# Patient Record
Sex: MALE | Race: BLACK OR AFRICAN AMERICAN | Employment: UNEMPLOYED | URBAN - METROPOLITAN AREA
[De-identification: names, ages, dates, MRNs, and addresses within clinical notes are randomized per-mention and may not be internally consistent; named-entity substitution may affect disease eponyms.]

---

## 2017-02-11 ENCOUNTER — HOSPITAL ENCOUNTER (EMERGENCY)
Age: 67
Discharge: HOME | End: 2017-02-11
Attending: EMERGENCY MEDICINE
Payer: MEDICARE

## 2017-02-11 ENCOUNTER — APPOINTMENT (OUTPATIENT)
Dept: GENERAL RADIOLOGY | Age: 67
End: 2017-02-11
Attending: EMERGENCY MEDICINE
Payer: MEDICARE

## 2017-02-11 VITALS
HEART RATE: 100 BPM | HEIGHT: 67 IN | RESPIRATION RATE: 20 BRPM | SYSTOLIC BLOOD PRESSURE: 181 MMHG | WEIGHT: 284.83 LBS | BODY MASS INDEX: 44.71 KG/M2 | DIASTOLIC BLOOD PRESSURE: 85 MMHG | OXYGEN SATURATION: 99 % | TEMPERATURE: 100 F

## 2017-02-11 DIAGNOSIS — M70.31 BURSITIS OF RIGHT ELBOW: Primary | ICD-10-CM

## 2017-02-11 LAB
ALBUMIN SERPL BCP-MCNC: 2.8 G/DL (ref 3.5–5)
ALBUMIN/GLOB SERPL: 0.5 {RATIO} (ref 1.1–2.2)
ALP SERPL-CCNC: 111 U/L (ref 45–117)
ALT SERPL-CCNC: 20 U/L (ref 12–78)
ANION GAP BLD CALC-SCNC: 6 MMOL/L (ref 5–15)
AST SERPL W P-5'-P-CCNC: 25 U/L (ref 15–37)
BASOPHILS # BLD AUTO: 0 K/UL (ref 0–0.1)
BASOPHILS # BLD: 0 % (ref 0–1)
BILIRUB SERPL-MCNC: 0.6 MG/DL (ref 0.2–1)
BUN SERPL-MCNC: 18 MG/DL (ref 6–20)
BUN/CREAT SERPL: 9 (ref 12–20)
CALCIUM SERPL-MCNC: 8.9 MG/DL (ref 8.5–10.1)
CHLORIDE SERPL-SCNC: 98 MMOL/L (ref 97–108)
CK MB CFR SERPL CALC: 0.3 % (ref 0–2.5)
CK MB SERPL-MCNC: 1 NG/ML (ref 5–25)
CK SERPL-CCNC: 310 U/L (ref 39–308)
CO2 SERPL-SCNC: 29 MMOL/L (ref 21–32)
CREAT SERPL-MCNC: 1.98 MG/DL (ref 0.7–1.3)
CRP SERPL-MCNC: 18.28 MG/DL
DIFFERENTIAL METHOD BLD: ABNORMAL
EOSINOPHIL # BLD: 0 K/UL (ref 0–0.4)
EOSINOPHIL NFR BLD: 0 % (ref 0–7)
ERYTHROCYTE [DISTWIDTH] IN BLOOD BY AUTOMATED COUNT: 14 % (ref 11.5–14.5)
ERYTHROCYTE [SEDIMENTATION RATE] IN BLOOD: 91 MM/HR (ref 0–20)
GLOBULIN SER CALC-MCNC: 5.6 G/DL (ref 2–4)
GLUCOSE SERPL-MCNC: 246 MG/DL (ref 65–100)
HCT VFR BLD AUTO: 33 % (ref 36.6–50.3)
HGB BLD-MCNC: 10.6 G/DL (ref 12.1–17)
LYMPHOCYTES # BLD AUTO: 11 % (ref 12–49)
LYMPHOCYTES # BLD: 1.5 K/UL
MCH RBC QN AUTO: 25.7 PG (ref 26–34)
MCHC RBC AUTO-ENTMCNC: 32.1 G/DL (ref 30–36.5)
MCV RBC AUTO: 80.1 FL (ref 80–99)
MONOCYTES # BLD: 0.9 K/UL (ref 0–1)
MONOCYTES NFR BLD AUTO: 7 % (ref 5–13)
NEUTS SEG # BLD: 10.4 K/UL (ref 1.8–8)
NEUTS SEG NFR BLD AUTO: 82 % (ref 32–75)
PLATELET # BLD AUTO: 274 K/UL (ref 150–400)
POTASSIUM SERPL-SCNC: 5.1 MMOL/L (ref 3.5–5.1)
PROT SERPL-MCNC: 8.4 G/DL (ref 6.4–8.2)
RBC # BLD AUTO: 4.12 M/UL (ref 4.1–5.7)
SODIUM SERPL-SCNC: 133 MMOL/L (ref 136–145)
TROPONIN I SERPL-MCNC: <0.04 NG/ML
WBC # BLD AUTO: 12.8 K/UL (ref 4.1–11.1)

## 2017-02-11 PROCEDURE — 73080 X-RAY EXAM OF ELBOW: CPT

## 2017-02-11 PROCEDURE — 73110 X-RAY EXAM OF WRIST: CPT

## 2017-02-11 PROCEDURE — 85652 RBC SED RATE AUTOMATED: CPT | Performed by: EMERGENCY MEDICINE

## 2017-02-11 PROCEDURE — 71020 XR CHEST PA LAT: CPT

## 2017-02-11 PROCEDURE — A9270 NON-COVERED ITEM OR SERVICE: HCPCS | Performed by: EMERGENCY MEDICINE

## 2017-02-11 PROCEDURE — 74011250637 HC RX REV CODE- 250/637: Performed by: EMERGENCY MEDICINE

## 2017-02-11 PROCEDURE — 36415 COLL VENOUS BLD VENIPUNCTURE: CPT | Performed by: EMERGENCY MEDICINE

## 2017-02-11 PROCEDURE — 99283 EMERGENCY DEPT VISIT LOW MDM: CPT

## 2017-02-11 PROCEDURE — 85025 COMPLETE CBC W/AUTO DIFF WBC: CPT | Performed by: EMERGENCY MEDICINE

## 2017-02-11 PROCEDURE — 86140 C-REACTIVE PROTEIN: CPT | Performed by: EMERGENCY MEDICINE

## 2017-02-11 PROCEDURE — 80053 COMPREHEN METABOLIC PANEL: CPT | Performed by: EMERGENCY MEDICINE

## 2017-02-11 PROCEDURE — 84484 ASSAY OF TROPONIN QUANT: CPT | Performed by: EMERGENCY MEDICINE

## 2017-02-11 PROCEDURE — 74011636637 HC RX REV CODE- 636/637: Performed by: EMERGENCY MEDICINE

## 2017-02-11 PROCEDURE — 82550 ASSAY OF CK (CPK): CPT | Performed by: EMERGENCY MEDICINE

## 2017-02-11 RX ORDER — AMLODIPINE BESYLATE 10 MG/1
TABLET ORAL DAILY
COMMUNITY
End: 2018-02-20

## 2017-02-11 RX ORDER — OXYCODONE AND ACETAMINOPHEN 5; 325 MG/1; MG/1
1 TABLET ORAL
Status: COMPLETED | OUTPATIENT
Start: 2017-02-11 | End: 2017-02-11

## 2017-02-11 RX ORDER — PREDNISONE 20 MG/1
60 TABLET ORAL DAILY
Qty: 15 TAB | Refills: 0 | Status: SHIPPED | OUTPATIENT
Start: 2017-02-11 | End: 2017-02-16

## 2017-02-11 RX ORDER — PREDNISONE 20 MG/1
60 TABLET ORAL
Status: COMPLETED | OUTPATIENT
Start: 2017-02-11 | End: 2017-02-11

## 2017-02-11 RX ADMIN — OXYCODONE HYDROCHLORIDE AND ACETAMINOPHEN 1 TABLET: 5; 325 TABLET ORAL at 14:14

## 2017-02-11 RX ADMIN — PREDNISONE 60 MG: 20 TABLET ORAL at 14:14

## 2017-02-11 NOTE — DISCHARGE INSTRUCTIONS
Bursitis of the Elbow: Care Instructions  Your Care Instructions  Bursitis is pain and swelling of the bursae, which are sacs of fluid that help your joints move smoothly. Olecranon bursitis is a type of bursitis that affects the back of the elbow. This is sometimes called Timothy elbow because the bump that develops looks like the cartoon character Timothy's elbow. Injury, overuse, or prolonged pressure on your elbow can cause this form of bursitis. Sometimes it happens when people have arthritis. It also can occur for unknown reasons. Treatment may include draining fluid from the bursa with a needle. If your doctor thought there was infection, he or she may have prescribed antibiotics. You also may get shots of medicine into the bursa to help the swelling go down. Your elbow should get better in a few days or weeks. Follow-up care is a key part of your treatment and safety. Be sure to make and go to all appointments, and call your doctor if you are having problems. It's also a good idea to know your test results and keep a list of the medicines you take. How can you care for yourself at home? · Take pain medicines exactly as directed. ¨ If the doctor gave you a prescription medicine for pain, take it as prescribed. ¨ If you are not taking a prescription pain medicine, ask your doctor if you can take an over-the-counter medicine. ¨ Do not take two or more pain medicines at the same time unless the doctor told you to. Many pain medicines have acetaminophen, which is Tylenol. Too much acetaminophen (Tylenol) can be harmful. · If your doctor prescribed antibiotics, take them as directed. Do not stop taking them just because you feel better. You need to take the full course of antibiotics. · If your doctor gave you a sling, an elastic bandage, or a compression sleeve, wear it exactly as instructed. · Put ice or a cold pack on your elbow for 10 to 20 minutes at a time.  Try to do this every 1 to 2 hours for the next 3 days (when you are awake) or until the swelling goes down. Put a thin cloth between the ice and your skin. · After 3 days, you can try heat, or alternate heat and ice. · Rest your elbow. Try to stop or reduce any activity that causes pain. · Wear elbow pads during physical activity to prevent injury. · Do not lean your elbows on tables or armrests. When should you call for help? Call your doctor now or seek immediate medical care if:  · Your pain is worse. · You have new or increased swelling in your elbow. · You have a fever. · Your elbow becomes red, or the redness gets worse. · You were given a shot and you have any bleeding or signs of infection (pain, swelling, redness, or pus) around the site of the shot. · You cannot use a joint, or the pain in a joint gets worse. Watch closely for changes in your health, and be sure to contact your doctor if:  · Your pain has not improved after 2 weeks. Where can you learn more? Go to http://vandana-sherri.info/. Enter  in the search box to learn more about \"Bursitis of the Elbow: Care Instructions. \"  Current as of: May 23, 2016  Content Version: 11.1  © 0479-9474 [employer]. Care instructions adapted under license by [employer] (which disclaims liability or warranty for this information). If you have questions about a medical condition or this instruction, always ask your healthcare professional. [place] [address] any warranty or liability for your use of this information. We hope that we have addressed all of your medical concerns. The examination and treatment you received in the Emergency Department were for an emergent problem and were not intended as complete care. It is important that you follow up with your healthcare provider(s) for ongoing care.  If your symptoms worsen or do not improve as expected, and you are unable to reach your usual health care provider(s), you should return to the Emergency Department. Today's healthcare is undergoing tremendous change, and patient satisfaction surveys are one of the many tools to assess the quality of medical care. You may receive a survey from the [employer] regarding your experience in the Emergency Department. I hope that your experience has been completely positive, particularly the medical care that I provided. As such, please participate in the survey; anything less than excellent does not meet my expectations or intentions. [facility] and [facility] participate in nationally recognized quality of care measures. If your blood pressure is greater than 120/80, as reported below, we urge that you seek medical care to address the potential of high blood pressure, commonly known as hypertension. Hypertension can be hereditary or can be caused by certain medical conditions, pain, stress, or \"white coat syndrome. \"       Please make an appointment with your health care provider(s) for follow up of your Emergency Department visit. VITALS:   Patient Vitals for the past 8 hrs:   Temp Pulse Resp BP SpO2   02/11/17 1501 - (!) 104 20 172/89 99 %   02/11/17 1335 100 °F (37.8 °C) (!) 106 18 (!) 155/104 100 %          Thank you for allowing us to provide you with medical care today. We realize that you have many choices for your emergency care needs. Please choose us in the future for any continued health care needs. Delphine Johansen [address], [address]: [phone]            Recent Results (from the past 24 hour(s))   METABOLIC PANEL, COMPREHENSIVE    Collection Time: 02/11/17  2:02 PM   Result Value Ref Range    Sodium 133 (L) 136 - 145 mmol/L    Potassium 5.1 3.5 - 5.1 mmol/L    Chloride 98 97 - 108 mmol/L    CO2 29 21 - 32 mmol/L    Anion gap 6 5 - 15 mmol/L    Glucose 246 (H) 65 - 100 mg/dL    BUN 18 6 - 20 MG/DL    Creatinine 1.98 (H) 0.70 - 1.30 MG/DL    BUN/Creatinine ratio 9 (L) 12 - 20      GFR est AA 41 (L) >60 ml/min/1.73m2    GFR est non-AA 34 (L) >60 ml/min/1.73m2    Calcium 8.9 8.5 - 10.1 MG/DL    Bilirubin, total 0.6 0.2 - 1.0 MG/DL    ALT (SGPT) 20 12 - 78 U/L    AST (SGOT) 25 15 - 37 U/L    Alk. phosphatase 111 45 - 117 U/L    Protein, total 8.4 (H) 6.4 - 8.2 g/dL    Albumin 2.8 (L) 3.5 - 5.0 g/dL    Globulin 5.6 (H) 2.0 - 4.0 g/dL    A-G Ratio 0.5 (L) 1.1 - 2.2     CK W/ CKMB & INDEX    Collection Time: 02/11/17  2:02 PM   Result Value Ref Range     (H) 39 - 308 U/L    CK - MB 1.0 <3.6 NG/ML    CK-MB Index 0.3 0 - 2.5     CBC WITH AUTOMATED DIFF    Collection Time: 02/11/17  2:02 PM   Result Value Ref Range    WBC 12.8 (H) 4.1 - 11.1 K/uL    RBC 4.12 4.10 - 5.70 M/uL    HGB 10.6 (L) 12.1 - 17.0 g/dL    HCT 33.0 (L) 36.6 - 50.3 %    MCV 80.1 80.0 - 99.0 FL    MCH 25.7 (L) 26.0 - 34.0 PG    MCHC 32.1 30.0 - 36.5 g/dL    RDW 14.0 11.5 - 14.5 %    PLATELET 012 258 - 674 K/uL    NEUTROPHILS 82 (H) 32 - 75 %    LYMPHOCYTES 11 (L) 12 - 49 %    MONOCYTES 7 5 - 13 %    EOSINOPHILS 0 0 - 7 %    BASOPHILS 0 0 - 1 %    ABS. NEUTROPHILS 10.4 (H) 1.8 - 8.0 K/UL    ABS. LYMPHOCYTES 1.5 K/UL    ABS. MONOCYTES 0.9 0.0 - 1.0 K/UL    ABS. EOSINOPHILS 0.0 0.0 - 0.4 K/UL    ABS. BASOPHILS 0.0 0.0 - 0.1 K/UL    DF AUTOMATED     TROPONIN I    Collection Time: 02/11/17  2:02 PM   Result Value Ref Range    Troponin-I, Qt. <0.04 <0.08 ng/mL   SED RATE (ESR)    Collection Time: 02/11/17  2:02 PM   Result Value Ref Range    Sed rate, automated 91 (H) 0 - 20 mm/hr   C REACTIVE PROTEIN, QT    Collection Time: 02/11/17  2:02 PM   Result Value Ref Range    C-Reactive protein 18.28 (H) <0.60 mg/dL       Xr Chest Pa Lat    Result Date: 2/11/2017  INDICATION: . R arm pain COMPARISON: Previous chest xray, 7/17/2014. Faye Po FINDINGS: PA and lateral view of the chest. . Lines/tubes/surgical: None. Heart/mediastinum: Unremarkable. Lungs/pleura:  No focal consolidation or mass. No visualized pleural effusion or pneumothorax. Additional Comments: None. Kaitlin Rae IMPRESSION: 1. No radiographic evidence of acute cardiopulmonary disease. Xr Elbow Rt Min 3 V    Result Date: 2/11/2017  XR WRIST RT AP/LAT/OBL MIN 3V, XR ELBOW RT MIN 3 V ,2/11/2017 2:14 PM INDICATION: Additional history: Chronic hand pain which extends up the forearm to the elbow COMPARISON: None . FINDINGS: ELBOW: 3 views The bones, joints, and soft tissues of the elbow are normal.  No elbow joint effusion is identified. . WRIST: 3 views Lucencies in the carpal bones and ulnar styloid. Degenerative changes in the wrists which are age appropriate. No visible fracture or malalignment. .    IMPRESSION:  1. Erosions versus subchondral cyst formation in the carpal bones and possible erosion in the ulnar styloid process. Xr Wrist Rt Ap/lat/obl Min 3v    Result Date: 2/11/2017  XR WRIST RT AP/LAT/OBL MIN 3V, XR ELBOW RT MIN 3 V ,2/11/2017 2:14 PM INDICATION: Additional history: Chronic hand pain which extends up the forearm to the elbow COMPARISON: None . FINDINGS: ELBOW: 3 views The bones, joints, and soft tissues of the elbow are normal.  No elbow joint effusion is identified. . WRIST: 3 views Lucencies in the carpal bones and ulnar styloid. Degenerative changes in the wrists which are age appropriate. No visible fracture or malalignment. .    IMPRESSION:  1. Erosions versus subchondral cyst formation in the carpal bones and possible erosion in the ulnar styloid process.

## 2017-02-11 NOTE — ED NOTES
Patient discharged by Dr. Johnny Dozier. IV removed. Vital signs re-assessed. Patient has no further questions or concerns regarding discharge at this time. Patient given one prescription. Patient dressed self and ambulated to ED lobby.

## 2017-02-11 NOTE — ED PROVIDER NOTES
HPI Comments: R handed male c/o atraumatic R elbow pain' x 2-3d; pt denies HA, vison changes, diff swallowing, CP, SOB, Abd pain, F/Ch, N/V, D/Cons or other current systemic complaints    Patient is a 78 y.o. male presenting with wrist pain and elbow pain. The history is provided by the patient and a relative. Wrist Pain    This is a new problem. The current episode started 2 days ago. The problem occurs constantly. The problem has been gradually worsening. The pain is present in the right elbow and right wrist. The quality of the pain is described as aching, pounding and constant. The pain is mild. Associated symptoms include limited range of motion and stiffness. Pertinent negatives include no numbness, no tingling, no itching, no back pain and no neck pain. The symptoms are aggravated by palpation, movement and activity. He has tried nothing for the symptoms. The treatment provided no relief. There has been no history of extremity trauma. denies   Elbow Pain    Associated symptoms include limited range of motion and stiffness. Pertinent negatives include no numbness, no tingling, no itching, no back pain and no neck pain. denies        Past Medical History:   Diagnosis Date    Anemia     Chronic kidney disease      UTI    Diabetes (HCC)     Gastrointestinal disorder      anemia    Hypertension     Kidney disease, chronic, stage II (GFR 60-89 ml/min)     Neurological disorder      Metabolic Brain Disorder       Past Surgical History:   Procedure Laterality Date    Hx other surgical       never         Family History:   Problem Relation Age of Onset    Diabetes Mother     Cancer Sister        Social History     Social History    Marital status:      Spouse name: N/A    Number of children: N/A    Years of education: N/A     Occupational History    Not on file.      Social History Main Topics    Smoking status: Former Smoker     Packs/day: 0.50     Years: 30.00     Types: Cigarettes     Quit date: 1/1/1994    Smokeless tobacco: Never Used    Alcohol use No    Drug use: No    Sexual activity: Yes     Partners: Female     Other Topics Concern    Not on file     Social History Narrative         ALLERGIES: Review of patient's allergies indicates no known allergies. Review of Systems   Musculoskeletal: Positive for arthralgias, myalgias and stiffness. Negative for back pain, gait problem, joint swelling and neck pain. Skin: Negative for itching. Neurological: Negative for tingling and numbness. All other systems reviewed and are negative. Vitals:    02/11/17 1335 02/11/17 1501 02/11/17 1546   BP: (!) 155/104 172/89 181/85   Pulse: (!) 106 (!) 104 100   Resp: 18 20 20   Temp: 100 °F (37.8 °C)     SpO2: 100% 99% 99%   Weight: 129.2 kg (284 lb 13.4 oz)     Height: 5' 7\" (1.702 m)              Physical Exam   Constitutional: He is oriented to person, place, and time. He appears well-developed and well-nourished. No distress. R handed, NAD, AxOx4, speaking in complete sentences     HENT:   Head: Normocephalic and atraumatic. Nose: Nose normal.   Mouth/Throat: Oropharynx is clear and moist. No oropharyngeal exudate. Eyes: Conjunctivae and EOM are normal. Pupils are equal, round, and reactive to light. Right eye exhibits no discharge. Left eye exhibits no discharge. Neck: Normal range of motion. Neck supple. Cardiovascular: Normal rate, regular rhythm, normal heart sounds and intact distal pulses. Exam reveals no gallop and no friction rub. No murmur heard. Pulmonary/Chest: Effort normal and breath sounds normal. No respiratory distress. He has no wheezes. He has no rales. He exhibits no tenderness. Abdominal: Soft. Bowel sounds are normal. He exhibits no distension and no mass. There is no tenderness. There is no rebound and no guarding. nttp   Musculoskeletal: He exhibits tenderness. He exhibits no edema or deformity.    R elbow - min ttp at lat > medial aspect; skin intact/ no redness/ deformity; decreased rom there/ wrist 2ary to pain/ no s box ttp;  R = L; pt has distal motor/ CV/ Sensation grossly intact all 5 R fingers; Lymphadenopathy:     He has no cervical adenopathy. Neurological: He is alert and oriented to person, place, and time. He has normal reflexes. No cranial nerve deficit. Coordination normal.   Skin: Skin is warm and dry. No rash noted. No erythema. Psychiatric: He has a normal mood and affect. Nursing note and vitals reviewed. MDM  ED Course       Procedures    Chief Complaint   Patient presents with    Wrist Pain    Elbow Pain       2:10 PM  The patients presenting problems have been discussed, and they are in agreement with the care plan formulated and outlined with them. I have encouraged them to ask questions as they arise throughout their visit. MEDICATIONS GIVEN:  Medications   oxyCODONE-acetaminophen (PERCOCET) 5-325 mg per tablet 1 Tab (not administered)   predniSONE (DELTASONE) tablet 60 mg (not administered)       LABS REVIEWED:  Labs Reviewed   METABOLIC PANEL, COMPREHENSIVE   CK W/ CKMB & INDEX   CBC WITH AUTOMATED DIFF   TROPONIN I   SED RATE (ESR)   C REACTIVE PROTEIN, QT   SAMPLES BEING HELD       RADIOLOGY RESULTS:  The following have been ordered and reviewed:  _____________________________________________________________________  _____________________________________________________________________        PROCEDURES:        CONSULTATIONS:       PROGRESS NOTES:      DIAGNOSIS:    1. Bursitis of right elbow        PLAN:  1- elbow bursitis; 'feels better now'; agrees w/ plans;   2 elevated Cr - at baseline;       ED COURSE: The patients hospital course has been uncomplicated. 2:30 PM  'feeling better'; awaiting results;      3:40 PM  Emmett Dallas's  results have been reviewed with him. He has been counseled regarding his diagnosis.   He verbally conveys understanding and agreement of the signs, symptoms, diagnosis, treatment and prognosis and additionally agrees to Call/ Arrange follow up as recommended with Dr. Alex Drake MD in 24 - 48 hours. He also agrees with the care-plan and conveys that all of his questions have been answered. I have also put together some discharge instructions for him that include: 1) educational information regarding their diagnosis, 2) how to care for their diagnosis at home, as well a 3) list of reasons why they would want to return to the ED prior to their follow-up appointment, should their condition change or for concerns.

## 2017-07-09 ENCOUNTER — APPOINTMENT (OUTPATIENT)
Dept: GENERAL RADIOLOGY | Age: 67
End: 2017-07-09
Attending: EMERGENCY MEDICINE
Payer: MEDICARE

## 2017-07-09 ENCOUNTER — HOSPITAL ENCOUNTER (EMERGENCY)
Age: 67
Discharge: HOME | End: 2017-07-09
Attending: EMERGENCY MEDICINE
Payer: MEDICARE

## 2017-07-09 VITALS
HEART RATE: 116 BPM | BODY MASS INDEX: 40.73 KG/M2 | TEMPERATURE: 100 F | DIASTOLIC BLOOD PRESSURE: 81 MMHG | SYSTOLIC BLOOD PRESSURE: 152 MMHG | HEIGHT: 68 IN | OXYGEN SATURATION: 97 % | WEIGHT: 268.74 LBS | RESPIRATION RATE: 21 BRPM

## 2017-07-09 DIAGNOSIS — M25.532 LEFT WRIST PAIN: Primary | ICD-10-CM

## 2017-07-09 LAB
ALBUMIN SERPL BCP-MCNC: 2.9 G/DL (ref 3.5–5)
ALBUMIN/GLOB SERPL: 0.5 {RATIO} (ref 1.1–2.2)
ALP SERPL-CCNC: 98 U/L (ref 45–117)
ALT SERPL-CCNC: 18 U/L (ref 12–78)
ANION GAP BLD CALC-SCNC: 12 MMOL/L (ref 5–15)
APTT PPP: 31.6 SEC (ref 22.1–32.5)
AST SERPL W P-5'-P-CCNC: 16 U/L (ref 15–37)
BASOPHILS # BLD AUTO: 0 K/UL (ref 0–0.1)
BASOPHILS # BLD: 0 % (ref 0–1)
BILIRUB SERPL-MCNC: 0.5 MG/DL (ref 0.2–1)
BUN SERPL-MCNC: 18 MG/DL (ref 6–20)
BUN/CREAT SERPL: 8 (ref 12–20)
CALCIUM SERPL-MCNC: 8.7 MG/DL (ref 8.5–10.1)
CHLORIDE SERPL-SCNC: 97 MMOL/L (ref 97–108)
CO2 SERPL-SCNC: 24 MMOL/L (ref 21–32)
CREAT SERPL-MCNC: 2.22 MG/DL (ref 0.7–1.3)
CRP SERPL-MCNC: 23.13 MG/DL
DIFFERENTIAL METHOD BLD: ABNORMAL
EOSINOPHIL # BLD: 0 K/UL (ref 0–0.4)
EOSINOPHIL NFR BLD: 0 % (ref 0–7)
ERYTHROCYTE [DISTWIDTH] IN BLOOD BY AUTOMATED COUNT: 15.9 % (ref 11.5–14.5)
ERYTHROCYTE [SEDIMENTATION RATE] IN BLOOD: 27 MM/HR (ref 0–20)
GLOBULIN SER CALC-MCNC: 5.3 G/DL (ref 2–4)
GLUCOSE SERPL-MCNC: 246 MG/DL (ref 65–100)
HCT VFR BLD AUTO: 32.9 % (ref 36.6–50.3)
HGB BLD-MCNC: 11.1 G/DL (ref 12.1–17)
INR PPP: 1.1 (ref 0.9–1.1)
LYMPHOCYTES # BLD AUTO: 13 % (ref 12–49)
LYMPHOCYTES # BLD: 1.5 K/UL
MCH RBC QN AUTO: 26 PG (ref 26–34)
MCHC RBC AUTO-ENTMCNC: 33.7 G/DL (ref 30–36.5)
MCV RBC AUTO: 77 FL (ref 80–99)
MONOCYTES # BLD: 0.9 K/UL (ref 0–1)
MONOCYTES NFR BLD AUTO: 8 % (ref 5–13)
NEUTS SEG # BLD: 9.1 K/UL (ref 1.8–8)
NEUTS SEG NFR BLD AUTO: 79 % (ref 32–75)
PLATELET # BLD AUTO: 399 K/UL (ref 150–400)
POTASSIUM SERPL-SCNC: 3.8 MMOL/L (ref 3.5–5.1)
PROT SERPL-MCNC: 8.2 G/DL (ref 6.4–8.2)
PROTHROMBIN TIME: 10.8 SEC (ref 9–11.1)
RBC # BLD AUTO: 4.27 M/UL (ref 4.1–5.7)
RBC MORPH BLD: ABNORMAL
SODIUM SERPL-SCNC: 133 MMOL/L (ref 136–145)
THERAPEUTIC RANGE,PTTT: NORMAL SECS (ref 58–77)
URATE SERPL-MCNC: 9.3 MG/DL (ref 3.5–7.2)
WBC # BLD AUTO: 11.5 K/UL (ref 4.1–11.1)

## 2017-07-09 PROCEDURE — 85025 COMPLETE CBC W/AUTO DIFF WBC: CPT | Performed by: EMERGENCY MEDICINE

## 2017-07-09 PROCEDURE — 86140 C-REACTIVE PROTEIN: CPT | Performed by: EMERGENCY MEDICINE

## 2017-07-09 PROCEDURE — 74011250636 HC RX REV CODE- 250/636: Performed by: EMERGENCY MEDICINE

## 2017-07-09 PROCEDURE — 85730 THROMBOPLASTIN TIME PARTIAL: CPT | Performed by: EMERGENCY MEDICINE

## 2017-07-09 PROCEDURE — 84550 ASSAY OF BLOOD/URIC ACID: CPT | Performed by: EMERGENCY MEDICINE

## 2017-07-09 PROCEDURE — 96374 THER/PROPH/DIAG INJ IV PUSH: CPT

## 2017-07-09 PROCEDURE — L3809 WHFO W/O JOINTS PRE OTS: HCPCS

## 2017-07-09 PROCEDURE — 73110 X-RAY EXAM OF WRIST: CPT

## 2017-07-09 PROCEDURE — 80053 COMPREHEN METABOLIC PANEL: CPT | Performed by: EMERGENCY MEDICINE

## 2017-07-09 PROCEDURE — 85652 RBC SED RATE AUTOMATED: CPT | Performed by: EMERGENCY MEDICINE

## 2017-07-09 PROCEDURE — 96375 TX/PRO/DX INJ NEW DRUG ADDON: CPT

## 2017-07-09 PROCEDURE — 36415 COLL VENOUS BLD VENIPUNCTURE: CPT | Performed by: EMERGENCY MEDICINE

## 2017-07-09 PROCEDURE — 85610 PROTHROMBIN TIME: CPT | Performed by: EMERGENCY MEDICINE

## 2017-07-09 PROCEDURE — 99284 EMERGENCY DEPT VISIT MOD MDM: CPT

## 2017-07-09 RX ORDER — HYDROCODONE BITARTRATE AND ACETAMINOPHEN 5; 325 MG/1; MG/1
1-2 TABLET ORAL
Qty: 15 TAB | Refills: 0 | Status: SHIPPED | OUTPATIENT
Start: 2017-07-09 | End: 2017-07-16

## 2017-07-09 RX ORDER — HYDROMORPHONE HYDROCHLORIDE 1 MG/ML
1 INJECTION, SOLUTION INTRAMUSCULAR; INTRAVENOUS; SUBCUTANEOUS
Status: COMPLETED | OUTPATIENT
Start: 2017-07-09 | End: 2017-07-09

## 2017-07-09 RX ORDER — COLCHICINE 0.6 MG/1
TABLET ORAL
Qty: 3 TAB | Refills: 0 | Status: SHIPPED | OUTPATIENT
Start: 2017-07-09 | End: 2018-03-13

## 2017-07-09 RX ORDER — ONDANSETRON 2 MG/ML
8 INJECTION INTRAMUSCULAR; INTRAVENOUS
Status: COMPLETED | OUTPATIENT
Start: 2017-07-09 | End: 2017-07-09

## 2017-07-09 RX ADMIN — HYDROMORPHONE HYDROCHLORIDE 1 MG: 1 INJECTION, SOLUTION INTRAMUSCULAR; INTRAVENOUS; SUBCUTANEOUS at 20:20

## 2017-07-09 RX ADMIN — ONDANSETRON 8 MG: 2 INJECTION INTRAMUSCULAR; INTRAVENOUS at 20:20

## 2017-07-09 NOTE — Clinical Note
- Colchicine as prescribed. - Norco as needed for pain. - Rest, ice, elevate your wrist. 
- Please call Dr. Odilia Edward office in the AM. Let them know you were seen in the ED for left wrist pain and swelling and need to be seen on Monday. - Return to ED  for fever, increased pain, increased swelling, joint redness, any other concerns.

## 2017-07-10 NOTE — ED PROVIDER NOTES
HPI Comments: The patient presents to the ED with L wrist pain and swelling. He is a poor historian. He notes pain and swelling for a few days. He cannot move his wrist. He denies any fever. He denies any wrist trauma. He does have hx gout. No meds have been taken for pain. The pain is a throbbing, constant pain which is severe. No meds have been taken. The wrist feels stills and he cannot move it. He has not other concerns. His family drove him to the ED. ORTHO:      Patient is a 78 y.o. male presenting with hand swelling. The history is provided by the patient. Hand Swelling           Past Medical History:   Diagnosis Date    Anemia     Chronic kidney disease     UTI    Diabetes ([facility] [address]     Gastrointestinal disorder     anemia    Hypertension     Kidney disease, chronic, stage II (GFR 60-89 ml/min)     Neurological disorder     Metabolic Brain Disorder       Past Surgical History:   Procedure Laterality Date    HX OTHER SURGICAL      never         Family History:   Problem Relation Age of Onset    Diabetes Mother     Cancer Sister        Social History     Social History    Marital status:      Spouse name: N/A    Number of children: N/A    Years of education: N/A     Occupational History    Not on file. Social History Main Topics    Smoking status: Former Smoker     Packs/day: 0.50     Years: 30.00     Types: Cigarettes     Quit date: 1/1/1994    Smokeless tobacco: Never Used    Alcohol use No    Drug use: No    Sexual activity: Yes     Partners: Female     Other Topics Concern    Not on file     Social History Narrative         ALLERGIES: Review of patient's allergies indicates no known allergies. Review of Systems   Constitutional: Negative for appetite change and fever. HENT: Negative for congestion, nosebleeds and sore throat. Eyes: Negative for discharge. Respiratory: Negative for cough and shortness of breath. Cardiovascular: Negative for chest pain. Gastrointestinal: Negative for abdominal pain, diarrhea, nausea and vomiting. Genitourinary: Negative for dysuria. Musculoskeletal: Positive for joint swelling. Skin: Negative for rash. Neurological: Negative for weakness and headaches. Hematological: Negative for adenopathy. Psychiatric/Behavioral: Negative. All other systems reviewed and are negative. Vitals:    07/09/17 1944   BP: (!) 217/100   Pulse: (!) 116   Resp: 21   Temp: 100.4 °F (38 °C)   SpO2: 97%   Weight: 121.9 kg (268 lb 11.9 oz)            Physical Exam   Constitutional: He is oriented to person, place, and time. He appears well-developed and well-nourished. HENT:   Head: Normocephalic and atraumatic. Mouth/Throat: Oropharynx is clear and moist.   Eyes: Conjunctivae are normal.   Neck: Normal range of motion. Neck supple. Cardiovascular: Normal rate, regular rhythm and normal heart sounds. Pulmonary/Chest: Effort normal and breath sounds normal.   Abdominal: Soft. Bowel sounds are normal. There is no tenderness. Musculoskeletal: He exhibits edema and tenderness. L wrist: Warm to touch. Difficult to distinguish erythema. Radial and ulnar pulses present. Joint is very stiff and difficulty to move without pain. Pain even with passive motion. Hand is swollen but passive finger movement without significant pain. Neurological: He is alert and oriented to person, place, and time. Skin: Skin is warm and dry. Psychiatric: He has a normal mood and affect. His behavior is normal.   Nursing note and vitals reviewed. [facility]  ED Course       Procedures    8:52 PM  Blood hemolyzed. Being re-drawn. DDx - trauma, gout, infection, OA    CONSULT:  Rina , NP - advises treating for gout, splint, and F/U with Dr. Francine Narvaez tomorrow. A/P:  1. L wrist pain/swelling - ? Gout. Needs to f/u with ortho in AM as may need eval for septic joint. Colchicine. Winston prn pain. Patient's results have been reviewed with them. Patient and/or family have verbally conveyed their understanding and agreement of the patient's signs, symptoms, diagnosis, treatment and prognosis and additionally agree to follow up as recommended or return to the Emergency Room should their condition change prior to follow-up. Discharge instructions have also been provided to the patient with some educational information regarding their diagnosis as well a list of reasons why they would want to return to the ER prior to their follow-up appointment should their condition change.

## 2017-07-10 NOTE — DISCHARGE INSTRUCTIONS
We hope that we have addressed all of your medical concerns. The examination and treatment you received in the Emergency Department were for an emergent problem and were not intended as complete care. It is important that you follow up with your healthcare provider(s) for ongoing care. If your symptoms worsen or do not improve as expected, and you are unable to reach your usual health care provider(s), you should return to the Emergency Department. Today's healthcare is undergoing tremendous change, and patient satisfaction surveys are one of the many tools to assess the quality of medical care. You may receive a survey from the [employer] regarding your experience in the Emergency Department. I hope that your experience has been completely positive, particularly the medical care that I provided. As such, please participate in the survey; anything less than excellent does not meet my expectations or intentions. Formerly [facility] and [employer] participate in nationally recognized quality of care measures. If your blood pressure is greater than 120/80, as reported below, we urge that you seek medical care to address the potential of high blood pressure, commonly known as hypertension. Hypertension can be hereditary or can be caused by certain medical conditions, pain, stress, or \"white coat syndrome. \"       Please make an appointment with your health care provider(s) for follow up of your Emergency Department visit.        VITALS:   Patient Vitals for the past 8 hrs:   Temp Pulse Resp BP SpO2   07/09/17 2200 - - - 152/81 97 %   07/09/17 2130 - - - 172/74 96 %   07/09/17 2110 100 °F (37.8 °C) - - - -   07/09/17 2053 - - - - 96 %   07/09/17 2050 - - - 184/68 -   07/09/17 2030 - - - 165/86 94 %   07/09/17 2019 - - - (!) 176/92 98 %   07/09/17 1944 100.4 °F (38 °C) (!) 116 21 (!) 217/100 97 %          Thank you for allowing us to provide you with medical care today. We realize that you have many choices for your emergency care needs. Please choose us in the future for any continued health care needs. Michelene Goldberg Latesha Arevalo, [address] [place], [place] [address] 20.   Office: [phone]            Recent Results (from the past 24 hour(s))   CBC WITH AUTOMATED DIFF    Collection Time: 07/09/17  8:19 PM   Result Value Ref Range    WBC 11.5 (H) 4.1 - 11.1 K/uL    RBC 4.27 4. 10 - 5.70 M/uL    HGB 11.1 (L) 12.1 - 17.0 g/dL    HCT 32.9 (L) 36.6 - 50.3 %    MCV 77.0 (L) 80.0 - 99.0 FL    MCH 26.0 26.0 - 34.0 PG    MCHC 33.7 30.0 - 36.5 g/dL    RDW 15.9 (H) 11.5 - 14.5 %    PLATELET 426 626 - 385 K/uL    NEUTROPHILS 79 (H) 32 - 75 %    LYMPHOCYTES 13 12 - 49 %    MONOCYTES 8 5 - 13 %    EOSINOPHILS 0 0 - 7 %    BASOPHILS 0 0 - 1 %    ABS. NEUTROPHILS 9.1 (H) 1.8 - 8.0 K/UL    ABS. LYMPHOCYTES 1.5 K/UL    ABS. MONOCYTES 0.9 0.0 - 1.0 K/UL    ABS. EOSINOPHILS 0.0 0.0 - 0.4 K/UL    ABS. BASOPHILS 0.0 0.0 - 0.1 K/UL    DF SMEAR SCANNED      RBC COMMENTS MICROCYTOSIS  1+        RBC COMMENTS POLYCHROMASIA  1+        RBC COMMENTS SCHISTOCYTES  PRESENT       SED RATE (ESR)    Collection Time: 07/09/17  8:19 PM   Result Value Ref Range    Sed rate, automated 27 (H) 0 - 20 mm/hr   METABOLIC PANEL, COMPREHENSIVE    Collection Time: 07/09/17  9:01 PM   Result Value Ref Range    Sodium 133 (L) 136 - 145 mmol/L    Potassium 3.8 3.5 - 5.1 mmol/L    Chloride 97 97 - 108 mmol/L    CO2 24 21 - 32 mmol/L    Anion gap 12 5 - 15 mmol/L    Glucose 246 (H) 65 - 100 mg/dL    BUN 18 6 - 20 MG/DL    Creatinine 2.22 (H) 0.70 - 1.30 MG/DL    BUN/Creatinine ratio 8 (L) 12 - 20      GFR est AA 36 (L) >60 ml/min/1.73m2    GFR est non-AA 30 (L) >60 ml/min/1.73m2    Calcium 8.7 8.5 - 10.1 MG/DL    Bilirubin, total 0.5 0.2 - 1.0 MG/DL    ALT (SGPT) 18 12 - 78 U/L    AST (SGOT) 16 15 - 37 U/L    Alk.  phosphatase 98 45 - 117 U/L    Protein, total 8.2 6.4 - 8.2 g/dL    Albumin 2.9 (L) 3.5 - 5.0 g/dL    Globulin 5.3 (H) 2.0 - 4.0 g/dL    A-G Ratio 0.5 (L) 1.1 - 2.2     C REACTIVE PROTEIN, QT    Collection Time: 07/09/17  9:01 PM   Result Value Ref Range    C-Reactive protein 23.13 (H) <0.60 mg/dL   PROTHROMBIN TIME + INR    Collection Time: 07/09/17  9:01 PM   Result Value Ref Range    INR 1.1 0.9 - 1.1      Prothrombin time 10.8 9.0 - 11.1 sec   PTT    Collection Time: 07/09/17  9:01 PM   Result Value Ref Range    aPTT 31.6 22.1 - 32.5 sec    aPTT, therapeutic range     58.0 - 77.0 SECS       Xr Wrist Lt Ap/lat/obl Min 3v    Result Date: 7/9/2017  EXAM: XR WRIST LT AP/LAT/OBL MIN 3V INDICATION:  pain, swelling, wrist is warm. ? gout vs infection. FINDINGS: Three  views of the left wrist demonstrate no fracture or dislocation. There is mild osteoarthritis. There are some small cysts in the carpal bones. There is soft tissue swelling without calcification. There is no bony erosion. IMPRESSION: No bony erosion. Osteoarthritis. Soft tissue swelling. No fracture. Joint Pain: Care Instructions  Your Care Instructions  Many people have small aches and pains from overuse or injury to muscles and joints. Joint injuries often happen during sports or recreation, work tasks, or projects around the home. An overuse injury can happen when you put too much stress on a joint or when you do an activity that stresses the joint over and over, such as using the computer or rowing a boat. You can take action at home to help your muscles and joints get better. You should feel better in 1 to 2 weeks, but it can take 3 months or more to heal completely. Follow-up care is a key part of your treatment and safety. Be sure to make and go to all appointments, and call your doctor if you are having problems. It's also a good idea to know your test results and keep a list of the medicines you take. How can you care for yourself at home?   · Do not put weight on the injured joint for at least a day or two.  · For the first day or two after an injury, do not take hot showers or baths, and do not use hot packs. The heat could make swelling worse. · Put ice or a cold pack on the sore joint for 10 to 20 minutes at a time. Try to do this every 1 to 2 hours for the next 3 days (when you are awake) or until the swelling goes down. Put a thin cloth between the ice and your skin. · Wrap the injury in an elastic bandage. Do not wrap it too tightly because this can cause more swelling. · Prop up the sore joint on a pillow when you ice it or anytime you sit or lie down during the next 3 days. Try to keep it above the level of your heart. This will help reduce swelling. · Take an over-the-counter pain medicine, such as acetaminophen (Tylenol), ibuprofen (Advil, Motrin), or naproxen (Aleve). Read and follow all instructions on the label. · After 1 or 2 days of rest, begin moving the joint gently. While the joint is still healing, you can begin to exercise using activities that do not strain or hurt the painful joint. When should you call for help? Call your doctor now or seek immediate medical care if:  · You have signs of infection, such as:  ¨ Increased pain, swelling, warmth, and redness. ¨ Red streaks leading from the joint. ¨ A fever. Watch closely for changes in your health, and be sure to contact your doctor if:  · Your movement or symptoms are not getting better after 1 to 2 weeks of home treatment. Where can you learn more? Go to http://vandana-sherri.info/. Enter P205 in the search box to learn more about \"Joint Pain: Care Instructions. \"  Current as of: March 21, 2017  Content Version: 11.3  © 6628-6022 [employer]. Care instructions adapted under license by [employer] (which disclaims liability or warranty for this information).  If you have questions about a medical condition or this instruction, always ask your healthcare professional. [facility] dis     Gout: Care Instructions  Your Care Instructions  Gout is a form of arthritis caused by a buildup of uric acid crystals in a joint. It causes sudden attacks of pain, swelling, redness, and stiffness, usually in one joint, especially the big toe. Gout usually comes on without a cause. But it can be brought on by drinking alcohol (especially beer) or eating seafood and red meat. Taking certain medicines, such as diuretics or aspirin, also can bring on an attack of gout. Taking your medicines as prescribed and following up with your doctor regularly can help you avoid gout attacks in the future. Follow-up care is a key part of your treatment and safety. Be sure to make and go to all appointments, and call your doctor if you are having problems. Its also a good idea to know your test results and keep a list of the medicines you take. How can you care for yourself at home? · If the joint is swollen, put ice or a cold pack on the area for 10 to 20 minutes at a time. Put a thin cloth between the ice and your skin. · Prop up the sore limb on a pillow when you ice it or anytime you sit or lie down during the next 3 days. Try to keep it above the level of your heart. This will help reduce swelling. · Rest sore joints. Avoid activities that put weight or strain on the joints for a few days. Take short rest breaks from your regular activities during the day. · Take your medicines exactly as prescribed. Call your doctor if you think you are having a problem with your medicine. · Take pain medicines exactly as directed. ¨ If the doctor gave you a prescription medicine for pain, take it as prescribed. ¨ If you are not taking a prescription pain medicine, ask your doctor if you can take an over-the-counter medicine. · Eat less seafood and red meat. · Check with your doctor before drinking alcohol. · Losing weight, if you are overweight, may help reduce attacks of gout. But do not go on a [employer]. \" Losing a lot of weight in a short amount of time can cause a gout attack. When should you call for help? Call your doctor now or seek immediate medical care if:  · You have a fever. · The joint is so painful you cannot use it. · You have sudden, unexplained swelling, redness, warmth, or severe pain in one or more joints. Watch closely for changes in your health, and be sure to contact your doctor if:  · You have joint pain. · Your symptoms get worse or are not improving after 2 or 3 days. Where can you learn more? Go to http://vandana-sherri.info/. Enter V277 in the search box to learn more about \"Gout: Care Instructions. \"  Current as of: 2016  Content Version: 11.3  © 1143-0657 [employer]. Care instructions adapted under license by [employer] (which disclaims liability or warranty for this information). If you have questions about a medical condition or this instruction, always ask your healthcare professional. [place] [address] any warranty or liability for your use of this information. Employee Benefit Plans Activation    Thank you for requesting access to Employee Benefit Plans. Please follow the instructions below to securely access and download your online medical record. Employee Benefit Plans allows you to send messages to your doctor, view your test results, renew your prescriptions, schedule appointments, and more. How Do I Sign Up? 1. In your internet browser, go to [email or web address]  2. Click on the First Time User? Click Here link in the Sign In box. You will be redirect to the New Member Sign Up page. 3. Enter your Employee Benefit Plans Access Code exactly as it appears below. You will not need to use this code after youve completed the sign-up process. If you do not sign up before the expiration date, you must request a new code. Employee Benefit Plans Access Code: [email or web address]  Expires: 10/7/2017 10:35 PM (This is the date your Employee Benefit Plans access code will )    4.  Enter the last four digits of your Social Security Number (xxxx) and Date of Birth (mm/dd/yyyy) as indicated and click Submit. You will be taken to the next sign-up page. 5. Create a M[employer]work ID. This will be your [employer]work login ID and cannot be changed, so think of one that is secure and easy to remember. 6. Create a [employer]work password. You can change your password at any time. 7. Enter your Password Reset Question and Answer. This can be used at a later time if you forget your password. 8. Enter your e-mail address. You will receive e-mail notification when new information is available in [address] 9. Click Sign Up. You can now view and download portions of your medical record. 10. Click the Download Summary menu link to download a portable copy of your medical information. Additional Information    If you have questions, please visit the Frequently Asked Questions section of the [employer] website at [email or web address] Remember, M[employer]work is NOT to be used for urgent needs. For medical emergencies, dial 911.

## 2017-07-10 NOTE — ED NOTES
Discharge note: The patient was discharged home in stable condition, accompanied by family member. The patient is alert and oriented, is in no respiratory distress and has vital signs noted. The patient's diagnosis, condition and treatment were explained to patient by Dr Susanna Brenann and reinforced by nurse. The patient expressed understanding of discharge instructions, prescriptions, and plan of care. A discharge plan has been developed. A  was not involved in the process. Patient offered a wheelchair to ED lobby for discharge but declined at this time. Patient ambulatory to ED lobby to go home with family member.

## 2017-07-10 NOTE — ED NOTES
AIDET communication provided and informed of purposeful rounding to include collaboration of entire care team; patient acknowledged understanding.

## 2017-07-10 NOTE — ED NOTES
AIDET communication provided and informed of purposeful rounding to include collaboration of entire care team; patient acknowledged understanding. Family at bedside. Pillow given for comfort.

## 2017-10-07 ENCOUNTER — HOSPITAL ENCOUNTER (EMERGENCY)
Age: 67
Discharge: HOME | End: 2017-10-07
Attending: EMERGENCY MEDICINE
Payer: MEDICARE

## 2017-10-07 ENCOUNTER — APPOINTMENT (OUTPATIENT)
Dept: GENERAL RADIOLOGY | Age: 67
End: 2017-10-07
Attending: EMERGENCY MEDICINE
Payer: MEDICARE

## 2017-10-07 ENCOUNTER — APPOINTMENT (OUTPATIENT)
Dept: ULTRASOUND IMAGING | Age: 67
End: 2017-10-07
Attending: EMERGENCY MEDICINE
Payer: MEDICARE

## 2017-10-07 ENCOUNTER — APPOINTMENT (OUTPATIENT)
Dept: MRI IMAGING | Age: 67
End: 2017-10-07
Attending: EMERGENCY MEDICINE
Payer: MEDICARE

## 2017-10-07 VITALS
DIASTOLIC BLOOD PRESSURE: 82 MMHG | OXYGEN SATURATION: 98 % | RESPIRATION RATE: 20 BRPM | WEIGHT: 262.13 LBS | BODY MASS INDEX: 39.86 KG/M2 | HEART RATE: 98 BPM | TEMPERATURE: 98.7 F | SYSTOLIC BLOOD PRESSURE: 159 MMHG

## 2017-10-07 DIAGNOSIS — M79.671 RIGHT FOOT PAIN: Primary | ICD-10-CM

## 2017-10-07 LAB
ALBUMIN SERPL-MCNC: 2.5 G/DL (ref 3.5–5)
ALBUMIN/GLOB SERPL: 0.4 {RATIO} (ref 1.1–2.2)
ALP SERPL-CCNC: 98 U/L (ref 45–117)
ALT SERPL-CCNC: 20 U/L (ref 12–78)
ANION GAP SERPL CALC-SCNC: 11 MMOL/L (ref 5–15)
AST SERPL-CCNC: 32 U/L (ref 15–37)
BASOPHILS # BLD: 0 K/UL (ref 0–0.1)
BASOPHILS NFR BLD: 0 % (ref 0–1)
BILIRUB SERPL-MCNC: 0.6 MG/DL (ref 0.2–1)
BUN SERPL-MCNC: 24 MG/DL (ref 6–20)
BUN/CREAT SERPL: 10 (ref 12–20)
CALCIUM SERPL-MCNC: 8.7 MG/DL (ref 8.5–10.1)
CHLORIDE SERPL-SCNC: 97 MMOL/L (ref 97–108)
CO2 SERPL-SCNC: 23 MMOL/L (ref 21–32)
CREAT SERPL-MCNC: 2.49 MG/DL (ref 0.7–1.3)
CRP SERPL-MCNC: 20.44 MG/DL
D DIMER PPP FEU-MCNC: 1.1 MG/L FEU (ref 0–0.65)
DIFFERENTIAL METHOD BLD: ABNORMAL
EOSINOPHIL # BLD: 0 K/UL (ref 0–0.4)
EOSINOPHIL NFR BLD: 0 % (ref 0–7)
ERYTHROCYTE [DISTWIDTH] IN BLOOD BY AUTOMATED COUNT: 15.8 % (ref 11.5–14.5)
ERYTHROCYTE [SEDIMENTATION RATE] IN BLOOD: 117 MM/HR (ref 0–20)
GLOBULIN SER CALC-MCNC: 6 G/DL (ref 2–4)
GLUCOSE SERPL-MCNC: 197 MG/DL (ref 65–100)
HCT VFR BLD AUTO: 31.2 % (ref 36.6–50.3)
HGB BLD-MCNC: 10.3 G/DL (ref 12.1–17)
LYMPHOCYTES # BLD: 2 K/UL
LYMPHOCYTES NFR BLD: 17 % (ref 12–49)
MCH RBC QN AUTO: 25.6 PG (ref 26–34)
MCHC RBC AUTO-ENTMCNC: 33 G/DL (ref 30–36.5)
MCV RBC AUTO: 77.6 FL (ref 80–99)
MONOCYTES # BLD: 1 K/UL (ref 0–1)
MONOCYTES NFR BLD: 8 % (ref 5–13)
NEUTS SEG # BLD: 8.9 K/UL (ref 1.8–8)
NEUTS SEG NFR BLD: 75 % (ref 32–75)
PLATELET # BLD AUTO: 289 K/UL (ref 150–400)
POTASSIUM SERPL-SCNC: 4.4 MMOL/L (ref 3.5–5.1)
PROT SERPL-MCNC: 8.5 G/DL (ref 6.4–8.2)
RBC # BLD AUTO: 4.02 M/UL (ref 4.1–5.7)
SODIUM SERPL-SCNC: 131 MMOL/L (ref 136–145)
WBC # BLD AUTO: 11.9 K/UL (ref 4.1–11.1)

## 2017-10-07 PROCEDURE — 80053 COMPREHEN METABOLIC PANEL: CPT | Performed by: EMERGENCY MEDICINE

## 2017-10-07 PROCEDURE — 96365 THER/PROPH/DIAG IV INF INIT: CPT

## 2017-10-07 PROCEDURE — L4360 PNEUMAT WALKING BOOT PRE CST: HCPCS

## 2017-10-07 PROCEDURE — 93971 EXTREMITY STUDY: CPT

## 2017-10-07 PROCEDURE — 74011250637 HC RX REV CODE- 250/637: Performed by: EMERGENCY MEDICINE

## 2017-10-07 PROCEDURE — 85379 FIBRIN DEGRADATION QUANT: CPT | Performed by: EMERGENCY MEDICINE

## 2017-10-07 PROCEDURE — 99283 EMERGENCY DEPT VISIT LOW MDM: CPT

## 2017-10-07 PROCEDURE — 86140 C-REACTIVE PROTEIN: CPT | Performed by: EMERGENCY MEDICINE

## 2017-10-07 PROCEDURE — 74011250636 HC RX REV CODE- 250/636: Performed by: EMERGENCY MEDICINE

## 2017-10-07 PROCEDURE — 73630 X-RAY EXAM OF FOOT: CPT

## 2017-10-07 PROCEDURE — 73718 MRI LOWER EXTREMITY W/O DYE: CPT

## 2017-10-07 PROCEDURE — 85652 RBC SED RATE AUTOMATED: CPT | Performed by: EMERGENCY MEDICINE

## 2017-10-07 PROCEDURE — 85025 COMPLETE CBC W/AUTO DIFF WBC: CPT | Performed by: EMERGENCY MEDICINE

## 2017-10-07 PROCEDURE — 74011000258 HC RX REV CODE- 258: Performed by: EMERGENCY MEDICINE

## 2017-10-07 PROCEDURE — 36415 COLL VENOUS BLD VENIPUNCTURE: CPT | Performed by: EMERGENCY MEDICINE

## 2017-10-07 RX ORDER — SULFAMETHOXAZOLE AND TRIMETHOPRIM 800; 160 MG/1; MG/1
1 TABLET ORAL
Status: COMPLETED | OUTPATIENT
Start: 2017-10-07 | End: 2017-10-07

## 2017-10-07 RX ORDER — SULFAMETHOXAZOLE AND TRIMETHOPRIM 800; 160 MG/1; MG/1
1 TABLET ORAL 2 TIMES DAILY
Qty: 20 TAB | Refills: 0 | Status: SHIPPED | OUTPATIENT
Start: 2017-10-07 | End: 2017-10-17

## 2017-10-07 RX ORDER — AMOXICILLIN AND CLAVULANATE POTASSIUM 500; 125 MG/1; MG/1
1 TABLET, FILM COATED ORAL 2 TIMES DAILY
Qty: 20 TAB | Refills: 0 | Status: SHIPPED | OUTPATIENT
Start: 2017-10-07 | End: 2017-10-17

## 2017-10-07 RX ADMIN — SULFAMETHOXAZOLE AND TRIMETHOPRIM 1 TABLET: 800; 160 TABLET ORAL at 04:38

## 2017-10-07 RX ADMIN — SODIUM CHLORIDE 3 G: 900 INJECTION, SOLUTION INTRAVENOUS at 04:37

## 2017-10-07 NOTE — ED NOTES
Pain in right foot \"has calmed down a lot. \" Patient and family informed of delay waiting for MRI tech to arrive. Watching TV and talking with each other at this time.  MRI checklist completed and pertanent information relayed to radiology

## 2017-10-07 NOTE — ED PROVIDER NOTES
HPI Comments: Cullen Nickerson is a 76 yo M with history of gout, CKD, HTN metabolic brain disorder and diabetes who presents to the ED complaining of right foot pain and swelling. Family is with patient and states that he has had pain and swelling in right midfoot for the past 4 days. He has not had injury. He has not had fever. Past Medical History:   Diagnosis Date    Anemia     Chronic kidney disease     UTI    Diabetes (HCC)     Gastrointestinal disorder     anemia    Hypertension     Kidney disease, chronic, stage II (GFR 60-89 ml/min)     Neurological disorder     Metabolic Brain Disorder       Past Surgical History:   Procedure Laterality Date    HX OTHER SURGICAL      never         Family History:   Problem Relation Age of Onset    Diabetes Mother     Cancer Sister        Social History     Social History    Marital status:      Spouse name: N/A    Number of children: N/A    Years of education: N/A     Occupational History    Not on file. Social History Main Topics    Smoking status: Former Smoker     Packs/day: 0.50     Years: 30.00     Types: Cigarettes     Quit date: 1/1/1994    Smokeless tobacco: Never Used    Alcohol use No    Drug use: No    Sexual activity: Yes     Partners: Female     Other Topics Concern    Not on file     Social History Narrative         ALLERGIES: Review of patient's allergies indicates no known allergies. Review of Systems   Constitutional: Negative for fever. HENT: Negative for sore throat. Eyes: Negative for visual disturbance. Respiratory: Negative for cough. Cardiovascular: Negative for chest pain. Gastrointestinal: Negative for abdominal pain. Genitourinary: Negative for dysuria. Musculoskeletal: Negative for back pain. Right foot pain   Skin: Negative for rash. Neurological: Negative for headaches.        Vitals:    10/07/17 0031 10/07/17 0200   BP: 198/88 168/80   Pulse:  (!) 110   Resp: 22 20   Temp: 98.7 °F (37.1 °C)    SpO2: 99% 100%   Weight: 118.9 kg (262 lb 2 oz)             Physical Exam   Constitutional: He appears well-developed and well-nourished. No distress. HENT:   Head: Normocephalic and atraumatic. Mouth/Throat: Oropharynx is clear and moist.   Eyes: Conjunctivae and EOM are normal.   Neck: Normal range of motion and phonation normal.   Cardiovascular: Normal rate and intact distal pulses. Pulmonary/Chest: Effort normal. No respiratory distress. Abdominal: He exhibits no distension. Musculoskeletal: Normal range of motion. Right foot: There is tenderness and swelling (mid foot with noticable warmth but no erythema of skin, no joint swelling). Neurological: He is alert. He is not disoriented. He exhibits normal muscle tone. Skin: Skin is warm and dry. Nursing note and vitals reviewed. MDM   Mid foot pain with vague warmth - patient has history of gout but pain and erythema does not seem to be centered on joint. Consider osteomyelitis vs mild cellultitis. Check labs ESR, CRP. XR right foot. May need MRI. ED Course     2:02 AM  Discussed with Dr. Kavita Bishop, radiologist.  Vague midfoot pain erythema, concern for osteomyelitis, also considering DVT as ddimer is elevated but patient does not have calf swelling, only foot. Agrees with plan for MR now to r/o osteomyelitis, will hold off on DVT US.     4:05 AM  MRI prelim read significant for diffuse soft tissue edema, no evidence of abscess. Focal marrow edema in the navicular bone could indicated osteomyelitis. Discussed with Dr. Sammy Aguilar, orthopedics who felt that considering his history of diabetes, could also have early charcot joint as well. Recommended putting in ortho boot, prescribing antibiotics and follow-up as out patient with Dr. Naa Thomas this week.      4:22 AM  As plan is for outpatient management and follow-up with ortho will need US to r/o DVT prior to discharge. IV Unasyn ordered now as well as bactrim. Plan on discharge home with bactrim and augmentin if US normal.    Procedures

## 2017-10-07 NOTE — ED NOTES
The patient was discharged home by ER MD and Nurse in stable condition, accompanied by parent/guardian . The patient is alert and oriented, is in no respiratory distress and has vital signs within normal limits . The patient's diagnosis, condition and treatment were explained to patient or parent/guardian. The patient/responsible party expressed understanding. prescriptions given to pt. No work/school note given to pt. A discharge plan has been developed. A  was not involved in the process. Aftercare instructions were given to the patient. Discharged via wheel chair. Continues \"feeling better. \"

## 2017-10-18 ENCOUNTER — OFFICE VISIT (OUTPATIENT)
Dept: FAMILY MEDICINE CLINIC | Age: 67
End: 2017-10-18

## 2017-10-18 VITALS
OXYGEN SATURATION: 100 % | SYSTOLIC BLOOD PRESSURE: 144 MMHG | HEIGHT: 68 IN | BODY MASS INDEX: 38.13 KG/M2 | RESPIRATION RATE: 18 BRPM | WEIGHT: 251.6 LBS | HEART RATE: 102 BPM | TEMPERATURE: 97 F | DIASTOLIC BLOOD PRESSURE: 68 MMHG

## 2017-10-18 DIAGNOSIS — N18.2 CKD (CHRONIC KIDNEY DISEASE) STAGE 2, GFR 60-89 ML/MIN: Chronic | ICD-10-CM

## 2017-10-18 DIAGNOSIS — E78.00 PURE HYPERCHOLESTEROLEMIA: ICD-10-CM

## 2017-10-18 DIAGNOSIS — E11.9 CONTROLLED TYPE 2 DIABETES MELLITUS WITHOUT COMPLICATION, WITHOUT LONG-TERM CURRENT USE OF INSULIN (HCC): Primary | ICD-10-CM

## 2017-10-18 DIAGNOSIS — D64.9 ANEMIA, UNSPECIFIED TYPE: ICD-10-CM

## 2017-10-18 NOTE — PROGRESS NOTES
CC:  Chief Complaint   Patient presents with   [facility] Wellness Visit          HISTORY OF PRESENT ILLNESS  Tina Villalobos is a 75 y.o. male. HPI Comments: 66M with h/o Type II DM, HTN, HLD and CKD. The patient has not been seen in our office in over a year. He recently was seen in the ER for onset of R-foot pain. The patient is a very, very poor historian. I have asked his family members to come back to the visit with him. He has not been taking his medications and really doesn't know what he is taking. He was recently seen in the ER for right foot pain. Underwent MRI, XR and Duplex US of the R-foot this showed:    MRI prelim read significant for diffuse soft tissue edema, no evidence of abscess. Focal marrow edema in the navicular bone could indicated osteomyelitis. Discussed with Dr. Lisa Mitchell, orthopedics who felt that considering his history of diabetes, could also have early charcot joint as well. Recommended putting in ortho boot, prescribing antibiotics and follow-up as out patient with Dr. Melanie Neil this week. Unfortunately, he has not been able to see Dr. Gilford Jesus yet. Has an appointment scheduled for 10/31/2017. I have verified this with the family. I have instructed the family that whenever he comes to our visits, he must bring his medications and that he needs to be seen every 4-months. Non-compliance from now on will result in dropping him from the practice. ROS:  Review of Systems   Musculoskeletal: Positive for joint pain (Right foot). All other systems reviewed and are negative.     Past Medical History:  Patient Active Problem List    Diagnosis Date Noted    Anemia 07/17/2014    Acute renal failure ([address] 06/23/2014    Diabetes mellitus type 2, controlled, without complications ([address] 52/59/4351    Protein calorie malnutrition ([facility] 06/23/2014    HTN (hypertension) 06/23/2014    CKD (chronic kidney disease) stage 2, GFR 60-89 ml/min 06/23/2014 Medications:  Current Outpatient Prescriptions   Medication Sig Dispense Refill    colchicine (COLCRYS) 0.6 mg tablet Take 2 tabs by mouth now. Take 1 tab in 1 hour. 3 Tab 0    amLODIPine (NORVASC) 10 mg tablet Take  by mouth daily.  metoprolol tartrate (LOPRESSOR) 25 mg tablet Take 25 mg by mouth two (2) times a day.  insulin syringe-needle U-100 Dispense ultrafine 1 mL syringes with 28 gauge needle Dx: E11.65 100 Syringe 11    ferrous sulfate (IRON) 325 mg (65 mg iron) EC tablet Take 1 tablet by mouth three (3) times daily (with meals). 100 tablet 0    glucose blood VI test strips (ASCENSIA CONTOUR) strip Check glucose TID 1 Package 11    Blood-Glucose Meter monitoring kit Check sugars 4 times daily. 1 Kit 1    pneumococcal 13 france conj dip (PREVNAR-13) 0.5 mL syrg injection 0.5 mL by IntraMUSCular route PRIOR TO DISCHARGE for 1 dose. 0.5 mL 0    insulin glargine (LANTUS) 100 unit/mL injection 10 Units at bedtime. (Patient not taking: Reported on 10/18/2017) 1 Vial 3    levothyroxine (SYNTHROID) 150 mcg tablet Take 1 Tab by mouth Daily (before breakfast). 30 Tab 5    LACTOSE-FREE FOOD (ENSURE COMPLETE PO) Take  by mouth daily. COULD NOT RECONCILE MEDICATIONS DUE TO PATIENT NOT KNOWING WHAT HE IS TAKING AND DID NOT BRING IN MEDICATIONS. OBJECTIVE:  /68 (BP 1 Location: Left arm, BP Patient Position: Sitting)  Pulse (!) 102  Temp 97 °F (36.1 °C) (Oral)   Resp 18  Ht 5' 8\" (1.727 m)  Wt 251 lb 9.6 oz (114.1 kg)  SpO2 100%  BMI 38.26 kg/m2  Physical Exam   Constitutional:   Unkempt     HENT:   Edentulous     Eyes: Conjunctivae and EOM are normal. Pupils are equal, round, and reactive to light. Neck: Normal range of motion. Neck supple. Cardiovascular: Normal rate and regular rhythm. Pulmonary/Chest: Effort normal and breath sounds normal.   Neurological: He is alert. Skin: Skin is warm. Psychiatric: He has a normal mood and affect.  Cognition and memory are impaired. He expresses inappropriate judgment. Nursing note and vitals reviewed. ASSESSMENT and PLAN    ICD-10-CM ICD-9-CM    1. Controlled type 2 diabetes mellitus without complication, without long-term current use of insulin (HCC) E11.9 250.00 HEMOGLOBIN A1C WITH EAG      METABOLIC PANEL, COMPREHENSIVE   2. Anemia, unspecified type D64.9 285.9    3. CKD (chronic kidney disease) stage 2, GFR 60-89 ml/min N18.2 585.2 HEMOGLOBIN A1C WITH EAG   4. Pure hypercholesterolemia E78.00 272.0 LIPID PANEL      CRP, HIGH SENSITIVITY      CBC WITH AUTOMATED DIFF     67M who was very difficult to understand and further more difficult to get a valid history. His KUMAR and BOY are with him and were not aware of all of his medical concerns. He has not been in our office in over a year. Will send for the above labs. Will await outside medical records. Discussed with him the vital importance of being a partner in his health care. He has not been. He may have some learning disability, but mostly, I am concerned that he is not engaged. I have discussed the diagnosis with the patient and the intended treatment plan as seen in the above orders. The patient has received an after-visit summary and questions were answered concerning future plans. Asked to return should symptoms worsen or not improve with treatment. Any pending labs and studies will be relayed to patient when they become available. Pt verbalizes understanding of plan of care and denies further questions or concerns at this time. Follow-up Disposition:  Return in about 1 month (around 11/18/2017).

## 2017-10-18 NOTE — PROGRESS NOTES
Chief Complaint   Patient presents with   [facility] Visit          1. Have you been to the ER, urgent care clinic since your last visit? Hospitalized since your last visit? NO    2. Have you seen or consulted any other health care providers outside of the [address] [place] [address] since your last visit? Include any pap smears or colon screening.  NO

## 2017-10-18 NOTE — MR AVS SNAPSHOT
Visit Information Date & Time Provider Department Dept. Phone Encounter #  
 10/18/2017  1:00 PM Ion Butcher Miguel [phone] [id number] Follow-up Instructions Return in about 1 month (around 11/18/2017). Upcoming Health Maintenance Date Due DTaP/Tdap/Td series (1 - Tdap) 9/23/1970 ZOSTER VACCINE AGE 60> 7/23/2009 GLAUCOMA SCREENING Q2Y 9/23/2014 FOBT Q 1 YEAR AGE 50-75 7/18/2015 FOOT EXAM Q1 7/30/2015 EYE EXAM RETINAL OR DILATED Q1 7/30/2015 Pneumococcal 65+ High/Highest Risk (2 of 2 - PCV13) 10/6/2015 HEMOGLOBIN A1C Q6M 2/28/2017 INFLUENZA AGE 9 TO ADULT 8/1/2017 MICROALBUMIN Q1 8/29/2017 LIPID PANEL Q1 8/29/2017 MEDICARE YEARLY EXAM 10/12/2017 Allergies as of 10/18/2017  Review Complete On: 10/18/2017 By: Brad Geiger MD  
 No Known Allergies Current Immunizations  Reviewed on 10/18/2017 Name Date Pneumococcal Polysaccharide (PPSV-23) 10/6/2014 10:02 AM  
  
 Reviewed by Brad Geiger MD on 10/18/2017 at  1:36 PM  
You Were Diagnosed With   
  
 Codes Comments Controlled type 2 diabetes mellitus without complication, without long-term current use of insulin ([facility]    -  Primary ICD-10-CM: E11.9 ICD-9-CM: 250.00 Anemia, unspecified type     ICD-10-CM: D64.9 ICD-9-CM: 285.9 CKD (chronic kidney disease) stage 2, GFR 60-89 ml/min     ICD-10-CM: N18.2 ICD-9-CM: 090. 2 Pure hypercholesterolemia     ICD-10-CM: E78.00 ICD-9-CM: 272.0 Vitals BP Pulse Temp Resp Height(growth percentile) Weight(growth percentile) 144/68 (BP 1 Location: Left arm, BP Patient Position: Sitting) (!) 102 97 °F (36.1 °C) (Oral) 18 5' 8\" (1.727 m) 251 lb 9.6 oz (114.1 kg) SpO2 BMI Smoking Status 100% 38.26 kg/m2 Former Smoker Vitals History BMI and BSA Data Body Mass Index Body Surface Area  
 38.26 kg/m 2 2.34 m 2 Preferred Pharmacy Pharmacy Name Phone [facility] [address] [place], [place] [address]Jose Antonio [phone] Your Updated Medication List  
  
   
This list is accurate as of: 10/18/17  1:57 PM.  Always use your most recent med list. amLODIPine 10 mg tablet Commonly known as:  Vonc[place] Take  by mouth daily. Blood-Glucose Meter monitoring kit Check sugars 4 times daily. colchicine 0.6 mg tablet Commonly known as:  COLCRYS Take 2 tabs by mouth now. Take 1 tab in 1 hour. ENSURE COMPLETE PO Take  by mouth daily. ferrous sulfate 325 mg (65 mg iron) EC tablet Commonly known as:  IRON Take 1 tablet by mouth three (3) times daily (with meals). glucose blood VI test strips strip Commonly known as:  Ascensia CONTOUR Check glucose TID  
  
 insulin glargine 100 unit/mL injection Commonly known as:  LANTUS  
10 Units at bedtime. insulin syringe-needle U-100 Dispense ultrafine 1 mL syringes with 28 gauge needle Dx: E11.65  
  
 levothyroxine 150 mcg tablet Commonly known as:  SYNTHROID Take 1 Tab by mouth Daily (before breakfast). metoprolol tartrate 25 mg tablet Commonly known as:  LOPRESSOR Take 25 mg by mouth two (2) times a day. pneumococcal 13 france conj dip 0.5 mL Syrg injection Commonly known as:  PREVNAR-13  
0.5 mL by IntraMUSCular route PRIOR TO DISCHARGE for 1 dose. We Performed the Following CBC WITH AUTOMATED DIFF [65554 CPT(R)] CRP, HIGH SENSITIVITY [84788 CPT(R)] HEMOGLOBIN A1C WITH EAG [51211 CPT(R)] LIPID PANEL [76018 CPT(R)] METABOLIC PANEL, COMPREHENSIVE [25173 CPT(R)] Follow-up Instructions Return in about 1 month (around 11/18/2017). Introducing [address]! Noemi Guy introduces [employer] patient portal. Now you can access parts of your medical record, email your doctor's office, and request medication refills online.    
 
1. In your internet browser, go to [email or web address] 2. Click on the First Time User? Click Here link in the Sign In box. You will see the New Member Sign Up page. 3. Enter your [employer] Access Code exactly as it appears below. You will not need to use this code after youve completed the sign-up process. If you do not sign up before the expiration date, you must request a new code. · [employer] Access Code: [email or web address] Expires: 1/16/2018  1:57 PM 
 
4. Enter the last four digits of your Social Security Number (xxxx) and Date of Birth (mm/dd/yyyy) as indicated and click Submit. You will be taken to the next sign-up page. 5. Create a ScheduleSoftt ID. This will be your [employer] login ID and cannot be changed, so think of one that is secure and easy to remember. 6. Create a [employer] password. You can change your password at any time. 7. Enter your Password Reset Question and Answer. This can be used at a later time if you forget your password. 8. Enter your e-mail address. You will receive e-mail notification when new information is available in [address] 9. Click Sign Up. You can now view and download portions of your medical record. 10. Click the Download Summary menu link to download a portable copy of your medical information. If you have questions, please visit the Frequently Asked Questions section of the [employer] website. Remember, [employer] is NOT to be used for urgent needs. For medical emergencies, dial 911. Now available from your iPhone and Android! Please provide this summary of care documentation to your next provider. Your primary care clinician is listed as [address] If you have any questions after today's visit, please call [phone].

## 2017-10-19 ENCOUNTER — LAB ONLY (OUTPATIENT)
Dept: FAMILY MEDICINE CLINIC | Age: 67
End: 2017-10-19

## 2017-10-19 ENCOUNTER — HOSPITAL ENCOUNTER (OUTPATIENT)
Dept: LAB | Age: 67
Discharge: HOME | End: 2017-10-19
Payer: MEDICARE

## 2017-10-19 PROCEDURE — 80061 LIPID PANEL: CPT

## 2017-10-19 PROCEDURE — 36415 COLL VENOUS BLD VENIPUNCTURE: CPT

## 2017-10-19 PROCEDURE — 86141 C-REACTIVE PROTEIN HS: CPT

## 2017-10-19 PROCEDURE — 83036 HEMOGLOBIN GLYCOSYLATED A1C: CPT

## 2017-10-19 PROCEDURE — 80053 COMPREHEN METABOLIC PANEL: CPT

## 2017-10-19 PROCEDURE — 85025 COMPLETE CBC W/AUTO DIFF WBC: CPT

## 2017-10-20 LAB
ALBUMIN SERPL-MCNC: 3.4 G/DL (ref 3.6–4.8)
ALBUMIN/GLOB SERPL: 0.8 {RATIO} (ref 1.2–2.2)
ALP SERPL-CCNC: 105 IU/L (ref 39–117)
ALT SERPL-CCNC: 19 IU/L (ref 0–44)
AST SERPL-CCNC: 17 IU/L (ref 0–40)
BASOPHILS # BLD AUTO: 0 X10E3/UL (ref 0–0.2)
BASOPHILS NFR BLD AUTO: 0 %
BILIRUB SERPL-MCNC: 0.3 MG/DL (ref 0–1.2)
BUN SERPL-MCNC: 44 MG/DL (ref 8–27)
BUN/CREAT SERPL: 18 (ref 10–24)
CALCIUM SERPL-MCNC: 9.3 MG/DL (ref 8.6–10.2)
CHLORIDE SERPL-SCNC: 97 MMOL/L (ref 96–106)
CHOLEST SERPL-MCNC: 212 MG/DL (ref 100–199)
CO2 SERPL-SCNC: 19 MMOL/L (ref 18–29)
CREAT SERPL-MCNC: 2.44 MG/DL (ref 0.76–1.27)
CRP SERPL HS-MCNC: 68.71 MG/L (ref 0–3)
EOSINOPHIL # BLD AUTO: 0.1 X10E3/UL (ref 0–0.4)
EOSINOPHIL NFR BLD AUTO: 1 %
ERYTHROCYTE [DISTWIDTH] IN BLOOD BY AUTOMATED COUNT: 17.5 % (ref 12.3–15.4)
EST. AVERAGE GLUCOSE BLD GHB EST-MCNC: 246 MG/DL
GFR SERPLBLD CREATININE-BSD FMLA CKD-EPI: 26 ML/MIN/1.73
GFR SERPLBLD CREATININE-BSD FMLA CKD-EPI: 30 ML/MIN/1.73
GLOBULIN SER CALC-MCNC: 4.3 G/DL (ref 1.5–4.5)
GLUCOSE SERPL-MCNC: 133 MG/DL (ref 65–99)
HBA1C MFR BLD: 10.2 % (ref 4.8–5.6)
HCT VFR BLD AUTO: 30.9 % (ref 37.5–51)
HDLC SERPL-MCNC: 35 MG/DL
HGB BLD-MCNC: 9.7 G/DL (ref 12.6–17.7)
IMM GRANULOCYTES # BLD: 0 X10E3/UL (ref 0–0.1)
IMM GRANULOCYTES NFR BLD: 1 %
INTERPRETATION, 910389: NORMAL
INTERPRETATION: NORMAL
LDLC SERPL CALC-MCNC: 151 MG/DL (ref 0–99)
LYMPHOCYTES # BLD AUTO: 1.8 X10E3/UL (ref 0.7–3.1)
LYMPHOCYTES NFR BLD AUTO: 24 %
Lab: NORMAL
MCH RBC QN AUTO: 24.6 PG (ref 26.6–33)
MCHC RBC AUTO-ENTMCNC: 31.4 G/DL (ref 31.5–35.7)
MCV RBC AUTO: 78 FL (ref 79–97)
MONOCYTES # BLD AUTO: 0.4 X10E3/UL (ref 0.1–0.9)
MONOCYTES NFR BLD AUTO: 6 %
NEUTROPHILS # BLD AUTO: 5.4 X10E3/UL (ref 1.4–7)
NEUTROPHILS NFR BLD AUTO: 68 %
PDF IMAGE, 910387: NORMAL
PLATELET # BLD AUTO: 580 X10E3/UL (ref 150–379)
POTASSIUM SERPL-SCNC: 4.6 MMOL/L (ref 3.5–5.2)
PROT SERPL-MCNC: 7.7 G/DL (ref 6–8.5)
RBC # BLD AUTO: 3.95 X10E6/UL (ref 4.14–5.8)
SODIUM SERPL-SCNC: 135 MMOL/L (ref 134–144)
TRIGL SERPL-MCNC: 129 MG/DL (ref 0–149)
VLDLC SERPL CALC-MCNC: 26 MG/DL (ref 5–40)
WBC # BLD AUTO: 7.8 X10E3/UL (ref 3.4–10.8)

## 2018-01-08 RX ORDER — LEVOTHYROXINE SODIUM 150 UG/1
TABLET ORAL
Qty: 30 TAB | Refills: 5 | Status: SHIPPED | OUTPATIENT
Start: 2018-01-08 | End: 2018-03-19 | Stop reason: SDUPTHER

## 2018-01-08 RX ORDER — AMLODIPINE BESYLATE 10 MG/1
TABLET ORAL
Qty: 30 TAB | Refills: 5 | Status: SHIPPED | OUTPATIENT
Start: 2018-01-08 | End: 2018-03-17

## 2018-02-20 ENCOUNTER — OFFICE VISIT (OUTPATIENT)
Dept: FAMILY MEDICINE CLINIC | Age: 68
End: 2018-02-20

## 2018-02-20 VITALS
SYSTOLIC BLOOD PRESSURE: 160 MMHG | DIASTOLIC BLOOD PRESSURE: 92 MMHG | WEIGHT: 240.6 LBS | TEMPERATURE: 98.1 F | BODY MASS INDEX: 36.46 KG/M2 | OXYGEN SATURATION: 98 % | HEART RATE: 89 BPM | HEIGHT: 68 IN | RESPIRATION RATE: 20 BRPM

## 2018-02-20 DIAGNOSIS — D64.9 ANEMIA, UNSPECIFIED TYPE: ICD-10-CM

## 2018-02-20 DIAGNOSIS — Z00.00 MEDICARE ANNUAL WELLNESS VISIT, SUBSEQUENT: Primary | ICD-10-CM

## 2018-02-20 DIAGNOSIS — I10 ESSENTIAL HYPERTENSION: ICD-10-CM

## 2018-02-20 DIAGNOSIS — N18.2 CKD (CHRONIC KIDNEY DISEASE) STAGE 2, GFR 60-89 ML/MIN: Chronic | ICD-10-CM

## 2018-02-20 DIAGNOSIS — E11.21 TYPE 2 DIABETES WITH NEPHROPATHY (HCC): ICD-10-CM

## 2018-02-20 DIAGNOSIS — E44.1 MILD PROTEIN-CALORIE MALNUTRITION (HCC): ICD-10-CM

## 2018-02-20 DIAGNOSIS — R35.1 NOCTURIA: ICD-10-CM

## 2018-02-20 DIAGNOSIS — E78.00 PURE HYPERCHOLESTEROLEMIA: ICD-10-CM

## 2018-02-20 DIAGNOSIS — Z91.199 NONCOMPLIANCE WITH DIABETES TREATMENT: ICD-10-CM

## 2018-02-20 PROBLEM — M25.471 EDEMA OF RIGHT ANKLE: Status: ACTIVE | Noted: 2017-10-31

## 2018-02-20 RX ORDER — AMLODIPINE BESYLATE 10 MG/1
TABLET ORAL
COMMUNITY
End: 2018-03-12

## 2018-02-20 NOTE — PATIENT INSTRUCTIONS
Medicare Wellness Visit, Male    The best way to live healthy is to have a healthy lifestyle by eating a well-balanced diet, exercising regularly, limiting alcohol and stopping smoking. Regular physical exams and screening tests are another way to keep healthy. Preventive exams provided by your health care provider can find health problems before they become diseases or illnesses. Preventive services including immunizations, screening tests, monitoring and exams can help you take care of your own health. All people over age 72 should have a pneumovax  and and a prevnar shot to prevent pneumonia. These are once in a lifetime unless you and your provider decide differently. All people over 65 should have a yearly flu shot and a tetanus vaccine every 10 years. Screening for diabetes mellitus with a blood sugar test should be done every year. Glaucoma is a disease of the eye due to increased ocular pressure that can lead to blindness and it should be done every year by an eye professional.    Cardiovascular screening tests that check for elevated lipids (fatty part of blood) which can lead to heart disease and strokes should be done every 5 years. Colorectal screening that evaluates for blood or polyps in your colon should be done yearly as a stool test or every five years as a flexible sigmoidoscope or every 10 years as a colonoscopy up to age 76. Men up to age 76 may need a screening blood test for prostate cancer at certain intervals, depending on their personal and family history. This decision is between the patient and his provider. If you have been a smoker or had family history of abdominal aortic aneurysms, you and your provider may decide to schedule an ultrasound test of your aorta. Hepatitis C screening is also recommended for anyone born between [birth year] through [address]. A shingles vaccine is also recommended once in a lifetime after age 61.     Your Medicare Wellness Exam is recommended annually. Here is a list of your current Health Maintenance items with a due date:  Health Maintenance Due   Topic Date Due    DTaP/Tdap/Td  (1 - Tdap) 09/23/1970    Shingles Vaccine  07/23/2009    Glaucoma Screening   09/23/2014    Stool testing for trace blood  07/18/2015    Eye Exam  07/30/2015    Pneumococcal Vaccine (2 of 2 - PCV13) 10/06/2015         Medicare Wellness Visit, Male    The best way to live healthy is to have a healthy lifestyle by eating a well-balanced diet, exercising regularly, limiting alcohol and stopping smoking. Regular physical exams and screening tests are another way to keep healthy. Preventive exams provided by your health care provider can find health problems before they become diseases or illnesses. Preventive services including immunizations, screening tests, monitoring and exams can help you take care of your own health. All people over age 72 should have a pneumovax  and and a prevnar shot to prevent pneumonia. These are once in a lifetime unless you and your provider decide differently. All people over 65 should have a yearly flu shot and a tetanus vaccine every 10 years. Screening for diabetes mellitus with a blood sugar test should be done every year. Glaucoma is a disease of the eye due to increased ocular pressure that can lead to blindness and it should be done every year by an eye professional.    Cardiovascular screening tests that check for elevated lipids (fatty part of blood) which can lead to heart disease and strokes should be done every 5 years. Colorectal screening that evaluates for blood or polyps in your colon should be done yearly as a stool test or every five years as a flexible sigmoidoscope or every 10 years as a colonoscopy up to age 76. Men up to age 76 may need a screening blood test for prostate cancer at certain intervals, depending on their personal and family history.  This decision is between the patient and his provider. If you have been a smoker or had family history of abdominal aortic aneurysms, you and your provider may decide to schedule an ultrasound test of your aorta. Hepatitis C screening is also recommended for anyone born between [birth year] [address]. A shingles vaccine is also recommended once in a lifetime after age 61. Your Medicare Wellness Exam is recommended annually. Here is a list of your current Health Maintenance items with a due date:  Health Maintenance Due   Topic Date Due    DTaP/Tdap/Td  (1 - Tdap) 09/23/1970    Shingles Vaccine  07/23/2009    Glaucoma Screening   09/23/2014    Stool testing for trace blood  07/18/2015    Eye Exam  07/30/2015    Pneumococcal Vaccine (2 of 2 - PCV13) 10/06/2015          Diabetes Foot Health: Care Instructions  Your Care Instructions    When you have diabetes, your feet need extra care and attention. Diabetes can damage the nerve endings and blood vessels in your feet, making you less likely to notice when your feet are injured. Diabetes also limits your body's ability to fight infection and get blood to areas that need it. If you get a minor foot injury, it could become an ulcer or a serious infection. With good foot care, you can prevent most of these problems. Caring for your feet can be quick and easy. Most of the care can be done when you are bathing or getting ready for bed. Follow-up care is a key part of your treatment and safety. Be sure to make and go to all appointments, and call your doctor if you are having problems. It's also a good idea to know your test results and keep a list of the medicines you take. How can you care for yourself at home? · Keep your blood sugar close to normal by watching what and how much you eat, monitoring blood sugar, taking medicines if prescribed, and getting regular exercise. · Do not smoke. Smoking affects blood flow and can make foot problems worse.  If you need help quitting, talk to your doctor about stop-smoking programs and medicines. These can increase your chances of quitting for good. · Eat a diet that is low in fats. High fat intake can cause fat to build up in your blood vessels and decrease blood flow. · Inspect your feet daily for blisters, cuts, cracks, or sores. If you cannot see well, use a mirror or have someone help you. · Take care of your feet:  [facility] your feet every day. Use warm (not hot) water. Check the water temperature with your wrists or other part of your body, not your feet. ¨ Dry your feet well. Pat them dry. Do not rub the skin on your feet too hard. Dry well between your toes. If the skin on your feet stays moist, bacteria or a fungus can grow, which can lead to infection. ¨ Keep your skin soft. Use moisturizing skin cream to keep the skin on your feet soft and prevent calluses and cracks. But do not put the cream between your toes, and stop using any cream that causes a rash. ¨ Clean underneath your toenails carefully. Do not use a sharp object to clean underneath your toenails. Use the blunt end of a nail file or other rounded tool. ¨ Trim and file your toenails straight across to prevent ingrown toenails. Use a nail clipper, not scissors. Use an emery board to smooth the edges. · Change socks daily. Socks without seams are best, because seams often rub the feet. You can find socks for people with diabetes from specialty catalogs. · Look inside your shoes every day for things like gravel or torn linings, which could cause blisters or sores. · Buy shoes that fit well:  ¨ Look for shoes that have plenty of space around the toes. This helps prevent bunions and blisters. ¨ Try on shoes while wearing the kind of socks you will usually wear with the shoes. ¨ Avoid plastic shoes. They may rub your feet and cause blisters. Good shoes should be made of materials that are flexible and breathable, such as leather or cloth.   ¨ Break in new shoes slowly by wearing them for no more than an hour a day for several days. Take extra time to check your feet for red areas, blisters, or other problems after you wear new shoes. · Do not go barefoot. Do not wear sandals, and do not wear shoes with very thin soles. Thin soles are easy to puncture. They also do not protect your feet from hot pavement or cold weather. · Have your doctor check your feet during each visit. If you have a foot problem, see your doctor. Do not try to treat an early foot problem at home. Home remedies or treatments that you can buy without a prescription (such as corn removers) can be harmful. · Always get early treatment for foot problems. A minor irritation can lead to a major problem if not properly cared for early. When should you call for help? Call your doctor now or seek immediate medical care if:  ? · You have a foot sore, an ulcer or break in the skin that is not healing after 4 days, bleeding corns or calluses, or an ingrown toenail. ? · You have blue or black areas, which can mean bruising or blood flow problems. ? · You have peeling skin or tiny blisters between your toes or cracking or oozing of the skin. ? · You have a fever for more than 24 hours and a foot sore. ? · You have new numbness or tingling in your feet that does not go away after you move your feet or change positions. ? · You have unexplained or unusual swelling of the foot or ankle. ? Watch closely for changes in your health, and be sure to contact your doctor if:  ? · You cannot do proper foot care. Where can you learn more? Go to http://vandana-sherri.info/. Enter A739 in the search box to learn more about \"Diabetes Foot Health: Care Instructions. \"  Current as of: March 13, 2017  Content Version: 11.4  © 4945-2151 [employer]. Care instructions adapted under license by [employer] (which disclaims liability or warranty for this information).  If you have questions about a medical condition or this instruction, always ask your healthcare professional. [place] [address] any warranty or liability for your use of this information.

## 2018-02-20 NOTE — ASSESSMENT & PLAN NOTE
Stable, based on history, physical exam and review of pertinent labs, studies and medications; meds reconciled; lifestyle modifications recommended.   Lab Results   Component Value Date/Time    WBC 7.8 10/19/2017 09:16 AM    HGB 9.7 10/19/2017 09:16 AM    HCT 30.9 10/19/2017 09:16 AM    PLATELET 870 32/13/2050 09:16 AM    Creatinine 2.44 10/19/2017 09:16 AM    BUN 44 10/19/2017 09:16 AM    Potassium 4.6 10/19/2017 09:16 AM    INR 1.1 07/09/2017 09:01 PM    Prothrombin time 10.8 07/09/2017 09:01 PM

## 2018-02-20 NOTE — MR AVS SNAPSHOT
[address] [place] [address] 
 
 
 [address] [facility] 
[phone] Patient: Bobby Fonseca MRN: [id number]:[id number] Visit Information Date & Time Provider Department Dept. Phone Encounter #  
 2/20/2018  9:30 AM Ion Wilkinson Miguel [phone] [id number] Follow-up Instructions Return in about 3 months (around 5/20/2018), or if symptoms worsen or fail to improve. Follow-up and Disposition History Upcoming Health Maintenance Date Due DTaP/Tdap/Td series (1 - Tdap) 9/23/1970 ZOSTER VACCINE AGE 60> 7/23/2009 GLAUCOMA SCREENING Q2Y 9/23/2014 FOBT Q 1 YEAR AGE 50-75 7/18/2015 EYE EXAM RETINAL OR DILATED Q1 7/30/2015 Pneumococcal 65+ High/Highest Risk (2 of 2 - PCV13) 10/6/2015 HEMOGLOBIN A1C Q6M 4/19/2018 LIPID PANEL Q1 10/19/2018 FOOT EXAM Q1 2/20/2019 MICROALBUMIN Q1 2/20/2019 MEDICARE YEARLY EXAM 2/21/2019 Allergies as of 2/20/2018  Review Complete On: 2/20/2018 By: Jose Bhandari MD  
 No Known Allergies Current Immunizations  Reviewed on 2/20/2018 Name Date Pneumococcal Polysaccharide (PPSV-23) 10/6/2014 10:02 AM  
  
 Reviewed by Jose Bhandari MD on 2/20/2018 at 10:41 AM  
You Were Diagnosed With   
  
 Codes Comments Medicare annual wellness visit, subsequent    -  Primary ICD-10-CM: Z00.00 ICD-9-CM: V70.0 Type 2 diabetes with nephropathy (HCC)     ICD-10-CM: E11.21 
ICD-9-CM: 250.40, 583.81   
 CKD (chronic kidney disease) stage 2, GFR 60-89 ml/min     ICD-10-CM: N18.2 ICD-9-CM: 585.2 Essential hypertension     ICD-10-CM: I10 
ICD-9-CM: 401.9 Anemia, unspecified type     ICD-10-CM: D64.9 ICD-9-CM: 285.9 Noncompliance with diabetes treatment     ICD-10-CM: Z91.19 ICD-9-CM: V15.81 Nocturia     ICD-10-CM: R35.1 ICD-9-CM: 788.43 Pure hypercholesterolemia     ICD-10-CM: E78.00 ICD-9-CM: 272.0 Mild protein-calorie malnutrition ([facility] 75.)     ICD-10-CM: E44.1 ICD-9-CM: 263.1 Vitals BP Pulse Temp Resp Height(growth percentile) Weight(growth percentile) (!) 160/92 89 98.1 °F (36.7 °C) (Oral) 20 5' 8\" (1.727 m) 240 lb 9.6 oz (109.1 kg) SpO2 BMI Smoking Status 98% 36.58 kg/m2 Former Smoker Vitals History BMI and BSA Data Body Mass Index Body Surface Area  
 36.58 kg/m 2 2.29 m 2 Preferred Pharmacy Pharmacy Name Phone [address] [phone] Your Updated Medication List  
  
   
This list is accurate as of: 2/20/18 11:06 AM.  Always use your most recent med list.  
  
  
  
  
 * amLODIPine 10 mg tablet Commonly known as:  Aaron Thakkar Take  by mouth. * amLODIPine 10 mg tablet Commonly known as:  Aaron Thakkar TAKE ONE TABLET BY MOUTH ONCE DAILY Blood-Glucose Meter monitoring kit Check sugars 4 times daily. colchicine 0.6 mg tablet Commonly known as:  COLCRYS Take 2 tabs by mouth now. Take 1 tab in 1 hour. diph,pertuss(acel),tetanus vac(PF) 2 Lf-(2.5-5-3-5 mcg)-5Lf/0.5 mL Syrg vaccine Commonly known as:  ADACEL  
0.5 mL by IntraMUSCular route once for 1 dose. ENSURE COMPLETE PO Take  by mouth daily. ferrous sulfate 325 mg (65 mg iron) EC tablet Commonly known as:  IRON Take 1 tablet by mouth three (3) times daily (with meals). glucose blood VI test strips strip Commonly known as:  Ascensia CONTOUR Check glucose TID  
  
 insulin glargine 100 unit/mL injection Commonly known as:  LANTUS  
10 Units at bedtime. insulin syringe-needle U-100 Dispense ultrafine 1 mL syringes with 28 gauge needle Dx: E11.65  
  
 levothyroxine 150 mcg tablet Commonly known as:  SYNTHROID  
TAKE ONE TABLET BY MOUTH ONCE DAILY BEFORE BREAKFAST  
  
 metoprolol tartrate 25 mg tablet Commonly known as:  LOPRESSOR Take 25 mg by mouth two (2) times a day. pneumococcal 13 france conj dip 0.5 mL Syrg injection Commonly known as:  PREVNAR-13  
0.5 mL by IntraMUSCular route once for 1 dose. * Notice: This list has 2 medication(s) that are the same as other medications prescribed for you. Read the directions carefully, and ask your doctor or other care provider to review them with you. Prescriptions Printed Refills diph,pertuss,acel,,tetanus vac,PF, (ADACEL) 2 Lf-(2.5-5-3-5 mcg)-5Lf/0.5 mL syrg vaccine 0 Si.5 mL by IntraMUSCular route once for 1 dose. Class: Print Route: IntraMUSCular  
 pneumococcal 13 france conj dip (PREVNAR-13) 0.5 mL syrg injection 0 Si.5 mL by IntraMUSCular route once for 1 dose. Class: Print Route: IntraMUSCular We Performed the Following CBC WITH AUTOMATED DIFF [60161 CPT(R)] CRP, HIGH SENSITIVITY [34970 CPT(R)] HEMOGLOBIN A1C WITH EAG [32925 CPT(R)] LIPID PANEL [31638 CPT(R)] METABOLIC PANEL, COMPREHENSIVE [36107 CPT(R)] PSA, DIAGNOSTIC (PROSTATE SPECIFIC AG) [id number] CPT(R)] Follow-up Instructions Return in about 3 months (around 2018), or if symptoms worsen or fail to improve. Patient Instructions Medicare Wellness Visit, Male The best way to live healthy is to have a healthy lifestyle by eating a well-balanced diet, exercising regularly, limiting alcohol and stopping smoking. Regular physical exams and screening tests are another way to keep healthy. Preventive exams provided by your health care provider can find health problems before they become diseases or illnesses. Preventive services including immunizations, screening tests, monitoring and exams can help you take care of your own health. All people over age 72 should have a pneumovax  and and a prevnar shot to prevent pneumonia. These are once in a lifetime unless you and your provider decide differently. All people over 65 should have a yearly flu shot and a tetanus vaccine every 10 years. Screening for diabetes mellitus with a blood sugar test should be done every year. Glaucoma is a disease of the eye due to increased ocular pressure that can lead to blindness and it should be done every year by an eye professional. 
 
Cardiovascular screening tests that check for elevated lipids (fatty part of blood) which can lead to heart disease and strokes should be done every 5 years. Colorectal screening that evaluates for blood or polyps in your colon should be done yearly as a stool test or every five years as a flexible sigmoidoscope or every 10 years as a colonoscopy up to age 76. Men up to age 76 may need a screening blood test for prostate cancer at certain intervals, depending on their personal and family history. This decision is between the patient and his provider. If you have been a smoker or had family history of abdominal aortic aneurysms, you and your provider may decide to schedule an ultrasound test of your aorta. Hepatitis C screening is also recommended for anyone born between [birth year] through [address]. A shingles vaccine is also recommended once in a lifetime after age 61. Your Medicare Wellness Exam is recommended annually. Here is a list of your current Health Maintenance items with a due date: 
Health Maintenance Due Topic Date Due  
 DTaP/Tdap/Td  (1 - Tdap) 09/23/1970  Shingles Vaccine  07/23/2009  Glaucoma Screening   09/23/2014  Stool testing for trace blood  07/18/2015 Bentley Gimenez Eye Exam  07/30/2015  Pneumococcal Vaccine (2 of 2 - PCV13) 10/06/2015 Medicare Wellness Visit, Male The best way to live healthy is to have a healthy lifestyle by eating a well-balanced diet, exercising regularly, limiting alcohol and stopping smoking. Regular physical exams and screening tests are another way to keep healthy. Preventive exams provided by your health care provider can find health problems before they become diseases or illnesses. Preventive services including immunizations, screening tests, monitoring and exams can help you take care of your own health. All people over age 72 should have a pneumovax  and and a prevnar shot to prevent pneumonia. These are once in a lifetime unless you and your provider decide differently. All people over 65 should have a yearly flu shot and a tetanus vaccine every 10 years. Screening for diabetes mellitus with a blood sugar test should be done every year. Glaucoma is a disease of the eye due to increased ocular pressure that can lead to blindness and it should be done every year by an eye professional. 
 
Cardiovascular screening tests that check for elevated lipids (fatty part of blood) which can lead to heart disease and strokes should be done every 5 years. Colorectal screening that evaluates for blood or polyps in your colon should be done yearly as a stool test or every five years as a flexible sigmoidoscope or every 10 years as a colonoscopy up to age 76. Men up to age 76 may need a screening blood test for prostate cancer at certain intervals, depending on their personal and family history. This decision is between the patient and his provider. If you have been a smoker or had family history of abdominal aortic aneurysms, you and your provider may decide to schedule an ultrasound test of your aorta. Hepatitis C screening is also recommended for anyone born between [birth year] through [address]. A shingles vaccine is also recommended once in a lifetime after age 61. Your Medicare Wellness Exam is recommended annually. Here is a list of your current Health Maintenance items with a due date: 
Health Maintenance Due Topic Date Due  
 DTaP/Tdap/Td  (1 - Tdap) 09/23/1970  Shingles Vaccine  07/23/2009  Glaucoma Screening   09/23/2014  Stool testing for trace blood  07/18/2015 Afshan Moran Eye Exam  07/30/2015  Pneumococcal Vaccine (2 of 2 - PCV13) 10/06/2015 Diabetes Foot Health: Care Instructions Your Care Instructions When you have diabetes, your feet need extra care and attention. Diabetes can damage the nerve endings and blood vessels in your feet, making you less likely to notice when your feet are injured. Diabetes also limits your body's ability to fight infection and get blood to areas that need it. If you get a minor foot injury, it could become an ulcer or a serious infection. With good foot care, you can prevent most of these problems. Caring for your feet can be quick and easy. Most of the care can be done when you are bathing or getting ready for bed. Follow-up care is a key part of your treatment and safety. Be sure to make and go to all appointments, and call your doctor if you are having problems. It's also a good idea to know your test results and keep a list of the medicines you take. How can you care for yourself at home? · Keep your blood sugar close to normal by watching what and how much you eat, monitoring blood sugar, taking medicines if prescribed, and getting regular exercise. · Do not smoke. Smoking affects blood flow and can make foot problems worse. If you need help quitting, talk to your doctor about stop-smoking programs and medicines. These can increase your chances of quitting for good. · Eat a diet that is low in fats. High fat intake can cause fat to build up in your blood vessels and decrease blood flow. · Inspect your feet daily for blisters, cuts, cracks, or sores. If you cannot see well, use a mirror or have someone help you. · Take care of your feet: 
[facility] [place] [facility] your feet every day. Use warm (not hot) water. Check the water temperature with your wrists or other part of your body, not your feet. ¨ Dry your feet well. Pat them dry.  Do not rub the skin on your feet too hard. Dry well between your toes. If the skin on your feet stays moist, bacteria or a fungus can grow, which can lead to infection. ¨ Keep your skin soft. Use moisturizing skin cream to keep the skin on your feet soft and prevent calluses and cracks. But do not put the cream between your toes, and stop using any cream that causes a rash. ¨ Clean underneath your toenails carefully. Do not use a sharp object to clean underneath your toenails. Use the blunt end of a nail file or other rounded tool. ¨ Trim and file your toenails straight across to prevent ingrown toenails. Use a nail clipper, not scissors. Use an emery board to smooth the edges. · Change socks daily. Socks without seams are best, because seams often rub the feet. You can find socks for people with diabetes from specialty catalogs. · Look inside your shoes every day for things like gravel or torn linings, which could cause blisters or sores. · Buy shoes that fit well: 
¨ Look for shoes that have plenty of space around the toes. This helps prevent bunions and blisters. ¨ Try on shoes while wearing the kind of socks you will usually wear with the shoes. ¨ Avoid plastic shoes. They may rub your feet and cause blisters. Good shoes should be made of materials that are flexible and breathable, such as leather or cloth. ¨ Break in new shoes slowly by wearing them for no more than an hour a day for several days. Take extra time to check your feet for red areas, blisters, or other problems after you wear new shoes. · Do not go barefoot. Do not wear sandals, and do not wear shoes with very thin soles. Thin soles are easy to puncture. They also do not protect your feet from hot pavement or cold weather. · Have your doctor check your feet during each visit. If you have a foot problem, see your doctor. Do not try to treat an early foot problem at home.  Home remedies or treatments that you can buy without a prescription (such as corn removers) can be harmful. · Always get early treatment for foot problems. A minor irritation can lead to a major problem if not properly cared for early. When should you call for help? Call your doctor now or seek immediate medical care if: 
? · You have a foot sore, an ulcer or break in the skin that is not healing after 4 days, bleeding corns or calluses, or an ingrown toenail. ? · You have blue or black areas, which can mean bruising or blood flow problems. ? · You have peeling skin or tiny blisters between your toes or cracking or oozing of the skin. ? · You have a fever for more than 24 hours and a foot sore. ? · You have new numbness or tingling in your feet that does not go away after you move your feet or change positions. ? · You have unexplained or unusual swelling of the foot or ankle. ? Watch closely for changes in your health, and be sure to contact your doctor if: 
? · You cannot do proper foot care. Where can you learn more? Go to http://vandana-sherri.info/. Enter A739 in the search box to learn more about \"Diabetes Foot Health: Care Instructions. \" Current as of: March 13, 2017 Content Version: 11.4 © 1826-7282 [employer]. Care instructions adapted under license by [employer] (which disclaims liability or warranty for this information). If you have questions about a medical condition or this instruction, always ask your healthcare professional. [place] [address] any warranty or liability for your use of this information. Patient Instructions History Introducing [facility]! [facility] introduces [employer] patient portal. Now you can access parts of your medical record, email your doctor's office, and request medication refills online. 1. In your internet browser, go to [email or web address]. [email or web address] 2. Click on the First Time User? Click Here link in the Sign In box.  You will see the New Member Sign Up page. 3. Enter your [employer] Access Code exactly as it appears below. You will not need to use this code after youve completed the sign-up process. If you do not sign up before the expiration date, you must request a new code. · [employer] Access Code: [email or web address] Expires: 5/21/2018 10:54 AM 
 
4. Enter the last four digits of your Social Security Number (xxxx) and Date of Birth (mm/dd/yyyy) as indicated and click Submit. You will be taken to the next sign-up page. 5. Create a [employer] ID. This will be your [employer] login ID and cannot be changed, so think of one that is secure and easy to remember. 6. Create a [employer] password. You can change your password at any time. 7. Enter your Password Reset Question and Answer. This can be used at a later time if you forget your password. 8. Enter your e-mail address. You will receive e-mail notification when new information is available in [address]. 9. Click Sign Up. You can now view and download portions of your medical record. 10. Click the Download Summary menu link to download a portable copy of your medical information. If you have questions, please visit the Frequently Asked Questions section of the [employer] website. Remember, [employer] is NOT to be used for urgent needs. For medical emergencies, dial 911. Now available from your iPhone and Android! Please provide this summary of care documentation to your next provider. Your primary care clinician is listed as [address]. If you have any questions after today's visit, please call [phone].

## 2018-02-20 NOTE — ACP (ADVANCE CARE PLANNING)
Patient not able to discuss and understand the concept of ACP form. His family was not with him today. In the past, had given him one with his family present. Does not recall filling it out. Tried to help guide him through the conversation. Form given again.

## 2018-02-20 NOTE — PROGRESS NOTES
Identified pt with two pt identifiers(name and ). Chief Complaint   Patient presents with    Complete Physical     Annual physical        Health Maintenance Due   Topic    DTaP/Tdap/Td series (1 - Tdap)    ZOSTER VACCINE AGE 60>     GLAUCOMA SCREENING Q2Y     FOBT Q 1 YEAR AGE 50-75     FOOT EXAM Q1     EYE EXAM RETINAL OR DILATED Q1     Pneumococcal 65+ High/Highest Risk (2 of 2 - PCV13)    Influenza Age 5 to Adult     MICROALBUMIN Q1     MEDICARE YEARLY EXAM        Wt Readings from Last 3 Encounters:   10/18/17 251 lb 9.6 oz (114.1 kg)   10/07/17 262 lb 2 oz (118.9 kg)   17 268 lb 11.9 oz (121.9 kg)     Temp Readings from Last 3 Encounters:   10/18/17 97 °F (36.1 °C) (Oral)   10/07/17 98.7 °F (37.1 °C)   17 100 °F (37.8 °C)     BP Readings from Last 3 Encounters:   10/18/17 144/68   10/07/17 159/82   17 152/81     Pulse Readings from Last 3 Encounters:   10/18/17 (!) 102   10/07/17 98   17 (!) 116         Learning Assessment:  :     Learning Assessment 2016   PRIMARY LEARNER Patient Patient   HIGHEST LEVEL OF EDUCATION - PRIMARY LEARNER  - DID NOT GRADUATE HIGH SCHOOL   BARRIERS PRIMARY LEARNER - NONE   CO-LEARNER CAREGIVER - No   PRIMARY LANGUAGE ENGLISH ENGLISH   LEARNER PREFERENCE PRIMARY VIDEOS LISTENING   ANSWERED BY patient Patient   RELATIONSHIP SELF SELF       Depression Screening:  :     PHQ over the last two weeks 2018   Little interest or pleasure in doing things Not at all   Feeling down, depressed or hopeless Not at all   Total Score PHQ 2 0       Fall Risk Assessment:  :     Fall Risk Assessment, last 12 mths 2018   Able to walk? Yes   Fall in past 12 months? No       Abuse Screening:  :     Abuse Screening Questionnaire 10/11/2016   Do you ever feel afraid of your partner? N   Are you in a relationship with someone who physically or mentally threatens you? N   Is it safe for you to go home?  Y       Coordination of Care Questionnaire:  :     1) Have you been to an emergency room, urgent care clinic since your last visit? no   Hospitalized since your last visit? no             2) Have you seen or consulted any other health care providers outside of [place] [address] since your last visit? no  (Include any pap smears or colon screenings in this section.)    3) Do you have an Advance Directive on file? no  Are you interested in receiving information about Advance Directives? no    Reviewed record in preparation for visit and have obtained necessary documentation. Medication reconciliation up to date and corrected with patient at this time. Patient presents for annual Medicare physical and reports that he is not checking his blood sugars nor is he taking his insulin. Blood pressure this visit elevated.

## 2018-02-20 NOTE — PROGRESS NOTES
CC:  Chief Complaint   Patient presents with   [facility] Annual Wellness Visit     Medicare wellness visit and also completion of Dellis Din form     This is a Subsequent Medicare Annual Wellness Exam (AWV) (Performed 12 months after IPPE or effective date of Medicare Part B enrollment)    I have reviewed the patient's medical history in detail and updated the computerized patient record. History   67M with h/o W6CQ and uncertain control. He has CKD, HTN and Metabolic brain disorder NOS. He continues to be poorly compliant with medical regimen. He has not been seen in the office since October 2017. He reports that he has not been taking any of his medications. He stopped the insulin. He does not routinely check his blood sugars at home despite having a machine. So, he does not know what his levels are. He is unaware of his medications. He states: \"I am taking some sort of round pill. \"  He did not bring his medications into the visit with him. The last visit, we had a similar discussion. At that time, his family was with him. He became upset and left the exam room when we discussed his non-compliance. Unfortunately, this did not motivate him to action. He returns still non-compliant and his family did not come with him today. He did not bring his medications. Past Medical History:  Past Medical History:   Diagnosis Date    Anemia     Chronic kidney disease     UTI    Diabetes ([address]     Gastrointestinal disorder     anemia    Hypertension     Kidney disease, chronic, stage II (GFR 60-89 ml/min)     Neurological disorder     Metabolic Brain Disorder       Past Surgical History:  Past Surgical History:   Procedure Laterality Date    HX OTHER SURGICAL      never       Medications:  Current Outpatient Prescriptions   Medication Sig Dispense Refill    diph,pertuss,acel,,tetanus vac,PF, (ADACEL) 2 Lf-(2.5-5-3-5 mcg)-5Lf/0.5 mL syrg vaccine 0.5 mL by IntraMUSCular route once for 1 dose.  0.5 mL 0    pneumococcal 13 fr[place] conj dip (PREVNAR-13) 0.5 mL syrg injection 0.5 mL by IntraMUSCular route once for 1 dose. 0.5 mL 0    amLODIPine (NORVASC) 10 mg tablet TAKE ONE TABLET BY MOUTH ONCE DAILY 30 Tab 5    levothyroxine (SYNTHROID) 150 mcg tablet TAKE ONE TABLET BY MOUTH ONCE DAILY BEFORE BREAKFAST 30 Tab 5    colchicine (COLCRYS) 0.6 mg tablet Take 2 tabs by mouth now. Take 1 tab in 1 hour. 3 Tab 0    metoprolol tartrate (LOPRESSOR) 25 mg tablet Take 25 mg by mouth two (2) times a day.  ferrous sulfate (IRON) 325 mg (65 mg iron) EC tablet Take 1 tablet by mouth three (3) times daily (with meals). 100 tablet 0    amLODIPine (NORVASC) 10 mg tablet Take  by mouth.  amLODIPine (NORVASC) 10 mg tablet Take  by mouth daily.  insulin glargine (LANTUS) 100 unit/mL injection 10 Units at bedtime. (Patient not taking: Reported on 10/18/2017) 1 Vial 3    insulin syringe-needle U-100 Dispense ultrafine 1 mL syringes with 28 gauge needle Dx: E11.65 100 Syringe 11    glucose blood VI test strips (ASCENSIA CONTOUR) strip Check glucose TID 1 Package 11    Blood-Glucose Meter monitoring kit Check sugars 4 times daily. 1 Kit 1    LACTOSE-FREE FOOD (ENSURE COMPLETE PO) Take  by mouth daily.        Allergies:  No Known Allergies    Family History:  Family History   Problem Relation Age of Onset    Diabetes Mother     Cancer Sister        Social History:  Social History   Substance Use Topics    Smoking status: Former Smoker     Packs/day: 0.50     Years: 30.00     Types: Cigarettes     Quit date: 1/1/1994    Smokeless tobacco: Never Used    Alcohol use No       Problem List:  Patient Active Problem List   Diagnosis Code    Acute renal failure ([facility] [address] N17.9    Diabetes mellitus type 2, controlled, without complications (HCC) F58.0    Protein calorie malnutrition ([address] E46    HTN (hypertension) I10    CKD (chronic kidney disease) stage 2, GFR 60-89 ml/min N18.2    Anemia D64.9    Type 2 diabetes with nephropathy ([facility] [address] E11.21    Edema of right ankle M25.471    Noncompliance with diabetes treatment Z91.19       Depression Risk Factor Screening:     PHQ over the last two weeks 2/20/2018   Little interest or pleasure in doing things Not at all   Feeling down, depressed or hopeless Not at all   Total Score PHQ 2 0     Alcohol Risk Factor Screening: You do not drink alcohol or very rarely. Functional Ability and Level of Safety:   Hearing Loss  Hearing is good. Activities of Daily Living  The home contains: no safety equipment. Patient does total self care    Fall Risk  Fall Risk Assessment, last 12 mths 2/20/2018   Able to walk? Yes   Fall in past 12 months? No       Abuse Screen  Patient is not abused    Cognitive Screening   Evaluation of Cognitive Function:  Has your family/caregiver stated any concerns about your memory: yes  Abnormal    Patient Care Team   Patient Care Team:  Marco Felton MD as PCP - General (Pediatrics)  Caryl Helm MD (Hematology and Oncology)  Katina Orellana MD (Nephrology)    Assessment/Plan   Education and counseling provided:  Are appropriate based on today's review and evaluation  End-of-Life planning (with patient's consent)  Prostate cancer screening tests (PSA, covered annually)  Colorectal cancer screening tests  Screening for glaucoma  Diabetes screening test    Diagnoses and all orders for this visit:    1. Noncompliance with diabetes treatment    Other orders  -     diph,pertuss,acel,,tetanus vac,PF, (ADACEL) 2 Lf-(2.5-5-3-5 mcg)-5Lf/0.5 mL syrg vaccine; 0.5 mL by IntraMUSCular route once for 1 dose. -     pneumococcal 13 france conj dip (PREVNAR-13) 0.5 mL syrg injection; 0.5 mL by IntraMUSCular route once for 1 dose.     Health Maintenance Due   Topic Date Due    DTaP/Tdap/Td series (1 - Tdap) Discussed    ZOSTER VACCINE AGE 60>  Discussed/ordered    GLAUCOMA SCREENING Q2Y  Discussed/ordered    FOBT Q 1 YEAR AGE 50-75  ordered    EYE EXAM RETINAL OR DILATED Q1 Discussed/ordered    Pneumococcal 65+ High/Highest Risk (2 of 2 - PCV13) Ordered PCV-13       Assessment and Plan:    ICD-10-CM ICD-9-CM    1. Medicare annual wellness visit, subsequent Z00.00 V70.0 Anticipatory guidance discussed. Immunizations reviewed  HM updated. 2. Type 2 diabetes with nephropathy (HCC) E11.21 250.40 HEMOGLOBIN A1C WITH EAG     583.81    3. CKD (chronic kidney disease) stage 2, GFR 60-89 ml/min Z91.2 780.2 METABOLIC PANEL, COMPREHENSIVE   4. Essential hypertension I10 401.9    5. Anemia, unspecified type D64.9 285.9 CBC WITH AUTOMATED DIFF   6. Noncompliance with diabetes treatment Z91.19 V15.81    7. Nocturia R35.1 788.43 PSA, DIAGNOSTIC (PROSTATE SPECIFIC AG)   8. Pure hypercholesterolemia E78.00 272.0 LIPID PANEL      CRP, HIGH SENSITIVITY      METABOLIC PANEL, COMPREHENSIVE   9. Mild protein-calorie malnutrition (HCC) E44.1 263.1        Chronic Conditions Addressed Today     1. Anemia     Relevant Orders     CBC WITH AUTOMATED DIFF      2. CKD (chronic kidney disease) stage 2, GFR 60-89 ml/min     Relevant Orders     METABOLIC PANEL, COMPREHENSIVE      3. HTN (hypertension)     Relevant Medications     amLODIPine (NORVASC) 10 mg tablet      4. Noncompliance with diabetes treatment  Continues to be non-compliant with all medical care. 5. Protein calorie malnutrition ([facility] [address]      Stable, based on history, physical exam and review of pertinent labs, studies and medications; meds reconciled; lifestyle modifications recommended.   Lab Results   Component Value Date/Time    WBC 7.8 10/19/2017 09:16 AM    HGB 9.7 10/19/2017 09:16 AM    HCT 30.9 10/19/2017 09:16 AM    PLATELET 108 29/24/3745 09:16 AM    Creatinine 2.44 10/19/2017 09:16 AM    BUN 44 10/19/2017 09:16 AM    Potassium 4.6 10/19/2017 09:16 AM    INR 1.1 07/09/2017 09:01 PM    Prothrombin time 10.8 07/09/2017 09:01 PM            6. Type 2 diabetes with nephropathy ([facility] [address] - Primary     Relevant Orders     HEMOGLOBIN A1C WITH EAG        Other Problems Addressed Today     Nocturia        New problem. Will send for PSA. Possible that Nocturia is due to poor blood glucose control. Relevant Orders        PSA, DIAGNOSTIC (PROSTATE SPECIFIC AG)      Pure hypercholesterolemia            Relevant Orders        LIPID PANEL        CRP, HIGH SENSITIVITY        METABOLIC PANEL, COMPREHENSIVE        I have discussed the diagnosis with the patient and the intended treatment plan as seen in the above orders. The patient has received an after-visit summary and questions were answered concerning future plans. Asked to return should symptoms worsen or not improve with treatment. Any pending labs and studies will be relayed to patient when they become available. Pt verbalizes understanding of plan of care and denies further questions or concerns at this time. Follow-up Disposition:  Return in about 3 months (around 5/20/2018), or if symptoms worsen or fail to improve.

## 2018-02-21 LAB
ALBUMIN SERPL-MCNC: 3.6 G/DL (ref 3.6–4.8)
ALBUMIN/GLOB SERPL: 0.9 {RATIO} (ref 1.2–2.2)
ALP SERPL-CCNC: 94 IU/L (ref 39–117)
ALT SERPL-CCNC: 8 IU/L (ref 0–44)
AST SERPL-CCNC: 11 IU/L (ref 0–40)
BASOPHILS # BLD AUTO: 0 X10E3/UL (ref 0–0.2)
BASOPHILS NFR BLD AUTO: 1 %
BILIRUB SERPL-MCNC: 0.2 MG/DL (ref 0–1.2)
BUN SERPL-MCNC: 13 MG/DL (ref 8–27)
BUN/CREAT SERPL: 7 (ref 10–24)
CALCIUM SERPL-MCNC: 9.5 MG/DL (ref 8.6–10.2)
CHLORIDE SERPL-SCNC: 101 MMOL/L (ref 96–106)
CHOLEST SERPL-MCNC: 217 MG/DL (ref 100–199)
CO2 SERPL-SCNC: 23 MMOL/L (ref 18–29)
CREAT SERPL-MCNC: 1.78 MG/DL (ref 0.76–1.27)
CRP SERPL HS-MCNC: 15.57 MG/L (ref 0–3)
EOSINOPHIL # BLD AUTO: 0.1 X10E3/UL (ref 0–0.4)
EOSINOPHIL NFR BLD AUTO: 1 %
ERYTHROCYTE [DISTWIDTH] IN BLOOD BY AUTOMATED COUNT: 17.2 % (ref 12.3–15.4)
EST. AVERAGE GLUCOSE BLD GHB EST-MCNC: 177 MG/DL
GFR SERPLBLD CREATININE-BSD FMLA CKD-EPI: 38 ML/MIN/{1.73_M2}
GFR SERPLBLD CREATININE-BSD FMLA CKD-EPI: 44 ML/MIN/{1.73_M2}
GLOBULIN SER CALC-MCNC: 3.8 G/L (ref 1.5–4.5)
GLUCOSE SERPL-MCNC: 105 MG/DL (ref 65–99)
HBA1C MFR BLD: 7.8 % (ref 4.8–5.6)
HCT VFR BLD AUTO: 33.3 % (ref 37.5–51)
HDLC SERPL-MCNC: 38 MG/DL
HGB BLD-MCNC: 10.6 G/DL (ref 13–17.7)
IMM GRANULOCYTES # BLD: 0 X10E3/UL (ref 0–0.1)
IMM GRANULOCYTES NFR BLD: 0 %
INTERPRETATION, 910389: NORMAL
INTERPRETATION: NORMAL
LDLC SERPL CALC-MCNC: 157 MG/DL (ref 0–99)
LYMPHOCYTES # BLD AUTO: 2.5 X10E3/UL (ref 0.7–3.1)
LYMPHOCYTES NFR BLD AUTO: 42 %
Lab: NORMAL
MCH RBC QN AUTO: 24.9 PG (ref 26.6–33)
MCHC RBC AUTO-ENTMCNC: 31.8 G/DL (ref 31.5–35.7)
MCV RBC AUTO: 78 FL (ref 79–97)
MONOCYTES # BLD AUTO: 0.3 X10E3/UL (ref 0.1–0.9)
MONOCYTES NFR BLD AUTO: 5 %
NEUTROPHILS # BLD AUTO: 3.1 X10E3/UL (ref 1.4–7)
NEUTROPHILS NFR BLD AUTO: 51 %
PDF IMAGE, 910387: NORMAL
PLATELET # BLD AUTO: 351 X10E3/UL (ref 150–379)
POTASSIUM SERPL-SCNC: 5 MMOL/L (ref 3.5–5.2)
PROT SERPL-MCNC: 7.4 G/DL (ref 6–8.5)
PSA SERPL-MCNC: 0.4 NG/ML (ref 0–4)
RBC # BLD AUTO: 4.25 X10E6/UL (ref 4.14–5.8)
SODIUM SERPL-SCNC: 140 MMOL/L (ref 134–144)
TRIGL SERPL-MCNC: 110 MG/DL (ref 0–149)
VLDLC SERPL CALC-MCNC: 22 MG/DL (ref 5–40)
WBC # BLD AUTO: 5.9 X10E3/UL (ref 3.4–10.8)

## 2018-03-12 ENCOUNTER — HOSPITAL ENCOUNTER (INPATIENT)
Age: 68
LOS: 5 days | Discharge: HOME | DRG: 246 | End: 2018-03-17
Attending: EMERGENCY MEDICINE | Admitting: FAMILY MEDICINE
Payer: MEDICARE

## 2018-03-12 ENCOUNTER — APPOINTMENT (OUTPATIENT)
Dept: GENERAL RADIOLOGY | Age: 68
DRG: 246 | End: 2018-03-12
Attending: EMERGENCY MEDICINE
Payer: MEDICARE

## 2018-03-12 DIAGNOSIS — I16.1 HYPERTENSIVE EMERGENCY: Primary | ICD-10-CM

## 2018-03-12 DIAGNOSIS — I50.9 ACUTE CONGESTIVE HEART FAILURE, UNSPECIFIED CONGESTIVE HEART FAILURE TYPE: ICD-10-CM

## 2018-03-12 PROBLEM — J96.90 RESPIRATORY FAILURE (HCC): Status: ACTIVE | Noted: 2018-03-12

## 2018-03-12 LAB
ALBUMIN SERPL-MCNC: 2.9 G/DL (ref 3.5–5)
ALBUMIN/GLOB SERPL: 0.6 {RATIO} (ref 1.1–2.2)
ALP SERPL-CCNC: 103 U/L (ref 45–117)
ALT SERPL-CCNC: 97 U/L (ref 12–78)
ANION GAP SERPL CALC-SCNC: 15 MMOL/L (ref 5–15)
ARTERIAL PATENCY WRIST A: YES
AST SERPL-CCNC: 119 U/L (ref 15–37)
BASE DEFICIT BLD-SCNC: 3 MMOL/L
BASOPHILS # BLD: 0 K/UL (ref 0–0.1)
BASOPHILS NFR BLD: 0 % (ref 0–1)
BDY SITE: ABNORMAL
BILIRUB SERPL-MCNC: 0.2 MG/DL (ref 0.2–1)
BNP SERPL-MCNC: 2369 PG/ML (ref 0–125)
BUN SERPL-MCNC: 16 MG/DL (ref 6–20)
BUN/CREAT SERPL: 8 (ref 12–20)
CALCIUM SERPL-MCNC: 8.4 MG/DL (ref 8.5–10.1)
CHLORIDE SERPL-SCNC: 102 MMOL/L (ref 97–108)
CO2 SERPL-SCNC: 21 MMOL/L (ref 21–32)
CREAT SERPL-MCNC: 1.99 MG/DL (ref 0.7–1.3)
DIFFERENTIAL METHOD BLD: ABNORMAL
EOSINOPHIL # BLD: 0.1 K/UL (ref 0–0.4)
EOSINOPHIL NFR BLD: 1 % (ref 0–7)
ERYTHROCYTE [DISTWIDTH] IN BLOOD BY AUTOMATED COUNT: 15.6 % (ref 11.5–14.5)
FLUAV AG NPH QL IA: NEGATIVE
FLUBV AG NOSE QL IA: NEGATIVE
GAS FLOW.O2 O2 DELIVERY SYS: ABNORMAL L/MIN
GLOBULIN SER CALC-MCNC: 4.9 G/DL (ref 2–4)
GLUCOSE BLD STRIP.AUTO-MCNC: 237 MG/DL (ref 65–100)
GLUCOSE SERPL-MCNC: 323 MG/DL (ref 65–100)
HCO3 BLD-SCNC: 23.1 MMOL/L (ref 22–26)
HCT VFR BLD AUTO: 33 % (ref 36.6–50.3)
HGB BLD-MCNC: 10.4 G/DL (ref 12.1–17)
LACTATE SERPL-SCNC: 2.5 MMOL/L (ref 0.4–2)
LYMPHOCYTES # BLD: 2.4 K/UL
LYMPHOCYTES NFR BLD: 36 % (ref 12–49)
MCH RBC QN AUTO: 24.9 PG (ref 26–34)
MCHC RBC AUTO-ENTMCNC: 31.5 G/DL (ref 30–36.5)
MCV RBC AUTO: 78.9 FL (ref 80–99)
MONOCYTES # BLD: 0.4 K/UL (ref 0–1)
MONOCYTES NFR BLD: 6 % (ref 5–13)
NEUTS SEG # BLD: 3.7 K/UL (ref 1.8–8)
NEUTS SEG NFR BLD: 57 % (ref 32–75)
O2/TOTAL GAS SETTING VFR VENT: 28 %
PCO2 BLD: 42.5 MMHG (ref 35–45)
PEEP RESPIRATORY: 6 CMH2O
PH BLD: 7.34 [PH] (ref 7.35–7.45)
PIP ISTAT,IPIP: 12
PLATELET # BLD AUTO: 279 K/UL (ref 150–400)
PMV BLD AUTO: 10.4 FL (ref 8.9–12.9)
PO2 BLD: 113 MMHG (ref 80–100)
POTASSIUM SERPL-SCNC: 3.5 MMOL/L (ref 3.5–5.1)
PRESSURE SUPPORT SETTING VENT: 5 CMH2O
PROT SERPL-MCNC: 7.8 G/DL (ref 6.4–8.2)
RBC # BLD AUTO: 4.18 M/UL (ref 4.1–5.7)
SAO2 % BLD: 98 % (ref 92–97)
SERVICE CMNT-IMP: ABNORMAL
SODIUM SERPL-SCNC: 138 MMOL/L (ref 136–145)
SPECIMEN TYPE: ABNORMAL
TOTAL RESP. RATE, ITRR: 22
TROPONIN I SERPL-MCNC: 0.05 NG/ML
TSH SERPL DL<=0.05 MIU/L-ACNC: 0.08 UIU/ML (ref 0.36–3.74)
WBC # BLD AUTO: 6.6 K/UL (ref 4.1–11.1)
XXWBCSUS: 0

## 2018-03-12 PROCEDURE — 74011000250 HC RX REV CODE- 250: Performed by: EMERGENCY MEDICINE

## 2018-03-12 PROCEDURE — 36415 COLL VENOUS BLD VENIPUNCTURE: CPT | Performed by: EMERGENCY MEDICINE

## 2018-03-12 PROCEDURE — 65660000000 HC RM CCU STEPDOWN

## 2018-03-12 PROCEDURE — 74011250637 HC RX REV CODE- 250/637: Performed by: EMERGENCY MEDICINE

## 2018-03-12 PROCEDURE — 94640 AIRWAY INHALATION TREATMENT: CPT

## 2018-03-12 PROCEDURE — 74011250636 HC RX REV CODE- 250/636: Performed by: STUDENT IN AN ORGANIZED HEALTH CARE EDUCATION/TRAINING PROGRAM

## 2018-03-12 PROCEDURE — 65270000029 HC RM PRIVATE

## 2018-03-12 PROCEDURE — 83880 ASSAY OF NATRIURETIC PEPTIDE: CPT | Performed by: EMERGENCY MEDICINE

## 2018-03-12 PROCEDURE — 94660 CPAP INITIATION&MGMT: CPT

## 2018-03-12 PROCEDURE — 82962 GLUCOSE BLOOD TEST: CPT

## 2018-03-12 PROCEDURE — 87804 INFLUENZA ASSAY W/OPTIC: CPT | Performed by: EMERGENCY MEDICINE

## 2018-03-12 PROCEDURE — 77030013032 HC MSK BPAP/CPAP FISP -B

## 2018-03-12 PROCEDURE — 85025 COMPLETE CBC W/AUTO DIFF WBC: CPT | Performed by: EMERGENCY MEDICINE

## 2018-03-12 PROCEDURE — 84443 ASSAY THYROID STIM HORMONE: CPT | Performed by: EMERGENCY MEDICINE

## 2018-03-12 PROCEDURE — 93005 ELECTROCARDIOGRAM TRACING: CPT

## 2018-03-12 PROCEDURE — 74011250636 HC RX REV CODE- 250/636: Performed by: FAMILY MEDICINE

## 2018-03-12 PROCEDURE — 84484 ASSAY OF TROPONIN QUANT: CPT | Performed by: EMERGENCY MEDICINE

## 2018-03-12 PROCEDURE — 80053 COMPREHEN METABOLIC PANEL: CPT | Performed by: EMERGENCY MEDICINE

## 2018-03-12 PROCEDURE — 87040 BLOOD CULTURE FOR BACTERIA: CPT | Performed by: EMERGENCY MEDICINE

## 2018-03-12 PROCEDURE — 99285 EMERGENCY DEPT VISIT HI MDM: CPT

## 2018-03-12 PROCEDURE — 82803 BLOOD GASES ANY COMBINATION: CPT

## 2018-03-12 PROCEDURE — 83605 ASSAY OF LACTIC ACID: CPT | Performed by: EMERGENCY MEDICINE

## 2018-03-12 PROCEDURE — 71045 X-RAY EXAM CHEST 1 VIEW: CPT

## 2018-03-12 PROCEDURE — 36600 WITHDRAWAL OF ARTERIAL BLOOD: CPT

## 2018-03-12 PROCEDURE — 77030013140 HC MSK NEB VYRM -A

## 2018-03-12 RX ORDER — FUROSEMIDE 10 MG/ML
20 INJECTION INTRAMUSCULAR; INTRAVENOUS ONCE
Status: COMPLETED | OUTPATIENT
Start: 2018-03-12 | End: 2018-03-12

## 2018-03-12 RX ORDER — SODIUM CHLORIDE 0.9 % (FLUSH) 0.9 %
5-10 SYRINGE (ML) INJECTION EVERY 8 HOURS
Status: DISCONTINUED | OUTPATIENT
Start: 2018-03-12 | End: 2018-03-17 | Stop reason: SDUPTHER

## 2018-03-12 RX ORDER — GUAIFENESIN 100 MG/5ML
324 LIQUID (ML) ORAL
Status: COMPLETED | OUTPATIENT
Start: 2018-03-12 | End: 2018-03-12

## 2018-03-12 RX ORDER — SODIUM CHLORIDE 0.9 % (FLUSH) 0.9 %
5-10 SYRINGE (ML) INJECTION AS NEEDED
Status: DISCONTINUED | OUTPATIENT
Start: 2018-03-12 | End: 2018-03-17 | Stop reason: SDUPTHER

## 2018-03-12 RX ORDER — HEPARIN SODIUM 5000 [USP'U]/ML
5000 INJECTION, SOLUTION INTRAVENOUS; SUBCUTANEOUS EVERY 8 HOURS
Status: DISCONTINUED | OUTPATIENT
Start: 2018-03-12 | End: 2018-03-13 | Stop reason: SDUPTHER

## 2018-03-12 RX ADMIN — ASPIRIN 81 MG 324 MG: 81 TABLET ORAL at 19:13

## 2018-03-12 RX ADMIN — ALBUTEROL SULFATE 1 DOSE: 2.5 SOLUTION RESPIRATORY (INHALATION) at 19:15

## 2018-03-12 RX ADMIN — Medication 10 ML: at 22:10

## 2018-03-12 RX ADMIN — HEPARIN SODIUM 5000 UNITS: 5000 INJECTION, SOLUTION INTRAVENOUS; SUBCUTANEOUS at 21:31

## 2018-03-12 RX ADMIN — FUROSEMIDE 20 MG: 10 INJECTION, SOLUTION INTRAMUSCULAR; INTRAVENOUS at 22:37

## 2018-03-12 RX ADMIN — NITROGLYCERIN 1 INCH: 20 OINTMENT TOPICAL at 19:17

## 2018-03-12 NOTE — IP AVS SNAPSHOT
[address] [address] 
[phone] Patient: Shreya Beard MRN: [id number] MVL:3[birth year]03[birth year]003 A check shahana indicates which time of day the medication should be taken. My Medications START taking these medications Instructions Each Dose to Equal  
 Morning Noon Evening Bedtime  
 aspirin 81 mg chewable tablet Your last dose was: Your next dose is: Take 1 Tab by mouth daily. 81 mg  
    
   
   
   
  
 atorvastatin 40 mg tablet Commonly known as:  LIPITOR Your last dose was: Your next dose is: Take 1 Tab by mouth daily. 40 mg  
    
   
   
   
  
 carvedilol 6.25 mg tablet Commonly known as:  Tristan Jessica Your last dose was: Your next dose is: Take 1 Tab by mouth two (2) times daily (with meals). 6.25 mg  
    
   
   
   
  
 clopidogrel 75 mg Tab Commonly known as:  PLAVIX Start taking on:  3/18/2018 Your last dose was: Your next dose is: Take 1 Tab by mouth daily. 75 mg  
    
   
   
   
  
 hydrALAZINE 25 mg tablet Commonly known as:  APRESOLINE Your last dose was: Your next dose is: Take 1.5 Tabs by mouth three (3) times daily. 37.5 mg  
    
   
   
   
  
 insulin glargine 100 unit/mL injection Commonly known as:  LANTUS Your last dose was: Your next dose is:    
   
   
 10 Units by SubCUTAneous route nightly. 10 Units  
    
   
   
   
  
 isosorbide mononitrate ER 30 mg tablet Commonly known as:  IMDUR Your last dose was: Your next dose is: Take 1 Tab by mouth daily. 30 mg  
    
   
   
   
  
 pantoprazole 40 mg tablet Commonly known as:  PROTONIX Your last dose was: Your next dose is: Take 1 Tab by mouth daily.   
 40 mg  
    
   
   
   
  
  
 CONTINUE taking these medications Instructions Each Dose to Equal  
 Morning Noon Evening Bedtime  
 ferrous sulfate 325 mg (65 mg iron) tablet Your last dose was: Your next dose is: Take 325 mg by mouth daily. 325 mg  
    
   
   
   
  
 levothyroxine 150 mcg tablet Commonly known as:  SYNTHROID Your last dose was: Your next dose is: TAKE ONE TABLET BY MOUTH ONCE DAILY BEFORE BREAKFAST  
     
   
   
   
  
  
STOP taking these medications   
 amLODIPine 10 mg tablet Commonly known as:  Amy Jackson Where to Get Your Medications Information on where to get these meds will be given to you by the nurse or doctor. ! Ask your nurse or doctor about these medications  
  aspirin 81 mg chewable tablet  
 atorvastatin 40 mg tablet  
 carvedilol 6.25 mg tablet  
 clopidogrel 75 mg Tab  
 hydrALAZINE 25 mg tablet  
 insulin glargine 100 unit/mL injection  
 isosorbide mononitrate ER 30 mg tablet  
 pantoprazole 40 mg tablet

## 2018-03-12 NOTE — ED PROVIDER NOTES
HPI Comments: Michael Urbina is a 71 yo M with history of hypothyroidism, HTN, Diabetes and chronic kidney disease and bronchitis who is brought tot the ED by EMS with complaints of shortness of breath x 3 days. He states that this dyspnea increased this afternoon when he was trying to change out the oil in his home heater. He received duoneb by EMS and then was placed on NRB O2. He denies fever and chills. States that he has pain in his chest when he coughs and his cough is productive of white foamy sputum. Past Medical History:   Diagnosis Date    Anemia     Bronchitis     Chronic kidney disease     UTI    Diabetes (HCC)     Gastrointestinal disorder     anemia    Hypertension     Kidney disease, chronic, stage II (GFR 60-89 ml/min)     Neurological disorder     Metabolic Brain Disorder       Past Surgical History:   Procedure Laterality Date    HX OTHER SURGICAL      never         Family History:   Problem Relation Age of Onset    Diabetes Mother     Cancer Sister        Social History     Social History    Marital status:      Spouse name: N/A    Number of children: N/A    Years of education: N/A     Occupational History    Not on file. Social History Main Topics    Smoking status: Former Smoker     Packs/day: 0.50     Years: 30.00     Types: Cigarettes     Quit date: 1/1/1994    Smokeless tobacco: Never Used    Alcohol use No    Drug use: No    Sexual activity: Yes     Partners: Female     Other Topics Concern    Not on file     Social History Narrative         ALLERGIES: Review of patient's allergies indicates no known allergies. Review of Systems   Constitutional: Negative for fever. HENT: Negative for sore throat. Eyes: Negative for visual disturbance. Respiratory: Positive for cough and shortness of breath. Cardiovascular: Positive for chest pain. Gastrointestinal: Negative for abdominal pain. Genitourinary: Negative for dysuria. Musculoskeletal: Negative for back pain. Skin: Negative for rash. Neurological: Negative for headaches. Vitals:    03/12/18 1930 03/12/18 1940 03/12/18 1945 03/12/18 2000   BP: 190/90  164/78 143/74   Pulse: (!) 112  (!) 110 (!) 113   Resp: 11 20 18   Temp:       SpO2: 93% 97% 98% 98%            Physical Exam   Constitutional: He appears well-developed and well-nourished. No distress. HENT:   Head: Normocephalic and atraumatic. Mouth/Throat: Oropharynx is clear and moist.   Eyes: Conjunctivae and EOM are normal.   Neck: Normal range of motion and phonation normal.   Cardiovascular: Normal rate and intact distal pulses. Pulmonary/Chest: Effort normal. No respiratory distress. He has wheezes (faint). He has rales in the right lower field and the left lower field. Abdominal: He exhibits distension. There is no tenderness. Musculoskeletal: Normal range of motion. He exhibits no tenderness. Neurological: He is alert. He is not disoriented. He exhibits normal muscle tone. Skin: Skin is warm and dry. Nursing note and vitals reviewed. ED EKG interpretation:  Rhythm: sinus tachycardia; and regular . Rate (approx.): 109; Axis: normal; P wave: normal; QRS interval: normal ; ST/T wave: non-specific changes; in  Lead: aVL , V2 , V5  and V6 ; Other findings: abnormal ekg. This EKG was interpreted by Lis Juarez MD,ED Provider. MDM    Patient with hypertension, chest pain, dyspnea. Concern for hypertensive emergency with pulmonary edema. Also consider pneumonia or bronchitis. Check labs including trop, bnp, lactic acid. PCXR. Will give aspirin, NTG paste. Duoneb. Consider starting Bipap if pulmonary edema on CXR or patient desatruates with weaning O2 after duoneb. ED Course     7:31 PM  Review ofCXr limited by body habitus but is concerning for intersitial edema. PAtient remains tachypneic after duoneb.   Will apply Bipap.     8:22 PM  Discussed with Family medicine resident, Dr. Chandrika Jerez. Patient with hypertension, pulmonary edema and dyspnea. Improved after aspirin, NTG and bipap.      8:58 PM  No step down or ICU beds available at this time. Patient will need to board in ED. Discussed with Dr. Lane Vazquez [facility] ED, accepts patient to board in Ed under Family medicine admission.    Procedures

## 2018-03-12 NOTE — IP AVS SNAPSHOT
[address] [facility] 
 
 
 [address] [facility] [address] 
[phone] Patient: Sandro Ovalles MRN: [id number] I:[id number] About your hospitalization You were admitted on:  March 12, 2018 You last received care in the:  [facility] You were discharged on:  March 17, 2018 Why you were hospitalized Your primary diagnosis was:  Respiratory Failure (Hcc) Your diagnoses also included:  Ckd (Chronic Kidney Disease) Stage 2, Gfr 60-89 Ml/Min, Htn (Hypertension), Anemia, Type 2 Diabetes With Nephropathy (Hcc), Noncompliance With Diabetes Treatment, Ground Glass Opacity Present On Imaging Of Lung, Status Post Insertion Of Drug Eluting Coronary Artery Stent Follow-up Information Follow up With Details Comments Contact Info Ximena Villeda MD On 3/19/2018 Follow up with your primary care doctor at on Monday, 3/19/2018 at 1:45 PM   [address] [facility] 
[phone] Naomie Burrell MD On 4/2/2018 10:20 am [address] [facility] [phone] [address] [place] [address] 
[phone] Your Scheduled Appointments Monday March 19, 2018  1:45 PM EDT TRANSITIONAL CARE MANAGEMENT with Ximena Villeda MD  
[address] 7802 [facility]) [address] [facility]  
[phone] Monday April 02, 2018 10:20 AM EDT  
ESTABLISHED PATIENT with Naomie Burrell MD  
[facility] ([address]) 354 [facility] [address] 70 [facility]  
[phone] Discharge Orders None A check shahana indicates which time of day the medication should be taken. My Medications START taking these medications Instructions Each Dose to Equal  
 Morning Noon Evening Bedtime  
 aspirin 81 mg chewable tablet Your last dose was: Your next dose is: Take 1 Tab by mouth daily. 81 mg  
    
   
   
   
  
 atorvastatin 40 mg tablet Commonly known as:  LIPITOR Your last dose was: Your next dose is: Take 1 Tab by mouth daily. 40 mg  
    
   
   
   
  
 carvedilol 6.25 mg tablet Commonly known as:  [place] Dine Your last dose was: Your next dose is: Take 1 Tab by mouth two (2) times daily (with meals). 6.25 mg  
    
   
   
   
  
 clopidogrel 75 mg Tab Commonly known as:  PLAVIX Start taking on:  3/18/2018 Your last dose was: Your next dose is: Take 1 Tab by mouth daily. 75 mg  
    
   
   
   
  
 hydrALAZINE 25 mg tablet Commonly known as:  APRESOLINE Your last dose was: Your next dose is: Take 1.5 Tabs by mouth three (3) times daily. 37.5 mg  
    
   
   
   
  
 insulin glargine 100 unit/mL injection Commonly known as:  LANTUS Your last dose was: Your next dose is:    
   
   
 10 Units by SubCUTAneous route nightly. 10 Units  
    
   
   
   
  
 isosorbide mononitrate ER 30 mg tablet Commonly known as:  IMDUR Your last dose was: Your next dose is: Take 1 Tab by mouth daily. 30 mg  
    
   
   
   
  
 pantoprazole 40 mg tablet Commonly known as:  PROTONIX Your last dose was: Your next dose is: Take 1 Tab by mouth daily. 40 mg CONTINUE taking these medications Instructions Each Dose to Equal  
 Morning Noon Evening Bedtime  
 ferrous sulfate 325 mg (65 mg iron) tablet Your last dose was: Your next dose is: Take 325 mg by mouth daily. 325 mg  
    
   
   
   
  
 levothyroxine 150 mcg tablet Commonly known as:  SYNTHROID Your last dose was: Your next dose is: TAKE ONE TABLET BY MOUTH ONCE DAILY BEFORE BREAKFAST  
     
   
   
   
  
  
STOP taking these medications amLODIPine 10 mg tablet Commonly known as:  [place] Where to Get Your Medications Information on where to get these meds will be given to you by the nurse or doctor. ! Ask your nurse or doctor about these medications  
  aspirin 81 mg chewable tablet  
 atorvastatin 40 mg tablet  
 carvedilol 6.25 mg tablet  
 clopidogrel 75 mg Tab  
 hydrALAZINE 25 mg tablet  
 insulin glargine 100 unit/mL injection  
 isosorbide mononitrate ER 30 mg tablet  
 pantoprazole 40 mg tablet Discharge Instructions HOME DISCHARGE INSTRUCTIONS Deandra Brown / [id number] : 1950 Admission date: 3/12/2018 Discharge date: 3/17/2018 Please bring this form with you to show your care provider at your follow-up appointment. Primary care provider:  Warren Perez MD 
 
Discharging provider:  Karina Mahmood MD  - Family Medicine Resident Anthony Lopez - Attending, Family Medicine You have been admitted to the hospital with the following diagnoses: 
 
ACUTE DIAGNOSES: 
Respiratory failure ([address] Nick Celeste . . . . . . . . . . . . . . . . . . . . . . . . . . . . . . . . . . . . . . . . . . . . . . . . . . . . . . . . . . . . . . . . . . . . . . . MEDICATION CHANGES: 
 
1) NEW MEDICATIONS: 
-HYDRALAZINE 25 MG, TAKE ONE TABLET, THREE TIMES DAILY 
-ISOSORBIDE MONONITRATE ER 30 MG, TAKE ONE TABLET DAILY 
-COREG 6.25 MG, TAKE ONE TABLET,  TWO TIMES DAILY 
-ASPIRIN 81 MG, ONE TABLET DAILY 
-PLAVIX 75 MG, ONE TABLET DAILY 
-LIPITOR 40 MG, ONE TABLET DAILY 
-PLEASE RESUME HOME INSULIN 10 MG SUBCUTANEOUS AT BEDTIME 2) DISCONTINUED MEDICATIONS 
-AMLODIPINE 10 MG 
 
 
FOLLOW-UP CARE RECOMMENDATIONS: 
 
Appointments Follow-up Information Follow up With Details Comments Contact Info Warren Perez MD On 3/19/2018 Follow up with your primary care doctor at on Monday, 3/19/2018 at 1:45 PM   [address] [place] [address] 
[phone] Shira Atkins MD On 4/2/2018 10:20 am [address] [place] [address] [phone] [address] [facility] 
[phone] Follow-up tests needed: CBC and monitor BLOOD PRESSURES Pending test results: At the time of your discharge the following test results are still pending: None. Please make sure you review these results with your outpatient follow-up provider(s). Specific symptoms to watch for: chest pain, shortness of breath, fever, chills, nausea, vomiting, diarrhea, change in mentation, falling, weakness, bleeding. DIET/what to eat:  Diabetic diet ACTIVITY:  Activity as tolerated Wound care: none Equipment needed:  none What to do if new or unexpected symptoms occur? If you experience any of the above symptoms (or should other concerns or questions arise after discharge) please call your primary care physician. Return to the emergency room if you cannot get hold of your doctor. · It is very important that you keep your follow-up appointment(s). · Please bring discharge papers, medication list (and/or medication bottles) to your follow-up appointments for review by your outpatient provider(s). · Please check the list of medications and be sure it includes every medication (even non-prescription medications) that your provider wants you to take. · It is important that you take the medication exactly as they are prescribed. · Keep your medication in the bottles provided by the pharmacist and keep a list of the medication names, dosages, and times to be taken in your wallet. · Do not take other medications without consulting your doctor. · If you have any questions about your medications or other instructions, please talk to your nurse or care provider before you leave the hospital. 
 
 
 
Cardiac Catheterization  Discharge Instructions ?  Do not drive, operate any machinery, or sign any legal documents for 24 hours after your procedure. You must have someone to drive you home. ? You may take a shower 24 hours after your cardiac catheterization. Be sure to get the dressing wet and then remove it; gently wash the area with warm soapy water. Pat dry and leave open to air. To help prevent infections, be sure to keep the cath site clean and dry. No lotions, creams, powders, ointments, etc. in the cath site for approximately 1 week. ? Do not take a tub bath, get in a hot tub or swimming pool for approximately 5 days or until the cath site is completely healed. ? No strenuous activity or heavy lifting over 10 lbs. for 7 days. ? Drink plenty of fluids for 24-48 hours after your cath to flush the contrast dye from your kidneys. No alcoholic beverages for 24 hours. You may resume your previous diet (low fat, low cholesterol) after your cath. ? After your cath, some bruising or discomfort is common during the healing process. Tylenol, 1-2 tablets every 6 hours as needed, is recommended if you experience any discomfort. If you experience any signs or symptoms of infection such as fever, chills, or poorly healing incision, persistent tenderness or swelling in the groin, redness and/or warmth to the touch, numbness, significant tingling or pain at the groin site or affected extremity, rash, drainage from the cath site, or if the leg feels tight or swollen, call your physician right away. ? If bleeding at the cath site occurs, take a clean gauze pad and apply direct pressure to the groin just above the puncture site. Call 911 immediately, and continue to apply direct pressure until an ambulance gets to your location. ? You may return to work  2  days after your cardiac cath if no groin bleeding. Kalia Segura NP Information obtained by:  
 
I understand that if any problems occur once I am at home I am to contact my physician. These instructions were explained to me and I had the opportunity to ask questions. I understand and acknowledge receipt of the instructions indicated above. Physician's or R.N.'s Signature                                                                  Date/Time Patient or Representative Signature                                                          Date/Time Percutaneous Coronary Intervention: What to Expect at [facility] Your Recovery Percutaneous coronary intervention (PCI) is the name for procedures that are used to open a narrowed or blocked coronary artery. The two most common PCI procedures are coronary angioplasty and coronary stent placement. Your groin or arm may have a bruise and feel sore for a day or two after a percutaneous coronary intervention (PCI). You can do light activities around the house, but nothing strenuous for several days. This care sheet gives you a general idea about how long it will take for you to recover. But each person recovers at a different pace. Follow the steps below to get better as quickly as possible. How can you care for yourself at home? Activity · Do not do strenuous exercise and do not lift, pull, or push anything heavy until your doctor says it is okay. This may be for a day or two. You can walk around the house and do light activity, such as cooking. · You may shower 24 hours after the procedure, if your doctor okays it. Pat the incision dry. Do not take a bath, or go swimming, for 7 days. · If the catheter was placed in your groin, try to limit walking up stairs for the first couple of days · If the catheter was placed in your arm near your wrist, do not bend your wrist deeply for the first couple of days. Be careful using your hand to get into and out of a chair or bed. · If your doctor recommends it, get more exercise. Walking is a good choice. Bit by bit, increase the amount you walk every day. Try for at least 30 minutes on most days of the week. Diet · Drink plenty of fluids to help your body flush out the dye. If you have kidney, heart, or liver disease and have to limit fluids, talk with your doctor before you increase the amount of fluids you drink. · Keep eating a heart-healthy diet that has lots of fruits, vegetables, and whole grains. If you have not been eating this way, talk to your doctor. You also may want to talk to a dietitian. This expert can help you to learn about healthy foods and plan meals. Medicines · Your doctor will tell you if and when you can restart your medicines. He or she will also give you instructions about taking any new medicines. · If you take blood thinners, such as warfarin (Coumadin), clopidogrel (Plavix), or aspirin, be sure to talk to your doctor. He or she will tell you if and when to start taking those medicines again. Make sure that you understand exactly what your doctor wants you to do. · Your doctor will prescribe blood-thinning medicines. You will likely take aspirin plus another antiplatelet, such as clopidogrel (Plavix) or Effient (prasugrel). It is very important that you take your blood-thinner exactly as directed, because these medicines help keep your stent open. · Call your doctor if you think you are having a problem with your medicine. Care of the catheter site · Keep a bandage over the spot where the catheter was inserted for 24 hrs. Once the dressing is removed, if the puncture site oozes a little, you may apply a bandaid. Do not leave a bandaid on for more than 12 hrs.   
· Put ice or a cold pack on the area for 10 to 20 minutes at a time to help with soreness or swelling. Put a thin cloth between the ice and your skin. Follow-up care is a key part of your treatment and safety. Be sure to make and go to all appointments, and call your doctor if you are having problems. It's also a good idea to know your test results and keep a list of the medicines you take. When should you call for help? Call 911 anytime you think you may need emergency care. For example, call if: 
· You passed out (lost consciousness). · You have severe trouble breathing. · You have sudden chest pain and shortness of breath, or you cough up blood. · You have symptoms of a heart attack, such as: ¨ Chest pain or pressure. ¨ Sweating. ¨ Shortness of breath. ¨ Nausea or vomiting. ¨ Pain that spreads from the chest to the neck, jaw, or one or both shoulders or arms. ¨ Dizziness or lightheadedness. ¨ A fast or uneven pulse. After calling 911, chew 1 adult-strength aspirin. Wait for an ambulance. Do not try to drive yourself. · You have been diagnosed with angina, and you have angina symptoms that do not go away with rest or are not getting better within 5 minutes after you take one dose of nitroglycerin. Call your doctor now or seek immediate medical care if: 
· You are bleeding from the area where the catheter was put in your artery. · You have a fast-growing, painful lump at the catheter site. · You have signs of infection, such as: 
¨ Increased pain, swelling, warmth, or redness. ¨ Red streaks leading from the catheter site. ¨ Pus draining from the catheter site. ¨ A fever. · Your leg or arm looks blue or feels cold, numb, or tingly. Watch closely for changes in your health, and be sure to contact your doctor if you have any problems. Where can you learn more? Go to http://vandana-sherri.info/ Enter H961 in the search box to learn more about \"Percutaneous Coronary Intervention: What to Expect at Home. \" 
 © 2059-8301 [employer]. Care instructions adapted under license by [employer] (which disclaims liability or warranty for this information). This care instruction is for use with your licensed healthcare professional. If you have questions about a medical condition or this instruction, always ask your healthcare professional. [address] any warranty or liability for your use of this information. Content Version: 80.4.356820; Current as of: January 27, 2016 (modified 10/31/16). Avoiding Triggers With Heart Failure: Care Instructions Your Care Instructions Triggers are anything that make your heart failure flare up. A flare-up is also called \"sudden heart failure\" or \"acute heart failure. \" When you have a flare-up, fluid builds up in your lungs, and you have problems breathing. You might need to go to the hospital. By watching for changes in your condition and avoiding triggers, you can prevent heart failure flare-ups. Follow-up care is a key part of your treatment and safety. Be sure to make and go to all appointments, and call your doctor if you are having problems. It's also a good idea to know your test results and keep a list of the medicines you take. How can you care for yourself at home? Watch for changes in your weight and condition · Weigh yourself without clothing at the same time each day. Record your weight. Call your doctor if you gain 3 pounds or more in 24 hrs or 5 pounds in one week. A sudden weight gain may mean that your heart failure is getting worse. · Keep a daily record of your symptoms. Write down any changes in how you feel, such as new shortness of breath, cough, or problems eating. Also record if your ankles are more swollen than usual and if you have to urinate in the night more often. Note anything that you ate or did that could have triggered these changes. Limit sodium Sodium causes your body to hold on to water, making it harder for your heart to pump. People get most of their sodium from processed foods. Fast food and restaurant meals also tend to be very high in sodium. · Your doctor may suggest that you limit sodium to 1,500 milligrams (mg) a day. That is less than 1 teaspoon of salt a day, including all the salt you eat in cooking or in packaged foods. · Read food labels on cans and food packages. They tell you how much sodium you get in one serving. Check the serving size. If you eat more than one serving, you are getting more sodium. · Be aware that sodium can come in forms other than salt, including monosodium glutamate (MSG), sodium citrate, and sodium bicarbonate (baking soda). MSG is often added to Asian food. You can sometimes ask for food without MSG or salt. · Slowly reducing salt will help you adjust to the taste. Take the salt shaker off the table. · Flavor your food with garlic, lemon juice, onion, vinegar, herbs, and spices instead of salt. Do not use soy sauce, steak sauce, onion salt, garlic salt, mustard, or ketchup on your food, unless it is labeled \"low-sodium\" or \"low-salt. \" 
· Make your own salad dressings, sauces, and ketchup without adding salt. · Use fresh or frozen ingredients, instead of canned ones, whenever you can. Choose low-sodium canned goods. · Eat less processed food and food from restaurants, including fast food. Exercise as directed Moderate, regular exercise is very good for your heart. It improves your blood flow and helps control your weight. But too much exercise can stress your heart and cause a heart failure flare-up. · Check with your doctor before you start an exercise program. 
· Walking is an easy way to get exercise. Start out slowly. Gradually increase the length and pace of your walk. Swimming, riding a bike, and using a treadmill are also good forms of exercise. · When you exercise, watch for signs that your heart is working too hard. You are pushing yourself too hard if you cannot talk while you are exercising. If you become short of breath or dizzy or have chest pain, stop, sit down, and rest. 
· Do not exercise when you do not feel well. Take medicines correctly · Take your medicines exactly as prescribed. Call your doctor if you think you are having a problem with your medicine. · Make a list of all the medicines you take. Include those prescribed to you by other doctors and any over-the-counter medicines, vitamins, or supplements you take. Take this list with you when you go to any doctor. · Take your medicines at the same time every day. It may help you to post a list of all the medicines you take every day and what time of day you take them. · Make taking your medicine as simple as you can. Plan times to take your medicines when you are doing other things, such as eating a meal or getting ready for bed. This will make it easier to remember to take your medicines. · Get organized. Use helpful tools, such as daily or weekly pill containers. When should you call for help? Call 911 if you have symptoms of sudden heart failure such as: 
· You have severe trouble breathing. · You cough up pink, foamy mucus. · You have a new irregular or rapid heartbeat. Call your doctor now or seek immediate medical care if: 
· You have new or increased shortness of breath. · You are dizzy or lightheaded, or you feel like you may faint. · You have sudden weight gain, such as 3 pounds in 24 hours, or 5 pounds in one week. · You have increased swelling in your legs, ankles, or feet. · You are suddenly so tired or weak that you cannot do your usual activities. Watch closely for changes in your health, and be sure to contact your doctor if you develop new symptoms. Where can you learn more? Go to http://vandana-sherri.info/ Enter P105 in the search box to learn more about \"Avoiding Triggers With Heart Failure: Care Instructions. \" 
© 2086-6644 [employer]. Care instructions adapted under license by [employer] (which disclaims liability or warranty for this information). This care instruction is for use with your licensed healthcare professional. If you have questions about a medical condition or this instruction, always ask your healthcare professional. [address] any warranty or liability for your use of this information. Content Version: 30.3.892627; Current as of: January 27, 2016 (modified 2/9/18). eFuneral Announcement We are excited to announce that we are making your provider's discharge notes available to you in eFuneral. You will see these notes when they are completed and signed by the physician that discharged you from your recent hospital stay. If you have any questions or concerns about any information you see in eFuneral, please call the Health Information Department where you were seen or reach out to your Primary Care Provider for more information about your plan of care. Introducing [facility]! [facility] introduces eFuneral patient portal. Now you can access parts of your medical record, email your doctor's office, and request medication refills online. 1. In your internet browser, go to [email or web address] 2. Click on the First Time User? Click Here link in the Sign In box. You will see the New Member Sign Up page. 3. Enter your eFuneral Access Code exactly as it appears below. You will not need to use this code after youve completed the sign-up process. If you do not sign up before the expiration date, you must request a new code. · eFuneral Access Code: [email or web address] Expires: 5/21/2018 11:54 AM 
 
4.  Enter the last four digits of your Social Security Number (xxxx) and Date of Birth (mm/dd/yyyy) as indicated and click Submit. You will be taken to the next sign-up page. 5. Create a OwnEnergy ID. This will be your Own[employer] login ID and cannot be changed, so think of one that is secure and easy to remember. 6. Create a [employer] password. You can change your password at any time. 7. Enter your Password Reset Question and Answer. This can be used at a later time if you forget your password. 8. Enter your e-mail address. You will receive e-mail notification when new information is available in [address] 9. Click Sign Up. You can now view and download portions of your medical record. 10. Click the Download Summary menu link to download a portable copy of your medical information. If you have questions, please visit the Frequently Asked Questions section of the [employer] website. Remember, OwnEnergy is NOT to be used for urgent needs. For medical emergencies, dial 911. Now available from your iPhone and Android! Unresulted Labs-Please follow up with your PCP about these lab tests Order Current Status CULTURE, BLOOD, PAIRED Preliminary result Providers Seen During Your Hospitalization Provider Specialty Primary office phone Fatuma Esqueda MD Emergency Medicine [phone] Sandy Easton MD Family Practice [phone] Gerard Bai MD Family Practice [phone] Miriam Lopes DO Family Practice [phone] Your Primary Care Physician (PCP) Primary Care Physician Office Phone Office Fax Munira Baxter [phone] You are allergic to the following No active allergies Recent Documentation Height Weight BMI Smoking Status 1.727 m 108.8 kg 36.47 kg/m2 Former Smoker Emergency Contacts Name Discharge Info Relation Home Work Mobile Lucy Shea DISCHARGE CAREGIVER [3] Other Relative [6] [phone] Isael Sandoval  Other Relative [6]   [phone] Patient Belongings The following personal items are in your possession at time of discharge: 
  Dental Appliances: With patient  Visual Aid: None      Home Medications: Sent home   Jewelry: With patient  Clothing: At bedside    Other Valuables: At bedside Discharge Instructions Attachments/References MEFS - CLOPIDOGREL (PLAVIX) - (BY MOUTH) (ENGLISH) MEFS - CARVEDILOL (COREG, COREG CR, HYPERTENEVIDE-12.5) - (BY MOUTH) (ENGLISH) MEFS - ISOSORBIDE MONONITRATE (IMDUR, IMDUR ER, ISMO) - (BY MOUTH) (ENGLISH) MEFS - HYDRALAZINE (APRESOLINE) - (BY MOUTH) (ENGLISH) ASPIRIN: HEART ATTACK AND STROKE PREVENTION (ENGLISH) Patient Handouts Clopidogrel (Plavix) - (By mouth) Why this medicine is used:  
Helps prevent stroke, heart attack, and other heart problems. Contact a nurse or doctor right away if you have: 
· Sudden or severe headache · Bloody vomit or vomit that looks like coffee grounds; bloody or black, tarry stools · Bleeding that does not stop or bruises that do not heal 
· Dark urine or pale stools, nausea, loss of appetite, stomach pain, · Yellow skin or eyes Common side effects: · Minor bleeding or bruising © 2017 [employer] Information is for End User's use only and may not be sold, redistributed or otherwise used for commercial purposes. Carvedilol (Coreg, Coreg CR, Hypertenevide-12.5) - (By mouth) Why this medicine is used:  
Treats high blood pressure and heart failure. Contact a nurse or doctor right away if you have: 
· Change in how much or how often you urinate · Leg pain when you walk, legs and feet that feel cold or numb · Lightheadedness, dizziness, fainting · Rapid weight gain, swelling in your hands, ankles, or feet Common side effects: · Mild dizziness · Tiredness · Trouble having sex · Diarrhea © 2017 [employer] Information is for End User's use only and may not be sold, redistributed or otherwise used for commercial purposes. Isosorbide Mononitrate (Imdur, Imdur ER, Ismo) - (By mouth) Why this medicine is used:  
Prevents angina. Contact a nurse or doctor right away if you have: · Severe or ongoing dizziness, lightheadedness, fainting · Slow heartbeat, new or increased chest pain Common side effects: · Mild dizziness, lightheadedness · Headache · Feeling of warmth, redness of the face, neck, arms, and upper chest 
© 2017 [facility] Information is for End User's use only and may not be sold, redistributed or otherwise used for commercial purposes. Hydralazine (Apresoline) - (By mouth) Why this medicine is used:  
Treats high blood pressure. Contact a nurse or doctor right away if you have: · Chest pain that may spread to your arms, jaw, back, or neck, trouble breathing · Dark urine or pale stools · Pain in your upper stomach, yellow skin or eyes · Lightheadedness, dizziness, fainting, unusual sweating · Numbness, tingling, or burning pain in your hands, arms, legs, or feet Common side effects: 
· Diarrhea, nausea, vomiting, loss of appetite · Headache © 2017  [employer] [facility] Information is for End User's use only and may not be sold, redistributed or otherwise used for commercial purposes. Taking Aspirin to Prevent Heart Attack and Stroke: Care Instructions Your Care Instructions Aspirin acts as a \"blood thinner. \" It prevents blood clots from forming. When taken during and after a heart attack, it can reduce your chance of dying. And it's used if you have a stent in your coronary artery. Also, aspirin helps certain people lower their risk of a heart attack or stroke. Be sure you know what dose of aspirin to take and how often to take it. Low-dose aspirin is typically 81 mg. But the dose for daily aspirin can range from 81 mg to 325 mg. Taking aspirin every day can cause bleeding. It may not be safe if you have stomach ulcers. And it may not be safe if you have high blood pressure that is not controlled. If you take aspirin pills every day, do not take ones that have other ingredients such as caffeine or sodium. Before you start to take aspirin, tell your doctor all the medicines, vitamins, herbal products, and supplements you take. Follow-up care is a key part of your treatment and safety. Be sure to make and go to all appointments, and call your doctor if you are having problems. It's also a good idea to know your test results and keep a list of the medicines you take. How can you care for yourself at home? · Take aspirin with a full glass of water unless your doctor tells you not to. Do not lie down right after you take it. · If you have a stent in your coronary artery, take your aspirin as your heart doctor says to. If another doctor says to stop taking the aspirin for any reason, talk to your heart doctor before you stop. · Do not chew or crush the coated or sustained-release forms of aspirin. · Ask your doctor if you can drink alcohol while you take aspirin. And ask how much you can drink. Too much alcohol with aspirin can cause stomach bleeding. · Do not take aspirin if you are pregnant, unless your doctor says it is okay. · Keep all aspirin out of children's reach. · Throw aspirin away if it starts to smell like vinegar. · Do not take aspirin if you have gout or if you take prescription blood thinners, unless your doctor has told you to. · Do not take prescription or over-the-counter medicines, vitamins, herbal products, or supplements without talking to your doctor first. Kam Fernandez the label before you take another over-the-counter medicine. Many contain aspirin. So they could cause you to take too much aspirin. · Talk with your doctor before you take a pain medicine.  Ask which type of medicine you can take and how to take it safely with aspirin. · Tell your doctor or dentist before a surgery or procedure that you take aspirin. He or she will tell you if you should stop taking aspirin before your surgery or procedure. Make sure that you understand exactly what your doctor wants you to do. Where can you learn more? Go to http://vandana-sherri.info/. Enter M069 in the search box to learn more about \"Taking Aspirin to Prevent Heart Attack and Stroke: Care Instructions. \" Current as of: September 21, 2016 Content Version: 11.4 © 5198-8567 [employer]. Care instructions adapted under license by [employer] (which disclaims liability or warranty for this information). If you have questions about a medical condition or this instruction, always ask your healthcare professional. [address] any warranty or liability for your use of this information. Please provide this summary of care documentation to your next provider. Signatures-by signing, you are acknowledging that this After Visit Summary has been reviewed with you and you have received a copy. Patient Signature:  ____________________________________________________________ Date:  ____________________________________________________________  
  
Susana Lisa Provider Signature:  ____________________________________________________________ Date:  ____________________________________________________________

## 2018-03-12 NOTE — IP AVS SNAPSHOT
[address] [facility] [id number] 
[phone] Patient: Sammy Dao MRN: [id number] About your hospitalization You were admitted on:  March 12, 2018 You last received care in the:  [facility] 3 [facility] You were discharged on:  March 17, 2018 Why you were hospitalized Your primary diagnosis was:  Respiratory Failure (Hcc) Your diagnoses also included:  Ckd (Chronic Kidney Disease) Stage 2, Gfr 60-89 Ml/Min, Htn (Hypertension), Anemia, Type 2 Diabetes With Nephropathy (Hcc), Noncompliance With Diabetes Treatment, Ground Glass Opacity Present On Imaging Of Lung, Status Post Insertion Of Drug Eluting Coronary Artery Stent Follow-up Information Follow up With Details Comments Contact Info Joselito Rodarte MD On 3/19/2018 Follow up with your primary care doctor at on Monday, 3/19/2018 at 1:45 PM   [address]  [address] [place] [address] 
[phone] Erika Douglas MD On 4/2/2018 10:20 am 380 [facility] [phone] 70 [facility] 
[phone] Your Scheduled Appointments Monday March 19, 2018  1:45 PM EDT TRANSITIONAL CARE MANAGEMENT with Joselito Rodarte MD  
[address] [place] [address] [address] [place] [address]) Carlos  [address] [facility]  
[phone] Monday April 02, 2018 10:20 AM EDT  
ESTABLISHED PATIENT with Erika Douglas MD  
[facility] ([address] [place] [address] Road) 320 [facility] [address] 600 70 [facility]  
[phone] Discharge Orders None A check shahana indicates which time of day the medication should be taken. My Medications START taking these medications Instructions Each Dose to Equal  
 Morning Noon Evening Bedtime  
 aspirin 81 mg chewable tablet Your last dose was: Your next dose is: Take 1 Tab by mouth daily. 81 mg  
    
   
   
   
  
 atorvastatin 40 mg tablet Commonly known as:  LIPITOR Your last dose was: Your next dose is: Take 1 Tab by mouth daily. 40 mg  
    
   
   
   
  
 carvedilol 6.25 mg tablet Commonly known as:  Kiki Silva Your last dose was: Your next dose is: Take 1 Tab by mouth two (2) times daily (with meals). 6.25 mg  
    
   
   
   
  
 clopidogrel 75 mg Tab Commonly known as:  PLAVIX Start taking on:  3/18/2018 Your last dose was: Your next dose is: Take 1 Tab by mouth daily. 75 mg  
    
   
   
   
  
 hydrALAZINE 25 mg tablet Commonly known as:  APRESOLINE Your last dose was: Your next dose is: Take 1.5 Tabs by mouth three (3) times daily. 37.5 mg  
    
   
   
   
  
 insulin glargine 100 unit/mL injection Commonly known as:  LANTUS Your last dose was: Your next dose is:    
   
   
 10 Units by SubCUTAneous route nightly. 10 Units  
    
   
   
   
  
 isosorbide mononitrate ER 30 mg tablet Commonly known as:  IMDUR Your last dose was: Your next dose is: Take 1 Tab by mouth daily. 30 mg  
    
   
   
   
  
 pantoprazole 40 mg tablet Commonly known as:  PROTONIX Your last dose was: Your next dose is: Take 1 Tab by mouth daily. 40 mg CONTINUE taking these medications Instructions Each Dose to Equal  
 Morning Noon Evening Bedtime  
 ferrous sulfate 325 mg (65 mg iron) tablet Your last dose was: Your next dose is: Take 325 mg by mouth daily. 325 mg  
    
   
   
   
  
 levothyroxine 150 mcg tablet Commonly known as:  SYNTHROID Your last dose was: Your next dose is: TAKE ONE TABLET BY MOUTH ONCE DAILY BEFORE BREAKFAST  
     
   
   
   
  
  
STOP taking these medications amLODIPine 10 mg tablet Commonly known as:  Lindy Mehta Where to Get Your Medications Information on where to get these meds will be given to you by the nurse or doctor. ! Ask your nurse or doctor about these medications  
  aspirin 81 mg chewable tablet  
 atorvastatin 40 mg tablet  
 carvedilol 6.25 mg tablet  
 clopidogrel 75 mg Tab  
 hydrALAZINE 25 mg tablet  
 insulin glargine 100 unit/mL injection  
 isosorbide mononitrate ER 30 mg tablet  
 pantoprazole 40 mg tablet Discharge Instructions HOME DISCHARGE INSTRUCTIONS Umberto Cochran / [id number] : 1950 Admission date: 3/12/2018 Discharge date: 3/17/2018 Please bring this form with you to show your care provider at your follow-up appointment. Primary care provider:  Augie Mejia MD 
 
Discharging provider:  Fatou Wan MD  - Family Medicine Resident Chasidy Kam - Attending, Family Medicine You have been admitted to the hospital with the following diagnoses: 
 
ACUTE DIAGNOSES: 
Respiratory failure ([address] Indra Engle . . . . . . . . . . . . . . . . . . . . . . . . . . . . . . . . . . . . . . . . . . . . . . . . . . . . . . . . . . . . . . . . . . . . . . . MEDICATION CHANGES: 
 
1) NEW MEDICATIONS: 
-HYDRALAZINE 25 MG, TAKE ONE TABLET, THREE TIMES DAILY 
-ISOSORBIDE MONONITRATE ER 30 MG, TAKE ONE TABLET DAILY 
-COREG 6.25 MG, TAKE ONE TABLET,  TWO TIMES DAILY 
-ASPIRIN 81 MG, ONE TABLET DAILY 
-PLAVIX 75 MG, ONE TABLET DAILY 
-LIPITOR 40 MG, ONE TABLET DAILY 
-PLEASE RESUME HOME INSULIN 10 MG SUBCUTANEOUS AT BEDTIME 2) DISCONTINUED MEDICATIONS 
-AMLODIPINE 10 MG 
 
 
FOLLOW-UP CARE RECOMMENDATIONS: 
 
Appointments Follow-up Information Follow up With Details Comments Contact Info Augie Mejia MD On 3/19/2018 Follow up with your primary care doctor at on Monday, 3/19/2018 at 1:45 PM   Carlos [address]ales [address] [place] [address] 
[phone] Pérez Stevens MD On 4/2/2018 10:20 am [address] [phone] 1007 [facility] 
[phone] Follow-up tests needed: CBC and monitor BLOOD PRESSURES Pending test results: At the time of your discharge the following test results are still pending: None. Please make sure you review these results with your outpatient follow-up provider(s). Specific symptoms to watch for: chest pain, shortness of breath, fever, chills, nausea, vomiting, diarrhea, change in mentation, falling, weakness, bleeding. DIET/what to eat:  Diabetic diet ACTIVITY:  Activity as tolerated Wound care: none Equipment needed:  none What to do if new or unexpected symptoms occur? If you experience any of the above symptoms (or should other concerns or questions arise after discharge) please call your primary care physician. Return to the emergency room if you cannot get hold of your doctor. · It is very important that you keep your follow-up appointment(s). · Please bring discharge papers, medication list (and/or medication bottles) to your follow-up appointments for review by your outpatient provider(s). · Please check the list of medications and be sure it includes every medication (even non-prescription medications) that your provider wants you to take. · It is important that you take the medication exactly as they are prescribed. · Keep your medication in the bottles provided by the pharmacist and keep a list of the medication names, dosages, and times to be taken in your wallet. · Do not take other medications without consulting your doctor. · If you have any questions about your medications or other instructions, please talk to your nurse or care provider before you leave the hospital. 
 
 
 
Cardiac Catheterization  Discharge Instructions ?  Do not drive, operate any machinery, or sign any legal documents for 24 hours after your procedure. You must have someone to drive you home. ? You may take a shower 24 hours after your cardiac catheterization. Be sure to get the dressing wet and then remove it; gently wash the area with warm soapy water. Pat dry and leave open to air. To help prevent infections, be sure to keep the cath site clean and dry. No lotions, creams, powders, ointments, etc. in the cath site for approximately 1 week. ? Do not take a tub bath, get in a hot tub or swimming pool for approximately 5 days or until the cath site is completely healed. ? No strenuous activity or heavy lifting over 10 lbs. for 7 days. ? Drink plenty of fluids for 24-48 hours after your cath to flush the contrast dye from your kidneys. No alcoholic beverages for 24 hours. You may resume your previous diet (low fat, low cholesterol) after your cath. ? After your cath, some bruising or discomfort is common during the healing process. Tylenol, 1-2 tablets every 6 hours as needed, is recommended if you experience any discomfort. If you experience any signs or symptoms of infection such as fever, chills, or poorly healing incision, persistent tenderness or swelling in the groin, redness and/or warmth to the touch, numbness, significant tingling or pain at the groin site or affected extremity, rash, drainage from the cath site, or if the leg feels tight or swollen, call your physician right away. ? If bleeding at the cath site occurs, take a clean gauze pad and apply direct pressure to the groin just above the puncture site. Call 911 immediately, and continue to apply direct pressure until an ambulance gets to your location. ? You may return to work  2  days after your cardiac cath if no groin bleeding. Frank De Luna NP Information obtained by:  
 
I understand that if any problems occur once I am at home I am to contact my physician. These instructions were explained to me and I had the opportunity to ask questions. I understand and acknowledge receipt of the instructions indicated above. Physician's or R.N.'s Signature                                                                  Date/Time Patient or Representative Signature                                                          Date/Time Percutaneous Coronary Intervention: What to Expect at [facility] Your Recovery Percutaneous coronary intervention (PCI) is the name for procedures that are used to open a narrowed or blocked coronary artery. The two most common PCI procedures are coronary angioplasty and coronary stent placement. Your groin or arm may have a bruise and feel sore for a day or two after a percutaneous coronary intervention (PCI). You can do light activities around the house, but nothing strenuous for several days. This care sheet gives you a general idea about how long it will take for you to recover. But each person recovers at a different pace. Follow the steps below to get better as quickly as possible. How can you care for yourself at home? Activity · Do not do strenuous exercise and do not lift, pull, or push anything heavy until your doctor says it is okay. This may be for a day or two. You can walk around the house and do light activity, such as cooking. · You may shower 24 hours after the procedure, if your doctor okays it. Pat the incision dry. Do not take a bath, or go swimming, for 7 days. · If the catheter was placed in your groin, try to limit walking up stairs for the first couple of days · If the catheter was placed in your arm near your wrist, do not bend your wrist deeply for the first couple of days. Be careful using your hand to get into and out of a chair or bed. · If your doctor recommends it, get more exercise. Walking is a good choice. Bit by bit, increase the amount you walk every day. Try for at least 30 minutes on most days of the week. Diet · Drink plenty of fluids to help your body flush out the dye. If you have kidney, heart, or liver disease and have to limit fluids, talk with your doctor before you increase the amount of fluids you drink. · Keep eating a heart-healthy diet that has lots of fruits, vegetables, and whole grains. If you have not been eating this way, talk to your doctor. You also may want to talk to a dietitian. This expert can help you to learn about healthy foods and plan meals. Medicines · Your doctor will tell you if and when you can restart your medicines. He or she will also give you instructions about taking any new medicines. · If you take blood thinners, such as warfarin (Coumadin), clopidogrel (Plavix), or aspirin, be sure to talk to your doctor. He or she will tell you if and when to start taking those medicines again. Make sure that you understand exactly what your doctor wants you to do. · Your doctor will prescribe blood-thinning medicines. You will likely take aspirin plus another antiplatelet, such as clopidogrel (Plavix) or Effient (prasugrel). It is very important that you take your blood-thinner exactly as directed, because these medicines help keep your stent open. · Call your doctor if you think you are having a problem with your medicine. Care of the catheter site · Keep a bandage over the spot where the catheter was inserted for 24 hrs. Once the dressing is removed, if the puncture site oozes a little, you may apply a bandaid. Do not leave a bandaid on for more than 12 hrs.   
· Put ice or a cold pack on the area for 10 to 20 minutes at a time to help with soreness or swelling. Put a thin cloth between the ice and your skin. Follow-up care is a key part of your treatment and safety. Be sure to make and go to all appointments, and call your doctor if you are having problems. It's also a good idea to know your test results and keep a list of the medicines you take. When should you call for help? Call 911 anytime you think you may need emergency care. For example, call if: 
· You passed out (lost consciousness). · You have severe trouble breathing. · You have sudden chest pain and shortness of breath, or you cough up blood. · You have symptoms of a heart attack, such as: ¨ Chest pain or pressure. ¨ Sweating. ¨ Shortness of breath. ¨ Nausea or vomiting. ¨ Pain that spreads from the chest to the neck, jaw, or one or both shoulders or arms. ¨ Dizziness or lightheadedness. ¨ A fast or uneven pulse. After calling 911, chew 1 adult-strength aspirin. Wait for an ambulance. Do not try to drive yourself. · You have been diagnosed with angina, and you have angina symptoms that do not go away with rest or are not getting better within 5 minutes after you take one dose of nitroglycerin. Call your doctor now or seek immediate medical care if: 
· You are bleeding from the area where the catheter was put in your artery. · You have a fast-growing, painful lump at the catheter site. · You have signs of infection, such as: 
¨ Increased pain, swelling, warmth, or redness. ¨ Red streaks leading from the catheter site. ¨ Pus draining from the catheter site. ¨ A fever. · Your leg or arm looks blue or feels cold, numb, or tingly. Watch closely for changes in your health, and be sure to contact your doctor if you have any problems. Where can you learn more? Go to http://vandana-sherri.info/ Enter R269 in the search box to learn more about \"Percutaneous Coronary Intervention: What to Expect at Home. \" 
 © 3792-2609 "[employer]. Care instructions adapted under license by [employer] (which disclaims liability or warranty for this information). This care instruction is for use with your licensed healthcare professional. If you have questions about a medical condition or this instruction, always ask your healthcare professional. [address] any warranty or liability for your use of this information. Content Version: 54.2.625283; Current as of: January 27, 2016 (modified 10/31/16). Avoiding Triggers With Heart Failure: Care Instructions Your Care Instructions Triggers are anything that make your heart failure flare up. A flare-up is also called \"sudden heart failure\" or \"acute heart failure. \" When you have a flare-up, fluid builds up in your lungs, and you have problems breathing. You might need to go to the hospital. By watching for changes in your condition and avoiding triggers, you can prevent heart failure flare-ups. Follow-up care is a key part of your treatment and safety. Be sure to make and go to all appointments, and call your doctor if you are having problems. It's also a good idea to know your test results and keep a list of the medicines you take. How can you care for yourself at home? Watch for changes in your weight and condition · Weigh yourself without clothing at the same time each day. Record your weight. Call your doctor if you gain 3 pounds or more in 24 hrs or 5 pounds in one week. A sudden weight gain may mean that your heart failure is getting worse. · Keep a daily record of your symptoms. Write down any changes in how you feel, such as new shortness of breath, cough, or problems eating. Also record if your ankles are more swollen than usual and if you have to urinate in the night more often. Note anything that you ate or did that could have triggered these changes. Limit sodium Sodium causes your body to hold on to water, making it harder for your heart to pump. People get most of their sodium from processed foods. Fast food and restaurant meals also tend to be very high in sodium. · Your doctor may suggest that you limit sodium to 1,500 milligrams (mg) a day. That is less than 1 teaspoon of salt a day, including all the salt you eat in cooking or in packaged foods. · Read food labels on cans and food packages. They tell you how much sodium you get in one serving. Check the serving size. If you eat more than one serving, you are getting more sodium. · Be aware that sodium can come in forms other than salt, including monosodium glutamate (MSG), sodium citrate, and sodium bicarbonate (baking soda). MSG is often added to Asian food. You can sometimes ask for food without MSG or salt. · Slowly reducing salt will help you adjust to the taste. Take the salt shaker off the table. · Flavor your food with garlic, lemon juice, onion, vinegar, herbs, and spices instead of salt. Do not use soy sauce, steak sauce, onion salt, garlic salt, mustard, or ketchup on your food, unless it is labeled \"low-sodium\" or \"low-salt. \" 
· Make your own salad dressings, sauces, and ketchup without adding salt. · Use fresh or frozen ingredients, instead of canned ones, whenever you can. Choose low-sodium canned goods. · Eat less processed food and food from restaurants, including fast food. Exercise as directed Moderate, regular exercise is very good for your heart. It improves your blood flow and helps control your weight. But too much exercise can stress your heart and cause a heart failure flare-up. · Check with your doctor before you start an exercise program. 
· Walking is an easy way to get exercise. Start out slowly. Gradually increase the length and pace of your walk. Swimming, riding a bike, and using a treadmill are also good forms of exercise. · When you exercise, watch for signs that your heart is working too hard. You are pushing yourself too hard if you cannot talk while you are exercising. If you become short of breath or dizzy or have chest pain, stop, sit down, and rest. 
· Do not exercise when you do not feel well. Take medicines correctly · Take your medicines exactly as prescribed. Call your doctor if you think you are having a problem with your medicine. · Make a list of all the medicines you take. Include those prescribed to you by other doctors and any over-the-counter medicines, vitamins, or supplements you take. Take this list with you when you go to any doctor. · Take your medicines at the same time every day. It may help you to post a list of all the medicines you take every day and what time of day you take them. · Make taking your medicine as simple as you can. Plan times to take your medicines when you are doing other things, such as eating a meal or getting ready for bed. This will make it easier to remember to take your medicines. · Get organized. Use helpful tools, such as daily or weekly pill containers. When should you call for help? Call 911 if you have symptoms of sudden heart failure such as: 
· You have severe trouble breathing. · You cough up pink, foamy mucus. · You have a new irregular or rapid heartbeat. Call your doctor now or seek immediate medical care if: 
· You have new or increased shortness of breath. · You are dizzy or lightheaded, or you feel like you may faint. · You have sudden weight gain, such as 3 pounds in 24 hours, or 5 pounds in one week. · You have increased swelling in your legs, ankles, or feet. · You are suddenly so tired or weak that you cannot do your usual activities. Watch closely for changes in your health, and be sure to contact your doctor if you develop new symptoms. Where can you learn more? Go to http://vandana-sherri.info/ Enter M727 in the search box to learn more about \"Avoiding Triggers With Heart Failure: Care Instructions. \" 
© 8905-8160 [employer], [employer]. Care instructions adapted under license by [employer] (which disclaims liability or warranty for this information). This care instruction is for use with your licensed healthcare professional. If you have questions about a medical condition or this instruction, always ask your healthcare professional. [address] any warranty or liability for your use of this information. Content Version: 92.7.058217; Current as of: January 27, 2016 (modified 2/9/18). "[employer] Announcement We are excited to announce that we are making your provider's discharge notes available to you in "[employer]. You will see these notes when they are completed and signed by the physician that discharged you from your recent hospital stay. If you have any questions or concerns about any information you see in "[employer] please call the Health Information Department where you were seen or reach out to your Primary Care Provider for more information about your plan of care. Introducing [facility]! Georgette Barlow introduces "[employer] patient portal. Now you can access parts of your medical record, email your doctor's office, and request medication refills online. 1. In your internet browser, go to [email or web address] 2. Click on the First Time User? Click Here link in the Sign In box. You will see the New Member Sign Up page. 3. Enter your "[employer] Access Code exactly as it appears below. You will not need to use this code after youve completed the sign-up process. If you do not sign up before the expiration date, you must request a new code. · "[employer] Access Code: [email or web address] Expires: 5/21/2018 11:54 AM 
 
4.  Enter the last four digits of your Social Security Number (xxxx) and Date of Birth (mm/dd/yyyy) as indicated and click Submit. You will be taken to the next sign-up page. 5. Create a PatientsLikeMe ID. This will be your PatientsLikeMe login ID and cannot be changed, so think of one that is secure and easy to remember. 6. Create a PatientsLikeMe password. You can change your password at any time. 7. Enter your Password Reset Question and Answer. This can be used at a later time if you forget your password. 8. Enter your e-mail address. You will receive e-mail notification when new information is available in [address] 9. Click Sign Up. You can now view and download portions of your medical record. 10. Click the Download Summary menu link to download a portable copy of your medical information. If you have questions, please visit the Frequently Asked Questions section of the PatientsLikeMe website. Remember, PatientsLikeMe is NOT to be used for urgent needs. For medical emergencies, dial 911. Now available from your iPhone and Android! Unresulted Labs-Please follow up with your PCP about these lab tests Order Current Status CULTURE, BLOOD, PAIRED Preliminary result Providers Seen During Your Hospitalization Provider Specialty Primary office phone Luis Estrada MD Emergency Medicine [phone] Micheal Zambrano MD Family Practice [phone] Krzysztof Saleh MD Family Practice [phone] Jolene Miranda DO Family Practice [phone] Your Primary Care Physician (PCP) Primary Care Physician Office Phone Office Fax Gaurav Roberts [phone] You are allergic to the following No active allergies Recent Documentation Height Weight BMI Smoking Status 1.727 m 108.8 kg 36.47 kg/m2 Former Smoker Emergency Contacts Name Discharge Info Relation Home Work Mobile Lucy Shea CAREGIVER [3] Other Relative [6] [phone] Isael Sandoval  Other Relative [6]   [phone] Patient Belongings The following personal items are in your possession at time of discharge: 
  Dental Appliances: With patient  Visual Aid: None      Home Medications: Sent home   Jewelry: With patient  Clothing: At bedside    Other Valuables: At bedside Discharge Instructions Attachments/References MEFS - CLOPIDOGREL (PLAVIX) - (BY MOUTH) (ENGLISH) MEFS - CARVEDILOL (COREG, COREG CR, HYPERTENEVIDE-12.5) - (BY MOUTH) (ENGLISH) MEFS - ISOSORBIDE MONONITRATE (IMDUR, IMDUR ER, ISMO) - (BY MOUTH) (ENGLISH) MEFS - HYDRALAZINE (APRESOLINE) - (BY MOUTH) (ENGLISH) ASPIRIN: HEART ATTACK AND STROKE PREVENTION (ENGLISH) Patient Handouts Clopidogrel (Plavix) - (By mouth) Why this medicine is used:  
Helps prevent stroke, heart attack, and other heart problems. Contact a nurse or doctor right away if you have: 
· Sudden or severe headache · Bloody vomit or vomit that looks like coffee grounds; bloody or black, tarry stools · Bleeding that does not stop or bruises that do not heal 
· Dark urine or pale stools, nausea, loss of appetite, stomach pain, · Yellow skin or eyes Common side effects: · Minor bleeding or bruising © 2017 [address] Information is for End User's use only and may not be sold, redistributed or otherwise used for commercial purposes. Carvedilol (Coreg, Coreg CR, Hypertenevide-12.5) - (By mouth) Why this medicine is used:  
Treats high blood pressure and heart failure. Contact a nurse or doctor right away if you have: 
· Change in how much or how often you urinate · Leg pain when you walk, legs and feet that feel cold or numb · Lightheadedness, dizziness, fainting · Rapid weight gain, swelling in your hands, ankles, or feet Common side effects: · Mild dizziness · Tiredness · Trouble having sex · Diarrhea © 2017 [address] Information is for End User's use only and may not be sold, redistributed or otherwise used for commercial purposes. Isosorbide Mononitrate (Imdur, Imdur ER, Ismo) - (By mouth) Why this medicine is used:  
Prevents angina. Contact a nurse or doctor right away if you have: · Severe or ongoing dizziness, lightheadedness, fainting · Slow heartbeat, new or increased chest pain Common side effects: · Mild dizziness, lightheadedness · Headache · Feeling of warmth, redness of the face, neck, arms, and upper chest 
© 2017 [facility] Information is for End User's use only and may not be sold, redistributed or otherwise used for commercial purposes. Hydralazine (Apresoline) - (By mouth) Why this medicine is used:  
Treats high blood pressure. Contact a nurse or doctor right away if you have: · Chest pain that may spread to your arms, jaw, back, or neck, trouble breathing · Dark urine or pale stools · Pain in your upper stomach, yellow skin or eyes · Lightheadedness, dizziness, fainting, unusual sweating · Numbness, tingling, or burning pain in your hands, arms, legs, or feet Common side effects: 
· Diarrhea, nausea, vomiting, loss of appetite · Headache © 2017  [employer] [facility] Information is for End User's use only and may not be sold, redistributed or otherwise used for commercial purposes. Taking Aspirin to Prevent Heart Attack and Stroke: Care Instructions Your Care Instructions Aspirin acts as a \"blood thinner. \" It prevents blood clots from forming. When taken during and after a heart attack, it can reduce your chance of dying. And it's used if you have a stent in your coronary artery. Also, aspirin helps certain people lower their risk of a heart attack or stroke. Be sure you know what dose of aspirin to take and how often to take it. Low-dose aspirin is typically 81 mg. But the dose for daily aspirin can range from 81 mg to 325 mg. Taking aspirin every day can cause bleeding. It may not be safe if you have stomach ulcers. And it may not be safe if you have high blood pressure that is not controlled. If you take aspirin pills every day, do not take ones that have other ingredients such as caffeine or sodium. Before you start to take aspirin, tell your doctor all the medicines, vitamins, herbal products, and supplements you take. Follow-up care is a key part of your treatment and safety. Be sure to make and go to all appointments, and call your doctor if you are having problems. It's also a good idea to know your test results and keep a list of the medicines you take. How can you care for yourself at home? · Take aspirin with a full glass of water unless your doctor tells you not to. Do not lie down right after you take it. · If you have a stent in your coronary artery, take your aspirin as your heart doctor says to. If another doctor says to stop taking the aspirin for any reason, talk to your heart doctor before you stop. · Do not chew or crush the coated or sustained-release forms of aspirin. · Ask your doctor if you can drink alcohol while you take aspirin. And ask how much you can drink. Too much alcohol with aspirin can cause stomach bleeding. · Do not take aspirin if you are pregnant, unless your doctor says it is okay. · Keep all aspirin out of children's reach. · Throw aspirin away if it starts to smell like vinegar. · Do not take aspirin if you have gout or if you take prescription blood thinners, unless your doctor has told you to. · Do not take prescription or over-the-counter medicines, vitamins, herbal products, or supplements without talking to your doctor first. Michelet Blackwoodg the label before you take another over-the-counter medicine. Many contain aspirin. So they could cause you to take too much aspirin. · Talk with your doctor before you take a pain medicine.  Ask which type of medicine you can take and how to take it safely with aspirin. · Tell your doctor or dentist before a surgery or procedure that you take aspirin. He or she will tell you if you should stop taking aspirin before your surgery or procedure. Make sure that you understand exactly what your doctor wants you to do. Where can you learn more? Go to http://vandana-sherri.info/. Enter T897 in the search box to learn more about \"Taking Aspirin to Prevent Heart Attack and Stroke: Care Instructions. \" Current as of: September 21, 2016 Content Version: 11.4 © 9381-8410 [employer]. Care instructions adapted under license by [employer] (which disclaims liability or warranty for this information). If you have questions about a medical condition or this instruction, always ask your healthcare professional. [address] any warranty or liability for your use of this information. Please provide this summary of care documentation to your next provider. Signatures-by signing, you are acknowledging that this After Visit Summary has been reviewed with you and you have received a copy. Patient Signature:  ____________________________________________________________ Date:  ____________________________________________________________  
  
Eladia Keane Provider Signature:  ____________________________________________________________ Date:  ____________________________________________________________

## 2018-03-12 NOTE — ED TRIAGE NOTES
Pt arrives via EMS they report that pt has been SOB for the past 3 days but today while changing the oil in his home heater he became more SOB. They have given him neb treatments and he is on a non re breather when he arrives.   Dr Tao Razo at the bedside to evaluate

## 2018-03-13 ENCOUNTER — APPOINTMENT (OUTPATIENT)
Dept: CT IMAGING | Age: 68
DRG: 246 | End: 2018-03-13
Attending: FAMILY MEDICINE
Payer: MEDICARE

## 2018-03-13 LAB
ALBUMIN SERPL-MCNC: 2 G/DL (ref 3.5–5)
ALBUMIN/GLOB SERPL: 0.6 {RATIO} (ref 1.1–2.2)
ALP SERPL-CCNC: 69 U/L (ref 45–117)
ALT SERPL-CCNC: 65 U/L (ref 12–78)
ANION GAP SERPL CALC-SCNC: 9 MMOL/L (ref 5–15)
ARTERIAL PATENCY WRIST A: YES
AST SERPL-CCNC: 53 U/L (ref 15–37)
ATRIAL RATE: 109 BPM
BASE DEFICIT BLDA-SCNC: 1.7 MMOL/L
BASOPHILS # BLD: 0 K/UL (ref 0–0.1)
BASOPHILS NFR BLD: 0 % (ref 0–1)
BDY SITE: ABNORMAL
BILIRUB SERPL-MCNC: <0.1 MG/DL (ref 0.2–1)
BREATHS.SPONTANEOUS ON VENT: 16
BUN SERPL-MCNC: 13 MG/DL (ref 6–20)
BUN/CREAT SERPL: 9 (ref 12–20)
CALCIUM SERPL-MCNC: 6.9 MG/DL (ref 8.5–10.1)
CALCULATED P AXIS, ECG09: 30 DEGREES
CALCULATED R AXIS, ECG10: 59 DEGREES
CALCULATED T AXIS, ECG11: 148 DEGREES
CHLORIDE SERPL-SCNC: 111 MMOL/L (ref 97–108)
CO2 SERPL-SCNC: 22 MMOL/L (ref 21–32)
CREAT SERPL-MCNC: 1.52 MG/DL (ref 0.7–1.3)
D DIMER PPP FEU-MCNC: 2.07 MG/L FEU (ref 0–0.65)
DIAGNOSIS, 93000: NORMAL
DIFFERENTIAL METHOD BLD: ABNORMAL
EOSINOPHIL # BLD: 0 K/UL (ref 0–0.4)
EOSINOPHIL NFR BLD: 0 % (ref 0–7)
EPAP/CPAP/PEEP, PAPEEP: 6
ERYTHROCYTE [DISTWIDTH] IN BLOOD BY AUTOMATED COUNT: 16.1 % (ref 11.5–14.5)
FIO2 ON VENT: 30 %
GLOBULIN SER CALC-MCNC: 3.6 G/DL (ref 2–4)
GLUCOSE BLD STRIP.AUTO-MCNC: 112 MG/DL (ref 65–100)
GLUCOSE BLD STRIP.AUTO-MCNC: 143 MG/DL (ref 65–100)
GLUCOSE BLD STRIP.AUTO-MCNC: 216 MG/DL (ref 65–100)
GLUCOSE SERPL-MCNC: 162 MG/DL (ref 65–100)
HCO3 BLDA-SCNC: 24 MMOL/L (ref 22–26)
HCT VFR BLD AUTO: 29.5 % (ref 36.6–50.3)
HGB BLD-MCNC: 9.1 G/DL (ref 12.1–17)
IMM GRANULOCYTES # BLD: 0 K/UL (ref 0–0.04)
IMM GRANULOCYTES NFR BLD AUTO: 0 % (ref 0–0.5)
IPAP/PIP, IPAPIP: 12
LACTATE SERPL-SCNC: 1.5 MMOL/L (ref 0.4–2)
LYMPHOCYTES # BLD: 1.6 K/UL (ref 0.8–3.5)
LYMPHOCYTES NFR BLD: 19 % (ref 12–49)
MCH RBC QN AUTO: 25.1 PG (ref 26–34)
MCHC RBC AUTO-ENTMCNC: 30.8 G/DL (ref 30–36.5)
MCV RBC AUTO: 81.3 FL (ref 80–99)
MONOCYTES # BLD: 0.5 K/UL (ref 0–1)
MONOCYTES NFR BLD: 6 % (ref 5–13)
NEUTS SEG # BLD: 6.2 K/UL (ref 1.8–8)
NEUTS SEG NFR BLD: 74 % (ref 32–75)
NRBC # BLD: 0 K/UL (ref 0–0.01)
NRBC BLD-RTO: 0 PER 100 WBC
P-R INTERVAL, ECG05: 182 MS
PCO2 BLDA: 42 MMHG (ref 35–45)
PH BLDA: 7.37 [PH] (ref 7.35–7.45)
PLATELET # BLD AUTO: 226 K/UL (ref 150–400)
PMV BLD AUTO: 9.9 FL (ref 8.9–12.9)
PO2 BLDA: 124 MMHG (ref 80–100)
POTASSIUM SERPL-SCNC: 3.1 MMOL/L (ref 3.5–5.1)
PROT SERPL-MCNC: 5.6 G/DL (ref 6.4–8.2)
Q-T INTERVAL, ECG07: 332 MS
QRS DURATION, ECG06: 98 MS
QTC CALCULATION (BEZET), ECG08: 447 MS
RBC # BLD AUTO: 3.63 M/UL (ref 4.1–5.7)
SAO2 % BLD: 98 % (ref 92–97)
SAO2% DEVICE SAO2% SENSOR NAME: ABNORMAL
SERVICE CMNT-IMP: ABNORMAL
SODIUM SERPL-SCNC: 142 MMOL/L (ref 136–145)
SPECIMEN SITE: ABNORMAL
T3FREE SERPL-MCNC: 2.6 PG/ML (ref 2.2–4)
T4 SERPL-MCNC: 9.6 UG/DL (ref 4.5–12.1)
TROPONIN I SERPL-MCNC: 0.06 NG/ML
TROPONIN I SERPL-MCNC: <0.04 NG/ML
TROPONIN I SERPL-MCNC: <0.04 NG/ML
VENTILATION MODE VENT: ABNORMAL
VENTRICULAR RATE, ECG03: 109 BPM
WBC # BLD AUTO: 8.4 K/UL (ref 4.1–11.1)

## 2018-03-13 PROCEDURE — 84436 ASSAY OF TOTAL THYROXINE: CPT | Performed by: FAMILY MEDICINE

## 2018-03-13 PROCEDURE — 83605 ASSAY OF LACTIC ACID: CPT | Performed by: STUDENT IN AN ORGANIZED HEALTH CARE EDUCATION/TRAINING PROGRAM

## 2018-03-13 PROCEDURE — 74011250636 HC RX REV CODE- 250/636: Performed by: FAMILY MEDICINE

## 2018-03-13 PROCEDURE — 74011250636 HC RX REV CODE- 250/636: Performed by: STUDENT IN AN ORGANIZED HEALTH CARE EDUCATION/TRAINING PROGRAM

## 2018-03-13 PROCEDURE — C9113 INJ PANTOPRAZOLE SODIUM, VIA: HCPCS | Performed by: STUDENT IN AN ORGANIZED HEALTH CARE EDUCATION/TRAINING PROGRAM

## 2018-03-13 PROCEDURE — 74011250637 HC RX REV CODE- 250/637: Performed by: STUDENT IN AN ORGANIZED HEALTH CARE EDUCATION/TRAINING PROGRAM

## 2018-03-13 PROCEDURE — 74011636320 HC RX REV CODE- 636/320: Performed by: RADIOLOGY

## 2018-03-13 PROCEDURE — 94660 CPAP INITIATION&MGMT: CPT

## 2018-03-13 PROCEDURE — 65660000000 HC RM CCU STEPDOWN

## 2018-03-13 PROCEDURE — 84484 ASSAY OF TROPONIN QUANT: CPT | Performed by: FAMILY MEDICINE

## 2018-03-13 PROCEDURE — 80053 COMPREHEN METABOLIC PANEL: CPT | Performed by: STUDENT IN AN ORGANIZED HEALTH CARE EDUCATION/TRAINING PROGRAM

## 2018-03-13 PROCEDURE — 36600 WITHDRAWAL OF ARTERIAL BLOOD: CPT | Performed by: STUDENT IN AN ORGANIZED HEALTH CARE EDUCATION/TRAINING PROGRAM

## 2018-03-13 PROCEDURE — 84481 FREE ASSAY (FT-3): CPT | Performed by: FAMILY MEDICINE

## 2018-03-13 PROCEDURE — 74011636637 HC RX REV CODE- 636/637: Performed by: FAMILY MEDICINE

## 2018-03-13 PROCEDURE — 77030013032 HC MSK BPAP/CPAP FISP -B

## 2018-03-13 PROCEDURE — 71275 CT ANGIOGRAPHY CHEST: CPT

## 2018-03-13 PROCEDURE — C8929 TTE W OR WO FOL WCON,DOPPLER: HCPCS

## 2018-03-13 PROCEDURE — 82962 GLUCOSE BLOOD TEST: CPT

## 2018-03-13 PROCEDURE — 85379 FIBRIN DEGRADATION QUANT: CPT | Performed by: FAMILY MEDICINE

## 2018-03-13 PROCEDURE — 36415 COLL VENOUS BLD VENIPUNCTURE: CPT | Performed by: STUDENT IN AN ORGANIZED HEALTH CARE EDUCATION/TRAINING PROGRAM

## 2018-03-13 PROCEDURE — 82803 BLOOD GASES ANY COMBINATION: CPT | Performed by: STUDENT IN AN ORGANIZED HEALTH CARE EDUCATION/TRAINING PROGRAM

## 2018-03-13 PROCEDURE — 85025 COMPLETE CBC W/AUTO DIFF WBC: CPT | Performed by: STUDENT IN AN ORGANIZED HEALTH CARE EDUCATION/TRAINING PROGRAM

## 2018-03-13 RX ORDER — FUROSEMIDE 10 MG/ML
40 INJECTION INTRAMUSCULAR; INTRAVENOUS ONCE
Status: COMPLETED | OUTPATIENT
Start: 2018-03-13 | End: 2018-03-13

## 2018-03-13 RX ORDER — LANOLIN ALCOHOL/MO/W.PET/CERES
325 CREAM (GRAM) TOPICAL DAILY
Status: ON HOLD | COMMUNITY
End: 2020-01-01

## 2018-03-13 RX ORDER — HYDRALAZINE HYDROCHLORIDE 20 MG/ML
20 INJECTION INTRAMUSCULAR; INTRAVENOUS
Status: DISCONTINUED | OUTPATIENT
Start: 2018-03-13 | End: 2018-03-17 | Stop reason: HOSPADM

## 2018-03-13 RX ORDER — MAGNESIUM SULFATE 100 %
4 CRYSTALS MISCELLANEOUS AS NEEDED
Status: DISCONTINUED | OUTPATIENT
Start: 2018-03-13 | End: 2018-03-17 | Stop reason: HOSPADM

## 2018-03-13 RX ORDER — HEPARIN SODIUM 5000 [USP'U]/ML
5000 INJECTION, SOLUTION INTRAVENOUS; SUBCUTANEOUS EVERY 8 HOURS
Status: DISCONTINUED | OUTPATIENT
Start: 2018-03-13 | End: 2018-03-15

## 2018-03-13 RX ORDER — INSULIN GLARGINE 100 [IU]/ML
8 INJECTION, SOLUTION SUBCUTANEOUS
Status: DISCONTINUED | OUTPATIENT
Start: 2018-03-13 | End: 2018-03-17 | Stop reason: HOSPADM

## 2018-03-13 RX ORDER — DEXTROSE 50 % IN WATER (D50W) INTRAVENOUS SYRINGE
12.5-25 AS NEEDED
Status: DISCONTINUED | OUTPATIENT
Start: 2018-03-13 | End: 2018-03-17 | Stop reason: SDUPTHER

## 2018-03-13 RX ORDER — INSULIN LISPRO 100 [IU]/ML
INJECTION, SOLUTION INTRAVENOUS; SUBCUTANEOUS
Status: DISCONTINUED | OUTPATIENT
Start: 2018-03-13 | End: 2018-03-17 | Stop reason: HOSPADM

## 2018-03-13 RX ORDER — MAGNESIUM SULFATE 100 %
4 CRYSTALS MISCELLANEOUS AS NEEDED
Status: DISCONTINUED | OUTPATIENT
Start: 2018-03-13 | End: 2018-03-13

## 2018-03-13 RX ORDER — SODIUM CHLORIDE 0.9 % (FLUSH) 0.9 %
5-10 SYRINGE (ML) INJECTION AS NEEDED
Status: DISCONTINUED | OUTPATIENT
Start: 2018-03-13 | End: 2018-03-17 | Stop reason: SDUPTHER

## 2018-03-13 RX ORDER — DEXTROSE 50 % IN WATER (D50W) INTRAVENOUS SYRINGE
12.5-25 AS NEEDED
Status: DISCONTINUED | OUTPATIENT
Start: 2018-03-13 | End: 2018-03-17 | Stop reason: HOSPADM

## 2018-03-13 RX ORDER — POTASSIUM CHLORIDE 1.5 G/1.77G
40 POWDER, FOR SOLUTION ORAL
Status: COMPLETED | OUTPATIENT
Start: 2018-03-13 | End: 2018-03-13

## 2018-03-13 RX ORDER — HYDRALAZINE HYDROCHLORIDE 10 MG/1
10 TABLET, FILM COATED ORAL ONCE
Status: DISCONTINUED | OUTPATIENT
Start: 2018-03-13 | End: 2018-03-13

## 2018-03-13 RX ORDER — SODIUM CHLORIDE 0.9 % (FLUSH) 0.9 %
5-10 SYRINGE (ML) INJECTION EVERY 8 HOURS
Status: DISCONTINUED | OUTPATIENT
Start: 2018-03-13 | End: 2018-03-17 | Stop reason: SDUPTHER

## 2018-03-13 RX ORDER — AMLODIPINE BESYLATE 5 MG/1
10 TABLET ORAL DAILY
Status: DISCONTINUED | OUTPATIENT
Start: 2018-03-13 | End: 2018-03-14

## 2018-03-13 RX ADMIN — Medication 10 ML: at 21:30

## 2018-03-13 RX ADMIN — FUROSEMIDE 40 MG: 10 INJECTION, SOLUTION INTRAMUSCULAR; INTRAVENOUS at 11:28

## 2018-03-13 RX ADMIN — HEPARIN SODIUM 5000 UNITS: 5000 INJECTION, SOLUTION INTRAVENOUS; SUBCUTANEOUS at 06:00

## 2018-03-13 RX ADMIN — HYDRALAZINE HYDROCHLORIDE 20 MG: 20 INJECTION INTRAMUSCULAR; INTRAVENOUS at 20:11

## 2018-03-13 RX ADMIN — PANTOPRAZOLE SODIUM 40 MG: 40 INJECTION, POWDER, FOR SOLUTION INTRAVENOUS at 20:11

## 2018-03-13 RX ADMIN — INSULIN GLARGINE 8 UNITS: 100 INJECTION, SOLUTION SUBCUTANEOUS at 21:29

## 2018-03-13 RX ADMIN — HEPARIN SODIUM 5000 UNITS: 5000 INJECTION, SOLUTION INTRAVENOUS; SUBCUTANEOUS at 14:50

## 2018-03-13 RX ADMIN — HEPARIN SODIUM 5000 UNITS: 5000 INJECTION, SOLUTION INTRAVENOUS; SUBCUTANEOUS at 21:29

## 2018-03-13 RX ADMIN — HYDRALAZINE HYDROCHLORIDE 20 MG: 20 INJECTION INTRAMUSCULAR; INTRAVENOUS at 03:42

## 2018-03-13 RX ADMIN — INSULIN GLARGINE 8 UNITS: 100 INJECTION, SOLUTION SUBCUTANEOUS at 01:40

## 2018-03-13 RX ADMIN — PANTOPRAZOLE SODIUM 40 MG: 40 INJECTION, POWDER, FOR SOLUTION INTRAVENOUS at 08:40

## 2018-03-13 RX ADMIN — PERFLUTREN 2 ML: 6.52 INJECTION, SUSPENSION INTRAVENOUS at 09:20

## 2018-03-13 RX ADMIN — IOPAMIDOL 90 ML: 755 INJECTION, SOLUTION INTRAVENOUS at 05:51

## 2018-03-13 RX ADMIN — INSULIN LISPRO 2 UNITS: 100 INJECTION, SOLUTION INTRAVENOUS; SUBCUTANEOUS at 21:27

## 2018-03-13 RX ADMIN — HYDRALAZINE HYDROCHLORIDE 10 MG: 10 TABLET, FILM COATED ORAL at 01:31

## 2018-03-13 RX ADMIN — Medication 10 ML: at 01:31

## 2018-03-13 RX ADMIN — POTASSIUM CHLORIDE 40 MEQ: 1.5 POWDER, FOR SOLUTION ORAL at 10:55

## 2018-03-13 NOTE — ED NOTES
Report given to Jeffry Prior prior to patient transport to [facility]. Opportunity for questions given question. Patient awake and alert. Patient sitting on stretcher, patient on bipap.

## 2018-03-13 NOTE — PROGRESS NOTES
Bedside shift change report given to Alex Quinonez RN (oncoming nurse) by Kwan Bishop RN (offgoing nurse). Report included the following information ED Summary. 0540: Pt taken to CT on NRB, nurse followed to change linen and escort pt back to room. 9656: PT back to rm 18, VSS. BIPAP replaced.

## 2018-03-13 NOTE — PROGRESS NOTES
[facility] RESIDENCY PROGRAM   Daily Progress Note    Date: 3/13/2018    Assessment/Plan:   Donna Chávez is a 75 y.o. male with PMHx of Type 2 DM, CKD stage 3b, HTN, Anemia Hypothyroidism and poor compliance with medications who is admitted for acute hypoxic respiratory failure in the setting of pulmonary edema .     Acute hypoxic respiratory failure   - Pt tolerated BiPAP overnight , SpO2 %  - ABG PH 7.37, PCO2 42, PO2 124, O2 98  - D-dimer elevated--> CTA showed no evidence for PE. New finding of focal ground glass opacification in the right upper lobe measuring 3.5 cm. Patient afebrile, no white count, less suspicion for infectious etiology.   Consider consulting Pulm.   -Echo on 2014 showed EF of 55-60%, will obtain echo in the AM  - LA 2.5-->1.5 s/p Bipap  - Strict I/Os, daily weights  - Start scheduled lasix based on volume status      HTN   - POA /99 and received Asprin, Nitrobid, Lasix 20 mg IV  - BP overnight fluctuating in 160s and 170s s/p 2 X dose of Hydralazine  - IV hydralazine for BP > 180/110   - Continue home Amlodipine 10 mg PO daily      Type 2 DM  - Home dose of lantus 10 units at qhs ( although patient hasn't been non -compliant)  - Will restart 80% of home lantus with 8 units qhs  - POC gluocse AC&QHS with SSI     Hypothyroidism   - POA TSH 0.08--> low  - Patient with history of hypothyroidism but non compliant with Synthroid  - Primary vs Secondary hypothyroidism  - Hold Syntrhoid for now and obtain T3 and FT4     CKD stage 3b  - POA Cr 1.99 ( baseline ~1.8)  - Improved to Cr 1.52 s/p Bipap   -Stable, will continue to monitor     Anemia  - POA Hgb 10.4, patient on iron pills at home  - Daily CBCs        FEN/GI -NPO  Activity - Ambulate with assitance   DVT prophylaxis - Heparin  GI prophylaxis -  Protonix  Disposition - Plan to d/c to home once stable      CODE STATUS:  Full Code    Pt to be discussed with Dr. Noy Saenz MD  Family Medicine Resident           Subjective  No acute events overnight. Patient sleeping with BiPaP in place. Pateint tolerated BiPAP over night. He reports shortness of breath is resolved. Inpatient Medications  Current Facility-Administered Medications   Medication Dose Route Frequency    sodium chloride (NS) flush 5-10 mL  5-10 mL IntraVENous Q8H    sodium chloride (NS) flush 5-10 mL  5-10 mL IntraVENous PRN    heparin (porcine) injection 5,000 Units  5,000 Units SubCUTAneous Q8H    glucose chewable tablet 16 g  4 Tab Oral PRN    dextrose (D50W) injection syrg 12.5-25 g  12.5-25 g IntraVENous PRN    glucagon (GLUCAGEN) injection 1 mg  1 mg IntraMUSCular PRN    insulin glargine (LANTUS) injection 8 Units  8 Units SubCUTAneous QHS    pantoprazole (PROTONIX) 40 mg in 0 mL injection  40 mg IntraVENous Q12H    hydrALAZINE (APRESOLINE) 20 mg/mL injection 20 mg  20 mg IntraVENous Q6H PRN    amLODIPine (NORVASC) tablet 10 mg  10 mg Oral DAILY    saline peripheral flush soln 5-10 mL  5-10 mL IntraVENous Q8H    sodium chloride (NS) flush 5-10 mL  5-10 mL IntraVENous PRN     Current Outpatient Prescriptions   Medication Sig    amLODIPine (NORVASC) 10 mg tablet TAKE ONE TABLET BY MOUTH ONCE DAILY    levothyroxine (SYNTHROID) 150 mcg tablet TAKE ONE TABLET BY MOUTH ONCE DAILY BEFORE BREAKFAST    colchicine (COLCRYS) 0.6 mg tablet Take 2 tabs by mouth now. Take 1 tab in 1 hour.  metoprolol tartrate (LOPRESSOR) 25 mg tablet Take 25 mg by mouth two (2) times a day.  insulin glargine (LANTUS) 100 unit/mL injection 10 Units at bedtime. (Patient not taking: Reported on 10/18/2017)    insulin syringe-needle U-100 Dispense ultrafine 1 mL syringes with 28 gauge needle Dx: E11.65    ferrous sulfate (IRON) 325 mg (65 mg iron) EC tablet Take 1 tablet by mouth three (3) times daily (with meals).     glucose blood VI test strips (ASCENSIA CONTOUR) strip Check glucose TID    Blood-Glucose Meter monitoring kit Check sugars 4 times daily.  LACTOSE-FREE FOOD (ENSURE COMPLETE PO) Take  by mouth daily. Allergies  No Known Allergies      Objective  Vitals:  Patient Vitals for the past 8 hrs:   Pulse Resp BP SpO2   03/13/18 0730 90 17 148/82 99 %   03/13/18 0700 88 17 149/81 100 %   03/13/18 0430 (!) 102 17 163/76 95 %   03/13/18 0330 88 17 (!) 182/123 98 %   03/13/18 0300 92 - 161/89 100 %   03/13/18 0247 92 12 (!) 156/95 99 %   03/13/18 0230 98 20 (!) 137/124 100 %   03/13/18 0229 - - - 100 %   03/13/18 0200 92 25 (!) 172/107 96 %   03/13/18 0130 (!) 103 21 (!) 152/99 100 %   03/13/18 0100 93 18 (!) 160/93 99 %         I/O:    Intake/Output Summary (Last 24 hours) at 03/13/18 0819  Last data filed at 03/13/18 0143   Gross per 24 hour   Intake              120 ml   Output              500 ml   Net             -380 ml     Last shift:       Last 3 shifts:    03/11 1901 - 03/13 0700  In: 120 [P.O.:120]  Out: 500 [Urine:500]    Physical Exam:  General: No acute distress. Alert. Cooperative. Head: Normocephalic. Atraumatic. Respiratory: Breath sounds improved form admission with some mild rales on left and right lower lung fields. Cardiovascular: RRR. Normal S1,S2. No m/r/g.    GI: + bowel sounds. Nontender. No rebound tenderness or guarding. Nondistended   Extremities: Trace edema. No palpable cord. No tenderness. Laboratory Data  Recent Results (from the past 12 hour(s))   POC G3 - PUL    Collection Time: 03/12/18  9:33 PM   Result Value Ref Range    FIO2 (POC) 28 %    pH (POC) 7.344 (L) 7.35 - 7.45      pCO2 (POC) 42.5 35.0 - 45.0 MMHG    pO2 (POC) 113 (H) 80 - 100 MMHG    HCO3 (POC) 23.1 22 - 26 MMOL/L    sO2 (POC) 98 (H) 92 - 97 %    Base deficit (POC) 3 mmol/L    Site RIGHT RADIAL      Device: BIPAP      PEEP/CPAP (POC) 6 cmH2O    PIP (POC) 12      Pressure support 5 cmH2O    Allens test (POC) YES      Specimen type (POC) ARTERIAL      Total resp.  rate 22     GLUCOSE, POC    Collection Time: 03/12/18 10:28 PM   Result Value Ref Range    Glucose (POC) 237 (H) 65 - 100 mg/dL    Performed by [facility]    BLOOD GAS, ARTERIAL    Collection Time: 03/13/18 12:22 AM   Result Value Ref Range    pH 7.37 7.35 - 7.45      PCO2 42 35.0 - 45.0 mmHg    PO2 124 (H) 80 - 100 mmHg    O2 SAT 98 (H) 92 - 97 %    BICARBONATE 24 22 - 26 mmol/L    BASE DEFICIT 1.7 mmol/L    O2 METHOD BIPAP      FIO2 30 %    MODE BIPAP      SPONTANEOUS RATE 16.0      IPAP/PIP 12.0      EPAP/CPAP/PEEP 6.0      Sample source ARTERIAL      SITE RIGHT RADIAL      YIMI'S TEST YES     LACTIC ACID    Collection Time: 03/13/18  1:29 AM   Result Value Ref Range    Lactic acid 1.5 0.4 - 2.0 MMOL/L   METABOLIC PANEL, COMPREHENSIVE    Collection Time: 03/13/18  1:29 AM   Result Value Ref Range    Sodium 142 136 - 145 mmol/L    Potassium 3.1 (L) 3.5 - 5.1 mmol/L    Chloride 111 (H) 97 - 108 mmol/L    CO2 22 21 - 32 mmol/L    Anion gap 9 5 - 15 mmol/L    Glucose 162 (H) 65 - 100 mg/dL    BUN 13 6 - 20 MG/DL    Creatinine 1.52 (H) 0.70 - 1.30 MG/DL    BUN/Creatinine ratio 9 (L) 12 - 20      GFR est AA 56 (L) >60 ml/min/1.73m2    GFR est non-AA 46 (L) >60 ml/min/1.73m2    Calcium 6.9 (L) 8.5 - 10.1 MG/DL    Bilirubin, total <0.1 (L) 0.2 - 1.0 MG/DL    ALT (SGPT) 65 12 - 78 U/L    AST (SGOT) 53 (H) 15 - 37 U/L    Alk.  phosphatase 69 45 - 117 U/L    Protein, total 5.6 (L) 6.4 - 8.2 g/dL    Albumin 2.0 (L) 3.5 - 5.0 g/dL    Globulin 3.6 2.0 - 4.0 g/dL    A-G Ratio 0.6 (L) 1.1 - 2.2     CBC WITH AUTOMATED DIFF    Collection Time: 03/13/18  1:29 AM   Result Value Ref Range    WBC 8.4 4.1 - 11.1 K/uL    RBC 3.63 (L) 4.10 - 5.70 M/uL    HGB 9.1 (L) 12.1 - 17.0 g/dL    HCT 29.5 (L) 36.6 - 50.3 %    MCV 81.3 80.0 - 99.0 FL    MCH 25.1 (L) 26.0 - 34.0 PG    MCHC 30.8 30.0 - 36.5 g/dL    RDW 16.1 (H) 11.5 - 14.5 %    PLATELET 236 639 - 264 K/uL    MPV 9.9 8.9 - 12.9 FL    NRBC 0.0 0  WBC    ABSOLUTE NRBC 0.00 0.00 - 0.01 K/uL    NEUTROPHILS 74 32 - 75 %    LYMPHOCYTES 19 12 - 49 %    MONOCYTES 6 5 - 13 %    EOSINOPHILS 0 0 - 7 %    BASOPHILS 0 0 - 1 %    IMMATURE GRANULOCYTES 0 0.0 - 0.5 %    ABS. NEUTROPHILS 6.2 1.8 - 8.0 K/UL    ABS. LYMPHOCYTES 1.6 0.8 - 3.5 K/UL    ABS. MONOCYTES 0.5 0.0 - 1.0 K/UL    ABS. EOSINOPHILS 0.0 0.0 - 0.4 K/UL    ABS. BASOPHILS 0.0 0.0 - 0.1 K/UL    ABS. IMM. GRANS. 0.0 0.00 - 0.04 K/UL    DF AUTOMATED     D DIMER    Collection Time: 03/13/18  3:49 AM   Result Value Ref Range    D-dimer 2.07 (H) 0.00 - 0.65 mg/L FEU   TROPONIN I    Collection Time: 03/13/18  3:49 AM   Result Value Ref Range    Troponin-I, Qt. 0.06 (H) <0.05 ng/mL   GLUCOSE, POC    Collection Time: 03/13/18  7:33 AM   Result Value Ref Range    Glucose (POC) 143 (H) 65 - 100 mg/dL    Performed by [facility]            Imaging  CTA 03/13/18    INDICATION:   Shortness of breath, hypertension      COMPARISON:  None     TECHNIQUE:  Following the uneventful intravenous administration of 100 cc Isovue  641, thin helical axial images were obtained through the chest. Postprocessing  was performed. 3D image postprocessing was performed.     CT dose reduction was achieved through the use of a standardized protocol  tailored for this examination and automatic exposure control for dose  modulation.     FINDINGS:     There are no enlarged mediastinal or hilar lymph nodes. The heart size is  normal.  There is no pericardial effusion.     No filling defect is seen within the pulmonary arterial system to suggest  pulmonary embolus. The aorta is normal in caliber.     There is bilateral lower lobe atelectasis. There is groundglass opacification  and portions of the right upper lobe. No pulmonary nodule or mass is seen. There are moderate layering bilateral pleural effusions.     Limited evaluation of the upper abdomen demonstrates layering gallstones in the  gallbladder. The osseous structures are unremarkable.     IMPRESSION  IMPRESSION:  1. No evidence of pulmonary embolus.   2.  Moderate layering bilateral pleural effusions. 3. Bilateral lower lobe atelectasis. 4. Focal groundglass opacification in the right upper lobe measuring 3.5 cm is  nonspecific. May represent focal airspace disease. Consider follow-up study in  3-6 months.   5. Cholelithiasis.           Hospital Problems:  Hospital Problems  Date Reviewed: 2/20/2018          Codes Class Noted POA    Respiratory failure [facility]) ICD-10-CM: J96.90  ICD-9-CM: 518.81  3/12/2018 Unknown

## 2018-03-13 NOTE — PROGRESS NOTES
[facility] FAMILY MEDICINE RESIDENCY PROGRAM   Daily Progress Note    Date: 3/14/2018  PCP: Amanda Chapin MD     Assessment/Plan:   Timothy Weiss is a 75 y.o. male with history of Type 2 DM, CKD stage 3b, HTN, Anemia Hypothyroidism who has spent 2 night(s) in the hospital, who is admitted for acute hypoxic respiratory failure due to HFrEF.    24 hour Event:  -Weaned off BIPAP, now on room air with sats>90%  -BP elevated o/n, pt received IV Hydralazine 20mg x2 doses but SBP remained in the 80's    Acute hypoxic respiratory failure likely due to HFrEF: Most recent Echo (3/13/18) showed cardiomyopathy with EF 35-40% and moderate diffuse hypokinesia (compared with that of 07/2014). CTA showed no pe. Pro- BNP of 2369 and symptoms of orthopnea most likely suggestive of pulmonary edema.  - Weaned off BiPAP, now on room air  - Will schedule lasix given new Echo findings  - Strict I/Os with daily weight. - Seen by Dr. Adonis Veliz (cardiology). Plans on doing a Lexisxan cardiolite tomorrow. Also recommends starting hydralazine/nitrates for now and Coreg. Will consider RAAS inhibition once stability of renal function established.        HTN   - POA /99, now  171/80. Received IV hydralazine 20 mg x2 doses yesterday due to elevated BP  - IV hydralazine for BP > 180/110   - Will cont Amlodipine 10 mg PO daily. - Will start Coreg per cardio recs.   - Cardiology following     Type 2 DM  - Home dose of lantus 10 units at qhs ( although patient hasn't been non -compliant)  - Will restart 80% of home lantus with 8 units qhs  - POC gluocse AC&QHS with SSI     Hypothyroidism   - POA TSH 0.08--> low  - Patient with history of hypothyroidism but non compliant with Synthroid  - Primary vs Secondary hypothyroidism  - Hold Syntrhoid for now and obtain T3 and FT4     CKD stage 3b  - Cr 2.08 (POA Cr 1.99, baseline ~1.8)  -Stable, will continue to monitor     Anemia  - POA Hgb 10.4 (at baseline)  -Follow up Iron profile  - Resume home Iron with bowel regimen  - Daily CBCs     FEN/GI -Cardiac diet  Activity - Ambulate with assitance   DVT prophylaxis - Heparin  GI prophylaxis -  Protonix  Disposition - Plan to d/c to home once stable      CODE STATUS:  Full Code    Pt was discussed with Dr. Blanca Bae (supervising provider)    I appreciate the opportunity to participate in the care of this patient,    Azael Cortez MD  [facility] Medicine Resident         Subjective  Elevated BP's overnight. Patient Denies fever, chills, chest pain, palpitations, shortness of breath, abdominal pain, nausea and vomiting, and LE edema. Tolerating diet.  Off BIPAP and now on room air    Inpatient Medications  Current Facility-Administered Medications   Medication Dose Route Frequency    sodium chloride (NS) flush 5-10 mL  5-10 mL IntraVENous Q8H    sodium chloride (NS) flush 5-10 mL  5-10 mL IntraVENous PRN    heparin (porcine) injection 5,000 Units  5,000 Units SubCUTAneous Q8H    dextrose (D50W) injection syrg 12.5-25 g  12.5-25 g IntraVENous PRN    insulin glargine (LANTUS) injection 8 Units  8 Units SubCUTAneous QHS    pantoprazole (PROTONIX) 40 mg in 0 mL injection  40 mg IntraVENous Q12H    hydrALAZINE (APRESOLINE) 20 mg/mL injection 20 mg  20 mg IntraVENous Q6H PRN    amLODIPine (NORVASC) tablet 10 mg  10 mg Oral DAILY    insulin lispro (HUMALOG) injection   SubCUTAneous AC&HS    glucose chewable tablet 16 g  4 Tab Oral PRN    dextrose (D50W) injection syrg 12.5-25 g  12.5-25 g IntraVENous PRN    glucagon (GLUCAGEN) injection 1 mg  1 mg IntraMUSCular PRN    saline peripheral flush soln 5-10 mL  5-10 mL IntraVENous Q8H    sodium chloride (NS) flush 5-10 mL  5-10 mL IntraVENous PRN         Allergies  No Known Allergies      Objective  Vitals:  Visit Vitals    /80    Pulse 93    Temp 98.2 °F (36.8 °C)    Resp 20    Ht 5' 8\" (1.727 m)    Wt 244 lb 9.6 oz (110.9 kg)    SpO2 97%    BMI 37.19 kg/m2      Temp (24hrs), Av.3 °F (36.8 °C), Min:98.1 °F (36.7 °C), Max:98.5 °F (36.9 °C)     O2 Flow Rate (L/min): 15 l/min   O2 Device: Room air    I/O:    Intake/Output Summary (Last 24 hours) at 03/14/18 0559  Last data filed at 03/14/18 0139   Gross per 24 hour   Intake              340 ml   Output             1600 ml   Net            -1260 ml     Last 3 shifts:    03/12 0701 - 03/13 1900  In: 120 [P.O.:120]  Out: 1100 [Urine:1100]    Physical Exam:  General: No acute distress. Alert. Cooperative. Head: Normocephalic. Atraumatic. Eyes:  Conjunctiva pink. Sclera white. PERRL. Nose:  Septum midline. Mucosa pink. No drainage. Throat: Mucosa pink. Moist mucous membranes. No tonsillar exudates or erythema. Palate movement equal bilaterally. Respiratory: Normal work of breathing, mildly decreased breath sounds (R>L)   Cardiovascular: RRR. Normal S1,S2. No m/r/g. Pulses 2+ throughout. GI: + bowel sounds. Nontender. No rebound tenderness or guarding. Nondistended   Extremities: No edema. No tenderness. Neuro:                          Oriented. No focal deficits  Skin:                             Warm and dry. No rashes or lesions      Labs and Data:    Telemetry: Normal sinus rhythm  Recent Labs      03/14/18   0123  03/13/18   0129 03/12/18 1912   WBC  5.7  8.4  6.6   HGB  10.4*  9.1*  10.4*   HCT  32.8*  29.5*  33.0*   PLT  262  226  279     Recent Labs      03/14/18   0123  03/13/18   0129 03/12/18 1912   NA  138  142  138   K  3.6  3.1*  3.5   CL  101  111*  102   CO2  26  22  21   GLU  126*  162*  323*   BUN  17  13  16   CREA  2.08*  1.52*  1.99*   CA  9.2  6.9*  8.4*   ALB  2.9*  2.0*  2.9*   SGOT  23  53*  119*   ALT  59  65  97*         Signed by:  Alaina Denis MD  Resident, Family Medicine  03/14/18       Attending Note:

## 2018-03-13 NOTE — PROGRESS NOTES
TRANSFER - IN REPORT:    Verbal report received from [facility]) on Sandro Ovalles  being received from ED(unit) for routine progression of care      Report consisted of patients Situation, Background, Assessment and   Recommendations(SBAR). Information from the following report(s) SBAR, Kardex, ED Summary, Procedure Summary, Intake/Output, MAR, Recent Results and Cardiac Rhythm nsr, sinus tach was reviewed with the receiving nurse. Opportunity for questions and clarification was provided. Assessment completed upon patients arrival to unit and care assumed. RN to send trop before arrival to floor. Gisel Campos on floor, addressed goals of care. Per MD ok to do trial off BIPAP. Consult to cardiology, cardiac reg diet. 5968 Dr Tru Wilkerson notified of negative trop. Per MD, if next trop is negative, q6 trop order can be dcd.    1601 verbal order to dc q6 accuchecks, keep achs accuchecks. Gisel Knight notified that pt is now off BIPAP. MD aware pt has no SSI.     1622 SSI order set opened     3600 [facility] in ED notified that room is ready    [place] 694 Dr Selam Obrien notified that pt has not arrived from ED, BG check and SSI overdue. MD has spoken to ED Rn.     1930 Pt arrived to the floor on room air, satting 98%. No BG check at dinner, no SSI given. /114, norvasc was held this am due to BIPAP. Dr Selam Obrien notified, will round on pt. Bedside and Verbal shift change report given to Lalo Decker (oncoming nurse) by Mikey Moe (offgoing nurse). Report included the following information SBAR, Kardex, ED Summary, Procedure Summary, Intake/Output, MAR, Recent Results and Cardiac Rhythm sinus tach. Night RN aware that next trop due 2030, if negative the order can be dcd.

## 2018-03-13 NOTE — PROGRESS NOTES
[facility]: MED RECONCILIATION    Comments/Recommendations:   Patient is awake and and alert. Pharmacist reviewed refill history available with Rx Query. The patient has not taken his levothyroxine in about 5 days. The patient has been unable to afford his refill as he is waiting on his next check. The patient reports he is not taking insulin at this time. Medications added:     · None    Medications removed:    · Colchicine  · Insulin glargine  · Metoprolol tartrate    Medications adjusted:    · Ferrous sulfate changed to daily      Allergies: Review of patient's allergies indicates no known allergies. Prior to Admission Medications:   Prior to Admission Medications   Prescriptions Last Dose Informant Patient Reported? Taking? amLODIPine (NORVASC) 10 mg tablet 3/12/2018 at Unknown time  No Yes   Sig: TAKE ONE TABLET BY MOUTH ONCE DAILY   ferrous sulfate 325 mg (65 mg iron) tablet 3/12/2018 at Unknown time  Yes Yes   Sig: Take 325 mg by mouth daily.    levothyroxine (SYNTHROID) 150 mcg tablet 3/6/2018 at Unknown time  No Yes   Sig: TAKE ONE TABLET BY MOUTH ONCE DAILY BEFORE BREAKFAST      Facility-Administered Medications: None      Thank you,    Mak Ortega, PharmD, BCPS

## 2018-03-13 NOTE — ROUTINE PROCESS
TRANSFER - OUT REPORT:    Verbal report given to Isaias Simeon RN (name) on Kervin Cervantes  being transferred to [facility] ED (unit) for routine progression of care       Report consisted of patients Situation, Background, Assessment and   Recommendations(SBAR). Information from the following report(s) SBAR, ED Summary, MAR, Recent Results and Cardiac Rhythm Sinus Tach  was reviewed with the receiving nurse. Lines:   Peripheral IV 03/12/18 Right Antecubital (Active)   Site Assessment Clean, dry, & intact 3/12/2018  7:23 PM   Phlebitis Assessment 0 3/12/2018  7:23 PM   Infiltration Assessment 0 3/12/2018  7:23 PM   Dressing Status Clean, dry, & intact 3/12/2018  7:23 PM   Dressing Type Tape;Transparent 3/12/2018  7:23 PM   Hub Color/Line Status Green;Flushed;Patent 3/12/2018  7:23 PM   Action Taken Blood drawn 3/12/2018  7:23 PM   Alcohol Cap Used No 3/12/2018  7:23 PM        Opportunity for questions and clarification was provided.       Patient transported with:   Monitor  Registered Nurse   Domo Israel

## 2018-03-13 NOTE — CDMP QUERY
The diagnosis of malignant HTN has been documented for this patient. In ICD-10, malignant HTN is an unspecified term. Based on the definitions listed below, could your documentation be further specified as:    =>Hypertensive emergency  =>Hypertensive urgency  =>Other Explanation of clinical findings  =>Unable to Determine (no explanation of clinical findings)    The medical record reflects the following clinical findings, treatment, and risk factors:  Risk Factors:  74 yo M admitted with acute hypoxic respiratory failure and HTN   Clinical Indicators:  /99    230/111  137/124     182/123   Treatment: IV Hydrazaline 20 mg x 1 and PO,  IV Lasix 20 mg     Please clarify and document your clinical opinion in the progress notes and discharge summary including the definitive and/or presumptive diagnosis, (suspected or probable), related to the above clinical findings.  Please include clinical findings supporting your diagnosis.    -----------------------------------------------  Hypertensive Emergency: SBP >180 or DBP > 120 with associated organ dysfunction (e.g. CVA, LOC, memory loss, MI, KELIN, aortic dissection, angina, pulmonary edema, etc.)    Hypertensive Urgency: SBP > 180 or DBP > 110 with or without symptoms (e.g. severe HA, SOB, nosebleed, severe anxiety, etc.)    Thank you for your time   [facility] RN/BSN, CCDS  Desk:   [phone]   Other:  [phone]

## 2018-03-13 NOTE — PROGRESS NOTES
Assumed care of pt this am. Pt is alert and oriented with continuous BIPAP on currently, oral medications held. Pt resting in bed comfortably at this time. Spoke with MD this morning about improvement of pt blood gas and if pt still needed BIPAP, no new orders. Pt given one time dose of lasix and is putting out well. TRANSFER - OUT REPORT:    Verbal report given to 3rd floor(name) on [facility]  being transferred to 3rd floor(unit) for routine progression of care       Report consisted of patients Situation, Background, Assessment and   Recommendations(SBAR). Information from the following report(s) ED Summary was reviewed with the receiving nurse. Opportunity for questions and clarification was provided.       Patient transported with:   Monitor  O2 @ BIPAP liters  Registered Nurse

## 2018-03-13 NOTE — H&P
[address] [facility]   Admission H&P    Date of admission: 3/12/2018    Patient name: Eunice Cunningham  MRN: [id number]  YOB: 1950  Age: 75 y.o. Primary care provider:  Marco Felton MD     Source of Information: patient, medical records    Chief complaint:  dyspnea    History of Present Illness  Eunice Cunningham is a 75 y.o. male with PMHx of Type 2 DM, CKD stage 3b, HTN, Anemia ,Hypothyroidism and poor compliance with medications  who presents to the ER complaining of progressive worsening of dyspnea. Patient reports he has been having dyspnea for the past 2-3 that got worse this afternoon while he was trying to\" change oil\" in his house. He reports he had an episode of dyspnea while laying flat last night. Patient is poor historian and very difficult to understand. Patient denies any history of smoking,  nasal congestion, cough, chest pain, fever, chills, nausea, vomiting or abdominal pain. Pt reports he has only been taking of amlodipine and iron pills. Of note, upon chart review, patient was  Seen last by his PCP Dr. Fernando Stephen on 02/20/18 which showed his poor compliance with medial regimen and noted that she was concerned for appropriate judgment he has of his own medical condition. In the ER,   vital signs were remarkable for /99, P of 111, RR 27 SpO2 of 98% on non re-breather mask    Labs were remarkable for pro BNP of 2369, TSH 0.08  And Hgb 10.4 CXR interpretation per this writer shows some edema with cardiomegaly. Pt received duo-neb and was placed on  non rebreather mask en route to the ER by EMS due to low sats in the [id number] followed by BiPAP    Home Medications   Prior to Admission medications    Medication Sig Start Date End Date Taking?  Authorizing Provider   amLODIPine (NORVASC) 10 mg tablet TAKE ONE TABLET BY MOUTH ONCE DAILY 1/8/18  Yes Marco Felton MD   levothyroxine (SYNTHROID) 150 mcg tablet TAKE ONE TABLET BY MOUTH ONCE DAILY BEFORE BREAKFAST 1/8/18   Hannah Puckett MD   colchicine (COLCRYS) 0.6 mg tablet Take 2 tabs by mouth now. Take 1 tab in 1 hour. 7/9/17   Jarred Olvera MD   metoprolol tartrate (LOPRESSOR) 25 mg tablet Take 25 mg by mouth two (2) times a day. Historical Provider   insulin glargine (LANTUS) 100 unit/mL injection 10 Units at bedtime. Patient not taking: Reported on 10/18/2017 10/11/16   Hannah Puckett MD   insulin syringe-needle U-100 Dispense ultrafine 1 mL syringes with 28 gauge needle Dx: E11.65 10/11/16   Hannah Puckett MD   ferrous sulfate (IRON) 325 mg (65 mg iron) EC tablet Take 1 tablet by mouth three (3) times daily (with meals). 10/6/14   Elie Mckinnon MD   glucose blood VI test strips (ASCENSIA CONTOUR) strip Check glucose TID 8/19/14   Eile Mckinnon MD   Blood-Glucose Meter monitoring kit Check sugars 4 times daily. 7/28/14   Elie Mckinnon MD   LACTOSE-FREE FOOD (ENSURE COMPLETE PO) Take  by mouth daily.     Nigel Aguirre MD       Allergies   No Known Allergies      Past Medical History:   Diagnosis Date    Anemia     Bronchitis     Chronic kidney disease     UTI    Diabetes ([address]     Gastrointestinal disorder     anemia    Hypertension     Kidney disease, chronic, stage II (GFR 60-89 ml/min)     Neurological disorder     Metabolic Brain Disorder       Past Surgical History:   Procedure Laterality Date    HX OTHER SURGICAL      never       Family History   Problem Relation Age of Onset    Diabetes Mother     Cancer Sister          Social History   Patient resides  x  Independently      With family care      Assisted living      SNF      Ambulates  x  Independently      With cane       Assisted walker           Alcohol history   x  None     Social     Chronic     Smoking history  x  None     Former smoker     Current smoker     History   Smoking Status    Former Smoker    Packs/day: 0.50    Years: 30.00    Types: Cigarettes    Quit date: 1/1/1994   Smokeless Tobacco    Never Used           Drug history  x  None     Former drug user     Current drug user       Code status  x  Full code     DNR/DNI     Partial    Code status discussed with the patient/caregivers. Review of Systems  See HPI      Physical Exam  Visit Vitals    BP (!) 168/104    Pulse 94    Temp 97 °F (36.1 °C)    Resp 19    SpO2 100%        General: No acute distress. Alert. Cooperative. Head: Normocephalic. Atraumatic. Neck: Supple. Normal ROM. No stiffness. Respiratory: Rales in the left and right lower bases with diminished breath sounds    Cardiovascular: RRR. Normal S1,S2. No m/r/g. Pulses 2+ throughout. GI: + bowel sounds. Nontender. No rebound tenderness or guarding. Non distended, obese   Extremities: Trace edema. No tenderness. Musculoskeletal: Full ROM in all extremities.            : Deferred   Rectal: Deferred       Laboratory Data  Recent Results (from the past 24 hour(s))   EKG, 12 LEAD, INITIAL    Collection Time: 03/12/18  7:06 PM   Result Value Ref Range    Ventricular Rate 109 BPM    Atrial Rate 109 BPM    P-R Interval 182 ms    QRS Duration 98 ms    Q-T Interval 332 ms    QTC Calculation (Bezet) 447 ms    Calculated P Axis 30 degrees    Calculated R Axis 59 degrees    Calculated T Axis 148 degrees    Diagnosis       Sinus tachycardia  T wave abnormality, consider lateral ischemia  Abnormal ECG  When compared with ECG of 17-JUL-2014 13:36,  Nonspecific T wave abnormality now evident in Inferior leads  T wave inversion no longer evident in Anterior leads     CBC WITH AUTOMATED DIFF    Collection Time: 03/12/18  7:12 PM   Result Value Ref Range    WBC 6.6 4.1 - 11.1 K/uL    RBC 4.18 4.10 - 5.70 M/uL    HGB 10.4 (L) 12.1 - 17.0 g/dL    HCT 33.0 (L) 36.6 - 50.3 %    MCV 78.9 (L) 80.0 - 99.0 FL    MCH 24.9 (L) 26.0 - 34.0 PG    MCHC 31.5 30.0 - 36.5 g/dL    RDW 15.6 (H) 11.5 - 14.5 %    PLATELET 127 462 - 763 K/uL    MPV 10.4 8.9 - 12.9 FL    NEUTROPHILS 57 32 - 75 %    LYMPHOCYTES 36 12 - 49 %    MONOCYTES 6 5 - 13 %    EOSINOPHILS 1 0 - 7 %    BASOPHILS 0 0 - 1 %    ABS. NEUTROPHILS 3.7 1.8 - 8.0 K/UL    ABS. LYMPHOCYTES 2.4 K/UL    ABS. MONOCYTES 0.4 0.0 - 1.0 K/UL    ABS. EOSINOPHILS 0.1 0.0 - 0.4 K/UL    ABS. BASOPHILS 0.0 0.0 - 0.1 K/UL    DF AUTOMATED      XXWBCSUS 0     METABOLIC PANEL, COMPREHENSIVE    Collection Time: 03/12/18  7:12 PM   Result Value Ref Range    Sodium 138 136 - 145 mmol/L    Potassium 3.5 3.5 - 5.1 mmol/L    Chloride 102 97 - 108 mmol/L    CO2 21 21 - 32 mmol/L    Anion gap 15 5 - 15 mmol/L    Glucose 323 (H) 65 - 100 mg/dL    BUN 16 6 - 20 MG/DL    Creatinine 1.99 (H) 0.70 - 1.30 MG/DL    BUN/Creatinine ratio 8 (L) 12 - 20      GFR est AA 41 (L) >60 ml/min/1.73m2    GFR est non-AA 34 (L) >60 ml/min/1.73m2    Calcium 8.4 (L) 8.5 - 10.1 MG/DL    Bilirubin, total 0.2 0.2 - 1.0 MG/DL    ALT (SGPT) 97 (H) 12 - 78 U/L    AST (SGOT) 119 (H) 15 - 37 U/L    Alk.  phosphatase 103 45 - 117 U/L    Protein, total 7.8 6.4 - 8.2 g/dL    Albumin 2.9 (L) 3.5 - 5.0 g/dL    Globulin 4.9 (H) 2.0 - 4.0 g/dL    A-G Ratio 0.6 (L) 1.1 - 2.2     TROPONIN I    Collection Time: 03/12/18  7:12 PM   Result Value Ref Range    Troponin-I, Qt. 0.05 <0.08 ng/mL   NT-PRO BNP    Collection Time: 03/12/18  7:12 PM   Result Value Ref Range    NT pro-BNP 2369 (H) 0 - 125 PG/ML   LACTIC ACID    Collection Time: 03/12/18  7:12 PM   Result Value Ref Range    Lactic acid 2.5 (HH) 0.4 - 2.0 MMOL/L   TSH 3RD GENERATION    Collection Time: 03/12/18  7:12 PM   Result Value Ref Range    TSH 0.08 (L) 0.36 - 3.74 uIU/mL   INFLUENZA A & B AG (RAPID TEST)    Collection Time: 03/12/18  7:30 PM   Result Value Ref Range    Influenza A Antigen NEGATIVE  NEG      Influenza B Antigen NEGATIVE  NEG     POC G3 - PUL    Collection Time: 03/12/18  9:33 PM   Result Value Ref Range    FIO2 (POC) 28 %    pH (POC) 7.344 (L) 7.35 - 7.45      pCO2 (POC) 42.5 35.0 - 45.0 MMHG    pO2 (POC) 113 (H) 80 - 100 MMHG    HCO3 (POC) 23.1 22 - 26 MMOL/L    sO2 (POC) 98 (H) 92 - 97 %    Base deficit (POC) 3 mmol/L    Site RIGHT RADIAL      Device: BIPAP      PEEP/CPAP (POC) 6 cmH2O    PIP (POC) 12      Pressure support 5 cmH2O    Allens test (POC) YES      Specimen type (POC) ARTERIAL      Total resp. rate 22     GLUCOSE, POC    Collection Time: 03/12/18 10:28 PM   Result Value Ref Range    Glucose (POC) 237 (H) 65 - 100 mg/dL    [facility]formed by [facility]        Imaging    CXR 3/12/18    INDICATION:   dyspnea     EXAM:  AP CHEST RADIOGRAPH     COMPARISON: February 11, 2017     FINDINGS:     AP portable view of the chest demonstrates a normal cardiomediastinal  silhouette. The lungs are underinflated. There is no edema, effusion,  consolidation, or pneumothorax. The osseous structures are unremarkable.     IMPRESSION  IMPRESSION:  No acute process. EKG:  Sinus tachycardia with T wave abnormality     Assessment and Plan   Anthony Martinez is a 75 y.o. male with PMHx of Type 2 DM, CKD stage 3b, HTN, Anemia Hypothyroidism and poor compliance with medications who is admitted for acute hypoxic respiratory failure in the setting of pulmonary edema . Acute hypoxic respiratory failure   - Patient placed on non re breather mask en route to the hospital by EMS due to tachypnea and SpO2 in high 80s. -With elevated pro- BNP of 2369 and symptoms of orthopnea most likely suggestive of pulmonary edema.   - Continuous BiPAP overnight and evaluate in the morning   -Echo on 2014 showed EF of 55-60%, will obtain echo in the AM  - Received 1 time dose of IV Lasix 20 mg   - Wells score 1.5 with low probability for PE, will obtain d-dimer  - LA 2.5-->1.5 s/p Bipap  - ABG to assess respiratory status   - Strict I/Os  - Admit to step down     HTN   - POA /99 and received Asprin, Nitrobid, Lasix 20 mg IV  - SBPs still elevated in 160s and 180s  - IV hydralazine for BP > 180/110   - Continue home Amlodipine 10 mg PO daily     Type 2 DM  - Home dose of lantus 10 units at qhs ( although patient hasn't been non -compliant)  - Will restart 80% of home lantus with 8 units qhs  - POC gluocse AC&QHS with SSI    Hypothyroidism   - POA TSH 0.08--> low  - Patient with history of hypothyroidism but non compliant with Synthroid  - Primary vs Secondary hypothyroidism  - Hold Syntrhoid for now and obtain T3 and FT4    CKD stage 3b  - POA Cr 1.99 ( baseline ~1.8)  - Improved to Cr 1.52 s/p Bipap   -Stable, will continue to monitor    Anemia  - POA Hgb 10.4, patient on iron pills at home  - Daily CBCs      FEN/GI -NPO  Activity - Ambulate with assitance   DVT prophylaxis - Heparin  GI prophylaxis -  Protonix  Disposition - Plan to d/c to home once stable     CODE STATUS:  Full Code       Patient to be discussed with Dr. Idalia Waggoner MD  [address] [place] [address] Problems  Date Reviewed: 2/20/2018          Codes Class Noted POA    Respiratory failure [facility]) ICD-10-CM: J96.90  ICD-9-CM: 518.81  3/12/2018 Unknown Pt on Metformin prior to admission, HbA1c 6.9. Does not monitor f/s regularly.

## 2018-03-13 NOTE — PROGRESS NOTES
[address] [facility]   Senior Resident Admission Note    CC: \"Dyspnea and SOB\"    HPI:  Donna Chávez is a 75 y.o. male with known CKD, DM, hypertension who presents to the ER complaining of dyspnea and SOB. Symptoms were going on for the last couple of days. He was working in the garage today and reports increased WOB and dyspnea. No fever, no chills. Patient is admitted for acute hypoxic respiratory failure. Chart reviewed. Visit Vitals    /76    Pulse (!) 102    Temp 97.7 °F (36.5 °C)    Resp 17    SpO2 95%       Physical Exam   Constitutional: He is well-developed, well-nourished, and in no distress. In mild distress 2/2 increased WOB   HENT:   Head: Normocephalic and atraumatic. Cardiovascular: Regular rhythm, normal heart sounds and intact distal pulses. Exam reveals no gallop. No murmur heard. Pulmonary/Chest: He has no wheezes. He has no rales. Bibasilar bilateral crackles   Abdominal: Soft. Bowel sounds are normal. He exhibits no distension. Musculoskeletal: He exhibits edema. A/P: 76 yo Charlene Mehta admitted for acute hypoxic respiratory failure. DDX include pulmonary edema, CHF, PE. No known hx of CHF however high risk given the underlying hypertension. Last ECHO 7/2017 with EF 55-60% and G1DD. Poor quality of CXR but significant concern for pulmonary edema.   -Admit to stepdown for continuous BIPAP  -Continue continuous BIPAP wean as tolerated  -D-dimer, if elevated will need CTA to rule out PE as the pt tachypnic and tachycardic  -Hydralazine for BP control, resume home Amlodipine  -Repeat ECHO  -Trend Troponin      Patient seen, examined, and discussed with Dr. Lianna Claros (PGY-1). For the remaining assessment and plan of other medical problems please refer to Dr. Sanjuana Altman H&P for more details.     Pt discussed with on-call attending physician    Ju Sanabria MD  Family Medicine Resident, PGY-2

## 2018-03-14 ENCOUNTER — APPOINTMENT (OUTPATIENT)
Dept: NUCLEAR MEDICINE | Age: 68
DRG: 246 | End: 2018-03-14
Attending: SPECIALIST
Payer: MEDICARE

## 2018-03-14 PROBLEM — R91.8 GROUND GLASS OPACITY PRESENT ON IMAGING OF LUNG: Chronic | Status: ACTIVE | Noted: 2018-03-14

## 2018-03-14 LAB
ALBUMIN SERPL-MCNC: 2.9 G/DL (ref 3.5–5)
ALBUMIN/GLOB SERPL: 0.6 {RATIO} (ref 1.1–2.2)
ALP SERPL-CCNC: 96 U/L (ref 45–117)
ALT SERPL-CCNC: 59 U/L (ref 12–78)
ANION GAP SERPL CALC-SCNC: 11 MMOL/L (ref 5–15)
AST SERPL-CCNC: 23 U/L (ref 15–37)
BASOPHILS # BLD: 0 K/UL (ref 0–0.1)
BASOPHILS NFR BLD: 0 % (ref 0–1)
BILIRUB SERPL-MCNC: 0.2 MG/DL (ref 0.2–1)
BUN SERPL-MCNC: 17 MG/DL (ref 6–20)
BUN/CREAT SERPL: 8 (ref 12–20)
CALCIUM SERPL-MCNC: 9.2 MG/DL (ref 8.5–10.1)
CHLORIDE SERPL-SCNC: 101 MMOL/L (ref 97–108)
CO2 SERPL-SCNC: 26 MMOL/L (ref 21–32)
CREAT SERPL-MCNC: 2.08 MG/DL (ref 0.7–1.3)
DIFFERENTIAL METHOD BLD: ABNORMAL
EOSINOPHIL # BLD: 0.1 K/UL (ref 0–0.4)
EOSINOPHIL NFR BLD: 1 % (ref 0–7)
ERYTHROCYTE [DISTWIDTH] IN BLOOD BY AUTOMATED COUNT: 15.9 % (ref 11.5–14.5)
GLOBULIN SER CALC-MCNC: 4.8 G/DL (ref 2–4)
GLUCOSE BLD STRIP.AUTO-MCNC: 120 MG/DL (ref 65–100)
GLUCOSE BLD STRIP.AUTO-MCNC: 140 MG/DL (ref 65–100)
GLUCOSE BLD STRIP.AUTO-MCNC: 140 MG/DL (ref 65–100)
GLUCOSE BLD STRIP.AUTO-MCNC: 220 MG/DL (ref 65–100)
GLUCOSE SERPL-MCNC: 126 MG/DL (ref 65–100)
HCT VFR BLD AUTO: 32.8 % (ref 36.6–50.3)
HGB BLD-MCNC: 10.4 G/DL (ref 12.1–17)
IMM GRANULOCYTES # BLD: 0 K/UL (ref 0–0.04)
IMM GRANULOCYTES NFR BLD AUTO: 0 % (ref 0–0.5)
LYMPHOCYTES # BLD: 1.9 K/UL (ref 0.8–3.5)
LYMPHOCYTES NFR BLD: 34 % (ref 12–49)
MCH RBC QN AUTO: 25 PG (ref 26–34)
MCHC RBC AUTO-ENTMCNC: 31.7 G/DL (ref 30–36.5)
MCV RBC AUTO: 78.8 FL (ref 80–99)
MONOCYTES # BLD: 0.5 K/UL (ref 0–1)
MONOCYTES NFR BLD: 8 % (ref 5–13)
NEUTS SEG # BLD: 3.2 K/UL (ref 1.8–8)
NEUTS SEG NFR BLD: 56 % (ref 32–75)
NRBC # BLD: 0 K/UL (ref 0–0.01)
NRBC BLD-RTO: 0 PER 100 WBC
PLATELET # BLD AUTO: 262 K/UL (ref 150–400)
PMV BLD AUTO: 9.8 FL (ref 8.9–12.9)
POTASSIUM SERPL-SCNC: 3.6 MMOL/L (ref 3.5–5.1)
PROT SERPL-MCNC: 7.7 G/DL (ref 6.4–8.2)
RBC # BLD AUTO: 4.16 M/UL (ref 4.1–5.7)
SERVICE CMNT-IMP: ABNORMAL
SODIUM SERPL-SCNC: 138 MMOL/L (ref 136–145)
TROPONIN I SERPL-MCNC: <0.04 NG/ML
WBC # BLD AUTO: 5.7 K/UL (ref 4.1–11.1)

## 2018-03-14 PROCEDURE — 74011250636 HC RX REV CODE- 250/636: Performed by: STUDENT IN AN ORGANIZED HEALTH CARE EDUCATION/TRAINING PROGRAM

## 2018-03-14 PROCEDURE — 74011250636 HC RX REV CODE- 250/636: Performed by: FAMILY MEDICINE

## 2018-03-14 PROCEDURE — 65660000000 HC RM CCU STEPDOWN

## 2018-03-14 PROCEDURE — 36415 COLL VENOUS BLD VENIPUNCTURE: CPT | Performed by: STUDENT IN AN ORGANIZED HEALTH CARE EDUCATION/TRAINING PROGRAM

## 2018-03-14 PROCEDURE — 74011250637 HC RX REV CODE- 250/637: Performed by: STUDENT IN AN ORGANIZED HEALTH CARE EDUCATION/TRAINING PROGRAM

## 2018-03-14 PROCEDURE — 82607 VITAMIN B-12: CPT | Performed by: FAMILY MEDICINE

## 2018-03-14 PROCEDURE — 84484 ASSAY OF TROPONIN QUANT: CPT | Performed by: FAMILY MEDICINE

## 2018-03-14 PROCEDURE — 80053 COMPREHEN METABOLIC PANEL: CPT | Performed by: FAMILY MEDICINE

## 2018-03-14 PROCEDURE — 74011636637 HC RX REV CODE- 636/637: Performed by: FAMILY MEDICINE

## 2018-03-14 PROCEDURE — 74011250637 HC RX REV CODE- 250/637: Performed by: SPECIALIST

## 2018-03-14 PROCEDURE — 82962 GLUCOSE BLOOD TEST: CPT

## 2018-03-14 PROCEDURE — 85025 COMPLETE CBC W/AUTO DIFF WBC: CPT | Performed by: STUDENT IN AN ORGANIZED HEALTH CARE EDUCATION/TRAINING PROGRAM

## 2018-03-14 PROCEDURE — C9113 INJ PANTOPRAZOLE SODIUM, VIA: HCPCS | Performed by: STUDENT IN AN ORGANIZED HEALTH CARE EDUCATION/TRAINING PROGRAM

## 2018-03-14 RX ORDER — CARVEDILOL 6.25 MG/1
6.25 TABLET ORAL 2 TIMES DAILY WITH MEALS
Status: DISCONTINUED | OUTPATIENT
Start: 2018-03-14 | End: 2018-03-17 | Stop reason: HOSPADM

## 2018-03-14 RX ORDER — LEVOTHYROXINE SODIUM 150 UG/1
150 TABLET ORAL
Status: DISCONTINUED | OUTPATIENT
Start: 2018-03-14 | End: 2018-03-17 | Stop reason: HOSPADM

## 2018-03-14 RX ORDER — HYDRALAZINE HYDROCHLORIDE 25 MG/1
37.5 TABLET, FILM COATED ORAL 3 TIMES DAILY
Status: DISCONTINUED | OUTPATIENT
Start: 2018-03-14 | End: 2018-03-17 | Stop reason: HOSPADM

## 2018-03-14 RX ORDER — LANOLIN ALCOHOL/MO/W.PET/CERES
325 CREAM (GRAM) TOPICAL DAILY
Status: DISCONTINUED | OUTPATIENT
Start: 2018-03-14 | End: 2018-03-17 | Stop reason: HOSPADM

## 2018-03-14 RX ORDER — ISOSORBIDE MONONITRATE 30 MG/1
30 TABLET, EXTENDED RELEASE ORAL DAILY
Status: DISCONTINUED | OUTPATIENT
Start: 2018-03-14 | End: 2018-03-17 | Stop reason: HOSPADM

## 2018-03-14 RX ADMIN — ISOSORBIDE MONONITRATE 30 MG: 30 TABLET, EXTENDED RELEASE ORAL at 09:00

## 2018-03-14 RX ADMIN — INSULIN LISPRO 2 UNITS: 100 INJECTION, SOLUTION INTRAVENOUS; SUBCUTANEOUS at 02:00

## 2018-03-14 RX ADMIN — HYDRALAZINE HYDROCHLORIDE 20 MG: 20 INJECTION INTRAMUSCULAR; INTRAVENOUS at 03:43

## 2018-03-14 RX ADMIN — HYDRALAZINE HYDROCHLORIDE 37.5 MG: 25 TABLET ORAL at 17:37

## 2018-03-14 RX ADMIN — HEPARIN SODIUM 5000 UNITS: 5000 INJECTION, SOLUTION INTRAVENOUS; SUBCUTANEOUS at 05:22

## 2018-03-14 RX ADMIN — CARVEDILOL 6.25 MG: 6.25 TABLET, FILM COATED ORAL at 09:00

## 2018-03-14 RX ADMIN — CARVEDILOL 6.25 MG: 6.25 TABLET, FILM COATED ORAL at 17:37

## 2018-03-14 RX ADMIN — Medication 10 ML: at 05:22

## 2018-03-14 RX ADMIN — LEVOTHYROXINE SODIUM 150 MCG: 150 TABLET ORAL at 09:00

## 2018-03-14 RX ADMIN — HYDRALAZINE HYDROCHLORIDE 37.5 MG: 25 TABLET ORAL at 21:16

## 2018-03-14 RX ADMIN — HYDRALAZINE HYDROCHLORIDE 37.5 MG: 25 TABLET ORAL at 09:04

## 2018-03-14 RX ADMIN — HEPARIN SODIUM 5000 UNITS: 5000 INJECTION, SOLUTION INTRAVENOUS; SUBCUTANEOUS at 14:53

## 2018-03-14 RX ADMIN — FERROUS SULFATE TAB 325 MG (65 MG ELEMENTAL FE) 325 MG: 325 (65 FE) TAB at 09:00

## 2018-03-14 RX ADMIN — INSULIN GLARGINE 8 UNITS: 100 INJECTION, SOLUTION SUBCUTANEOUS at 22:00

## 2018-03-14 RX ADMIN — PANTOPRAZOLE SODIUM 40 MG: 40 INJECTION, POWDER, FOR SOLUTION INTRAVENOUS at 09:00

## 2018-03-14 RX ADMIN — INSULIN LISPRO 3 UNITS: 100 INJECTION, SOLUTION INTRAVENOUS; SUBCUTANEOUS at 12:01

## 2018-03-14 RX ADMIN — PANTOPRAZOLE SODIUM 40 MG: 40 INJECTION, POWDER, FOR SOLUTION INTRAVENOUS at 21:16

## 2018-03-14 RX ADMIN — Medication 10 ML: at 14:53

## 2018-03-14 RX ADMIN — Medication 10 ML: at 21:25

## 2018-03-14 RX ADMIN — HEPARIN SODIUM 5000 UNITS: 5000 INJECTION, SOLUTION INTRAVENOUS; SUBCUTANEOUS at 21:16

## 2018-03-14 RX ADMIN — Medication 10 ML: at 21:16

## 2018-03-14 NOTE — PROGRESS NOTES
Shift Summary  3/14/2018  [id number]    0700 Pt awake at time of bedside shift report received from [facility].     2621 AM vitals, assessment, and meds complete without difficulty, AM rhythm strip shows SR w/occasional PVC. Reviewed today's plan of care and goals with patient; plan of care today includes monitor cardiac and respiratory status. 1511 Spoke w/stress lab on phone; pt has already had breakfast and half of his coffee. Plan for stress test tomorrow and will make pt NPO after midnight tonight. 0212 Rounded at bedside with Dr. Ric Madison team, informed them that stress test is planned for tomorrow. No other significant changes noted on my shift. Pt has completed the resting phase of the nuclear stress test and will be NPO after midnight for completion of test tomorrow. 1900 Bedside and Verbal shift change report given to Rolando Garcia (oncoming nurse) by Jaki Wellington (offgoing nurse). Report included the following information SBAR, Kardex, Intake/Output, MAR, Accordion, Recent Results and Cardiac Rhythm NSR.

## 2018-03-14 NOTE — NURSE NAVIGATOR
Chart reviewed by Heart Failure Nurse Navigator. Heart Failure database completed. Echo with EF 35-40% with moderate diffuse hypokinesis, systolic function moderately reduced, grade 1 diastolic dysfunction. ACEi/ARB: Per cardiology note, will consider RAAS inhibition once renal function established. BB: carvedilol 6.25 mg BID. Hydralazine 37.5 mg TID  Isosorbide mononitrate 30 mg daily. CRT not currently indicated. NYHA Functional Class not yet assigned. Heart Failure Teach Back in Patient Education. Heart Failure Avoiding Triggers on Discharge Instructions. Patient admitted with hypertensive urgency and HF related to poor medication compliance and cognitive barriers. Patient improved after diuresis and BP control. Stress test planned for tomorrow.

## 2018-03-14 NOTE — ROUTINE PROCESS
TRANSFER - IN REPORT:    Verbal report received from [place], RN(name) on Taurus Weeks  being received from ER(unit) for routine progression of care      Report consisted of patients Situation, Background, Assessment and   Recommendations(SBAR). Information from the following report(s) SBAR, Kardex, ED Summary, Intake/Output, MAR, Accordion, Recent Results, Cardiac Rhythm ST and Alarm Parameters  was reviewed with the receiving nurse. Opportunity for questions and clarification was provided. Assessment completed upon patients arrival to unit and care assumed.

## 2018-03-14 NOTE — PROGRESS NOTES
Dr. Clayton Cancer notified of pt having consistantly high BP like 171/80. Dr. Clayton Cancer to address issue with team and make appropriate changes soon.

## 2018-03-14 NOTE — PROGRESS NOTES
Dr. Rae Coleman notified of current cbg of 103 and current NPH insulin of 20 units now.  Ordered to hold current NPH insulin dose per MD.

## 2018-03-14 NOTE — PROGRESS NOTES
Workup for anemia performed by Dr. Nancy David in 2014. His note from 9/24/14 provides an an excellent summary of the workup. It appears the patient has multifactorial causes. We can try to get him back on his previous regimen, it appears presenting for follow up has been a recent with his recent PCP, so we will stress continued OP management of his anemia with the patient. Dr. Henrietta Alanis last office note:    Assessment:   1) Anemia  Multifactorial: CKD, anemia of chronic disease, B12 deficiency. BMBx was completed and was unremarkable. Last month he was quite anemia, and I recommended he return for transfusion and start aranesp, however, we were unable to reach him by phone. Today hemoglobin is a bit better, though still low. No need for transfusion, but I think he would benefit from aranesp, we will try to get him set up to start this monthly with his B12 injections.    2) Pernicious anemia  Intrinsic factor abs positive. Continue B12 supplementation, given in [facility].     3) Polyclonal gammopathy   Normal SPEP and UPEP, and normal BMBx. This does not appear to be the etiology of #1.     4) CKD  Following with nephrology     Plan:      · Continue B12 1000mcg IM monthly  · Start aranesp monthly  · Labs in 1 month: CBC ([facility])  · Return to see me in about 1 month at time of next injection        Will check iron studies, B12, folate this admission prior to tx since it has been a few years since the initial workup.

## 2018-03-14 NOTE — CDMP QUERY
2) => Acute diastolic  congestive heart failure POA in the setting of acute hypoxic respiratory failure, pleural effusions, CMP, and LV dysfunction treated with IV Lasix 40 mg and cardio consult   => Other explanation of clinical findings   => Clinically Undetermined (no explanation for clinical findings)    The medical record reflects the following clinical findings, treatment, and risk factors. Risk Factors:  74 yo M admitted with acute hypoxic respiratory failure and HTN   Clinical Indicators:  pro BNP of 2369, orthopnea, pulmonary edema   3/14 cardio consult states \" Acute respiratory failure, combination of airway disease + ADHF, ADHF with moderate LV dysfunction, no MI \"   Treatment:     Please clarify and document your clinical opinion in the progress notes and discharge summary including the definitive and/or presumptive diagnosis, (suspected or probable), related to the above clinical findings. Please include clinical findings supporting your diagnosis.     Thank you for your time   [facility] RN/BSN, [address]  Desk:   [phone]   Other:  [phone]

## 2018-03-14 NOTE — PROGRESS NOTES
Primary Nurse Susana Hayes RN and [facility], RN performed a dual skin assessment on this patient No impairment noted  Juan score is 21

## 2018-03-14 NOTE — ROUTINE PROCESS
Bedside and Verbal shift change report given to Alecia Huerta RN (oncoming nurse) by Rhianna Mohan RN (offgoing nurse). Report included the following information SBAR, Kardex, Intake/Output, MAR, Accordion, Recent Results, Cardiac Rhythm SR/ST and Alarm Parameters .

## 2018-03-14 NOTE — PROGRESS NOTES
Pt had coffee and ate breakfast this am. Will do resting portion of stress test today and stress portion tomorrow am. Informed patient's nurse patient needs to be NPO after midnight and pt is not to have any caffeine after dinner. Dr. Gay García made aware.

## 2018-03-14 NOTE — PROGRESS NOTES
Problem: Falls - Risk of  Goal: *Absence of Falls  Document Maria Fernanda Fall Risk and appropriate interventions in the flowsheet.    Outcome: Progressing Towards Goal  Fall Risk Interventions:            Medication Interventions: Assess postural VS orthostatic hypotension, Bed/chair exit alarm, Evaluate medications/consider consulting pharmacy, Patient to call before getting OOB, Teach patient to arise slowly

## 2018-03-14 NOTE — PROGRESS NOTES
3/14/2018  10:49 AM  Met with patient to discuss discharge planning. Verified patient charted demographics correct. Patient lives in [place] with his sister and brother in law, with 3 steps to enter. He follows  [facility] for medical management. He uses [employer] for Liya Michelle. He has a walker and cane, has used [facility] in the past. His family will be able to provide transportation on discharge. Will continue to follow. No Patient Care Coordination Note on file. Care Management Interventions  PCP Verified by CM:  Yes  Mode of Transport at Discharge: Self  Transition of Care Consult (CM Consult): Discharge Planning  Physical Therapy Consult: No  Occupational Therapy Consult: No  Speech Therapy Consult: No  Current Support Network: Relative's Home  Confirm Follow Up Transport: Family  Plan discussed with Pt/Family/Caregiver: Yes  Discharge Location  Discharge Placement: Home  Tj Denny N Eliceo Levine

## 2018-03-14 NOTE — CONSULTS
Rena Mccarthy MD [facility] [address]  Office [phone]  Mobile [phone]    Date of  Admission: 3/12/2018  7:01 PM  PCP- Min Marmolejo MD    Doreen Starr is a 75 y.o. male admitted for Respiratory failure ([facility] [address] Consult requested by Reilly Lopez MD    Assessment  · Acute respiratory failure, combination of airway disease + ADHF  · ADHF with moderate LV dysfunction, no MI -  Compensated  · Cardiomyopathy with regional/global LV dysfunction (EF 35-40%, inferolateral HK)  · HTN, suboptimal control  · T2DM  · CKD  · Cognitive dysfunction? · Chronic anemia with microcytic indices        Discussion/Recommendations:  Complex case with multiple comorbid conditions. Subacute presentation with dyspnea but no chest pain. No evidence of myocardial injury yet regional/global LV dysfunction by echo raises concern for CAD. High risk due to DM/CKD. Would evaluate further with lexiscan cardiolite. Consider cath for high risk findings. Optimize HF Rx - hydralazine/nitrates for now, consider RAAS inhibition once stability of renal function established. Start coreg. Needs anemia evaluation. Following. .. Subjective:  Fair historian. Chronic HTN, T2DM and CKD in the setting of chronic cognitive dysfunction but no previous cardiac hx, admitted with progressive dyspnea but no chest pain. In ED note to be in resp failure, normal CXR, accelerated HTN, improved with iv loops/nebulizer Rx. Chest CT without PE but + effusions. Troponins negative but proBNP 2K. Echo personally reviewed - regional/global LV dysfunction (severe inferolateral/lateral HK, EF 35-40%, LVH). Got iv lasix evening of 3/12 and and yesterday morning. Feels better today.  No orthopnea, PND or edema noted    Lives with his brother in [place], not working on disability  Nonsmoker,no alcohol      Past Medical History:   Diagnosis Date    Anemia     Bronchitis     Chronic kidney disease     UTI    Diabetes ([facility] [address]     Gastrointestinal disorder     anemia    Hypertension     Kidney disease, chronic, stage II (GFR 60-89 ml/min)     Neurological disorder     Metabolic Brain Disorder      Past Surgical History:   Procedure Laterality Date    HX OTHER SURGICAL      never     No current facility-administered medications on file prior to encounter. Current Outpatient Prescriptions on File Prior to Encounter   Medication Sig Dispense Refill    amLODIPine (NORVASC) 10 mg tablet TAKE ONE TABLET BY MOUTH ONCE DAILY 30 Tab 5    levothyroxine (SYNTHROID) 150 mcg tablet TAKE ONE TABLET BY MOUTH ONCE DAILY BEFORE BREAKFAST 30 Tab 5     No Known Allergies          Review of Symptoms:  Constitutional: negative for fevers, chills, anorexia and weight loss  Respiratory: negative for hemoptysis or pleurisy/chest pain  Gastrointestinal: negative for dysphagia, odynophagia and abdominal pain  Musculoskeletal:negative  Neurological: negative for dizziness, vertigo and seizures  Other systems reviewed and negative except as above. Physical Exam    Visit Vitals    /82 (BP 1 Location: Right arm, BP Patient Position: At rest)    Pulse 100    Temp 98.5 °F (36.9 °C)    Resp 23    Ht 5' 8\" (1.727 m)    Wt 244 lb 9.6 oz (110.9 kg)    SpO2 98%    BMI 37.19 kg/m2     Visit Vitals    /82 (BP 1 Location: Right arm, BP Patient Position: At rest)    Pulse 100    Temp 98.5 °F (36.9 °C)    Resp 23    Ht 5' 8\" (1.727 m)    Wt 244 lb 9.6 oz (110.9 kg)    SpO2 98%    BMI 37.19 kg/m2     General Appearance:  Well developed, well nourished,alert and oriented x 3, and individual in no acute distress. Ears/Nose/Mouth/Throat:   Hearing grossly normal.         Neck: Supple. Chest:   Lungs clear to auscultation bilaterally. Cardiovascular:  Regular rate and rhythm, S1, S2 normal, no murmur. Abdomen:   Soft, non-tender, bowel sounds are active. Extremities: No edema bilaterally. Skin: Warm and dry. Cardiographics    Telemetry: normal sinus rhythm  ECG: normal sinus rhythm, nonspecific lateral T wave inversions, new from 2014      Recent Results (from the past 12 hour(s))   GLUCOSE, POC    Collection Time: 03/13/18  8:49 PM   Result Value Ref Range    Glucose (POC) 216 (H) 65 - 100 mg/dL    Performed by Buddy Moore (PCT)    TROPONIN I    Collection Time: 03/13/18  8:50 PM   Result Value Ref Range    Troponin-I, Qt. <0.04 <0.05 ng/mL   CBC WITH AUTOMATED DIFF    Collection Time: 03/14/18  1:23 AM   Result Value Ref Range    WBC 5.7 4.1 - 11.1 K/uL    RBC 4.16 4.10 - 5.70 M/uL    HGB 10.4 (L) 12.1 - 17.0 g/dL    HCT 32.8 (L) 36.6 - 50.3 %    MCV 78.8 (L) 80.0 - 99.0 FL    MCH 25.0 (L) 26.0 - 34.0 PG    MCHC 31.7 30.0 - 36.5 g/dL    RDW 15.9 (H) 11.5 - 14.5 %    PLATELET 780 077 - 007 K/uL    MPV 9.8 8.9 - 12.9 FL    NRBC 0.0 0  WBC    ABSOLUTE NRBC 0.00 0.00 - 0.01 K/uL    NEUTROPHILS 56 32 - 75 %    LYMPHOCYTES 34 12 - 49 %    MONOCYTES 8 5 - 13 %    EOSINOPHILS 1 0 - 7 %    BASOPHILS 0 0 - 1 %    IMMATURE GRANULOCYTES 0 0.0 - 0.5 %    ABS. NEUTROPHILS 3.2 1.8 - 8.0 K/UL    ABS. LYMPHOCYTES 1.9 0.8 - 3.5 K/UL    ABS. MONOCYTES 0.5 0.0 - 1.0 K/UL    ABS. EOSINOPHILS 0.1 0.0 - 0.4 K/UL    ABS. BASOPHILS 0.0 0.0 - 0.1 K/UL    ABS. IMM.  GRANS. 0.0 0.00 - 0.04 K/UL    DF AUTOMATED     TROPONIN I    Collection Time: 03/14/18  1:23 AM   Result Value Ref Range    Troponin-I, Qt. <0.04 <8.61 ng/mL   METABOLIC PANEL, COMPREHENSIVE    Collection Time: 03/14/18  1:23 AM   Result Value Ref Range    Sodium 138 136 - 145 mmol/L    Potassium 3.6 3.5 - 5.1 mmol/L    Chloride 101 97 - 108 mmol/L    CO2 26 21 - 32 mmol/L    Anion gap 11 5 - 15 mmol/L    Glucose 126 (H) 65 - 100 mg/dL    BUN 17 6 - 20 MG/DL    Creatinine 2.08 (H) 0.70 - 1.30 MG/DL    BUN/Creatinine ratio 8 (L) 12 - 20      GFR est AA 39 (L) >60 ml/min/1.73m2    GFR est non-AA 32 (L) >60 ml/min/1.73m2    Calcium 9.2 8.5 - 10.1 MG/DL    Bilirubin, total 0.2 0.2 - 1.0 MG/DL    ALT (SGPT) 59 12 - 78 U/L    AST (SGOT) 23 15 - 37 U/L    Alk.  phosphatase 96 45 - 117 U/L    Protein, total 7.7 6.4 - 8.2 g/dL    Albumin 2.9 (L) 3.5 - 5.0 g/dL    Globulin 4.8 (H) 2.0 - 4.0 g/dL    A-G Ratio 0.6 (L) 1.1 - 2.2     GLUCOSE, POC    Collection Time: 03/14/18  7:35 AM   Result Value Ref Range    Glucose (POC) 120 (H) 65 - 100 mg/dL    Performed by Tameka Rae (PCT)

## 2018-03-15 ENCOUNTER — APPOINTMENT (OUTPATIENT)
Dept: NUCLEAR MEDICINE | Age: 68
DRG: 246 | End: 2018-03-15
Attending: SPECIALIST
Payer: MEDICARE

## 2018-03-15 LAB
ALBUMIN SERPL-MCNC: 2.7 G/DL (ref 3.5–5)
ALBUMIN/GLOB SERPL: 0.6 {RATIO} (ref 1.1–2.2)
ALP SERPL-CCNC: 86 U/L (ref 45–117)
ALT SERPL-CCNC: 38 U/L (ref 12–78)
ANION GAP SERPL CALC-SCNC: 12 MMOL/L (ref 5–15)
APPEARANCE UR: CLEAR
AST SERPL-CCNC: 17 U/L (ref 15–37)
ATTENDING PHYSICIAN, CST07: NORMAL
BACTERIA URNS QL MICRO: NEGATIVE /HPF
BASOPHILS # BLD: 0 K/UL (ref 0–0.1)
BASOPHILS NFR BLD: 1 % (ref 0–1)
BILIRUB SERPL-MCNC: 0.3 MG/DL (ref 0.2–1)
BILIRUB UR QL: NEGATIVE
BUN SERPL-MCNC: 20 MG/DL (ref 6–20)
BUN/CREAT SERPL: 10 (ref 12–20)
CALCIUM SERPL-MCNC: 8.9 MG/DL (ref 8.5–10.1)
CHLORIDE SERPL-SCNC: 101 MMOL/L (ref 97–108)
CO2 SERPL-SCNC: 23 MMOL/L (ref 21–32)
COLOR UR: ABNORMAL
CREAT SERPL-MCNC: 2.1 MG/DL (ref 0.7–1.3)
CREAT UR-MCNC: 97.49 MG/DL
DIAGNOSIS, 93000: NORMAL
DIFFERENTIAL METHOD BLD: ABNORMAL
DUKE TM SCORE RESULT, CST14: NORMAL
DUKE TREADMILL SCORE, CST13: NORMAL
ECG INTERP BEFORE EX, CST11: NORMAL
ECG INTERP DURING EX, CST12: NORMAL
EOSINOPHIL # BLD: 0.1 K/UL (ref 0–0.4)
EOSINOPHIL NFR BLD: 1 % (ref 0–7)
EPITH CASTS URNS QL MICRO: ABNORMAL /LPF
ERYTHROCYTE [DISTWIDTH] IN BLOOD BY AUTOMATED COUNT: 16.2 % (ref 11.5–14.5)
FERRITIN SERPL-MCNC: 129 NG/ML (ref 26–388)
FOLATE SERPL-MCNC: 13.3 NG/ML (ref 5–21)
FUNCTIONAL CAPACITY, CST17: NORMAL
GLOBULIN SER CALC-MCNC: 4.6 G/DL (ref 2–4)
GLUCOSE BLD STRIP.AUTO-MCNC: 116 MG/DL (ref 65–100)
GLUCOSE BLD STRIP.AUTO-MCNC: 131 MG/DL (ref 65–100)
GLUCOSE BLD STRIP.AUTO-MCNC: 149 MG/DL (ref 65–100)
GLUCOSE BLD STRIP.AUTO-MCNC: 98 MG/DL (ref 65–100)
GLUCOSE SERPL-MCNC: 114 MG/DL (ref 65–100)
GLUCOSE UR STRIP.AUTO-MCNC: NEGATIVE MG/DL
HCT VFR BLD AUTO: 30.6 % (ref 36.6–50.3)
HGB BLD-MCNC: 9.8 G/DL (ref 12.1–17)
HGB UR QL STRIP: ABNORMAL
HYALINE CASTS URNS QL MICRO: ABNORMAL /LPF (ref 0–5)
IMM GRANULOCYTES # BLD: 0 K/UL (ref 0–0.04)
IMM GRANULOCYTES NFR BLD AUTO: 0 % (ref 0–0.5)
IRON SATN MFR SERPL: 12 % (ref 20–50)
IRON SERPL-MCNC: 30 UG/DL (ref 35–150)
KETONES UR QL STRIP.AUTO: NEGATIVE MG/DL
KNOWN CARDIAC CONDITION, CST08: NORMAL
LEUKOCYTE ESTERASE UR QL STRIP.AUTO: NEGATIVE
LYMPHOCYTES # BLD: 1.9 K/UL (ref 0.8–3.5)
LYMPHOCYTES NFR BLD: 31 % (ref 12–49)
MAX. DIASTOLIC BP, CST04: 89 MMHG
MAX. HEART RATE, CST05: 103 BPM
MAX. SYSTOLIC BP, CST03: 185 MMHG
MCH RBC QN AUTO: 25.2 PG (ref 26–34)
MCHC RBC AUTO-ENTMCNC: 32 G/DL (ref 30–36.5)
MCV RBC AUTO: 78.7 FL (ref 80–99)
MONOCYTES # BLD: 0.5 K/UL (ref 0–1)
MONOCYTES NFR BLD: 7 % (ref 5–13)
NEUTS SEG # BLD: 3.8 K/UL (ref 1.8–8)
NEUTS SEG NFR BLD: 61 % (ref 32–75)
NITRITE UR QL STRIP.AUTO: NEGATIVE
NRBC # BLD: 0 K/UL (ref 0–0.01)
NRBC BLD-RTO: 0 PER 100 WBC
OVERALL BP RESPONSE TO EXERCISE, CST16: NORMAL
OVERALL HR RESPONSE TO EXERCISE, CST15: NORMAL
PEAK EX METS, CST10: 1 METS
PH UR STRIP: 6 [PH] (ref 5–8)
PLATELET # BLD AUTO: 272 K/UL (ref 150–400)
PMV BLD AUTO: 9.8 FL (ref 8.9–12.9)
POTASSIUM SERPL-SCNC: 3.7 MMOL/L (ref 3.5–5.1)
PROT SERPL-MCNC: 7.3 G/DL (ref 6.4–8.2)
PROT UR STRIP-MCNC: 100 MG/DL
PROT UR-MCNC: 112 MG/DL (ref 0–11.9)
PROTOCOL NAME, CST01: NORMAL
RBC # BLD AUTO: 3.89 M/UL (ref 4.1–5.7)
RBC #/AREA URNS HPF: ABNORMAL /HPF (ref 0–5)
SERVICE CMNT-IMP: ABNORMAL
SERVICE CMNT-IMP: NORMAL
SODIUM SERPL-SCNC: 136 MMOL/L (ref 136–145)
SP GR UR REFRACTOMETRY: 1.01 (ref 1–1.03)
TEST INDICATION, CST09: NORMAL
TIBC SERPL-MCNC: 260 UG/DL (ref 250–450)
UR CULT HOLD, URHOLD: NORMAL
UROBILINOGEN UR QL STRIP.AUTO: 0.2 EU/DL (ref 0.2–1)
VIT B12 SERPL-MCNC: 735 PG/ML (ref 211–911)
WBC # BLD AUTO: 6.2 K/UL (ref 4.1–11.1)
WBC URNS QL MICRO: ABNORMAL /HPF (ref 0–4)

## 2018-03-15 PROCEDURE — 51798 US URINE CAPACITY MEASURE: CPT

## 2018-03-15 PROCEDURE — A9500 TC99M SESTAMIBI: HCPCS

## 2018-03-15 PROCEDURE — 36415 COLL VENOUS BLD VENIPUNCTURE: CPT | Performed by: STUDENT IN AN ORGANIZED HEALTH CARE EDUCATION/TRAINING PROGRAM

## 2018-03-15 PROCEDURE — 85025 COMPLETE CBC W/AUTO DIFF WBC: CPT | Performed by: STUDENT IN AN ORGANIZED HEALTH CARE EDUCATION/TRAINING PROGRAM

## 2018-03-15 PROCEDURE — 82570 ASSAY OF URINE CREATININE: CPT | Performed by: INTERNAL MEDICINE

## 2018-03-15 PROCEDURE — 74011250636 HC RX REV CODE- 250/636: Performed by: INTERNAL MEDICINE

## 2018-03-15 PROCEDURE — 82746 ASSAY OF FOLIC ACID SERUM: CPT | Performed by: FAMILY MEDICINE

## 2018-03-15 PROCEDURE — 74011636637 HC RX REV CODE- 636/637: Performed by: FAMILY MEDICINE

## 2018-03-15 PROCEDURE — 80053 COMPREHEN METABOLIC PANEL: CPT | Performed by: FAMILY MEDICINE

## 2018-03-15 PROCEDURE — 74011250637 HC RX REV CODE- 250/637: Performed by: SPECIALIST

## 2018-03-15 PROCEDURE — 93017 CV STRESS TEST TRACING ONLY: CPT

## 2018-03-15 PROCEDURE — 74011250636 HC RX REV CODE- 250/636: Performed by: STUDENT IN AN ORGANIZED HEALTH CARE EDUCATION/TRAINING PROGRAM

## 2018-03-15 PROCEDURE — 84156 ASSAY OF PROTEIN URINE: CPT | Performed by: INTERNAL MEDICINE

## 2018-03-15 PROCEDURE — 74011250636 HC RX REV CODE- 250/636: Performed by: NURSE PRACTITIONER

## 2018-03-15 PROCEDURE — 81001 URINALYSIS AUTO W/SCOPE: CPT | Performed by: INTERNAL MEDICINE

## 2018-03-15 PROCEDURE — 82728 ASSAY OF FERRITIN: CPT | Performed by: FAMILY MEDICINE

## 2018-03-15 PROCEDURE — C9113 INJ PANTOPRAZOLE SODIUM, VIA: HCPCS | Performed by: STUDENT IN AN ORGANIZED HEALTH CARE EDUCATION/TRAINING PROGRAM

## 2018-03-15 PROCEDURE — 65660000000 HC RM CCU STEPDOWN

## 2018-03-15 PROCEDURE — 74011250636 HC RX REV CODE- 250/636: Performed by: SPECIALIST

## 2018-03-15 PROCEDURE — 82962 GLUCOSE BLOOD TEST: CPT

## 2018-03-15 PROCEDURE — 83540 ASSAY OF IRON: CPT | Performed by: STUDENT IN AN ORGANIZED HEALTH CARE EDUCATION/TRAINING PROGRAM

## 2018-03-15 PROCEDURE — 74011250637 HC RX REV CODE- 250/637: Performed by: STUDENT IN AN ORGANIZED HEALTH CARE EDUCATION/TRAINING PROGRAM

## 2018-03-15 RX ORDER — SODIUM CHLORIDE 9 MG/ML
100 INJECTION, SOLUTION INTRAVENOUS CONTINUOUS
Status: DISCONTINUED | OUTPATIENT
Start: 2018-03-15 | End: 2018-03-16

## 2018-03-15 RX ORDER — HYDRALAZINE HYDROCHLORIDE 25 MG/1
37.5 TABLET, FILM COATED ORAL 3 TIMES DAILY
Qty: 90 TAB | Refills: 1 | Status: SHIPPED
Start: 2018-03-15 | End: 2018-03-17

## 2018-03-15 RX ORDER — CARVEDILOL 6.25 MG/1
6.25 TABLET ORAL 2 TIMES DAILY WITH MEALS
Qty: 60 TAB | Refills: 1 | Status: SHIPPED
Start: 2018-03-15 | End: 2018-03-17

## 2018-03-15 RX ORDER — GUAIFENESIN 100 MG/5ML
81 LIQUID (ML) ORAL DAILY
Qty: 90 TAB | Refills: 1 | Status: SHIPPED
Start: 2018-03-15 | End: 2018-03-17

## 2018-03-15 RX ORDER — HEPARIN SODIUM 5000 [USP'U]/ML
5000 INJECTION, SOLUTION INTRAVENOUS; SUBCUTANEOUS EVERY 8 HOURS
Status: COMPLETED | OUTPATIENT
Start: 2018-03-15 | End: 2018-03-15

## 2018-03-15 RX ORDER — ISOSORBIDE MONONITRATE 30 MG/1
30 TABLET, EXTENDED RELEASE ORAL DAILY
Qty: 30 TAB | Refills: 1 | Status: SHIPPED
Start: 2018-03-16 | End: 2018-03-17

## 2018-03-15 RX ADMIN — Medication 10 ML: at 22:04

## 2018-03-15 RX ADMIN — HYDRALAZINE HYDROCHLORIDE 37.5 MG: 25 TABLET ORAL at 11:26

## 2018-03-15 RX ADMIN — INSULIN GLARGINE 8 UNITS: 100 INJECTION, SOLUTION SUBCUTANEOUS at 22:04

## 2018-03-15 RX ADMIN — PANTOPRAZOLE SODIUM 40 MG: 40 INJECTION, POWDER, FOR SOLUTION INTRAVENOUS at 11:26

## 2018-03-15 RX ADMIN — Medication 10 ML: at 06:53

## 2018-03-15 RX ADMIN — PANTOPRAZOLE SODIUM 40 MG: 40 INJECTION, POWDER, FOR SOLUTION INTRAVENOUS at 22:03

## 2018-03-15 RX ADMIN — FERROUS SULFATE TAB 325 MG (65 MG ELEMENTAL FE) 325 MG: 325 (65 FE) TAB at 11:26

## 2018-03-15 RX ADMIN — LEVOTHYROXINE SODIUM 150 MCG: 150 TABLET ORAL at 06:53

## 2018-03-15 RX ADMIN — HEPARIN SODIUM 5000 UNITS: 5000 INJECTION, SOLUTION INTRAVENOUS; SUBCUTANEOUS at 15:18

## 2018-03-15 RX ADMIN — INSULIN LISPRO 2 UNITS: 100 INJECTION, SOLUTION INTRAVENOUS; SUBCUTANEOUS at 16:18

## 2018-03-15 RX ADMIN — HYDRALAZINE HYDROCHLORIDE 37.5 MG: 25 TABLET ORAL at 21:56

## 2018-03-15 RX ADMIN — CARVEDILOL 6.25 MG: 6.25 TABLET, FILM COATED ORAL at 16:18

## 2018-03-15 RX ADMIN — REGADENOSON 0.4 MG: 0.08 INJECTION, SOLUTION INTRAVENOUS at 09:40

## 2018-03-15 RX ADMIN — HEPARIN SODIUM 5000 UNITS: 5000 INJECTION, SOLUTION INTRAVENOUS; SUBCUTANEOUS at 06:52

## 2018-03-15 RX ADMIN — HEPARIN SODIUM 5000 UNITS: 5000 INJECTION, SOLUTION INTRAVENOUS; SUBCUTANEOUS at 22:03

## 2018-03-15 RX ADMIN — SODIUM CHLORIDE 100 ML/HR: 9 INJECTION, SOLUTION INTRAVENOUS at 15:18

## 2018-03-15 RX ADMIN — ISOSORBIDE MONONITRATE 30 MG: 30 TABLET, EXTENDED RELEASE ORAL at 11:26

## 2018-03-15 RX ADMIN — HYDRALAZINE HYDROCHLORIDE 37.5 MG: 25 TABLET ORAL at 15:18

## 2018-03-15 RX ADMIN — CARVEDILOL 6.25 MG: 6.25 TABLET, FILM COATED ORAL at 11:26

## 2018-03-15 NOTE — DISCHARGE INSTRUCTIONS
HOME DISCHARGE INSTRUCTIONS    Kervin Cervantes / [id number] : 1950    Admission date: 3/12/2018 Discharge date: 3/17/2018     Please bring this form with you to show your care provider at your follow-up appointment. Primary care provider:  Tala Bethea MD    Discharging provider:  Alaina Denis MD  - Family Medicine Resident  John Weiss - Attending, Family Medicine     You have been admitted to the hospital with the following diagnoses:    ACUTE DIAGNOSES:  Respiratory failure ([address]  . . . . . . . . . . . . . . . . . . . . . . . . . . . . . . . . . . . . . . . . . . . . . . . . . . . . . . . . . . . . . . . . . . . . . . . . MEDICATION CHANGES:    1) NEW MEDICATIONS:  -HYDRALAZINE 25 MG, TAKE ONE TABLET, THREE TIMES DAILY  -ISOSORBIDE MONONITRATE ER 30 MG, TAKE ONE TABLET DAILY  -COREG 6.25 MG, TAKE ONE TABLET,  TWO TIMES DAILY  -ASPIRIN 81 MG, ONE TABLET DAILY  -PLAVIX 75 MG, ONE TABLET DAILY  -LIPITOR 40 MG, ONE TABLET DAILY  -PLEASE RESUME HOME INSULIN 10 MG SUBCUTANEOUS AT BEDTIME    2) DISCONTINUED MEDICATIONS  -AMLODIPINE 10 MG      FOLLOW-UP CARE RECOMMENDATIONS:    Appointments  Follow-up Information     Follow up With Details Comments Contact Info    Tala Bethea MD On 3/19/2018 Follow up with your primary care doctor at on Monday, 3/19/2018 at 1:45 PM   [facility]      Manuelito Terry, MD On 2018 10:20 am [address] [place] [address]  [phone]             Follow-up tests needed: CBC and monitor BLOOD PRESSURES    Pending test results: At the time of your discharge the following test results are still pending: None. Please make sure you review these results with your outpatient follow-up provider(s). Specific symptoms to watch for: chest pain, shortness of breath, fever, chills, nausea, vomiting, diarrhea, change in mentation, falling, weakness, bleeding.      DIET/what to eat: Diabetic diet    ACTIVITY:  Activity as tolerated    Wound care: none    Equipment needed:  none    What to do if new or unexpected symptoms occur? If you experience any of the above symptoms (or should other concerns or questions arise after discharge) please call your primary care physician. Return to the emergency room if you cannot get hold of your doctor. · It is very important that you keep your follow-up appointment(s). · Please bring discharge papers, medication list (and/or medication bottles) to your follow-up appointments for review by your outpatient provider(s). · Please check the list of medications and be sure it includes every medication (even non-prescription medications) that your provider wants you to take. · It is important that you take the medication exactly as they are prescribed. · Keep your medication in the bottles provided by the pharmacist and keep a list of the medication names, dosages, and times to be taken in your wallet. · Do not take other medications without consulting your doctor. · If you have any questions about your medications or other instructions, please talk to your nurse or care provider before you leave the hospital.        Cardiac Catheterization  Discharge Instructions     Do not drive, operate any machinery, or sign any legal documents for 24 hours after your procedure. You must have someone to drive you home.  You may take a shower 24 hours after your cardiac catheterization. Be sure to get the dressing wet and then remove it; gently wash the area with warm soapy water. Pat dry and leave open to air. To help prevent infections, be sure to keep the cath site clean and dry. No lotions, creams, powders, ointments, etc. in the cath site for approximately 1 week.  Do not take a tub bath, get in a hot tub or swimming pool for approximately 5 days or until the cath site is completely healed.        No strenuous activity or heavy lifting over 10 lbs. for 7 days.  Drink plenty of fluids for 24-48 hours after your cath to flush the contrast dye from your kidneys. No alcoholic beverages for 24 hours. You may resume your previous diet (low fat, low cholesterol) after your cath.  After your cath, some bruising or discomfort is common during the healing process. Tylenol, 1-2 tablets every 6 hours as needed, is recommended if you experience any discomfort. If you experience any signs or symptoms of infection such as fever, chills, or poorly healing incision, persistent tenderness or swelling in the groin, redness and/or warmth to the touch, numbness, significant tingling or pain at the groin site or affected extremity, rash, drainage from the cath site, or if the leg feels tight or swollen, call your physician right away.  If bleeding at the cath site occurs, take a clean gauze pad and apply direct pressure to the groin just above the puncture site. Call 911 immediately, and continue to apply direct pressure until an ambulance gets to your location.  You may return to work  2  days after your cardiac cath if no groin bleeding. Yue Manning NP         Information obtained by:     I understand that if any problems occur once I am at home I am to contact my physician. These instructions were explained to me and I had the opportunity to ask questions. I understand and acknowledge receipt of the instructions indicated above.                                                                                                                                                Physician's or R.N.'s Signature                                                                  Date/Time                                                                                                                                              Patient or Representative Signature Date/Time          Percutaneous Coronary Intervention: What to Expect at [facility]     Percutaneous coronary intervention (PCI) is the name for procedures that are used to open a narrowed or blocked coronary artery. The two most common PCI procedures are coronary angioplasty and coronary stent placement. Your groin or arm may have a bruise and feel sore for a day or two after a percutaneous coronary intervention (PCI). You can do light activities around the house, but nothing strenuous for several days. This care sheet gives you a general idea about how long it will take for you to recover. But each person recovers at a different pace. Follow the steps below to get better as quickly as possible. How can you care for yourself at home? Activity  · Do not do strenuous exercise and do not lift, pull, or push anything heavy until your doctor says it is okay. This may be for a day or two. You can walk around the house and do light activity, such as cooking. · You may shower 24 hours after the procedure, if your doctor okays it. Pat the incision dry. Do not take a bath, or go swimming, for 7 days. · If the catheter was placed in your groin, try to limit walking up stairs for the first couple of days  · If the catheter was placed in your arm near your wrist, do not bend your wrist deeply for the first couple of days. Be careful using your hand to get into and out of a chair or bed. · If your doctor recommends it, get more exercise. Walking is a good choice. Bit by bit, increase the amount you walk every day. Try for at least 30 minutes on most days of the week. Diet  · Drink plenty of fluids to help your body flush out the dye. If you have kidney, heart, or liver disease and have to limit fluids, talk with your doctor before you increase the amount of fluids you drink. · Keep eating a heart-healthy diet that has lots of fruits, vegetables, and whole grains.  If you have not been eating this way, talk to your doctor. You also may want to talk to a dietitian. This expert can help you to learn about healthy foods and plan meals. Medicines  · Your doctor will tell you if and when you can restart your medicines. He or she will also give you instructions about taking any new medicines. · If you take blood thinners, such as warfarin (Coumadin), clopidogrel (Plavix), or aspirin, be sure to talk to your doctor. He or she will tell you if and when to start taking those medicines again. Make sure that you understand exactly what your doctor wants you to do. · Your doctor will prescribe blood-thinning medicines. You will likely take aspirin plus another antiplatelet, such as clopidogrel (Plavix) or Effient (prasugrel). It is very important that you take your blood-thinner exactly as directed, because these medicines help keep your stent open. · Call your doctor if you think you are having a problem with your medicine. Care of the catheter site  · Keep a bandage over the spot where the catheter was inserted for 24 hrs. Once the dressing is removed, if the puncture site oozes a little, you may apply a bandaid. Do not leave a bandaid on for more than 12 hrs. · Put ice or a cold pack on the area for 10 to 20 minutes at a time to help with soreness or swelling. Put a thin cloth between the ice and your skin. Follow-up care is a key part of your treatment and safety. Be sure to make and go to all appointments, and call your doctor if you are having problems. It's also a good idea to know your test results and keep a list of the medicines you take. When should you call for help? Call 911 anytime you think you may need emergency care. For example, call if:  · You passed out (lost consciousness). · You have severe trouble breathing. · You have sudden chest pain and shortness of breath, or you cough up blood.   · You have symptoms of a heart attack, such as:  ¨ Chest pain or pressure. ¨ Sweating. ¨ Shortness of breath. ¨ Nausea or vomiting. ¨ Pain that spreads from the chest to the neck, jaw, or one or both shoulders or arms. ¨ Dizziness or lightheadedness. ¨ A fast or uneven pulse. After calling 911, chew 1 adult-strength aspirin. Wait for an ambulance. Do not try to drive yourself. · You have been diagnosed with angina, and you have angina symptoms that do not go away with rest or are not getting better within 5 minutes after you take one dose of nitroglycerin. Call your doctor now or seek immediate medical care if:  · You are bleeding from the area where the catheter was put in your artery. · You have a fast-growing, painful lump at the catheter site. · You have signs of infection, such as:  ¨ Increased pain, swelling, warmth, or redness. ¨ Red streaks leading from the catheter site. ¨ Pus draining from the catheter site. ¨ A fever. · Your leg or arm looks blue or feels cold, numb, or tingly. Watch closely for changes in your health, and be sure to contact your doctor if you have any problems. Where can you learn more? Go to http://vandana[employer] Dentalsherri.info/  Enter V805 in the search box to learn more about \"Percutaneous Coronary Intervention: What to Expect at Home. \"  © 5769-0521 Healthwise, Incorporated. Care instructions adapted under license by [employer] (which disclaims liability or warranty for this information). This care instruction is for use with your licensed healthcare professional. If you have questions about a medical condition or this instruction, always ask your healthcare professional. [place] [address] any warranty or liability for your use of this information. Content Version: 25.1.465306; Current as of: January 27, 2016 (modified 10/31/16). Avoiding Triggers With Heart Failure: Care Instructions  Your Care Instructions    Triggers are anything that make your heart failure flare up.  A flare-up is also called \"sudden heart failure\" or \"acute heart failure. \" When you have a flare-up, fluid builds up in your lungs, and you have problems breathing. You might need to go to the hospital. By watching for changes in your condition and avoiding triggers, you can prevent heart failure flare-ups. Follow-up care is a key part of your treatment and safety. Be sure to make and go to all appointments, and call your doctor if you are having problems. It's also a good idea to know your test results and keep a list of the medicines you take. How can you care for yourself at home? Watch for changes in your weight and condition  · Weigh yourself without clothing at the same time each day. Record your weight. Call your doctor if you gain 3 pounds or more in 24 hrs or 5 pounds in one week. A sudden weight gain may mean that your heart failure is getting worse. · Keep a daily record of your symptoms. Write down any changes in how you feel, such as new shortness of breath, cough, or problems eating. Also record if your ankles are more swollen than usual and if you have to urinate in the night more often. Note anything that you ate or did that could have triggered these changes. Limit sodium  Sodium causes your body to hold on to water, making it harder for your heart to pump. People get most of their sodium from processed foods. Fast food and restaurant meals also tend to be very high in sodium. · Your doctor may suggest that you limit sodium to 1,500 milligrams (mg) a day. That is less than 1 teaspoon of salt a day, including all the salt you eat in cooking or in packaged foods. · Read food labels on cans and food packages. They tell you how much sodium you get in one serving. Check the serving size. If you eat more than one serving, you are getting more sodium. · Be aware that sodium can come in forms other than salt, including monosodium glutamate (MSG), sodium citrate, and sodium bicarbonate (baking soda).  MSG is often added to Asian food. You can sometimes ask for food without MSG or salt. · Slowly reducing salt will help you adjust to the taste. Take the salt shaker off the table. · Flavor your food with garlic, lemon juice, onion, vinegar, herbs, and spices instead of salt. Do not use soy sauce, steak sauce, onion salt, garlic salt, mustard, or ketchup on your food, unless it is labeled \"low-sodium\" or \"low-salt. \"  · Make your own salad dressings, sauces, and ketchup without adding salt. · Use fresh or frozen ingredients, instead of canned ones, whenever you can. Choose low-sodium canned goods. · Eat less processed food and food from restaurants, including fast food. Exercise as directed  Moderate, regular exercise is very good for your heart. It improves your blood flow and helps control your weight. But too much exercise can stress your heart and cause a heart failure flare-up. · Check with your doctor before you start an exercise program.  · Walking is an easy way to get exercise. Start out slowly. Gradually increase the length and pace of your walk. Swimming, riding a bike, and using a treadmill are also good forms of exercise. · When you exercise, watch for signs that your heart is working too hard. You are pushing yourself too hard if you cannot talk while you are exercising. If you become short of breath or dizzy or have chest pain, stop, sit down, and rest.  · Do not exercise when you do not feel well. Take medicines correctly  · Take your medicines exactly as prescribed. Call your doctor if you think you are having a problem with your medicine. · Make a list of all the medicines you take. Include those prescribed to you by other doctors and any over-the-counter medicines, vitamins, or supplements you take. Take this list with you when you go to any doctor. · Take your medicines at the same time every day.  It may help you to post a list of all the medicines you take every day and what time of day you take them.  · Make taking your medicine as simple as you can. Plan times to take your medicines when you are doing other things, such as eating a meal or getting ready for bed. This will make it easier to remember to take your medicines. · Get organized. Use helpful tools, such as daily or weekly pill containers. When should you call for help? Call 911 if you have symptoms of sudden heart failure such as:  · You have severe trouble breathing. · You cough up pink, foamy mucus. · You have a new irregular or rapid heartbeat. Call your doctor now or seek immediate medical care if:  · You have new or increased shortness of breath. · You are dizzy or lightheaded, or you feel like you may faint. · You have sudden weight gain, such as 3 pounds in 24 hours, or 5 pounds in one week. · You have increased swelling in your legs, ankles, or feet. · You are suddenly so tired or weak that you cannot do your usual activities. Watch closely for changes in your health, and be sure to contact your doctor if you develop new symptoms. Where can you learn more? Go to http://vandanaEmail[employer] Techn[employer]iessherri.info/  Enter V089 in the search box to learn more about \"Avoiding Triggers With Heart Failure: Care Instructions. \"  © 1281-0514 Healthwise, Incorporated. Care instructions adapted under license by [employer] (which disclaims liability or warranty for this information). This care instruction is for use with your licensed healthcare professional. If you have questions about a medical condition or this instruction, always ask your healthcare professional. [place] [address] any warranty or liability for your use of this information. Content Version: 84.3.860834; Current as of: January 27, 2016 (modified 2/9/18).

## 2018-03-15 NOTE — PROGRESS NOTES
Beginning of Shift:  Bedside and Verbal shift change report given to Geovanna Villanueva RN (oncoming nurse) by Rosa Rizzo RN (offgoing nurse). Report included the following information SBAR, Kardex, Procedure Summary, Intake/Output, MAR, Accordion, Recent Results, Med Rec Status and Cardiac Rhythm NSR.     2330  Bedside and Verbal shift report given to Malissa Cortez RN (oncoming nurse) by Geovanna Villanueva RN (offgoing nurse). Report included the following information SBAR, Kardex, Procedure Summary, Intake/Output, MAR, Accordion, Recent Results, Med Rec Status and Cardiac Rhythm NSR.

## 2018-03-15 NOTE — PROGRESS NOTES
[facility]   Daily Progress Note    Date: 3/16/2018  PCP: Dahlia Frankel MD     Assessment/Plan:   Selene Trivedi is a 75 y.o. male with history of Type 2 DM, CKD stage 3b, HTN, Anemia Hypothyroidism who has spent 4 night(s) in the hospital, who is admitted for acute hypoxic respiratory failure due to HFrEF.    24 hour Event:  -No Acute event. BP well controlled overnight    Acute hypoxic respiratory failure likely due to HFrEF: Most recent Echo (3/13/18) showed cardiomyopathy with EF 35-40% and moderate diffuse hypokinesia (compared with that of 07/2014). CTA showed no pe. Pro- BNP of 2369 and symptoms of orthopnea most likely suggestive of pulmonary edema. Weaned off BIPAP, Currently on room air    - Abnormal Lexiscan with LVEF 38% and severe risk of ischemia. Plan for Cath today  - On 3/14 pt was started on hydralazine 37.5mg TID, Isosorbide mononitrate 30mg daily and Coreg 6.25mg BID. - Will consider RAAS inhibition once stability of renal function established per cardio  - Holding lasix for now given elevated creatinine  - Strict I/Os with daily weight.  -Card following, appreciate recs       HTN   - POA /99, now  154/91.   -Discontinued home Amlodipine.   - On Coreg per cardio recs.   - Cardiology following     Type 2 DM  - Home dose of lantus 10 units at qhs ( although patient hasn't been non -compliant)  - Will restart 80% of home lantus with 8 units qhs  - POC gluocse AC&QHS with SSI     Hypothyroidism   - POA TSH 0.08--> low  - Patient with history of hypothyroidism but non compliant with Synthroid  - Primary vs Secondary hypothyroidism  - Hold Syntrhoid for now and obtain T3 and FT4     CKD stage 3b  - Cr 2.04 (POA Cr 1.99, baseline ~1.8)  -Stable, will continue to monitor     Anemia: Hgb 9.9 (POA 9.1, baseline~10)  Worked up in the past (2014) by Dr. Meagan Blake and found to be multifactoria (CKD, Anemia of chronic disease and B12 def)  -Iron profile (3/14/18): Fe 30 (L), TIBC 260 (normal), Fe sats 12% (L), Ferritin normal. Folate is and B12 are normal  - On home Iron with bowel regimen  - Daily CBCs     FEN/GI -NPO for cath today. On IVF at 100 ml/hr  Activity - Ambulate with assitance   DVT prophylaxis - Heparin  GI prophylaxis -  Protonix  Disposition - Possible d/c home today after Lexiscan with outpatient follow up     CODE STATUS:  Full Code    Pt was discussed with Dr. Thai Rossi (supervising provider)    I appreciate the opportunity to participate in the care of this patient,    Dimitris Duenas MD  [facility] Medicine Resident         Subjective   Patient Denies fever, chills, chest pain, palpitations, shortness of breath, abdominal pain, nausea and vomiting, and LE edema. Tolerating diet.  Sats are fine on room air    Inpatient Medications  Current Facility-Administered Medications   Medication Dose Route Frequency    0.9% sodium chloride infusion  100 mL/hr IntraVENous CONTINUOUS    levothyroxine (SYNTHROID) tablet 150 mcg  150 mcg Oral 6am    ferrous sulfate tablet 325 mg  325 mg Oral DAILY    hydrALAZINE (APRESOLINE) tablet 37.5 mg  37.5 mg Oral TID    isosorbide mononitrate ER (IMDUR) tablet 30 mg  30 mg Oral DAILY    carvedilol (COREG) tablet 6.25 mg  6.25 mg Oral BID WITH MEALS    sodium chloride (NS) flush 5-10 mL  5-10 mL IntraVENous Q8H    sodium chloride (NS) flush 5-10 mL  5-10 mL IntraVENous PRN    dextrose (D50W) injection syrg 12.5-25 g  12.5-25 g IntraVENous PRN    insulin glargine (LANTUS) injection 8 Units  8 Units SubCUTAneous QHS    pantoprazole (PROTONIX) 40 mg in 0 mL injection  40 mg IntraVENous Q12H    hydrALAZINE (APRESOLINE) 20 mg/mL injection 20 mg  20 mg IntraVENous Q6H PRN    insulin lispro (HUMALOG) injection   SubCUTAneous AC&HS    glucose chewable tablet 16 g  4 Tab Oral PRN    dextrose (D50W) injection syrg 12.5-25 g  12.5-25 g IntraVENous PRN    glucagon (GLUCAGEN) injection 1 mg  1 mg IntraMUSCular PRN    saline peripheral flush soln 5-10 mL  5-10 mL IntraVENous Q8H    sodium chloride (NS) flush 5-10 mL  5-10 mL IntraVENous PRN         Allergies  No Known Allergies      Objective  Vitals:  Visit Vitals    BP (!) 154/91    Pulse 78    Temp 98.2 °F (36.8 °C)    Resp 17    Ht 5' 8\" (1.727 m)    Wt 242 lb 3.2 oz (109.9 kg)    SpO2 94%    BMI 36.83 kg/m2      Temp (24hrs), Av.9 °F (36.6 °C), Min:97.7 °F (36.5 °C), Max:98.2 °F (36.8 °C)     O2 Flow Rate (L/min): 15 l/min   O2 Device: Room air    I/O:    Intake/Output Summary (Last 24 hours) at 18 0721  Last data filed at 18 0402   Gross per 24 hour   Intake             1140 ml   Output             1430 ml   Net             -290 ml     Last 3 shifts:     1901 -  0700  In: 1140 [P.O.:740; I.V.:400]  Out: 2180 [Urine:2180]    Physical Exam:  General: No acute distress. Alert. Cooperative. Head: Normocephalic. Atraumatic. Eyes:  Conjunctiva pink. Sclera white. PERRL. Nose:  Septum midline. Mucosa pink. No drainage. Throat: Mucosa pink. Moist mucous membranes. No tonsillar exudates or erythema. Palate movement equal bilaterally. Respiratory: Normal work of breathing, CTAB   Cardiovascular: RRR. Normal S1,S2. No m/r/g. Pulses 2+ throughout. GI: + bowel sounds. Nontender. No rebound tenderness or guarding. Nondistended   Extremities: No edema. No tenderness. Neuro:                          Oriented. No focal deficits  Skin:                             Warm and dry.  No rashes or lesions      Labs and Data:    Telemetry: Normal sinus rhythm  Recent Labs      18   0200  03/15/18   0119  18   0123   WBC  4.9  6.2  5.7   HGB  9.9*  9.8*  10.4*   HCT  31.2*  30.6*  32.8*   PLT  268  272  262     Recent Labs      18   0200  03/15/18   0119  18   0123   NA  137  136  138   K  3.5  3.7  3.6   CL  102  101  101   CO2  24  23  26   GLU  100  114*  126*   BUN  21*  20  17   CREA  2.04*  2.10*  2.08*   CA  8.9  8.9  9.2   PHOS 3.7   --    --    ALB  2.6*  2.7*  2.9*   SGOT  14*  17  23   ALT  30  38  59         Signed by:  Forest Carey MD  Resident, Family Medicine  03/16/18       Attending Note:

## 2018-03-15 NOTE — PROGRESS NOTES
Cardiology Progress Note                             [address] [facility]. [address], [address]                                 Phone [phone]; Fax [phone]        3/15/2018 9:49 AM     Admit Date:           3/12/2018  Admit Diagnosis:  Respiratory failure ([address]  :          1950   MRN:          [id number]   ASSESSMENT/RECOMMENDATION:   1)Cardiomyopathy, ischemic vs non ischemic: LVEF 40% per echo, with inferolateral hypokinesis, concerned for CAD. Nuclear stress test today  - High risk CKD/DM/HTN    Chronic systolic CHF/NYHA class II: new dx, lexiscan today  -continue coreg, imdur and hydralazine  -Consider RAAS inhibition once stability of renal function established. Hypertension: BP better today 140-150's. -Just received AM BP meds after stress test  - Will titrate medications as needed. Goal SBP <130. DM II: HgbA1c 7.8, per primary team    CKD: stable    Chronic anemia with microcytic indices: H/H stable  -Fe low/fe sat low  -GI consult? Cognitive dysfunction? Dementia     Addendum: abnormal stress test (reversible inferior defect), cardiac cath recommended. Dr Tracy Carrasco reviewed stress test results and needs for cardiac cath with the patient, he agrees to proceed. D/w Dr Killian Jasso, patient on cath scheduled for tomorrow AM at 7:30 am.  Will need nephrology consult today for recommendations for cath, hx of CKD, creatinine stable at 2. D/w Dr Hamida Rodriguez he will see the patient this afternoon. Last 3 Recorded Weights in this Encounter    18 1927 18 0536 03/15/18 0320   Weight: 245 lb 3.2 oz (111.2 kg) 244 lb 9.6 oz (110.9 kg) 241 lb 6.5 oz (109.5 kg)          1901 - 03/15 0700  In: 1240 [P.O.:1240]  Out: 2150 [Urine:2150]    SUBJECTIVE         Fair historian.  Chronic HTN, T2DM and CKD in the setting of chronic cognitive dysfunction but no previous cardiac hx, admitted with progressive dyspnea but no chest pain. In ED note to be in resp failure, normal CXR, accelerated HTN, improved with iv loops/nebulizer Rx. Chest CT without PE but + effusions. Troponins negative but proBNP 2K. Echo personally reviewed - regional/global LV dysfunction (severe inferolateral/lateral HK, EF 35-40%, LVH). Got iv lasix evening of 3/12 and and yesterday morning. Feels better today. No orthopnea, PND or edema noted     Lives with his brother in [facility], not working on disability  Nonsmoker,no alcohol      Stevens Senna denies palpitations, irregular heart beat, SOB, chest pain or LE edema.         Current Facility-Administered Medications   Medication Dose Route Frequency    [COMPLETED] technetium sestamibi (CARDIOLITE) injection 32 millicurie  32 millicurie IntraVENous RAD ONCE    levothyroxine (SYNTHROID) tablet 150 mcg  150 mcg Oral 6am    ferrous sulfate tablet 325 mg  325 mg Oral DAILY    hydrALAZINE (APRESOLINE) tablet 37.5 mg  37.5 mg Oral TID    isosorbide mononitrate ER (IMDUR) tablet 30 mg  30 mg Oral DAILY    carvedilol (COREG) tablet 6.25 mg  6.25 mg Oral BID WITH MEALS    sodium chloride (NS) flush 5-10 mL  5-10 mL IntraVENous Q8H    sodium chloride (NS) flush 5-10 mL  5-10 mL IntraVENous PRN    heparin (porcine) injection 5,000 Units  5,000 Units SubCUTAneous Q8H    dextrose (D50W) injection syrg 12.5-25 g  12.5-25 g IntraVENous PRN    insulin glargine (LANTUS) injection 8 Units  8 Units SubCUTAneous QHS    pantoprazole (PROTONIX) 40 mg in 0 mL injection  40 mg IntraVENous Q12H    hydrALAZINE (APRESOLINE) 20 mg/mL injection 20 mg  20 mg IntraVENous Q6H PRN    insulin lispro (HUMALOG) injection   SubCUTAneous AC&HS    glucose chewable tablet 16 g  4 Tab Oral PRN    dextrose (D50W) injection syrg 12.5-25 g  12.5-25 g IntraVENous PRN    glucagon (GLUCAGEN) injection 1 mg  1 mg IntraMUSCular PRN    saline peripheral flush soln 5-10 mL  5-10 mL IntraVENous Q8H    sodium chloride (NS) flush 5-10 mL  5-10 mL IntraVENous PRN      OBJECTIVE               Intake/Output Summary (Last 24 hours) at 03/15/18 0928  Last data filed at 03/15/18 0350   Gross per 24 hour   Intake              240 ml   Output              750 ml   Net             -510 ml       Review of Systems - History obtained from the patient AS PER  HPI    Telemetry NSR    PHYSICAL EXAM        Visit Vitals    /66 (BP 1 Location: Right arm, BP Patient Position: At rest)    Pulse 87    Temp 98.3 °F (36.8 °C)    Resp 16    Ht 5' 8\" (1.727 m)    Wt 241 lb 6.5 oz (109.5 kg)    SpO2 92%    BMI 36.71 kg/m2       Gen: Well-developed, obese, in no acute distress  alert and oriented x 3  HEENT:  Pink conjunctivae, Hearing grossly normal.No scleral icterus or conjunctival, moist mucous membranes  Neck: Supple,No JVD,   No cervical lymphadenopathy  Resp: No accessory muscle use, diminished bilateral bases exam limited d/t body habitus, No rales or rhonchi  Card: Regular Rate,Rythm, No murmurs, rubs or gallop. No thrills.    GI:          soft, non-tender   MSK: No cyanosis or clubbing, good capillary refill  Skin: No rashes or ulcers, no bruising  Neuro:  Cranial nerves are grossly intact, moving all four extremities, no focal deficit, follows commands appropriately  Psych:  Good insight, oriented to person, place and time, alert, Nml Affect  LE: No edema       DATA REVIEW            Laboratory and Imaging have been reviewed by me and are notable for  Recent Labs      03/14/18   0123  03/13/18   2050 03/13/18   1431   TROIQ  <0.04  <0.04  <0.04     Recent Labs      03/15/18   0119  03/14/18   0123  03/13/18   0129   NA  136  138  142   K  3.7  3.6  3.1*   CO2  23  26  22   BUN  20  17  13   CREA  2.10*  2.08*  1.52*   GLU  114*  126*  162*   WBC  6.2  5.7  8.4   HGB  9.8*  10.4*  9.1*   HCT  30.6*  32.8*  29.5*   PLT  272  262  [address] [facility],                            \

## 2018-03-15 NOTE — CONSULTS
One Kelly Alvarado  YOB: 1950     Assessment & Plan:   1. KELIN  · Cr 1.5 to 2 mg from CT Contrast  · Stable now  · If cr 2.1 or below, ok to cath  · Pre and post IVF to minimize RCN risk  · Contrast and RCN dw pt, But I think he has difficulty understanding  · PVR  2. CKD 3  · DM,HTN, Obesity  · RAAS once cr stable  · US: Ok in 2014  3. Anemia  · Iron low  · PO Iron  4. HTN  · BB  5. DM  6. CHF  · Abnormal stress test  7. Cognitive Dysfunction            Subjective:   CHIEF COMPLAIN:ARF  HPI:    75 Yrs old AAM whom we have seen in 2014,lost to  Fup is here with CHF: LVEF 40%>CTA showed no pe. Pro- BNP of 2369   He underwent stress test: abnormal , needs cath in am.  He has HTN and on BB. He has worsening cr: 1.5 to 2 mg. He has anemia and low Iron. He is poor historian so ROS not reliable  Chart rev  No UA this time , in pat some protein and blood. Unclear duration of DM,Status of DM:HBA1C 7.8 here. Review of Systems  A comprehensive review of systems was negative except for that written in the HPI. Past Medical History:   Diagnosis Date    Anemia     Bronchitis     Chronic kidney disease     UTI    Diabetes (HCC)     Gastrointestinal disorder     anemia    Hypertension     Kidney disease, chronic, stage II (GFR 60-89 ml/min)     Neurological disorder     Metabolic Brain Disorder      Past Surgical History:   Procedure Laterality Date    HX OTHER SURGICAL      never       Social History     Social History    Marital status:      Spouse name: N/A    Number of children: N/A    Years of education: N/A     Occupational History    Not on file.      Social History Main Topics    Smoking status: Former Smoker     Packs/day: 0.50     Years: 30.00     Types: Cigarettes     Quit date: 1/1/1994    Smokeless tobacco: Never Used    Alcohol use No    Drug use: No    Sexual activity: Yes     Partners: Female     Other Topics Concern    Not on file     Social History Narrative      Family History   Problem Relation Age of Onset    Diabetes Mother     Cancer Sister       Prior to Admission medications    Medication Sig Start Date End Date Taking? Authorizing Provider   ferrous sulfate 325 mg (65 mg iron) tablet Take 325 mg by mouth daily. Yes Historical Provider   amLODIPine (NORVASC) 10 mg tablet TAKE ONE TABLET BY MOUTH ONCE DAILY 18  Yes Jose Bhandari MD   levothyroxine (SYNTHROID) 150 mcg tablet TAKE ONE TABLET BY MOUTH ONCE DAILY BEFORE BREAKFAST 18  Yes Jose Bhandari MD     No Known Allergies    Objective:     Vitals:  Blood pressure 140/88, pulse 79, temperature 97.7 °F (36.5 °C), resp. rate 18, height 5' 8\" (1.727 m), weight 109.5 kg (241 lb 6.5 oz), SpO2 91 %.   Temp (24hrs), Av °F (36.7 °C), Min:97.7 °F (36.5 °C), Max:98.3 °F (36.8 °C)      Intake and Output:      1901 - 03/15 0700  In: 1240 [P.O.:1240]  Out: 2150 [Urine:2150]    Physical Exam:                Patient is intubated:  no    Physical Examination:   GENERAL ASSESSMENT: well developed and well nourished  SKIN: normal color, no lesions  HEAD: normocephalic  EYES: normal eyes  NECK: normal  CHEST: normal air exchange, no rales, no rhonchi, no wheezes, respiratory effort normal with no retractions  HEART: regular rate and rhythm, normal S1/S2  ABDOMEN: soft, non-distended, no masses, no hepatosplenomegaly  :Silva: no  EXTREMITY: no joint swelling  NEURO: gross motor exam normal by observation      ECG/rhythm[de-identified] Rev:yes  Xray/CT/US/MRI REV:yes  Data Review   Recent Results (from the past 72 hour(s))   EKG, 12 LEAD, INITIAL    Collection Time: 18  7:06 PM   Result Value Ref Range    Ventricular Rate 109 BPM    Atrial Rate 109 BPM    P-R Interval 182 ms    QRS Duration 98 ms    Q-T Interval 332 ms    QTC Calculation (Bezet) 447 ms    Calculated P Axis 30 degrees    Calculated R Axis 59 degrees    Calculated T Axis 148 degrees    Diagnosis Sinus tachycardia  T wave abnormality, consider lateral ischemia  Abnormal ECG  When compared with ECG of 17-JUL-2014 13:36,  Nonspecific T wave abnormality now evident in Inferior leads  T wave inversion no longer evident in Anterior leads  Confirmed by Althea Hubbard ([id number]) on 3/13/2018 10:11:23 AM     CBC WITH AUTOMATED DIFF    Collection Time: 03/12/18  7:12 PM   Result Value Ref Range    WBC 6.6 4.1 - 11.1 K/uL    RBC 4.18 4.10 - 5.70 M/uL    HGB 10.4 (L) 12.1 - 17.0 g/dL    HCT 33.0 (L) 36.6 - 50.3 %    MCV 78.9 (L) 80.0 - 99.0 FL    MCH 24.9 (L) 26.0 - 34.0 PG    MCHC 31.5 30.0 - 36.5 g/dL    RDW 15.6 (H) 11.5 - 14.5 %    PLATELET 990 203 - 863 K/uL    MPV 10.4 8.9 - 12.9 FL    NEUTROPHILS 57 32 - 75 %    LYMPHOCYTES 36 12 - 49 %    MONOCYTES 6 5 - 13 %    EOSINOPHILS 1 0 - 7 %    BASOPHILS 0 0 - 1 %    ABS. NEUTROPHILS 3.7 1.8 - 8.0 K/UL    ABS. LYMPHOCYTES 2.4 K/UL    ABS. MONOCYTES 0.4 0.0 - 1.0 K/UL    ABS. EOSINOPHILS 0.1 0.0 - 0.4 K/UL    ABS. BASOPHILS 0.0 0.0 - 0.1 K/UL    DF AUTOMATED      XXWBCSUS 0     METABOLIC PANEL, COMPREHENSIVE    Collection Time: 03/12/18  7:12 PM   Result Value Ref Range    Sodium 138 136 - 145 mmol/L    Potassium 3.5 3.5 - 5.1 mmol/L    Chloride 102 97 - 108 mmol/L    CO2 21 21 - 32 mmol/L    Anion gap 15 5 - 15 mmol/L    Glucose 323 (H) 65 - 100 mg/dL    BUN 16 6 - 20 MG/DL    Creatinine 1.99 (H) 0.70 - 1.30 MG/DL    BUN/Creatinine ratio 8 (L) 12 - 20      GFR est AA 41 (L) >60 ml/min/1.73m2    GFR est non-AA 34 (L) >60 ml/min/1.73m2    Calcium 8.4 (L) 8.5 - 10.1 MG/DL    Bilirubin, total 0.2 0.2 - 1.0 MG/DL    ALT (SGPT) 97 (H) 12 - 78 U/L    AST (SGOT) 119 (H) 15 - 37 U/L    Alk.  phosphatase 103 45 - 117 U/L    Protein, total 7.8 6.4 - 8.2 g/dL    Albumin 2.9 (L) 3.5 - 5.0 g/dL    Globulin 4.9 (H) 2.0 - 4.0 g/dL    A-G Ratio 0.6 (L) 1.1 - 2.2     TROPONIN I    Collection Time: 03/12/18  7:12 PM   Result Value Ref Range    Troponin-I, Qt. 0.05 <0.08 ng/mL   NT-PRO BNP    Collection Time: 03/12/18  7:12 PM   Result Value Ref Range    NT pro-BNP 2369 (H) 0 - 125 PG/ML   CULTURE, BLOOD, PAIRED    Collection Time: 03/12/18  7:12 PM   Result Value Ref Range    Special Requests: NO SPECIAL REQUESTS      Culture result: NO GROWTH 3 DAYS     LACTIC ACID    Collection Time: 03/12/18  7:12 PM   Result Value Ref Range    Lactic acid 2.5 (HH) 0.4 - 2.0 MMOL/L   TSH 3RD GENERATION    Collection Time: 03/12/18  7:12 PM   Result Value Ref Range    TSH 0.08 (L) 0.36 - 3.74 uIU/mL   INFLUENZA A & B AG (RAPID TEST)    Collection Time: 03/12/18  7:30 PM   Result Value Ref Range    Influenza A Antigen NEGATIVE  NEG      Influenza B Antigen NEGATIVE  NEG     POC G3 - PUL    Collection Time: 03/12/18  9:33 PM   Result Value Ref Range    FIO2 (POC) 28 %    pH (POC) 7.344 (L) 7.35 - 7.45      pCO2 (POC) 42.5 35.0 - 45.0 MMHG    pO2 (POC) 113 (H) 80 - 100 MMHG    HCO3 (POC) 23.1 22 - 26 MMOL/L    sO2 (POC) 98 (H) 92 - 97 %    Base deficit (POC) 3 mmol/L    Site RIGHT RADIAL      Device: BIPAP      PEEP/CPAP (POC) 6 cmH2O    PIP (POC) 12      Pressure support 5 cmH2O    Allens test (POC) YES      Specimen type (POC) ARTERIAL      Total resp.  rate 22     GLUCOSE, POC    Collection Time: 03/12/18 10:28 PM   Result Value Ref Range    Glucose (POC) 237 (H) 65 - 100 mg/dL    Performed by Tawnya Burk    BLOOD GAS, ARTERIAL    Collection Time: 03/13/18 12:22 AM   Result Value Ref Range    pH 7.37 7.35 - 7.45      PCO2 42 35.0 - 45.0 mmHg    PO2 124 (H) 80 - 100 mmHg    O2 SAT 98 (H) 92 - 97 %    BICARBONATE 24 22 - 26 mmol/L    BASE DEFICIT 1.7 mmol/L    O2 METHOD BIPAP      FIO2 30 %    MODE BIPAP      SPONTANEOUS RATE 16.0      IPAP/PIP 12.0      EPAP/CPAP/PEEP 6.0      Sample source ARTERIAL      SITE RIGHT RADIAL      YIMI'S TEST YES     LACTIC ACID    Collection Time: 03/13/18  1:29 AM   Result Value Ref Range    Lactic acid 1.5 0.4 - 2.0 MMOL/L   METABOLIC PANEL, COMPREHENSIVE    Collection Time: 03/13/18  1:29 AM   Result Value Ref Range    Sodium 142 136 - 145 mmol/L    Potassium 3.1 (L) 3.5 - 5.1 mmol/L    Chloride 111 (H) 97 - 108 mmol/L    CO2 22 21 - 32 mmol/L    Anion gap 9 5 - 15 mmol/L    Glucose 162 (H) 65 - 100 mg/dL    BUN 13 6 - 20 MG/DL    Creatinine 1.52 (H) 0.70 - 1.30 MG/DL    BUN/Creatinine ratio 9 (L) 12 - 20      GFR est AA 56 (L) >60 ml/min/1.73m2    GFR est non-AA 46 (L) >60 ml/min/1.73m2    Calcium 6.9 (L) 8.5 - 10.1 MG/DL    Bilirubin, total <0.1 (L) 0.2 - 1.0 MG/DL    ALT (SGPT) 65 12 - 78 U/L    AST (SGOT) 53 (H) 15 - 37 U/L    Alk. phosphatase 69 45 - 117 U/L    Protein, total 5.6 (L) 6.4 - 8.2 g/dL    Albumin 2.0 (L) 3.5 - 5.0 g/dL    Globulin 3.6 2.0 - 4.0 g/dL    A-G Ratio 0.6 (L) 1.1 - 2.2     CBC WITH AUTOMATED DIFF    Collection Time: 03/13/18  1:29 AM   Result Value Ref Range    WBC 8.4 4.1 - 11.1 K/uL    RBC 3.63 (L) 4.10 - 5.70 M/uL    HGB 9.1 (L) 12.1 - 17.0 g/dL    HCT 29.5 (L) 36.6 - 50.3 %    MCV 81.3 80.0 - 99.0 FL    MCH 25.1 (L) 26.0 - 34.0 PG    MCHC 30.8 30.0 - 36.5 g/dL    RDW 16.1 (H) 11.5 - 14.5 %    PLATELET 763 463 - 191 K/uL    MPV 9.9 8.9 - 12.9 FL    NRBC 0.0 0  WBC    ABSOLUTE NRBC 0.00 0.00 - 0.01 K/uL    NEUTROPHILS 74 32 - 75 %    LYMPHOCYTES 19 12 - 49 %    MONOCYTES 6 5 - 13 %    EOSINOPHILS 0 0 - 7 %    BASOPHILS 0 0 - 1 %    IMMATURE GRANULOCYTES 0 0.0 - 0.5 %    ABS. NEUTROPHILS 6.2 1.8 - 8.0 K/UL    ABS. LYMPHOCYTES 1.6 0.8 - 3.5 K/UL    ABS. MONOCYTES 0.5 0.0 - 1.0 K/UL    ABS. EOSINOPHILS 0.0 0.0 - 0.4 K/UL    ABS. BASOPHILS 0.0 0.0 - 0.1 K/UL    ABS. IMM.  GRANS. 0.0 0.00 - 0.04 K/UL    DF AUTOMATED     T3, FREE    Collection Time: 03/13/18  1:48 AM   Result Value Ref Range    Free Triiodothyronine (T3) 2.6 2.2 - 4.0 pg/mL   T4 (THYROXINE)    Collection Time: 03/13/18  1:48 AM   Result Value Ref Range    T4, Total 9.6 4.5 - 12.1 ug/dL   D DIMER    Collection Time: 03/13/18  3:49 AM   Result Value Ref Range    D-dimer 2.07 (H) 0.00 - 0.65 mg/L FEU   TROPONIN I    Collection Time: 03/13/18  3:49 AM   Result Value Ref Range    Troponin-I, Qt. 0.06 (H) <0.05 ng/mL   GLUCOSE, POC    Collection Time: 03/13/18  7:33 AM   Result Value Ref Range    Glucose (POC) 143 (H) 65 - 100 mg/dL    Performed by Faithsse 2, POC    Collection Time: 03/13/18 11:23 AM   Result Value Ref Range    Glucose (POC) 112 (H) 65 - 100 mg/dL    Performed by Unkown     TROPONIN I    Collection Time: 03/13/18  2:31 PM   Result Value Ref Range    Troponin-I, Qt. <0.04 <0.05 ng/mL   GLUCOSE, POC    Collection Time: 03/13/18  8:49 PM   Result Value Ref Range    Glucose (POC) 216 (H) 65 - 100 mg/dL    Performed by Scotty Moritz (PCT)    TROPONIN I    Collection Time: 03/13/18  8:50 PM   Result Value Ref Range    Troponin-I, Qt. <0.04 <0.05 ng/mL   CBC WITH AUTOMATED DIFF    Collection Time: 03/14/18  1:23 AM   Result Value Ref Range    WBC 5.7 4.1 - 11.1 K/uL    RBC 4.16 4.10 - 5.70 M/uL    HGB 10.4 (L) 12.1 - 17.0 g/dL    HCT 32.8 (L) 36.6 - 50.3 %    MCV 78.8 (L) 80.0 - 99.0 FL    MCH 25.0 (L) 26.0 - 34.0 PG    MCHC 31.7 30.0 - 36.5 g/dL    RDW 15.9 (H) 11.5 - 14.5 %    PLATELET 354 047 - 196 K/uL    MPV 9.8 8.9 - 12.9 FL    NRBC 0.0 0  WBC    ABSOLUTE NRBC 0.00 0.00 - 0.01 K/uL    NEUTROPHILS 56 32 - 75 %    LYMPHOCYTES 34 12 - 49 %    MONOCYTES 8 5 - 13 %    EOSINOPHILS 1 0 - 7 %    BASOPHILS 0 0 - 1 %    IMMATURE GRANULOCYTES 0 0.0 - 0.5 %    ABS. NEUTROPHILS 3.2 1.8 - 8.0 K/UL    ABS. LYMPHOCYTES 1.9 0.8 - 3.5 K/UL    ABS. MONOCYTES 0.5 0.0 - 1.0 K/UL    ABS. EOSINOPHILS 0.1 0.0 - 0.4 K/UL    ABS. BASOPHILS 0.0 0.0 - 0.1 K/UL    ABS. IMM.  GRANS. 0.0 0.00 - 0.04 K/UL    DF AUTOMATED     TROPONIN I    Collection Time: 03/14/18  1:23 AM   Result Value Ref Range    Troponin-I, Qt. <0.04 <7.96 ng/mL   METABOLIC PANEL, COMPREHENSIVE    Collection Time: 03/14/18  1:23 AM   Result Value Ref Range    Sodium 138 136 - 145 mmol/L    Potassium 3.6 3.5 - 5.1 mmol/L Chloride 101 97 - 108 mmol/L    CO2 26 21 - 32 mmol/L    Anion gap 11 5 - 15 mmol/L    Glucose 126 (H) 65 - 100 mg/dL    BUN 17 6 - 20 MG/DL    Creatinine 2.08 (H) 0.70 - 1.30 MG/DL    BUN/Creatinine ratio 8 (L) 12 - 20      GFR est AA 39 (L) >60 ml/min/1.73m2    GFR est non-AA 32 (L) >60 ml/min/1.73m2    Calcium 9.2 8.5 - 10.1 MG/DL    Bilirubin, total 0.2 0.2 - 1.0 MG/DL    ALT (SGPT) 59 12 - 78 U/L    AST (SGOT) 23 15 - 37 U/L    Alk.  phosphatase 96 45 - 117 U/L    Protein, total 7.7 6.4 - 8.2 g/dL    Albumin 2.9 (L) 3.5 - 5.0 g/dL    Globulin 4.8 (H) 2.0 - 4.0 g/dL    A-G Ratio 0.6 (L) 1.1 - 2.2     VITAMIN B12    Collection Time: 03/14/18  1:23 AM   Result Value Ref Range    Vitamin B12 735 211 - 911 pg/mL   GLUCOSE, POC    Collection Time: 03/14/18  7:35 AM   Result Value Ref Range    Glucose (POC) 120 (H) 65 - 100 mg/dL    Performed by Michelle Mckeon (PCT)    GLUCOSE, POC    Collection Time: 03/14/18 11:34 AM   Result Value Ref Range    Glucose (POC) 220 (H) 65 - 100 mg/dL    Performed by Michelle Mckeon (PCT)    GLUCOSE, POC    Collection Time: 03/14/18  4:24 PM   Result Value Ref Range    Glucose (POC) 140 (H) 65 - 100 mg/dL    Performed by Michelle Mckeon (PCT)    GLUCOSE, POC    Collection Time: 03/14/18  8:33 PM   Result Value Ref Range    Glucose (POC) 140 (H) 65 - 100 mg/dL    Performed by REYES DARIUS ([facility])    CBC WITH AUTOMATED DIFF    Collection Time: 03/15/18  1:19 AM   Result Value Ref Range    WBC 6.2 4.1 - 11.1 K/uL    RBC 3.89 (L) 4.10 - 5.70 M/uL    HGB 9.8 (L) 12.1 - 17.0 g/dL    HCT 30.6 (L) 36.6 - 50.3 %    MCV 78.7 (L) 80.0 - 99.0 FL    MCH 25.2 (L) 26.0 - 34.0 PG    MCHC 32.0 30.0 - 36.5 g/dL    RDW 16.2 (H) 11.5 - 14.5 %    PLATELET 554 120 - 280 K/uL    MPV 9.8 8.9 - 12.9 FL    NRBC 0.0 0  WBC    ABSOLUTE NRBC 0.00 0.00 - 0.01 K/uL    NEUTROPHILS 61 32 - 75 %    LYMPHOCYTES 31 12 - 49 %    MONOCYTES 7 5 - 13 %    EOSINOPHILS 1 0 - 7 %    BASOPHILS 1 0 - 1 %    IMMATURE GRANULOCYTES 0 0.0 - 0.5 %    ABS. NEUTROPHILS 3.8 1.8 - 8.0 K/UL    ABS. LYMPHOCYTES 1.9 0.8 - 3.5 K/UL    ABS. MONOCYTES 0.5 0.0 - 1.0 K/UL    ABS. EOSINOPHILS 0.1 0.0 - 0.4 K/UL    ABS. BASOPHILS 0.0 0.0 - 0.1 K/UL    ABS. IMM. GRANS. 0.0 0.00 - 0.04 K/UL    DF AUTOMATED     IRON PROFILE    Collection Time: 03/15/18  1:19 AM   Result Value Ref Range    Iron 30 (L) 35 - 150 ug/dL    TIBC 260 250 - 450 ug/dL    Iron % saturation 12 (L) 20 - 50 %   FOLATE    Collection Time: 03/15/18  1:19 AM   Result Value Ref Range    Folate 13.3 5.0 - 21.0 ng/mL   FERRITIN    Collection Time: 03/15/18  1:19 AM   Result Value Ref Range    Ferritin 129 26 - 078 NG/ML   METABOLIC PANEL, COMPREHENSIVE    Collection Time: 03/15/18  1:19 AM   Result Value Ref Range    Sodium 136 136 - 145 mmol/L    Potassium 3.7 3.5 - 5.1 mmol/L    Chloride 101 97 - 108 mmol/L    CO2 23 21 - 32 mmol/L    Anion gap 12 5 - 15 mmol/L    Glucose 114 (H) 65 - 100 mg/dL    BUN 20 6 - 20 MG/DL    Creatinine 2.10 (H) 0.70 - 1.30 MG/DL    BUN/Creatinine ratio 10 (L) 12 - 20      GFR est AA 38 (L) >60 ml/min/1.73m2    GFR est non-AA 32 (L) >60 ml/min/1.73m2    Calcium 8.9 8.5 - 10.1 MG/DL    Bilirubin, total 0.3 0.2 - 1.0 MG/DL    ALT (SGPT) 38 12 - 78 U/L    AST (SGOT) 17 15 - 37 U/L    Alk. phosphatase 86 45 - 117 U/L    Protein, total 7.3 6.4 - 8.2 g/dL    Albumin 2.7 (L) 3.5 - 5.0 g/dL    Globulin 4.6 (H) 2.0 - 4.0 g/dL    A-G Ratio 0.6 (L) 1.1 - 2.2     GLUCOSE, POC    Collection Time: 03/15/18  7:34 AM   Result Value Ref Range    Glucose (POC) 98 65 - 100 mg/dL    Performed by Andie Burch    NUCLEAR STRESS TEST    Collection Time: 03/15/18  9:23 AM   Result Value Ref Range    Diagnosis       1. Uneventful Lexiscan injection. No additional ST-T changes. 2. MPI to follow.      Confirmed by Amie Calderón MD., Ranjana ([id number]),  Michael Zeng ([id number]) on 3/15/2018   9:50:42 AM      Test indication       positive genetic testing for cardiomyopathy  cardiomyopathy, HF Functional capacity Patient did not exercise. Pharmacological stress     ECG Interp. Before Exercise NSR, anterolateral and inferior TWI, prolonged QT     ECG Interp. During Exercise none     Overall HR response to exercise Patient did not exercise, pharmacological stress     Overall BP response to exercise resting hypertension - appropriate response     Max. Systolic  mmHg    Max. Diastolic BP 89 mmHg    Max. Heart rate 103 BPM    [facility] treadmill score      [place] TM score result      Peak Ex METs 1.0 METS    Protocol name Kashmir Corbett cardiac condition      Attending physician Amie Silva MD, Ranjana    GLUCOSE, POC    Collection Time: 03/15/18 11:48 AM   Result Value Ref Range    Glucose (POC) 131 (H) 65 - 100 mg/dL    Performed by Isabella Chavez        Discussed with:    Patient and Nurse  Thank you so much to allow us to participate in this patient's care. We will follow.  : Tai Oshea MD  3/15/2018      [facility]:  [email or web address]  Simba Gauze office:  [address] [address] [place] [address]  Phone: [phone]  Fax :     [phone]    [place] office:  [address] [facility]  Phone - [phone]  Fax - [phone]

## 2018-03-15 NOTE — PROGRESS NOTES
Bedside and Verbal shift change report given to [address] (oncoming nurse) by CARLY Guajardo RN (offgoing nurse). Report given with SBAR, Kardex, Intake/Output, MAR and Recent Results.

## 2018-03-15 NOTE — PROGRESS NOTES
[facility]   Daily Progress Note    Date: 3/15/2018  PCP: Lc Elizondo MD     Assessment/Plan:   Lexy Stratton is a 75 y.o. male with history of Type 2 DM, CKD stage 3b, HTN, Anemia Hypothyroidism who has spent 3 night(s) in the hospital, who is admitted for acute hypoxic respiratory failure due to HFrEF.    24 hour Event:  -No Acute event. BP well controlled overnight    Acute hypoxic respiratory failure likely due to HFrEF: Most recent Echo (3/13/18) showed cardiomyopathy with EF 35-40% and moderate diffuse hypokinesia (compared with that of 07/2014). CTA showed no pe. Pro- BNP of 2369 and symptoms of orthopnea most likely suggestive of pulmonary edema. - Currently on room air  - Will have New Dk today. - Started on hydralazine 37.5mg TID, Isosorbide mononitrate 30mg daily and Coreg 6.25mg BID. - Will consider RAAS inhibition once stability of renal function established per cardio  - Holding lasix for now given elevated creatinine  - Strict I/Os with daily weight.  -Card following, appreciate recs       HTN   - POA /99, now  147/66. Received IV hydralazine 20 mg x2 doses yesterday due to elevated BP  - IV hydralazine for BP > 180/110   - Will cont Amlodipine 10 mg PO daily. - Will start Coreg per cardio recs.   - Cardiology following     Type 2 DM  - Home dose of lantus 10 units at qhs ( although patient hasn't been non -compliant)  - Will restart 80% of home lantus with 8 units qhs  - POC gluocse AC&QHS with SSI     Hypothyroidism   - POA TSH 0.08--> low  - Patient with history of hypothyroidism but non compliant with Synthroid  - Primary vs Secondary hypothyroidism  - Hold Syntrhoid for now and obtain T3 and FT4     CKD stage 3b  - Cr 2.08 (POA Cr 1.99, baseline ~1.8)  -Stable, will continue to monitor     Anemia: Hgb 9.8 (POA 9.1, baseline~10)  Worked up in the past (2014) by Dr. Keith Lomax and found to be multifactoria (CKD, Anemia of chronic disease and B12 def)  -Iron profile (3/14/18): Fe 30 (L), TIBC 260 (normal), Fe sats 12% (L), Ferritin normal. Folate is and B12 are normal  - On home Iron with bowel regimen  - Daily CBCs     FEN/GI -npo  Activity - Ambulate with assitance   DVT prophylaxis - Heparin  GI prophylaxis -  Protonix  Disposition - Possible d/c home today after Lexiscan with outpatient follow up     CODE STATUS:  Full Code    Pt was discussed with Dr. Shiv Sandoval (supervising provider)    I appreciate the opportunity to participate in the care of this patient,    Enio Gallego MD  [facility] Medicine Resident         Subjective   Patient Denies fever, chills, chest pain, palpitations, shortness of breath, abdominal pain, nausea and vomiting, and LE edema. Tolerating diet.  Sats are fine on room air    Inpatient Medications  Current Facility-Administered Medications   Medication Dose Route Frequency    levothyroxine (SYNTHROID) tablet 150 mcg  150 mcg Oral 6am    ferrous sulfate tablet 325 mg  325 mg Oral DAILY    hydrALAZINE (APRESOLINE) tablet 37.5 mg  37.5 mg Oral TID    isosorbide mononitrate ER (IMDUR) tablet 30 mg  30 mg Oral DAILY    carvedilol (COREG) tablet 6.25 mg  6.25 mg Oral BID WITH MEALS    sodium chloride (NS) flush 5-10 mL  5-10 mL IntraVENous Q8H    sodium chloride (NS) flush 5-10 mL  5-10 mL IntraVENous PRN    heparin (porcine) injection 5,000 Units  5,000 Units SubCUTAneous Q8H    dextrose (D50W) injection syrg 12.5-25 g  12.5-25 g IntraVENous PRN    insulin glargine (LANTUS) injection 8 Units  8 Units SubCUTAneous QHS    pantoprazole (PROTONIX) 40 mg in 0 mL injection  40 mg IntraVENous Q12H    hydrALAZINE (APRESOLINE) 20 mg/mL injection 20 mg  20 mg IntraVENous Q6H PRN    insulin lispro (HUMALOG) injection   SubCUTAneous AC&HS    glucose chewable tablet 16 g  4 Tab Oral PRN    dextrose (D50W) injection syrg 12.5-25 g  12.5-25 g IntraVENous PRN    glucagon (GLUCAGEN) injection 1 mg  1 mg IntraMUSCular PRN    saline peripheral flush soln 5-10 mL  5-10 mL IntraVENous Q8H    sodium chloride (NS) flush 5-10 mL  5-10 mL IntraVENous PRN         Allergies  No Known Allergies      Objective  Vitals:  Visit Vitals    /66 (BP 1 Location: Right arm, BP Patient Position: At rest)    Pulse 88    Temp 98.3 °F (36.8 °C)    Resp 16    Ht 5' 8\" (1.727 m)    Wt 241 lb 6.5 oz (109.5 kg)    SpO2 92%    BMI 36.71 kg/m2      Temp (24hrs), Av.2 °F (36.8 °C), Min:98 °F (36.7 °C), Max:98.5 °F (36.9 °C)     O2 Flow Rate (L/min): 15 l/min   O2 Device: Room air    I/O:    Intake/Output Summary (Last 24 hours) at 03/15/18 0558  Last data filed at 03/15/18 0353   Gross per 24 hour   Intake              900 ml   Output             1150 ml   Net             -250 ml     Last 3 shifts:     0701 -  1900  In: 2724 [P.O.:1240]  Out: 2000 [Urine:2000]    Physical Exam:  General: No acute distress. Alert. Cooperative. Head: Normocephalic. Atraumatic. Eyes:  Conjunctiva pink. Sclera white. PERRL. Nose:  Septum midline. Mucosa pink. No drainage. Throat: Mucosa pink. Moist mucous membranes. No tonsillar exudates or erythema. Palate movement equal bilaterally. Respiratory: Normal work of breathing, CTAB   Cardiovascular: RRR. Normal S1,S2. No m/r/g. Pulses 2+ throughout. GI: + bowel sounds. Nontender. No rebound tenderness or guarding. Nondistended   Extremities: No edema. No tenderness. Neuro:                          Oriented. No focal deficits  Skin:                             Warm and dry.  No rashes or lesions      Labs and Data:    Telemetry: Normal sinus rhythm  Recent Labs      03/15/18   0119  18   0123  18   0129   WBC  6.2  5.7  8.4   HGB  9.8*  10.4*  9.1*   HCT  30.6*  32.8*  29.5*   PLT  272  262  226     Recent Labs      03/15/18   0119  18   0123  18   0129   NA  136  138  142   K  3.7  3.6  3.1*   CL  101  101  111*   CO2  23  26  22   GLU  114*  126*  162*   BUN  20  17 13   CREA  2.10*  2.08*  1.52*   CA  8.9  9.2  6.9*   ALB  2.7*  2.9*  2.0*   SGOT  17  23  53*   ALT  38  59  65         Signed by:  Lisa Vieyra MD  Resident, Family Medicine  03/15/18       Attending Note:

## 2018-03-16 ENCOUNTER — APPOINTMENT (OUTPATIENT)
Dept: CARDIAC CATH/INVASIVE PROCEDURES | Age: 68
DRG: 246 | End: 2018-03-16
Payer: MEDICARE

## 2018-03-16 ENCOUNTER — HOME HEALTH ADMISSION (OUTPATIENT)
Dept: HOME HEALTH SERVICES | Facility: HOME HEALTH | Age: 68
End: 2018-03-16

## 2018-03-16 LAB
ALBUMIN SERPL-MCNC: 2.6 G/DL (ref 3.5–5)
ALBUMIN/GLOB SERPL: 0.6 {RATIO} (ref 1.1–2.2)
ALP SERPL-CCNC: 84 U/L (ref 45–117)
ALT SERPL-CCNC: 30 U/L (ref 12–78)
ANION GAP SERPL CALC-SCNC: 11 MMOL/L (ref 5–15)
AST SERPL-CCNC: 14 U/L (ref 15–37)
ATRIAL RATE: 87 BPM
BASOPHILS # BLD: 0 K/UL (ref 0–0.1)
BASOPHILS NFR BLD: 1 % (ref 0–1)
BILIRUB SERPL-MCNC: 0.3 MG/DL (ref 0.2–1)
BUN SERPL-MCNC: 21 MG/DL (ref 6–20)
BUN/CREAT SERPL: 10 (ref 12–20)
CALCIUM SERPL-MCNC: 8.9 MG/DL (ref 8.5–10.1)
CALCULATED P AXIS, ECG09: 43 DEGREES
CALCULATED R AXIS, ECG10: 57 DEGREES
CALCULATED T AXIS, ECG11: 141 DEGREES
CHLORIDE SERPL-SCNC: 102 MMOL/L (ref 97–108)
CO2 SERPL-SCNC: 24 MMOL/L (ref 21–32)
CREAT SERPL-MCNC: 2.04 MG/DL (ref 0.7–1.3)
DIAGNOSIS, 93000: NORMAL
DIFFERENTIAL METHOD BLD: ABNORMAL
EOSINOPHIL # BLD: 0.1 K/UL (ref 0–0.4)
EOSINOPHIL NFR BLD: 1 % (ref 0–7)
ERYTHROCYTE [DISTWIDTH] IN BLOOD BY AUTOMATED COUNT: 15.8 % (ref 11.5–14.5)
GLOBULIN SER CALC-MCNC: 4.7 G/DL (ref 2–4)
GLUCOSE BLD STRIP.AUTO-MCNC: 109 MG/DL (ref 65–100)
GLUCOSE BLD STRIP.AUTO-MCNC: 118 MG/DL (ref 65–100)
GLUCOSE BLD STRIP.AUTO-MCNC: 73 MG/DL (ref 65–100)
GLUCOSE BLD STRIP.AUTO-MCNC: 87 MG/DL (ref 65–100)
GLUCOSE BLD STRIP.AUTO-MCNC: 98 MG/DL (ref 65–100)
GLUCOSE SERPL-MCNC: 100 MG/DL (ref 65–100)
HCT VFR BLD AUTO: 31.2 % (ref 36.6–50.3)
HGB BLD-MCNC: 9.9 G/DL (ref 12.1–17)
IMM GRANULOCYTES # BLD: 0 K/UL (ref 0–0.04)
IMM GRANULOCYTES NFR BLD AUTO: 0 % (ref 0–0.5)
LYMPHOCYTES # BLD: 1.4 K/UL (ref 0.8–3.5)
LYMPHOCYTES NFR BLD: 29 % (ref 12–49)
MCH RBC QN AUTO: 25.1 PG (ref 26–34)
MCHC RBC AUTO-ENTMCNC: 31.7 G/DL (ref 30–36.5)
MCV RBC AUTO: 79 FL (ref 80–99)
MONOCYTES # BLD: 0.5 K/UL (ref 0–1)
MONOCYTES NFR BLD: 10 % (ref 5–13)
NEUTS SEG # BLD: 2.9 K/UL (ref 1.8–8)
NEUTS SEG NFR BLD: 60 % (ref 32–75)
NRBC # BLD: 0 K/UL (ref 0–0.01)
NRBC BLD-RTO: 0 PER 100 WBC
P-R INTERVAL, ECG05: 202 MS
PHOSPHATE SERPL-MCNC: 3.7 MG/DL (ref 2.6–4.7)
PLATELET # BLD AUTO: 268 K/UL (ref 150–400)
PMV BLD AUTO: 10 FL (ref 8.9–12.9)
POTASSIUM SERPL-SCNC: 3.5 MMOL/L (ref 3.5–5.1)
PROT SERPL-MCNC: 7.3 G/DL (ref 6.4–8.2)
Q-T INTERVAL, ECG07: 466 MS
QRS DURATION, ECG06: 102 MS
QTC CALCULATION (BEZET), ECG08: 560 MS
RBC # BLD AUTO: 3.95 M/UL (ref 4.1–5.7)
SERVICE CMNT-IMP: ABNORMAL
SERVICE CMNT-IMP: ABNORMAL
SERVICE CMNT-IMP: NORMAL
SODIUM SERPL-SCNC: 137 MMOL/L (ref 136–145)
VENTRICULAR RATE, ECG03: 87 BPM
WBC # BLD AUTO: 4.9 K/UL (ref 4.1–11.1)

## 2018-03-16 PROCEDURE — 74011000250 HC RX REV CODE- 250: Performed by: INTERNAL MEDICINE

## 2018-03-16 PROCEDURE — 93458 L HRT ARTERY/VENTRICLE ANGIO: CPT

## 2018-03-16 PROCEDURE — B2151ZZ FLUOROSCOPY OF LEFT HEART USING LOW OSMOLAR CONTRAST: ICD-10-PCS | Performed by: INTERNAL MEDICINE

## 2018-03-16 PROCEDURE — 74011000258 HC RX REV CODE- 258: Performed by: INTERNAL MEDICINE

## 2018-03-16 PROCEDURE — 77030029065 HC DRSG HEMO QCLOT ZMED -B

## 2018-03-16 PROCEDURE — 74011250636 HC RX REV CODE- 250/636: Performed by: INTERNAL MEDICINE

## 2018-03-16 PROCEDURE — 74011250636 HC RX REV CODE- 250/636

## 2018-03-16 PROCEDURE — C1769 GUIDE WIRE: HCPCS

## 2018-03-16 PROCEDURE — 77030004532 HC CATH ANGI DX IMP BSC -A

## 2018-03-16 PROCEDURE — 85025 COMPLETE CBC W/AUTO DIFF WBC: CPT | Performed by: STUDENT IN AN ORGANIZED HEALTH CARE EDUCATION/TRAINING PROGRAM

## 2018-03-16 PROCEDURE — 74011636637 HC RX REV CODE- 636/637: Performed by: FAMILY MEDICINE

## 2018-03-16 PROCEDURE — 74011250637 HC RX REV CODE- 250/637: Performed by: NURSE PRACTITIONER

## 2018-03-16 PROCEDURE — 36415 COLL VENOUS BLD VENIPUNCTURE: CPT | Performed by: STUDENT IN AN ORGANIZED HEALTH CARE EDUCATION/TRAINING PROGRAM

## 2018-03-16 PROCEDURE — C1894 INTRO/SHEATH, NON-LASER: HCPCS

## 2018-03-16 PROCEDURE — C1887 CATHETER, GUIDING: HCPCS

## 2018-03-16 PROCEDURE — 93005 ELECTROCARDIOGRAM TRACING: CPT

## 2018-03-16 PROCEDURE — 77030019569 HC BND COMPR RAD TERU -B

## 2018-03-16 PROCEDURE — 3E083PZ INTRODUCTION OF PLATELET INHIBITOR INTO HEART, PERCUTANEOUS APPROACH: ICD-10-PCS | Performed by: INTERNAL MEDICINE

## 2018-03-16 PROCEDURE — B2111ZZ FLUOROSCOPY OF MULTIPLE CORONARY ARTERIES USING LOW OSMOLAR CONTRAST: ICD-10-PCS | Performed by: INTERNAL MEDICINE

## 2018-03-16 PROCEDURE — 4A023N7 MEASUREMENT OF CARDIAC SAMPLING AND PRESSURE, LEFT HEART, PERCUTANEOUS APPROACH: ICD-10-PCS | Performed by: INTERNAL MEDICINE

## 2018-03-16 PROCEDURE — 74011250637 HC RX REV CODE- 250/637: Performed by: STUDENT IN AN ORGANIZED HEALTH CARE EDUCATION/TRAINING PROGRAM

## 2018-03-16 PROCEDURE — 74011250637 HC RX REV CODE- 250/637: Performed by: SPECIALIST

## 2018-03-16 PROCEDURE — 84100 ASSAY OF PHOSPHORUS: CPT | Performed by: STUDENT IN AN ORGANIZED HEALTH CARE EDUCATION/TRAINING PROGRAM

## 2018-03-16 PROCEDURE — C1874 STENT, COATED/COV W/DEL SYS: HCPCS

## 2018-03-16 PROCEDURE — 77030013797 HC KT TRNSDUC PRSSR EDWD -A

## 2018-03-16 PROCEDURE — C1725 CATH, TRANSLUMIN NON-LASER: HCPCS

## 2018-03-16 PROCEDURE — 027034Z DILATION OF CORONARY ARTERY, ONE ARTERY WITH DRUG-ELUTING INTRALUMINAL DEVICE, PERCUTANEOUS APPROACH: ICD-10-PCS | Performed by: INTERNAL MEDICINE

## 2018-03-16 PROCEDURE — 82962 GLUCOSE BLOOD TEST: CPT

## 2018-03-16 PROCEDURE — 74011636320 HC RX REV CODE- 636/320

## 2018-03-16 PROCEDURE — 74011250637 HC RX REV CODE- 250/637

## 2018-03-16 PROCEDURE — 77030008543 HC TBNG MON PRSS MRTM -A

## 2018-03-16 PROCEDURE — 65660000000 HC RM CCU STEPDOWN

## 2018-03-16 PROCEDURE — 80053 COMPREHEN METABOLIC PANEL: CPT | Performed by: STUDENT IN AN ORGANIZED HEALTH CARE EDUCATION/TRAINING PROGRAM

## 2018-03-16 RX ORDER — HEPARIN SODIUM 1000 [USP'U]/ML
1000-5000 INJECTION, SOLUTION INTRAVENOUS; SUBCUTANEOUS
Status: DISCONTINUED | OUTPATIENT
Start: 2018-03-16 | End: 2018-03-16 | Stop reason: HOSPADM

## 2018-03-16 RX ORDER — MIDAZOLAM HYDROCHLORIDE 1 MG/ML
.5-1 INJECTION, SOLUTION INTRAMUSCULAR; INTRAVENOUS
Status: DISCONTINUED | OUTPATIENT
Start: 2018-03-16 | End: 2018-03-16 | Stop reason: HOSPADM

## 2018-03-16 RX ORDER — CLOPIDOGREL BISULFATE 75 MG/1
75 TABLET ORAL DAILY
Status: DISCONTINUED | OUTPATIENT
Start: 2018-03-17 | End: 2018-03-17 | Stop reason: HOSPADM

## 2018-03-16 RX ORDER — SODIUM CHLORIDE 0.9 % (FLUSH) 0.9 %
5-10 SYRINGE (ML) INJECTION EVERY 8 HOURS
Status: DISCONTINUED | OUTPATIENT
Start: 2018-03-16 | End: 2018-03-17 | Stop reason: HOSPADM

## 2018-03-16 RX ORDER — FENTANYL CITRATE 50 UG/ML
25-200 INJECTION, SOLUTION INTRAMUSCULAR; INTRAVENOUS
Status: DISCONTINUED | OUTPATIENT
Start: 2018-03-16 | End: 2018-03-16 | Stop reason: HOSPADM

## 2018-03-16 RX ORDER — HYDRALAZINE HYDROCHLORIDE 20 MG/ML
10-20 INJECTION INTRAMUSCULAR; INTRAVENOUS
Status: DISCONTINUED | OUTPATIENT
Start: 2018-03-16 | End: 2018-03-17 | Stop reason: HOSPADM

## 2018-03-16 RX ORDER — PANTOPRAZOLE SODIUM 40 MG/1
40 TABLET, DELAYED RELEASE ORAL
Status: DISCONTINUED | OUTPATIENT
Start: 2018-03-16 | End: 2018-03-17 | Stop reason: HOSPADM

## 2018-03-16 RX ORDER — HEPARIN SODIUM 200 [USP'U]/100ML
500 INJECTION, SOLUTION INTRAVENOUS ONCE
Status: COMPLETED | OUTPATIENT
Start: 2018-03-16 | End: 2018-03-16

## 2018-03-16 RX ORDER — FENTANYL CITRATE 50 UG/ML
INJECTION, SOLUTION INTRAMUSCULAR; INTRAVENOUS
Status: COMPLETED
Start: 2018-03-16 | End: 2018-03-16

## 2018-03-16 RX ORDER — SODIUM CHLORIDE 0.9 % (FLUSH) 0.9 %
5-10 SYRINGE (ML) INJECTION AS NEEDED
Status: DISCONTINUED | OUTPATIENT
Start: 2018-03-16 | End: 2018-03-17 | Stop reason: HOSPADM

## 2018-03-16 RX ORDER — MIDAZOLAM HYDROCHLORIDE 1 MG/ML
INJECTION, SOLUTION INTRAMUSCULAR; INTRAVENOUS
Status: COMPLETED
Start: 2018-03-16 | End: 2018-03-16

## 2018-03-16 RX ORDER — CLOPIDOGREL BISULFATE 75 MG/1
600 TABLET ORAL ONCE
Status: COMPLETED | OUTPATIENT
Start: 2018-03-16 | End: 2018-03-16

## 2018-03-16 RX ORDER — CLOPIDOGREL BISULFATE 75 MG/1
600 TABLET ORAL ONCE
Status: DISCONTINUED | OUTPATIENT
Start: 2018-03-16 | End: 2018-03-16

## 2018-03-16 RX ORDER — HEPARIN SODIUM 200 [USP'U]/100ML
INJECTION, SOLUTION INTRAVENOUS
Status: COMPLETED
Start: 2018-03-16 | End: 2018-03-16

## 2018-03-16 RX ORDER — SODIUM CHLORIDE 900 MG/100ML
INJECTION INTRAVENOUS
Status: DISPENSED
Start: 2018-03-16 | End: 2018-03-16

## 2018-03-16 RX ORDER — BIVALIRUDIN 250 MG/5ML
INJECTION, POWDER, LYOPHILIZED, FOR SOLUTION INTRAVENOUS
Status: COMPLETED
Start: 2018-03-16 | End: 2018-03-16

## 2018-03-16 RX ORDER — DIPHENHYDRAMINE HYDROCHLORIDE 50 MG/ML
25 INJECTION, SOLUTION INTRAMUSCULAR; INTRAVENOUS
Status: DISCONTINUED | OUTPATIENT
Start: 2018-03-16 | End: 2018-03-17 | Stop reason: HOSPADM

## 2018-03-16 RX ORDER — SODIUM CHLORIDE 9 MG/ML
50 INJECTION, SOLUTION INTRAVENOUS CONTINUOUS
Status: DISPENSED | OUTPATIENT
Start: 2018-03-16 | End: 2018-03-16

## 2018-03-16 RX ORDER — VERAPAMIL HYDROCHLORIDE 2.5 MG/ML
INJECTION, SOLUTION INTRAVENOUS
Status: DISPENSED
Start: 2018-03-16 | End: 2018-03-16

## 2018-03-16 RX ORDER — HEPARIN SODIUM 1000 [USP'U]/ML
INJECTION, SOLUTION INTRAVENOUS; SUBCUTANEOUS
Status: DISPENSED
Start: 2018-03-16 | End: 2018-03-16

## 2018-03-16 RX ORDER — LIDOCAINE HYDROCHLORIDE 10 MG/ML
5-20 INJECTION INFILTRATION; PERINEURAL
Status: DISCONTINUED | OUTPATIENT
Start: 2018-03-16 | End: 2018-03-16 | Stop reason: HOSPADM

## 2018-03-16 RX ORDER — HEPARIN SODIUM 200 [USP'U]/100ML
500 INJECTION, SOLUTION INTRAVENOUS
Status: DISCONTINUED | OUTPATIENT
Start: 2018-03-16 | End: 2018-03-16 | Stop reason: HOSPADM

## 2018-03-16 RX ORDER — CLOPIDOGREL 300 MG/1
TABLET, FILM COATED ORAL
Status: COMPLETED
Start: 2018-03-16 | End: 2018-03-16

## 2018-03-16 RX ORDER — POTASSIUM CHLORIDE 750 MG/1
20 TABLET, FILM COATED, EXTENDED RELEASE ORAL
Status: COMPLETED | OUTPATIENT
Start: 2018-03-16 | End: 2018-03-16

## 2018-03-16 RX ORDER — VERAPAMIL HYDROCHLORIDE 2.5 MG/ML
2.5-5 INJECTION, SOLUTION INTRAVENOUS
Status: DISCONTINUED | OUTPATIENT
Start: 2018-03-16 | End: 2018-03-16 | Stop reason: HOSPADM

## 2018-03-16 RX ADMIN — IOPAMIDOL 125 ML: 755 INJECTION, SOLUTION INTRAVENOUS at 10:20

## 2018-03-16 RX ADMIN — Medication 10 ML: at 06:20

## 2018-03-16 RX ADMIN — HEPARIN SODIUM 1000 UNITS: 200 INJECTION, SOLUTION INTRAVENOUS at 10:19

## 2018-03-16 RX ADMIN — SODIUM CHLORIDE 50 ML/HR: 900 INJECTION, SOLUTION INTRAVENOUS at 14:12

## 2018-03-16 RX ADMIN — Medication 10 ML: at 21:38

## 2018-03-16 RX ADMIN — INSULIN GLARGINE 8 UNITS: 100 INJECTION, SOLUTION SUBCUTANEOUS at 21:39

## 2018-03-16 RX ADMIN — FENTANYL CITRATE 25 MCG: 50 INJECTION, SOLUTION INTRAMUSCULAR; INTRAVENOUS at 09:24

## 2018-03-16 RX ADMIN — FENTANYL CITRATE 25 MCG: 50 INJECTION, SOLUTION INTRAMUSCULAR; INTRAVENOUS at 09:10

## 2018-03-16 RX ADMIN — HEPARIN SODIUM 1000 UNITS: 200 INJECTION, SOLUTION INTRAVENOUS at 09:14

## 2018-03-16 RX ADMIN — MIDAZOLAM HYDROCHLORIDE 1 MG: 1 INJECTION, SOLUTION INTRAMUSCULAR; INTRAVENOUS at 09:11

## 2018-03-16 RX ADMIN — BIVALIRUDIN 1.75 MG/KG/HR: 250 INJECTION, POWDER, LYOPHILIZED, FOR SOLUTION INTRAVENOUS at 09:40

## 2018-03-16 RX ADMIN — LEVOTHYROXINE SODIUM 150 MCG: 150 TABLET ORAL at 06:20

## 2018-03-16 RX ADMIN — PANTOPRAZOLE SODIUM 40 MG: 40 TABLET, DELAYED RELEASE ORAL at 16:33

## 2018-03-16 RX ADMIN — MIDAZOLAM 1 MG: 1 INJECTION INTRAMUSCULAR; INTRAVENOUS at 09:11

## 2018-03-16 RX ADMIN — HYDRALAZINE HYDROCHLORIDE 37.5 MG: 25 TABLET ORAL at 16:33

## 2018-03-16 RX ADMIN — HYDRALAZINE HYDROCHLORIDE 20 MG: 20 INJECTION INTRAMUSCULAR; INTRAVENOUS at 10:08

## 2018-03-16 RX ADMIN — HYDRALAZINE HYDROCHLORIDE 37.5 MG: 25 TABLET ORAL at 11:46

## 2018-03-16 RX ADMIN — CLOPIDOGREL BISULFATE 600 MG: 300 TABLET, FILM COATED ORAL at 10:19

## 2018-03-16 RX ADMIN — CARVEDILOL 6.25 MG: 6.25 TABLET, FILM COATED ORAL at 11:45

## 2018-03-16 RX ADMIN — MIDAZOLAM HYDROCHLORIDE 1 MG: 1 INJECTION, SOLUTION INTRAMUSCULAR; INTRAVENOUS at 09:24

## 2018-03-16 RX ADMIN — HYDRALAZINE HYDROCHLORIDE 37.5 MG: 25 TABLET ORAL at 21:37

## 2018-03-16 RX ADMIN — BIVALIRUDIN 1.75 MG/KG/HR: 250 INJECTION, POWDER, LYOPHILIZED, FOR SOLUTION INTRAVENOUS at 10:01

## 2018-03-16 RX ADMIN — CARVEDILOL 6.25 MG: 6.25 TABLET, FILM COATED ORAL at 16:33

## 2018-03-16 RX ADMIN — POTASSIUM CHLORIDE 20 MEQ: 750 TABLET, EXTENDED RELEASE ORAL at 11:47

## 2018-03-16 RX ADMIN — ISOSORBIDE MONONITRATE 30 MG: 30 TABLET, EXTENDED RELEASE ORAL at 11:48

## 2018-03-16 RX ADMIN — FERROUS SULFATE TAB 325 MG (65 MG ELEMENTAL FE) 325 MG: 325 (65 FE) TAB at 11:45

## 2018-03-16 RX ADMIN — SODIUM CHLORIDE 50 ML/HR: 900 INJECTION, SOLUTION INTRAVENOUS at 13:01

## 2018-03-16 RX ADMIN — CLOPIDOGREL BISULFATE 600 MG: 75 TABLET ORAL at 10:19

## 2018-03-16 RX ADMIN — BIVALIRUDIN 82.5 MG: 250 INJECTION, POWDER, LYOPHILIZED, FOR SOLUTION INTRAVENOUS at 09:39

## 2018-03-16 RX ADMIN — SODIUM CHLORIDE 100 ML/HR: 9 INJECTION, SOLUTION INTRAVENOUS at 01:29

## 2018-03-16 RX ADMIN — LIDOCAINE HYDROCHLORIDE 20 ML: 10 INJECTION, SOLUTION INFILTRATION; PERINEURAL at 09:13

## 2018-03-16 NOTE — ROUTINE PROCESS
TRANSFER - OUT REPORT:    Verbal report given to Elliott Argueta RN on Yasmeen Land  being transferred to [facility] for routine progression of care       Report consisted of patients Situation, Background, Assessment and   Recommendations(SBAR). Information from the following report(s) SBAR, Procedure Summary, Recent Results and Cardiac Rhythm nsr will be reviewed at bedside per request with the receiving nurse. Lines:   Peripheral IV 03/12/18 Right Antecubital (Active)   Site Assessment Clean, dry, & intact 3/16/2018  3:56 AM   Phlebitis Assessment 0 3/16/2018  3:56 AM   Infiltration Assessment 0 3/16/2018  3:56 AM   Dressing Status Clean, dry, & intact 3/16/2018  3:56 AM   Dressing Type Tape;Transparent 3/16/2018  3:56 AM   Hub Color/Line Status Green;Capped 3/16/2018  3:56 AM   Action Taken Open ports on tubing capped 3/16/2018  3:56 AM   Alcohol Cap Used Yes 3/16/2018  3:56 AM       Peripheral IV 03/12/18 Left Antecubital (Active)   Site Assessment Clean, dry, & intact 3/16/2018  3:56 AM   Phlebitis Assessment 0 3/16/2018  3:56 AM   Infiltration Assessment 0 3/16/2018  3:56 AM   Dressing Status Clean, dry, & intact 3/16/2018  3:56 AM   Dressing Type Tape;Transparent 3/16/2018  3:56 AM   Hub Color/Line Status Green;Capped 3/16/2018  3:56 AM   Action Taken Open ports on tubing capped 3/16/2018  3:56 AM   Alcohol Cap Used Yes 3/16/2018  3:56 AM        Opportunity for questions and clarification was provided.       Patient transported with:   Monitor  Registered Nurse  Tech

## 2018-03-16 NOTE — PROGRESS NOTES
1025    TRANSFER - IN REPORT:    Verbal report received from Karolina Victor RN(name) on Monet Herzog  being received from cath(unit) for routine progression of care      Report consisted of patients Situation, Background, Assessment and   Recommendations(SBAR). Information from the following report(s) SBAR was reviewed with the receiving nurse. Opportunity for questions and clarification was provided. Assessment completed upon patients arrival to unit and care assumed. Pt voices understanding of post procedure bedrest instructions. 1045    BS 73 MG/DL given 120 cc ginger ale to drink    1100    Rechecked BS 87 mg/dl     1135     Post EKG for PCI performed    1315    . Blood aspirated from sheath then venous sheath pulled 6 Fr right Groin. [place] Eleazar applied. Manual pressure held    Blood aspirated from sheath then arterial sheath pulled 6 Fr right Groin. [place] Eleazar applied. Manual pressure held . 1325    Hemostasis achieved at right groin. Dressing applied. Pt voices understanding of post procedure bedrest instructions. 1355    TRANSFER - OUT REPORT:    Verbal report given to Thania Mckinney RN(name) on Monet Herzog  being transferred to [facility](unit) for        Report consisted of patients Situation, Background, Assessment and   Recommendations(SBAR). Information from the following report(s) SBAR was reviewed with the receiving nurse.     Lines:   Peripheral IV 03/12/18 Right Antecubital (Active)   Site Assessment Clean, dry, & intact 3/16/2018  3:56 AM   Phlebitis Assessment 0 3/16/2018  3:56 AM   Infiltration Assessment 0 3/16/2018  3:56 AM   Dressing Status Clean, dry, & intact 3/16/2018  3:56 AM   Dressing Type Tape;Transparent 3/16/2018  3:56 AM   Hub Color/Line Status Green;Capped 3/16/2018  3:56 AM   Action Taken Open ports on tubing capped 3/16/2018  3:56 AM   Alcohol Cap Used Yes 3/16/2018  3:56 AM       Peripheral IV 03/12/18 Left Antecubital (Active)   Site Assessment Clean, dry, & intact 3/16/2018  3:56 AM   Phlebitis Assessment 0 3/16/2018  3:56 AM   Infiltration Assessment 0 3/16/2018  3:56 AM   Dressing Status Clean, dry, & intact 3/16/2018  3:56 AM   Dressing Type Tape;Transparent 3/16/2018  3:56 AM   Hub Color/Line Status Green;Capped 3/16/2018  3:56 AM   Action Taken Open ports on tubing capped 3/16/2018  3:56 AM   Alcohol Cap Used Yes 3/16/2018  3:56 AM        Opportunity for questions and clarification was provided.       Patient transported with:   Registered Nurse

## 2018-03-16 NOTE — PROGRESS NOTES
Bedside and Verbal shift change report given to Joie Barlow RN (oncoming nurse) by Bernard Matos RN (offgoing nurse). Report included the following information SBAR, Kardex, Intake/Output, MAR, Recent Results and Cardiac Rhythm NSR.     7438- Patient off the floor for cath, did not have a chance to assess patient prior to his leaving the floor.

## 2018-03-16 NOTE — PROCEDURES
BRIEF PROCEDURE NOTE    Date of Procedure: 3/16/2018   Preoperative Diagnosis: Cardiomyopathy EF 35-40%; Positive stress test  Postoperative Diagnosis: PCI to Diag with SEBASTIAN  Procedure: Left heart cath, LV angiography, coronary angiography  Interventional Cardiologist: Segun Lemos MD  Anesthesia: local + IV sedation  Estimated Blood Loss: Minimal  Findings:   L Main:Nml    LAD: Med - MLI; D1 - ostial 40%; Mid 70%    LCflex: Med; MLI;     RI - Med Nml    RCA: med to large; Nml; PDA and PLB - Nml    LVEDP: mildly elevated    LVEF: Not assessed    No significant gradient across aortic valve. PCI: EBU 3.5 guide/BMW wire  Mid Diag 70%  Pre dil with 2 by 12 balloon  SEBASTIAN 2.5 by 15 ( resolute )  CECELIA 3 before and after      Specimens Removed : None    Closure Device: Manual        See full cath note.     Complications: none    Segun Lemos MD

## 2018-03-16 NOTE — PROGRESS NOTES
I met with pt ,his sister and brother-in-law. Pt lives with his supportive sister & B-I-L @ the address on demographics. Pt is receptive to the [facility] visit with [employer]. Order sent through cc link to [employer]. Pt will likely be discharged home tomorrow if stable or Sunday. If discharged over the weekend,nursing please notify [facility] by calling [phone]. I notified Aquilino Kaiser,cardiology nurse navigator and Winston Cain navigator with Dr Yves Parson regarding pt.'s admission and likely weekend discharge.       Nando Tanner    Cardiology and PCP appointments are on pt.'s AVS.JEFF Shirley

## 2018-03-16 NOTE — PROGRESS NOTES
One Kelly Alvarado  YOB: 1950          Assessment & Plan:   1. KELIN  · Cr 1.5 to 2 mg from CT Contrast  · Stable now  · Pre and post IVF to minimize RCN risk  · Contrast and RCN dw pt, But I think he has difficulty understanding  · PVR  2. CKD 3  · DM,HTN, Obesity  · RAAS once cr stable  · US: Ok in 2014  3. Anemia  · Iron low  · PO Iron  4. HTN  · BB  5. DM  6. CHF  · Abnormal stress test  7.  Cognitive Dysfunction       Subjective:   CC:ckd  HPI: Patient seen   CKD:Cr stable  Cath today  HTN: 120-150S  ROS:neg for 12 sys  Current Facility-Administered Medications   Medication Dose Route Frequency    fentaNYL citrate (PF) injection  mcg   mcg IntraVENous Multiple    iopamidol (ISOVUE-370) 76 % injection 100-200 mL  100-200 mL InterCATHeter Multiple    lidocaine (XYLOCAINE) 10 mg/mL (1 %) injection 5-20 mL  5-20 mL SubCUTAneous Multiple    midazolam (VERSED) injection 0.5-10 mg  0.5-10 mg IntraVENous Multiple    potassium chloride SR (KLOR-CON 10) tablet 20 mEq  20 mEq Oral NOW    heparin (porcine) 1,000 unit/mL injection 1,000-5,000 Units  1,000-5,000 Units IntraVENous Multiple    nitroglycerin 100 mcg/ml compounded injection  1-10 mL IntraarTERial Multiple    verapamil (ISOPTIN) 2.5 mg/mL injection 2.5-5 mg  2.5-5 mg IntraarTERial Multiple    verapamil (ISOPTIN) 2.5 mg/mL injection        heparin (porcine) 1,000 unit/mL injection        iopamidol (ISOVUE-370) 76 % injection        iopamidol (ISOVUE-370) 76 % injection        nitroglycerin 1 mg/10mL injection        bivalirudin (ANGIOMAX) BOLUS 82.5 mg  0.75 mg/kg IntraVENous ONCE    Followed by   [facility] bivalirudin (ANGIOMAX) 250 mg in 0.9% sodium chloride (MBP/ADV) 50 mL  1.75 mg/kg/hr IntraVENous CONTINUOUS    heparinized saline 2 units/mL infusion 1,000 Units  500 mL IntraarTERial Multiple    nitroglycerin 100 mcg/ml compounded injection  1-10 mL IntraCORONary Multiple    iopamidol (ISOVUE-370) 76 % injection 50 mL  50 mL InterCATHeter Multiple    bivalirudin (ANGIOMAX) 250 mg injection        0.9% sodium chloride (MBP/ADV) infusion        clopidogrel (PLAVIX) tablet 600 mg  600 mg Oral ONCE    0.9% sodium chloride infusion  100 mL/hr IntraVENous CONTINUOUS    levothyroxine (SYNTHROID) tablet 150 mcg  150 mcg Oral 6am    ferrous sulfate tablet 325 mg  325 mg Oral DAILY    hydrALAZINE (APRESOLINE) tablet 37.5 mg  37.5 mg Oral TID    isosorbide mononitrate ER (IMDUR) tablet 30 mg  30 mg Oral DAILY    carvedilol (COREG) tablet 6.25 mg  6.25 mg Oral BID WITH MEALS    sodium chloride (NS) flush 5-10 mL  5-10 mL IntraVENous Q8H    sodium chloride (NS) flush 5-10 mL  5-10 mL IntraVENous PRN    dextrose (D50W) injection syrg 12.5-25 g  12.5-25 g IntraVENous PRN    insulin glargine (LANTUS) injection 8 Units  8 Units SubCUTAneous QHS    pantoprazole (PROTONIX) 40 mg in 0 mL injection  40 mg IntraVENous Q12H    hydrALAZINE (APRESOLINE) 20 mg/mL injection 20 mg  20 mg IntraVENous Q6H PRN    insulin lispro (HUMALOG) injection   SubCUTAneous AC&HS    glucose chewable tablet 16 g  4 Tab Oral PRN    dextrose (D50W) injection syrg 12.5-25 g  12.5-25 g IntraVENous PRN    glucagon (GLUCAGEN) injection 1 mg  1 mg IntraMUSCular PRN    saline peripheral flush soln 5-10 mL  5-10 mL IntraVENous Q8H    sodium chloride (NS) flush 5-10 mL  5-10 mL IntraVENous PRN          Objective:     Vitals:  Blood pressure (!) 154/91, pulse 81, temperature 98.2 °F (36.8 °C), resp. rate 17, height 5' 8\" (1.727 m), weight 109.9 kg (242 lb 3.2 oz), SpO2 94 %. Temp (24hrs), Av.9 °F (36.6 °C), Min:97.7 °F (36.5 °C), Max:98.2 °F (36.8 °C)      Intake and Output:      1901 - 03/16 0700  In: 7195 [P.O.:740;  I.V.:400]  Out: 2180 [Urine:2180]    Physical Exam:                Patient is intubated:  no    Physical Examination:   GENERAL ASSESSMENT: NAD  HEENT:Nontraumatic   CHEST: CTA  HEART: S1S2  ABDOMEN: Soft,NT,  :Silva:   EXTREMITY: EDEMA  NEURO:Grossly non focal          ECG/rhythm:    Data Review      No results for input(s): TNIPOC in the last 72 hours. No lab exists for component: ITNL   Recent Labs      03/14/18   0123  03/13/18   2050  03/13/18   1431   TROIQ  <0.04  <0.04  <0.04     Recent Labs      03/16/18   0200  03/15/18   0119  03/14/18   0123   NA  137  136  138   K  3.5  3.7  3.6   CL  102  101  101   CO2  24  23  26   BUN  21*  20  17   CREA  2.04*  2.10*  2.08*   GLU  100  114*  126*   PHOS  3.7   --    --    CA  8.9  8.9  9.2   ALB  2.6*  2.7*  2.9*   WBC  4.9  6.2  5.7   HGB  9.9*  9.8*  10.4*   HCT  31.2*  30.6*  32.8*   PLT  268  272  262      No results for input(s): INR, PTP, APTT in the last 72 hours. No lab exists for component: INREXT  Needs: urine analysis, urine sodium, protein and creatinine  Lab Results   Component Value Date/Time    Sodium urine, random 112 07/17/2014 06:30 PM    Creatinine, urine 97.49 03/15/2018 04:03 PM         Discussed with:  Colleague, Nursing    : Kiesha Marc MD  3/16/2018        [facility]:  [email or web address]  Annette Chi office:  [address] [place] [address],[address]  [address]  Phone: [phone]  Fax :     [phone]    [facility] office:  [address] [facility]  Phone - [phone]  Fax - [phone]

## 2018-03-16 NOTE — PROGRESS NOTES
Cardiology Progress Note                             [address] [facility], [place], [address] [facility]                                 Phone [phone]; Fax [phone]        3/16/2018 9:49 AM     Admit Date:           3/12/2018  Admit Diagnosis:  Respiratory failure ([address]  :          1950   MRN:          [id number]   ASSESSMENT/RECOMMENDATION:   1)Cardiomyopathy, ischemic vs non ischemic: LVEF 40% per echo, with inferolateral hypokinesis, concerned for CAD. Abnormal stress test (reversible inferior defect), cardiac cath today. - High risk CKD/DM/HTN    Chronic systolic CHF/NYHA class II: new dx, cardiac cath today  -continue coreg, imdur and hydralazine  -Consider RAAS inhibition once stability of renal function established. Hypertension: BP controlled   - Will titrate medications as needed. Goal SBP <130. DM II: HgbA1c 7.8, per primary team    CKD: stable, nephrology following     Hypokalemia: 3.5, replete    Chronic anemia with microcytic indices: H/H stable  -Fe low/fe sat low  -GI consult? Cognitive dysfunction? Dementia                  Last 3 Recorded Weights in this Encounter    18 0536 03/15/18 0320 18 0629   Weight: 244 lb 9.6 oz (110.9 kg) 241 lb 6.5 oz (109.5 kg) 242 lb 3.2 oz (109.9 kg)          1901 -  0700  In: 1140 [P.O.:740; I.V.:400]  Out: 2180 [Urine:2180]    SUBJECTIVE         Fair historian. Chronic HTN, T2DM and CKD in the setting of chronic cognitive dysfunction but no previous cardiac hx, admitted with progressive dyspnea but no chest pain. In ED note to be in resp failure, normal CXR, accelerated HTN, improved with iv loops/nebulizer Rx. Chest CT without PE but + effusions. Troponins negative but proBNP 2K. Echo personally reviewed - regional/global LV dysfunction (severe inferolateral/lateral HK, EF 35-40%, LVH).  Got iv lasix evening of 3/12 and and yesterday morning. Feels better today. No orthopnea, PND or edema noted     Lives with his brother in [place], not working on disability  Nonsmoker,no alcohol      [place] denies palpitations, irregular heart beat, SOB, chest pain or LE edema.         Current Facility-Administered Medications   Medication Dose Route Frequency    fentaNYL citrate (PF) injection  mcg   mcg IntraVENous Multiple    iopamidol (ISOVUE-370) 76 % injection 100-200 mL  100-200 mL InterCATHeter Multiple    lidocaine (XYLOCAINE) 10 mg/mL (1 %) injection 5-20 mL  5-20 mL SubCUTAneous Multiple    midazolam (VERSED) injection 0.5-10 mg  0.5-10 mg IntraVENous Multiple    potassium chloride SR (KLOR-CON 10) tablet 20 mEq  20 mEq Oral NOW    heparin (porcine) 1,000 unit/mL injection 1,000-5,000 Units  1,000-5,000 Units IntraVENous Multiple    nitroglycerin 100 mcg/ml compounded injection  1-10 mL IntraarTERial Multiple    verapamil (ISOPTIN) 2.5 mg/mL injection 2.5-5 mg  2.5-5 mg IntraarTERial Multiple    verapamil (ISOPTIN) 2.5 mg/mL injection        heparin (porcine) 1,000 unit/mL injection        iopamidol (ISOVUE-370) 76 % injection        iopamidol (ISOVUE-370) 76 % injection        nitroglycerin 1 mg/10mL injection        bivalirudin (ANGIOMAX) BOLUS 82.5 mg  0.75 mg/kg IntraVENous ONCE    Followed by   [address] [place] [address] bivalirudin (ANGIOMAX) 250 mg in 0.9% sodium chloride (MBP/ADV) 50 mL  1.75 mg/kg/hr IntraVENous CONTINUOUS    heparinized saline 2 units/mL infusion 1,000 Units  500 mL IntraarTERial Multiple    nitroglycerin 100 mcg/ml compounded injection  1-10 mL IntraCORONary Multiple    iopamidol (ISOVUE-370) 76 % injection 50 mL  50 mL InterCATHeter Multiple    0.9% sodium chloride infusion  100 mL/hr IntraVENous CONTINUOUS    levothyroxine (SYNTHROID) tablet 150 mcg  150 mcg Oral 6am    ferrous sulfate tablet 325 mg  325 mg Oral DAILY    hydrALAZINE (APRESOLINE) tablet 37.5 mg  37.5 mg Oral TID    isosorbide mononitrate ER (IMDUR) tablet 30 mg  30 mg Oral DAILY    carvedilol (COREG) tablet 6.25 mg  6.25 mg Oral BID WITH MEALS    sodium chloride (NS) flush 5-10 mL  5-10 mL IntraVENous Q8H    sodium chloride (NS) flush 5-10 mL  5-10 mL IntraVENous PRN    dextrose (D50W) injection syrg 12.5-25 g  12.5-25 g IntraVENous PRN    insulin glargine (LANTUS) injection 8 Units  8 Units SubCUTAneous QHS    pantoprazole (PROTONIX) 40 mg in 0 mL injection  40 mg IntraVENous Q12H    hydrALAZINE (APRESOLINE) 20 mg/mL injection 20 mg  20 mg IntraVENous Q6H PRN    insulin lispro (HUMALOG) injection   SubCUTAneous AC&HS    glucose chewable tablet 16 g  4 Tab Oral PRN    dextrose (D50W) injection syrg 12.5-25 g  12.5-25 g IntraVENous PRN    glucagon (GLUCAGEN) injection 1 mg  1 mg IntraMUSCular PRN    saline peripheral flush soln 5-10 mL  5-10 mL IntraVENous Q8H    sodium chloride (NS) flush 5-10 mL  5-10 mL IntraVENous PRN      OBJECTIVE               Intake/Output Summary (Last 24 hours) at 03/16/18 0935  Last data filed at 03/16/18 0402   Gross per 24 hour   Intake             1140 ml   Output             1430 ml   Net             -290 ml       Review of Systems - History obtained from the patient AS PER  HPI    Telemetry NSR    PHYSICAL EXAM        Visit Vitals    BP (!) 154/91    Pulse 81    Temp 98.2 °F (36.8 °C)    Resp 17    Ht 5' 8\" (1.727 m)    Wt 242 lb 3.2 oz (109.9 kg)    SpO2 94%    BMI 36.83 kg/m2       Gen: Well-developed, obese, in no acute distress  alert and oriented x 3  HEENT:  Pink conjunctivae, Hearing grossly normal.No scleral icterus or conjunctival, moist mucous membranes  Neck: Supple,No JVD,   No cervical lymphadenopathy  Resp: No accessory muscle use, diminished bilateral bases exam limited d/t body habitus, No rales or rhonchi  Card: Regular Rate,Rythm, No murmurs, rubs or gallop. No thrills.    GI:          soft, non-tender   MSK: No cyanosis or clubbing, good capillary refill  Skin: No rashes or ulcers, no bruising  Neuro:  Cranial nerves are grossly intact, moving all four extremities, no focal deficit, follows commands appropriately  Psych:  Good insight, oriented to person, place and time, alert, Nml Affect  LE: No edema       DATA REVIEW            Laboratory and Imaging have been reviewed by me and are notable for  Recent Labs      03/14/18   0123  03/13/18   2050  03/13/18   1431   TROIQ  <0.04  <0.04  <0.04     Recent Labs      03/16/18   0200  03/15/18   0119  03/14/18   0123   NA  137  136  138   K  3.5  3.7  3.6   CO2  24  23  26   BUN  21*  20  17   CREA  2.04*  2.10*  2.08*   GLU  100  114*  126*   PHOS  3.7   --    --    WBC  4.9  6.2  5.7   HGB  9.9*  9.8*  10.4*   HCT  31.2*  30.6*  32.8*   PLT  268  272  Sotero Campos NP                           \

## 2018-03-16 NOTE — PROGRESS NOTES
1930  Bedside and Verbal shift change report given to Smith Knowles RN (oncoming nurse) by Holly Funes (offgoing nurse). Report included the following information SBAR, Kardex, Procedure Summary, Intake/Output, MAR, Accordion and Recent Results. Received patient on the recliner watching television without any complaints of pain. 2230  CHG bath done. Visit Vitals    BP (!) 154/91    Pulse 78    Temp 98.2 °F (36.8 °C)    Resp 17    Ht 5' 8\" (1.727 m)    Wt 109.5 kg (241 lb 6.5 oz)    SpO2 94%    BMI 36.71 kg/m2     Bedside and Verbal shift change report given to Danica Anna (oncoming nurse) by Smith Knowles RN (offgoing nurse). Report included the following information SBAR, Kardex, Procedure Summary, Intake/Output, MAR, Accordion and Recent Results.

## 2018-03-16 NOTE — CARDIO/PULMONARY
Cardiac Rehab:   3/16/2018 @ 1030:  Received cardiac rehab consult for post-cath education. Pt is already being followed for CM education (See MONIQUE Sanchez's RN initial education note 3/14/18). Pt is presently in cath lab. Cardiac rehab will follow.

## 2018-03-16 NOTE — PROGRESS NOTES
1400 TRANSFER - IN REPORT:    Verbal report received from Ed RN(name) on [employer]  being received from Cath Lab(unit) for routine progression of care      Report consisted of patients Situation, Background, Assessment and   Recommendations(SBAR). Information from the following report(s) SBAR, Kardex, Intake/Output and Recent Results was reviewed with the receiving nurse. Opportunity for questions and clarification was provided. Assessment completed upon patients arrival to unit and care assumed. Primary Nurse Tova Kinsey RN and ELLA Smith RN performed a dual skin assessment on this patient No impairment noted  Juan score is 23

## 2018-03-16 NOTE — PROGRESS NOTES
[facility] Dosing Services: IV to PO Conversion    The pharmacist has determined that this patient meets P & T approved criteria for conversion from IV to oral therapy for the following medication:Pantoprazole    The pharmacist has written the following order for the patient: pantoprazole 40mg PO twice daily before meals  The pharmacist will continue to monitor the patient's status and advise the physician if conversion back to IV therapy is recommended.     Signed Jackeline Schmitz Contact information:  [phone]

## 2018-03-16 NOTE — PROGRESS NOTES
Problem: Falls - Risk of  Goal: *Absence of Falls  Document Maria Fernanda Fall Risk and appropriate interventions in the flowsheet.    Outcome: Progressing Towards Goal  Fall Risk Interventions:            Medication Interventions: Bed/chair exit alarm, Evaluate medications/consider consulting pharmacy, Patient to call before getting OOB

## 2018-03-16 NOTE — PROGRESS NOTES
[facility]   Daily Progress Note    Date: 3/16/2018  PCP: Akanksha Garzon MD     Assessment/Plan:   Usha Paula is a 75 y.o. male with history of Type 2 DM, CKD stage 3b, HTN, Anemia Hypothyroidism who has spent 4 night(s) in the hospital, who is admitted for acute hypoxic respiratory failure due to HFrEF.    24 hour Event:  -underwent left heart catheterization yesterday. Pt recovering well. Acute hypoxic respiratory failure likely due to HFrEF: S/P Left heart cath (3/16). Echo (3/13/18) showed cardiomyopathy with EF 35-40% and moderate diffuse hypokinesia (compared with that of 07/2014). CTA showed no pe. Pro- BNP of 2369 and symptoms of orthopnea most likely suggestive of pulmonary edema. Weaned off BIPAP, Currently on room air. Abnormal Lexiscan with LVEF 38% and severe risk of ischemia.    - Daily plavix 75 mg, will add ASA upon discharge. - On 3/14 pt was started on hydralazine 37.5mg TID, Isosorbide mononitrate 30mg daily and Coreg 6.25mg BID. - Will consider RAAS inhibition once stability of renal function established per cardio  - Holding lasix for now given elevated creatinine  - Strict I/Os with daily weight.  -Card following, appreciate recs       HTN   - POA /99, now  139/64.   -Discontinued home Amlodipine.   - On Coreg per cardio recs. - Cardiology following     Type 2 DM: Last A1C is 7.8 (2/20/2018).  Sugars have been well controlled since admission  - Home dose of lantus 10 units at qhs ( although patient hasn't been compliant)  - Will restart 80% of home lantus with 8 units qhs.   - POC gluocse AC&QHS with SSI     Hypothyroidism   - Low TSH (0.08) and normal free T4.  - Patient with history of hypothyroidism but non compliant with Synthroid  - Primary vs Secondary hypothyroidism  - Hold Syntrhoid     CKD stage 3b  - Cr 2.13 (POA Cr 1.99, baseline ~1.8)  -Stable, will continue to monitor  -Dr. Dottie Spencer (Nephro) saw pt and agrees to cardio recs above.     Anemia: Hgb 9.6 (POA 9.1, baseline~10)  Worked up in the past (2014) by Dr. Pablo Alvarado and found to be multifactoria (CKD, Anemia of chronic disease and B12 def)  -Iron profile (3/14/18): Fe 30 (L), TIBC 260 (normal), Fe sats 12% (L), Ferritin normal. Folate is and B12 are normal  - On home Iron with bowel regimen  - Daily CBCs     FEN/GI -Cardiac regular  Activity - Ambulate with assitance   DVT prophylaxis - Heparin  GI prophylaxis -  Protonix  Disposition - D/c today with H2H visit with Earl Downs.     CODE STATUS:  Full Code    Pt was discussed with Dr. Camilo Ledesma (supervising provider)    I appreciate the opportunity to participate in the care of this patient,    Fatou Wan MD  [facility] Resident         Subjective   Patient Denies fever, chills, chest pain, palpitations, shortness of breath, abdominal pain, nausea and vomiting, and LE edema. Tolerating diet.  Sats are fine on room air    Inpatient Medications  Current Facility-Administered Medications   Medication Dose Route Frequency    verapamil (ISOPTIN) 2.5 mg/mL injection        heparin (porcine) 1,000 unit/mL injection        iopamidol (ISOVUE-370) 76 % injection        nitroglycerin 1 mg/10mL injection        0.9% sodium chloride (MBP/ADV) infusion        hydrALAZINE (APRESOLINE) 20 mg/mL injection 10-20 mg  10-20 mg IntraVENous Multiple    sodium chloride (NS) flush 5-10 mL  5-10 mL IntraVENous Q8H    sodium chloride (NS) flush 5-10 mL  5-10 mL IntraVENous PRN    diphenhydrAMINE (BENADRYL) injection 25 mg  25 mg IntraVENous ONCE PRN    0.9% sodium chloride infusion  50 mL/hr IntraVENous CONTINUOUS    [START ON 3/17/2018] clopidogrel (PLAVIX) tablet 75 mg  75 mg Oral DAILY    levothyroxine (SYNTHROID) tablet 150 mcg  150 mcg Oral 6am    ferrous sulfate tablet 325 mg  325 mg Oral DAILY    hydrALAZINE (APRESOLINE) tablet 37.5 mg  37.5 mg Oral TID    isosorbide mononitrate ER (IMDUR) tablet 30 mg  30 mg Oral DAILY  carvedilol (COREG) tablet 6.25 mg  6.25 mg Oral BID WITH MEALS    sodium chloride (NS) flush 5-10 mL  5-10 mL IntraVENous Q8H    sodium chloride (NS) flush 5-10 mL  5-10 mL IntraVENous PRN    dextrose (D50W) injection syrg 12.5-25 g  12.5-25 g IntraVENous PRN    insulin glargine (LANTUS) injection 8 Units  8 Units SubCUTAneous QHS    pantoprazole (PROTONIX) 40 mg in 0 mL injection  40 mg IntraVENous Q12H    hydrALAZINE (APRESOLINE) 20 mg/mL injection 20 mg  20 mg IntraVENous Q6H PRN    insulin lispro (HUMALOG) injection   SubCUTAneous AC&HS    glucose chewable tablet 16 g  4 Tab Oral PRN    dextrose (D50W) injection syrg 12.5-25 g  12.5-25 g IntraVENous PRN    glucagon (GLUCAGEN) injection 1 mg  1 mg IntraMUSCular PRN    saline peripheral flush soln 5-10 mL  5-10 mL IntraVENous Q8H    sodium chloride (NS) flush 5-10 mL  5-10 mL IntraVENous PRN         Allergies  No Known Allergies      Objective  Vitals:  Visit Vitals    /51    Pulse 79    Temp 98.2 °F (36.8 °C)    Resp 11    Ht 5' 8\" (1.727 m)    Wt 242 lb 3.2 oz (109.9 kg)    SpO2 100%    BMI 36.83 kg/m2      Temp (24hrs), Av °F (36.7 °C), Min:97.7 °F (36.5 °C), Max:98.2 °F (36.8 °C)     O2 Flow Rate (L/min): 15 l/min   O2 Device: Room air    I/O:    Intake/Output Summary (Last 24 hours) at 18 1351  Last data filed at 18 1343   Gross per 24 hour   Intake             1140 ml   Output             2080 ml   Net             -940 ml     Last 3 shifts:     1901 -  0700  In: 1140 [P.O.:740; I.V.:400]  Out: 2180 [Urine:2180]    Physical Exam:  General: No acute distress. Alert. Cooperative. Head: Normocephalic. Atraumatic. Eyes:  Conjunctiva pink. Sclera white. PERRL. Nose:  Septum midline. Mucosa pink. No drainage. Throat: Mucosa pink. Moist mucous membranes. No tonsillar exudates or erythema. Palate movement equal bilaterally. Respiratory: Normal work of breathing, CTAB   Cardiovascular: RRR.  Normal S1,S2. No m/r/g. Pulses 2+ throughout. GI: + bowel sounds. Nontender. No rebound tenderness or guarding. Nondistended   Extremities: No edema. No tenderness. Neuro:                          Oriented. No focal deficits  Skin:                             Warm and dry. No rashes or lesions      Labs and Data:    Telemetry: Normal sinus rhythm  Recent Labs      03/16/18   0200  03/15/18   0119  03/14/18   0123   WBC  4.9  6.2  5.7   HGB  9.9*  9.8*  10.4*   HCT  31.2*  30.6*  32.8*   PLT  268  272  262     Recent Labs      03/16/18   0200  03/15/18   0119  03/14/18   0123   NA  137  136  138   K  3.5  3.7  3.6   CL  102  101  101   CO2  24  23  26   GLU  100  114*  126*   BUN  21*  20  17   CREA  2.04*  2.10*  2.08*   CA  8.9  8.9  9.2   PHOS  3.7   --    --    ALB  2.6*  2.7*  2.9*   SGOT  14*  17  23   ALT  30  38  59         Signed by:  Azael Cortez MD  Resident, Family Medicine  03/16/18       Attending Note:

## 2018-03-16 NOTE — ROUTINE PROCESS
TRANSFER - IN REPORT:    Verbal report received from bedside RN on Donna Chávez  being received from [id number] for ordered procedure      Report consisted of patients Situation, Background, Assessment and   Recommendations(SBAR). Information from the following report(s) Kardex and Cardiac Rhythm NSR was reviewed with the receiving nurse. Opportunity for questions and clarification was provided. Assessment completed upon patients arrival to unit and care assumed.

## 2018-03-16 NOTE — PROGRESS NOTES
Primary Nurse Jacob Frankel RN and Jamil Jones RN performed a dual skin assessment on this patient No impairment noted  Juan score is 20

## 2018-03-17 VITALS
SYSTOLIC BLOOD PRESSURE: 138 MMHG | HEIGHT: 68 IN | TEMPERATURE: 97.5 F | OXYGEN SATURATION: 99 % | DIASTOLIC BLOOD PRESSURE: 69 MMHG | BODY MASS INDEX: 36.35 KG/M2 | WEIGHT: 239.86 LBS | RESPIRATION RATE: 17 BRPM | HEART RATE: 74 BPM

## 2018-03-17 PROBLEM — Z95.5 STATUS POST INSERTION OF DRUG ELUTING CORONARY ARTERY STENT: Status: ACTIVE | Noted: 2018-03-16

## 2018-03-17 PROBLEM — J96.90 RESPIRATORY FAILURE (HCC): Status: RESOLVED | Noted: 2018-03-12 | Resolved: 2018-03-17

## 2018-03-17 LAB
ALBUMIN SERPL-MCNC: 2.5 G/DL (ref 3.5–5)
ALBUMIN/GLOB SERPL: 0.5 {RATIO} (ref 1.1–2.2)
ALP SERPL-CCNC: 81 U/L (ref 45–117)
ALT SERPL-CCNC: 24 U/L (ref 12–78)
ANION GAP SERPL CALC-SCNC: 12 MMOL/L (ref 5–15)
AST SERPL-CCNC: 14 U/L (ref 15–37)
BASOPHILS # BLD: 0 K/UL (ref 0–0.1)
BASOPHILS NFR BLD: 0 % (ref 0–1)
BILIRUB SERPL-MCNC: 0.2 MG/DL (ref 0.2–1)
BUN SERPL-MCNC: 20 MG/DL (ref 6–20)
BUN/CREAT SERPL: 9 (ref 12–20)
CALCIUM SERPL-MCNC: 8.6 MG/DL (ref 8.5–10.1)
CHLORIDE SERPL-SCNC: 106 MMOL/L (ref 97–108)
CO2 SERPL-SCNC: 20 MMOL/L (ref 21–32)
CREAT SERPL-MCNC: 2.13 MG/DL (ref 0.7–1.3)
DIFFERENTIAL METHOD BLD: ABNORMAL
EOSINOPHIL # BLD: 0 K/UL (ref 0–0.4)
EOSINOPHIL NFR BLD: 1 % (ref 0–7)
ERYTHROCYTE [DISTWIDTH] IN BLOOD BY AUTOMATED COUNT: 15.9 % (ref 11.5–14.5)
GLOBULIN SER CALC-MCNC: 4.6 G/DL (ref 2–4)
GLUCOSE BLD STRIP.AUTO-MCNC: 183 MG/DL (ref 65–100)
GLUCOSE BLD STRIP.AUTO-MCNC: 98 MG/DL (ref 65–100)
GLUCOSE SERPL-MCNC: 141 MG/DL (ref 65–100)
HCT VFR BLD AUTO: 30.4 % (ref 36.6–50.3)
HGB BLD-MCNC: 9.6 G/DL (ref 12.1–17)
IMM GRANULOCYTES # BLD: 0 K/UL (ref 0–0.04)
IMM GRANULOCYTES NFR BLD AUTO: 0 % (ref 0–0.5)
LYMPHOCYTES # BLD: 1.1 K/UL (ref 0.8–3.5)
LYMPHOCYTES NFR BLD: 20 % (ref 12–49)
MCH RBC QN AUTO: 24.9 PG (ref 26–34)
MCHC RBC AUTO-ENTMCNC: 31.6 G/DL (ref 30–36.5)
MCV RBC AUTO: 79 FL (ref 80–99)
MONOCYTES # BLD: 0.5 K/UL (ref 0–1)
MONOCYTES NFR BLD: 9 % (ref 5–13)
NEUTS SEG # BLD: 3.7 K/UL (ref 1.8–8)
NEUTS SEG NFR BLD: 70 % (ref 32–75)
NRBC # BLD: 0 K/UL (ref 0–0.01)
NRBC BLD-RTO: 0 PER 100 WBC
PHOSPHATE SERPL-MCNC: 3.3 MG/DL (ref 2.6–4.7)
PLATELET # BLD AUTO: 250 K/UL (ref 150–400)
PMV BLD AUTO: 10 FL (ref 8.9–12.9)
POTASSIUM SERPL-SCNC: 3.8 MMOL/L (ref 3.5–5.1)
PROT SERPL-MCNC: 7.1 G/DL (ref 6.4–8.2)
RBC # BLD AUTO: 3.85 M/UL (ref 4.1–5.7)
SERVICE CMNT-IMP: ABNORMAL
SERVICE CMNT-IMP: NORMAL
SODIUM SERPL-SCNC: 138 MMOL/L (ref 136–145)
WBC # BLD AUTO: 5.3 K/UL (ref 4.1–11.1)

## 2018-03-17 PROCEDURE — 80053 COMPREHEN METABOLIC PANEL: CPT | Performed by: FAMILY MEDICINE

## 2018-03-17 PROCEDURE — 36415 COLL VENOUS BLD VENIPUNCTURE: CPT | Performed by: STUDENT IN AN ORGANIZED HEALTH CARE EDUCATION/TRAINING PROGRAM

## 2018-03-17 PROCEDURE — 74011250637 HC RX REV CODE- 250/637: Performed by: NURSE PRACTITIONER

## 2018-03-17 PROCEDURE — 85025 COMPLETE CBC W/AUTO DIFF WBC: CPT | Performed by: STUDENT IN AN ORGANIZED HEALTH CARE EDUCATION/TRAINING PROGRAM

## 2018-03-17 PROCEDURE — 74011250637 HC RX REV CODE- 250/637: Performed by: SPECIALIST

## 2018-03-17 PROCEDURE — 84100 ASSAY OF PHOSPHORUS: CPT | Performed by: FAMILY MEDICINE

## 2018-03-17 PROCEDURE — 74011250637 HC RX REV CODE- 250/637: Performed by: STUDENT IN AN ORGANIZED HEALTH CARE EDUCATION/TRAINING PROGRAM

## 2018-03-17 PROCEDURE — 82962 GLUCOSE BLOOD TEST: CPT

## 2018-03-17 RX ORDER — GUAIFENESIN 100 MG/5ML
81 LIQUID (ML) ORAL DAILY
Qty: 90 TAB | Refills: 3 | Status: ON HOLD | OUTPATIENT
Start: 2018-03-17 | End: 2020-01-01

## 2018-03-17 RX ORDER — GUAIFENESIN 100 MG/5ML
81 LIQUID (ML) ORAL DAILY
Status: DISCONTINUED | OUTPATIENT
Start: 2018-03-17 | End: 2018-03-17 | Stop reason: HOSPADM

## 2018-03-17 RX ORDER — INSULIN GLARGINE 100 [IU]/ML
10 INJECTION, SOLUTION SUBCUTANEOUS
Qty: 1 VIAL | Refills: 1 | Status: SHIPPED | OUTPATIENT
Start: 2018-03-17 | End: 2018-03-19 | Stop reason: SDUPTHER

## 2018-03-17 RX ORDER — HYDRALAZINE HYDROCHLORIDE 25 MG/1
37.5 TABLET, FILM COATED ORAL 3 TIMES DAILY
Qty: 90 TAB | Refills: 8 | Status: ON HOLD | OUTPATIENT
Start: 2018-03-17 | End: 2020-01-01 | Stop reason: SDUPTHER

## 2018-03-17 RX ORDER — CLOPIDOGREL BISULFATE 75 MG/1
75 TABLET ORAL DAILY
Qty: 90 TAB | Refills: 1 | Status: ON HOLD | OUTPATIENT
Start: 2018-03-18 | End: 2020-01-01 | Stop reason: SDUPTHER

## 2018-03-17 RX ORDER — PANTOPRAZOLE SODIUM 40 MG/1
40 TABLET, DELAYED RELEASE ORAL DAILY
Qty: 60 TAB | Refills: 0 | Status: SHIPPED | OUTPATIENT
Start: 2018-03-17 | End: 2018-05-31 | Stop reason: SDUPTHER

## 2018-03-17 RX ORDER — CARVEDILOL 6.25 MG/1
6.25 TABLET ORAL 2 TIMES DAILY WITH MEALS
Qty: 60 TAB | Refills: 5 | Status: ON HOLD | OUTPATIENT
Start: 2018-03-17 | End: 2020-01-01

## 2018-03-17 RX ORDER — ATORVASTATIN CALCIUM 20 MG/1
40 TABLET, FILM COATED ORAL
Status: DISCONTINUED | OUTPATIENT
Start: 2018-03-17 | End: 2018-03-17 | Stop reason: HOSPADM

## 2018-03-17 RX ORDER — ISOSORBIDE MONONITRATE 30 MG/1
30 TABLET, EXTENDED RELEASE ORAL DAILY
Qty: 30 TAB | Refills: 5 | Status: SHIPPED | OUTPATIENT
Start: 2018-03-17 | End: 2018-10-18 | Stop reason: SDUPTHER

## 2018-03-17 RX ORDER — ATORVASTATIN CALCIUM 40 MG/1
40 TABLET, FILM COATED ORAL DAILY
Qty: 90 TAB | Refills: 3 | Status: ON HOLD | OUTPATIENT
Start: 2018-03-17 | End: 2020-01-01

## 2018-03-17 RX ADMIN — Medication 10 ML: at 05:25

## 2018-03-17 RX ADMIN — CLOPIDOGREL BISULFATE 75 MG: 75 TABLET ORAL at 08:17

## 2018-03-17 RX ADMIN — PANTOPRAZOLE SODIUM 40 MG: 40 TABLET, DELAYED RELEASE ORAL at 08:18

## 2018-03-17 RX ADMIN — ASPIRIN 81 MG 81 MG: 81 TABLET ORAL at 09:55

## 2018-03-17 RX ADMIN — FERROUS SULFATE TAB 325 MG (65 MG ELEMENTAL FE) 325 MG: 325 (65 FE) TAB at 08:18

## 2018-03-17 RX ADMIN — LEVOTHYROXINE SODIUM 150 MCG: 150 TABLET ORAL at 05:26

## 2018-03-17 RX ADMIN — CARVEDILOL 6.25 MG: 6.25 TABLET, FILM COATED ORAL at 08:17

## 2018-03-17 RX ADMIN — ISOSORBIDE MONONITRATE 30 MG: 30 TABLET, EXTENDED RELEASE ORAL at 08:17

## 2018-03-17 RX ADMIN — HYDRALAZINE HYDROCHLORIDE 37.5 MG: 25 TABLET ORAL at 08:17

## 2018-03-17 NOTE — PROGRESS NOTES
Problem: Falls - Risk of  Goal: *Absence of Falls  Document Maria Fernanda Fall Risk and appropriate interventions in the flowsheet.    Outcome: Progressing Towards Goal  Fall Risk Interventions:            Medication Interventions: Bed/chair exit alarm         History of Falls Interventions: Bed/chair exit alarm, Door open when patient unattended, Evaluate medications/consider consulting pharmacy

## 2018-03-17 NOTE — PROGRESS NOTES
3/17/2018   CARE MANAGEMENT NOTE:  (weekend coverage)  CM notified Nurse Navigator, Serafin Elizabeth of pt's discharge.   Mj

## 2018-03-17 NOTE — DISCHARGE SUMMARY
[facility] [address] [place] [address]   Office [phone]  Fax [phone]       Discharge / Transfer / Off-Service Note     Name: Yasmeen Land MRN: [id number]  Sex: Male   YOB: 1950  Age: 75 y.o. PCP: Rola Hernandez MD     Date of admission: 3/12/2018  Date of discharge/transfer: 3/17/2018    Attending physician at admission: Dr. Martínez Yin MD  Attending physician at discharge/transfer: Dr. Edwar Almonte  Resident physician at discharge/transfer: Charlie Herrera MD     Consultants during hospitalization  Sammie Arias and Daniel Snell (Cardiology)  Dr. Shantel Balbuena (Nephrology)     Admission diagnoses   Respiratory failure [facility])    Recommended follow-up after discharge    1. PCP (3/19/2018)  2. Cardiology (4/2/2018)     Things to follow up on with PCP: Kidney function, Hemoglobin, Glucose levels      MEDICATION CHANGES:    1) NEW MEDICATIONS:    -HYDRALAZINE 25 MG, TAKE ONE TABLET, THREE TIMES DAILY  -ISOSORBIDE MONONITRATE ER 30 MG, TAKE ONE TABLET DAILY  -COREG 6.25 MG, TAKE ONE TABLET,  TWO TIMES DAILY  -ASPIRIN 81 MG, ONE TABLET DAILY  -PLAVIX 75 MG, ONE TABLET DAILY  -LIPITOR 40 MG, ONE TABLET DAILY  -PLEASE RESUME HOME INSULIN 10 MG SUBCUTANEOUS AT BEDTIME    2) DISCONTINUED MEDICATIONS:  -AMLODIPINE 10 MG        History of Present Illness    As per admitting provider, Dr. Guillermo Redd, \" Yasmeen Land is a 75 y.o. male with PMHx of Type 2 DM, CKD stage 3b, HTN, Anemia ,Hypothyroidism and poor compliance with medications  who presents to the ER complaining of progressive worsening of dyspnea. Patient reports he has been having dyspnea for the past 2-3 that got worse this afternoon while he was trying to\" change oil\" in his house. He reports he had an episode of dyspnea while laying flat last night. Patient is poor historian and very difficult to understand.  Patient denies any history of smoking,  nasal congestion, cough, chest pain, fever, chills, nausea, vomiting or abdominal pain. Pt reports he has only been taking of amlodipine and iron pills. Of note, upon chart review, patient was  Seen last by his PCP Dr. Nicol Muhammad on 02/20/18 which showed his poor compliance with medial regimen and noted that she was concerned for appropriate judgment he has of his own medical condition. In the ER,   vital signs were remarkable for /99, P of 111, RR 27 SpO2 of 98% on non re-breather mask    Labs were remarkable for pro BNP of 2369, TSH 0.08  And Hgb 10.4 CXR interpretation per this writer shows some edema with cardiomegaly. Pt received duo-neb and was placed on  non rebreather mask en route to the ER by EMS due to low sats in the [id number] followed by BiPAP. \"       Hospital course    Michael Urbina was admitted into the Family Medicine Service from 03/12/2018 to 3/17/2018 for acute hypoxic respiratory failure due to HFrEF. During the course of this admission, the following conditions were addressed/managed; Acute hypoxic respiratory failure likely due to HFrEF:   -On admission, patient complained of orthopnea and worsening SOB with exertion. Pro- BNP was 2369. -CTA showed no pulmonary embolism.    -He was started on  BiPAP and given IV lasix x2 doses. BiPAP was weaned off to room air in <24 hours.  -Echo (3/13/18) showed cardiomyopathy with EF 35-40% and moderate diffuse hypokinesia (compared with that of 07/2014). -Patient was seen by cardiology and had an Abnormal Lexiscan (03/15/18) which showed LVEF 38% and severe risk of ischemia.  -He then had a cardiac cath with PCI (3/16/18). -Patient started on DAPT with ASA 81 mg and plavix 75 mg for a year.  -Also discharged with hydralazine 37.5mg TID, Isosorbide mononitrate 30mg daily and Coreg 6.25mg BID, which were all used during this admission given his LVEF 40%  -Has outpatient follow up with Cardiology on (04/2/2018). They will consider RAAS once stability of renal function established.     HTN   Patient initially presented with significantly elevated BP (204/99) but this has been stabilized with Coreg 6.25mg BID and Hydralazine 37.5mg TID. Scripts for both Coreg and Hydralazine given to patient. We discontinued his home Amlodipine. Type 2 DM: Last A1C is 7.8 (2/20/2018). Sugars have been well controlled since admission using 8 units Lantus at night. Resumed home Lantus 10 units at qhs upon discharge. Patient encouraged to check his sugar about 4 times daily and be compliant with his medications. Hypothyroidism   - Low TSH (0.08) and normal free T4. Patient labs reflective of subclinical hyperthyroidism. He however has a history of hypothyroidism but non compliant with Synthroid. Can continue home Synthroid. Will follow up outpatient with PCP and will need repeat TSH and free T4 in about 6 weeks.      Acute on chronic kidney disease:  - Cr 2.13 (POA Cr 1.99, baseline ~1.8)  - Seen and evaluated by Dr. Octaviano Bryan (Nephro), agrees with cardiology on starting patient on RAAS once creatinine stabilizes.       Anemia: Hgb 9.6 (POA 9.1, baseline~10)  Worked up in the past (2014) by Dr. Rose Cuba and found to be multifactoria (CKD, Anemia of chronic disease and B12 def)  -Iron profile (3/14/18): Fe 30 (L), TIBC 260 (normal), Fe sats 12% (L), Ferritin normal. Folate is and B12 are normal  -Continued home Iron with bowel regimen    PHYSICAL EXAM AT DISCHARGE    Vitals:                           Reviewed    General: No acute distress. Alert. Cooperative. Head: Normocephalic. Atraumatic. Eyes:              Conjunctiva pink. Sclera white. PERRL. Nose:             Septum midline. Mucosa pink. No drainage. Throat: Mucosa pink. Moist mucous membranes. No tonsillar exudates or erythema. Palate movement equal bilaterally. Respiratory: Normal work of breathing, CTAB   Cardiovascular: RRR. Normal S1,S2. No m/r/g. Pulses 2+ throughout. GI: + bowel sounds. Nontender. No rebound tenderness or guarding.  Nondistended   Extremities: No edema. No tenderness. Neuro:                          Oriented. No focal deficits  Skin:                             Warm and dry. No rashes or lesions        Condition at discharge: Stable. Labs  Recent Labs      03/17/18 0347 03/16/18   0200  03/15/18   0119   WBC  5.3  4.9  6.2   HGB  9.6*  9.9*  9.8*   HCT  30.4*  31.2*  30.6*   PLT  250  268  272     Recent Labs      03/17/18   0347 03/16/18   0200  03/15/18   0119   NA  138  137  136   K  3.8  3.5  3.7   CL  106  102  101   CO2  20*  24  23   BUN  20  21*  20   CREA  2.13*  2.04*  2.10*   GLU  141*  100  114*   CA  8.6  8.9  8.9   PHOS  3.3  3.7   --      Recent Labs      03/17/18 0347 03/16/18   0200  03/15/18   0119   SGOT  14*  14*  17   ALT  24  30  38   AP  81  84  86   TBILI  0.2  0.3  0.3   TP  7.1  7.3  7.3   ALB  2.5*  2.6*  2.7*   GLOB  4.6*  4.7*  4.6*     Recent Labs      03/17/18   1040  03/17/18   0821  03/16/18   2129  03/16/18   1620  03/16/18   1101   03/15/18   0119   TIBC   --    --    --    --    --    --   260   PSAT   --    --    --    --    --    --   12*   FERR   --    --    --    --    --    --   129   GLUCPOC  183*  98  109*  118*  87   < >   --     < > = values in this interval not displayed. Cultures:  -Blood culture showed no growth x5 days  -Influenza was negative      Procedures / Diagnostic Studies:  -S/p Cardiac Catheterization     Imaging    Results from Hospital Encounter encounter on 03/12/18   XR CHEST PORT   Narrative INDICATION:   dyspnea    EXAM:  AP CHEST RADIOGRAPH    COMPARISON: February 11, 2017    FINDINGS:    AP portable view of the chest demonstrates a normal cardiomediastinal  silhouette. The lungs are underinflated. There is no edema, effusion,  consolidation, or pneumothorax. The osseous structures are unremarkable. Impression IMPRESSION:  No acute process.                          Results from [place] encounter on 03/12/18   CTA CHEST W OR W WO CONT   Narrative INDICATION: Shortness of breath, hypertension     COMPARISON:  None    TECHNIQUE:  Following the uneventful intravenous administration of 100 cc Isovue  113, thin helical axial images were obtained through the chest. Postprocessing  was performed. 3D image postprocessing was performed. CT dose reduction was achieved through the use of a standardized protocol  tailored for this examination and automatic exposure control for dose  modulation. FINDINGS:    There are no enlarged mediastinal or hilar lymph nodes. The heart size is  normal.  There is no pericardial effusion. No filling defect is seen within the pulmonary arterial system to suggest  pulmonary embolus. The aorta is normal in caliber. There is bilateral lower lobe atelectasis. There is groundglass opacification  and portions of the right upper lobe. No pulmonary nodule or mass is seen. There are moderate layering bilateral pleural effusions. Limited evaluation of the upper abdomen demonstrates layering gallstones in the  gallbladder. The osseous structures are unremarkable. Impression IMPRESSION:  1. No evidence of pulmonary embolus. 2.  Moderate layering bilateral pleural effusions. 3. Bilateral lower lobe atelectasis. 4. Focal groundglass opacification in the right upper lobe measuring 3.5 cm is  nonspecific. May represent focal airspace disease. Consider follow-up study in  3-6 months. 5. Cholelithiasis. No procedure found. Chronic diagnoses   Problem List as of 3/17/2018  Date Reviewed: 2/20/2018          Codes Class Noted - Resolved    Ground glass opacity present on imaging of lung (Chronic) ICD-10-CM: R91.8  ICD-9-CM: 793.19  3/14/2018 - Present    Overview Signed 3/14/2018 12:00 PM by Dameon Michelel MD     Seen on CT 3/13/18.   Needs follow up CT in June 2018             * (Principal)Respiratory failure [facility]) ICD-10-CM: J96.90  ICD-9-CM: 518.81  3/12/2018 - Present        Type 2 diabetes with nephropathy ([address] ICD-10-CM: E11.21  ICD-9-CM: 250.40, 583.81  2/20/2018 - Present        Noncompliance with diabetes treatment ICD-10-CM: Z91.19  ICD-9-CM: V15.81  2/20/2018 - Present    Overview Signed 2/20/2018 10:45 AM by Zach Georges MD     Patient is persistently non-compliant with medical regimen. Does not adhere to diabetic teaching, dose not follow up. Stops medications without doctors approval. Poor historian. Edema of right ankle ICD-10-CM: M25.471  ICD-9-CM: 719.07  10/31/2017 - Present        Anemia ICD-10-CM: D64.9  ICD-9-CM: 285.9  7/17/2014 - Present        Protein calorie malnutrition ([facility] ICD-10-CM: E46  ICD-9-CM: 263.9  6/23/2014 - Present        HTN (hypertension) ICD-10-CM: I10  ICD-9-CM: 401.9  6/23/2014 - Present        CKD (chronic kidney disease) stage 2, GFR 60-89 ml/min (Chronic) ICD-10-CM: N18.2  ICD-9-CM: 585.2  6/23/2014 - Present        RESOLVED: Dyspnea ICD-10-CM: R06.00  ICD-9-CM: 786.09  7/17/2014 - 7/26/2014        RESOLVED: Acute renal failure ([facility] ICD-10-CM: N17.9  ICD-9-CM: 584.9  6/23/2014 - 2/20/2018        RESOLVED: Generalized weakness ICD-10-CM: R53.1  ICD-9-CM: 780.79  6/23/2014 - 6/28/2014        RESOLVED: UTI (lower urinary tract infection) ICD-10-CM: N39.0  ICD-9-CM: 599.0  6/23/2014 - 6/28/2014        RESOLVED: Diabetes mellitus type 2, controlled, without complications ([facility] NER-35-BX: E11.9  ICD-9-CM: 250.00  6/23/2014 - 2/20/2018        RESOLVED: High anion gap metabolic acidosis QRE-45-MQ: E87.2  ICD-9-CM: 276.2  6/23/2014 - 6/28/2014        RESOLVED: Hyponatremia ICD-10-CM: E87.1  ICD-9-CM: 276.1  6/23/2014 - 6/28/2014        RESOLVED: Metabolic encephalopathy NOX-68-KZ: G93.41  ICD-9-CM: 348.31  6/23/2014 - 6/28/2014              Discharge/Transfer Medications  Current Discharge Medication List      START taking these medications    Details   aspirin 81 mg chewable tablet Take 1 Tab by mouth daily.   Qty: 90 Tab, Refills: 3      carvedilol (COREG) 6.25 mg tablet Take 1 Tab by mouth two (2) times daily (with meals). Qty: 60 Tab, Refills: 5      hydrALAZINE (APRESOLINE) 25 mg tablet Take 1.5 Tabs by mouth three (3) times daily. Qty: 90 Tab, Refills: 8      isosorbide mononitrate ER (IMDUR) 30 mg tablet Take 1 Tab by mouth daily. Qty: 30 Tab, Refills: 5      clopidogrel (PLAVIX) 75 mg tab Take 1 Tab by mouth daily. Qty: 90 Tab, Refills: 1      insulin glargine (LANTUS) 100 unit/mL injection 10 Units by SubCUTAneous route nightly. Qty: 1 Vial, Refills: 1      pantoprazole (PROTONIX) 40 mg tablet Take 1 Tab by mouth daily. Qty: 60 Tab, Refills: 0      atorvastatin (LIPITOR) 40 mg tablet Take 1 Tab by mouth daily. Qty: 90 Tab, Refills: 3         CONTINUE these medications which have NOT CHANGED    Details   ferrous sulfate 325 mg (65 mg iron) tablet Take 325 mg by mouth daily. levothyroxine (SYNTHROID) 150 mcg tablet TAKE ONE TABLET BY MOUTH ONCE DAILY BEFORE BREAKFAST  Qty: 30 Tab, Refills: 5    Comments: Please consider 90 day supplies to promote better adherence         STOP taking these medications       amLODIPine (NORVASC) 10 mg tablet Comments:   Reason for Stopping:                Diet:  Cardiac diet.     Activity:  As tolerated     Disposition: Home or Self Care    Discharge instructions to patient/family  Please seek medical attention for any new or worsening symptoms particularly fever, chest pain, shortness of breath, abdominal pain, nausea, vomiting    Follow up plans/appointments  Follow-up Information     Follow up With Details Comments Contact Info    Amanda Chapin MD On 3/19/2018 Follow up with your primary care doctor at on Monday, 3/19/2018 at 1:45 PM   [address]  [address]      Catherine Lacey MD On 4/2/2018 10:20 am [address]  [facility] [address]             Elaine Parsons MD  Family Medicine Resident       For Billing    Chief Complaint   Patient presents with    Shortness of Breath       Hospital Problems  Date Reviewed: 2/20/2018          Codes Class Noted POA    Ground glass opacity present on imaging of lung (Chronic) ICD-10-CM: R91.8  ICD-9-CM: 793.19  3/14/2018 Unknown    Overview Signed 3/14/2018 12:00 PM by Dameon Michelle MD     Seen on CT 3/13/18. Needs follow up CT in June 2018             * (Principal)Respiratory failure [facility]) ICD-10-CM: J96.90  ICD-9-CM: 518.81  3/12/2018 Yes        Type 2 diabetes with nephropathy ([facility] ICD-10-CM: E11.21  ICD-9-CM: 250.40, 583.81  2/20/2018 Yes        Noncompliance with diabetes treatment ICD-10-CM: Z91.19  ICD-9-CM: V15.81  2/20/2018 Yes    Overview Signed 2/20/2018 10:45 AM by Nadiya Cabrera MD     Patient is persistently non-compliant with medical regimen. Does not adhere to diabetic teaching, dose not follow up. Stops medications without doctors approval. Poor historian.               Anemia ICD-10-CM: D64.9  ICD-9-CM: 285.9  7/17/2014 Yes        HTN (hypertension) ICD-10-CM: I10  ICD-9-CM: 401.9  6/23/2014 Yes        CKD (chronic kidney disease) stage 2, GFR 60-89 ml/min (Chronic) ICD-10-CM: N18.2  ICD-9-CM: 585.2  6/23/2014 Yes

## 2018-03-17 NOTE — PROGRESS NOTES
Discharged home with cousin. Cardiac Rehab education completed with patient and family and prescription drug discount card given by physician. No complaints of pain, dizziness, or malaise at discharge. PIVs removed prior to discharge and education completed with review of disharge paperwork. Patient taken to awaiting family car via wheel chair with possessions in hand.

## 2018-03-17 NOTE — PROGRESS NOTES
Problem: Falls - Risk of  Goal: *Absence of Falls  Document Maria Fernanda Fall Risk and appropriate interventions in the flowsheet.    Fall Risk Interventions:            Medication Interventions: Bed/chair exit alarm         History of Falls Interventions: Bed/chair exit alarm, Door open when patient unattended, Evaluate medications/consider consulting pharmacy

## 2018-03-17 NOTE — PROGRESS NOTES
Job Queen MD. [facility]              Patient: Altagracia Ivory  : 1950      Today's Date: 3/17/2018          CARDIOLOGY PROGRESS NOTE  S: Doing well. No complaints. O:   Physical Exam:  Visit Vitals    /90    Pulse 83    Temp 98 °F (36.7 °C)    Resp 21    Ht 5' 8\" (1.727 m)    Wt 239 lb 13.8 oz (108.8 kg)    SpO2 100%    BMI 36.47 kg/m2     Patient appears generally well, mood and affect are appropriate and pleasant. HEENT:  Hearing intact, non-icteric, normocephalic, atraumatic. Neck Exam: Supple    Lung Exam: Clear to auscultation, even breath sounds. Cardiac Exam: Regular rate and rhythm with no murmur  Abdomen: Soft, non-tender,obese   Extremities: MAW, No lower extremity edema. Psych: Appropriate affect  Neuro - Grossly intact      Review of Symptoms:  Constitutional: Negative for fever   HEENT: Negative for vision changes. Respiratory: Negative for productive cough  Cardiovascular: Negative for syncope    Gastrointestinal: Negative for abdominal pain, melena  Genitourinary: Negative for dysuria  Skin: Negative for rash  Heme: No problems bleeding.   Neuro - no speech changes or focal weaknesses            LABS / OTHER STUDIES:     Recent Results (from the past 24 hour(s))   GLUCOSE, POC    Collection Time: 18  4:20 PM   Result Value Ref Range    Glucose (POC) 118 (H) 65 - 100 mg/dL    Performed by [id number], POC    Collection Time: 18  9:29 PM   Result Value Ref Range    Glucose (POC) 109 (H) 65 - 100 mg/dL    Performed by [facility]    CBC WITH AUTOMATED DIFF    Collection Time: 18  3:47 AM   Result Value Ref Range    WBC 5.3 4.1 - 11.1 K/uL    RBC 3.85 (L) 4.10 - 5.70 M/uL    HGB 9.6 (L) 12.1 - 17.0 g/dL    HCT 30.4 (L) 36.6 - 50.3 %    MCV 79.0 (L) 80.0 - 99.0 FL    MCH 24.9 (L) 26.0 - 34.0 PG    MCHC 31.6 30.0 - 36.5 g/dL    RDW 15.9 (H) 11.5 - 14.5 %    PLATELET 188 103 - 742 K/uL    MPV 10.0 8.9 - 12.9 FL    NRBC 0.0 0 PER 100 WBC    ABSOLUTE NRBC 0.00 0.00 - 0.01 K/uL    NEUTROPHILS 70 32 - 75 %    LYMPHOCYTES 20 12 - 49 %    MONOCYTES 9 5 - 13 %    EOSINOPHILS 1 0 - 7 %    BASOPHILS 0 0 - 1 %    IMMATURE GRANULOCYTES 0 0.0 - 0.5 %    ABS. NEUTROPHILS 3.7 1.8 - 8.0 K/UL    ABS. LYMPHOCYTES 1.1 0.8 - 3.5 K/UL    ABS. MONOCYTES 0.5 0.0 - 1.0 K/UL    ABS. EOSINOPHILS 0.0 0.0 - 0.4 K/UL    ABS. BASOPHILS 0.0 0.0 - 0.1 K/UL    ABS. IMM. GRANS. 0.0 0.00 - 0.04 K/UL    DF AUTOMATED     METABOLIC PANEL, COMPREHENSIVE    Collection Time: 03/17/18  3:47 AM   Result Value Ref Range    Sodium 138 136 - 145 mmol/L    Potassium 3.8 3.5 - 5.1 mmol/L    Chloride 106 97 - 108 mmol/L    CO2 20 (L) 21 - 32 mmol/L    Anion gap 12 5 - 15 mmol/L    Glucose 141 (H) 65 - 100 mg/dL    BUN 20 6 - 20 MG/DL    Creatinine 2.13 (H) 0.70 - 1.30 MG/DL    BUN/Creatinine ratio 9 (L) 12 - 20      GFR est AA 38 (L) >60 ml/min/1.73m2    GFR est non-AA 31 (L) >60 ml/min/1.73m2    Calcium 8.6 8.5 - 10.1 MG/DL    Bilirubin, total 0.2 0.2 - 1.0 MG/DL    ALT (SGPT) 24 12 - 78 U/L    AST (SGOT) 14 (L) 15 - 37 U/L    Alk.  phosphatase 81 45 - 117 U/L    Protein, total 7.1 6.4 - 8.2 g/dL    Albumin 2.5 (L) 3.5 - 5.0 g/dL    Globulin 4.6 (H) 2.0 - 4.0 g/dL    A-G Ratio 0.5 (L) 1.1 - 2.2     PHOSPHORUS    Collection Time: 03/17/18  3:47 AM   Result Value Ref Range    Phosphorus 3.3 2.6 - 4.7 MG/DL   GLUCOSE, POC    Collection Time: 03/17/18  8:21 AM   Result Value Ref Range    Glucose (POC) 98 65 - 100 mg/dL    Performed by [employer]    GLUCOSE, POC    Collection Time: 03/17/18 10:40 AM   Result Value Ref Range    Glucose (POC) 183 (H) 65 - 100 mg/dL    Performed by [employer]        Lab Results   Component Value Date/Time    Cholesterol, total 217 (H) 02/20/2018 11:17 AM    HDL Cholesterol 38 (L) 02/20/2018 11:17 AM    LDL, calculated 157 (H) 02/20/2018 11:17 AM    VLDL, calculated 22 02/20/2018 11:17 AM    Triglyceride 110 02/20/2018 11:17 AM    CHOL/HDL Ratio 6.8 (H) 06/23/2014 09:15 PM           CARDIAC DIAGNOSTICS:     Cardiac Evaluation Includes:    Cardiac cath 3/16/18 - L Main:Nml. LAD: Med - MLI; D1 - ostial 40%; Mid 70%. LCflex: Med; MLI; .  RI - Med Nml. RCA: med to large; Nml; PDA and PLB - Nml. LVEDP: mildly elevated. LVEF: Not assessed. No significant gradient across aortic valve.---> Mid Diag 70% Pre dil with 2 by 12 balloon SEBASTIAN 2.5 by 15 ( resolute )          ASSESSMENT AND PLAN:     Assessment and Plan:  1) Cardiomyopathy and chronic CHF (Class II NYHA)   -  LVEF 40% per echo, with inferolateral hypokinesis  - Cath with PCI to diagonal --> DAPT 1 year  - Statin added for CAD   -continue coreg, imdur and hydralazine  -Consider RAAS inhibition once stability of renal function established as outpatient. - Home today - outpatient FU arranged.    2) Hypertension  - cont meds   - BP OK      3) CKD: stable, nephrology following     4) Dyslipidemia   - needs a statin with CAD         Zulema Walker MD, [address], [address]  [address].  [address], [facility] N. Iris Levine.  [place], [address] [place] [address]  Ph: [phone]   Ph [phone]

## 2018-03-17 NOTE — CARDIO/PULMONARY
Cardiac Rehab: Following patient for HF & post-PCI education. S/P PTCA with SEBASTIAN to D1 (3/16). LVEF 35-40%. Cardiologist is Dr Marques Dooley. Cardiac Meds:  ACE/ARB - none (admission creat 1.99)  BB - started carvedilol   Statin - started atorvastatin (see lipid panel, 2/20/28). ASA - started 81 mg  Prior to admission meds also included: amlodipine. New PO Cardiac Medications: Plavix, carvedilol, Imdur, hydralazine, atorvastatin, and aspirin. Smoking History: Former smoker (quit 1994). 3/17/2018 Met with Malinda Eri prior to discharge from [facility]. His cousin Christal Perales) was also present. Discussed pt/family's understanding of PCI procedure and tx plan. Added CAD education info, with stent card, to HF folder. Reviewed post-PCI instructions, via femoral site. Reviewed purposes of new cardiac meds. Emphasized importance of med compliance, especially with Plavix, to avoid occluding stent. Amlodipine has been discontinued. Family Pracitice resident arrived to further reinforce need for med compliance and f/u appt with PCP Dr Erika Butler on Monday, 3/19. Also has f/u appt scheduled with Dr Marques Dooley on 4/2/18. Pt declined outpatient cardiac rehab, due to lack of transportation and limited financial resources to cover co-pay. Reinforced importance of daily weight monitoring, when to call MD for fluid wt gain, and low sodium diet (less than 1500 mg daily). Brother-in-law to obtain digital scale. Explained need to drink enough fluid to avoid dehydration, while also being careful not to become fluid overload. Per MD, discharging without diuretic, due to elevated creat. [place] Eri could benefit from ongoing reinforcement on all topics discussed. He has limited understanding of his health problems. Encouraged family to assist pt in improving his medication & dietary compliance. All questions were answered. Recommend teaching be provided with family present, due to pt's cognitive limitations.

## 2018-03-18 LAB
BACTERIA SPEC CULT: NORMAL
SERVICE CMNT-IMP: NORMAL

## 2018-03-19 ENCOUNTER — PATIENT OUTREACH (OUTPATIENT)
Dept: FAMILY MEDICINE CLINIC | Age: 68
End: 2018-03-19

## 2018-03-19 ENCOUNTER — OFFICE VISIT (OUTPATIENT)
Dept: FAMILY MEDICINE CLINIC | Age: 68
End: 2018-03-19

## 2018-03-19 VITALS
TEMPERATURE: 98.4 F | RESPIRATION RATE: 18 BRPM | HEIGHT: 68 IN | WEIGHT: 248 LBS | DIASTOLIC BLOOD PRESSURE: 84 MMHG | SYSTOLIC BLOOD PRESSURE: 138 MMHG | HEART RATE: 75 BPM | BODY MASS INDEX: 37.59 KG/M2 | OXYGEN SATURATION: 100 %

## 2018-03-19 DIAGNOSIS — E02 SUBCLINICAL IODINE-DEFICIENCY HYPOTHYROIDISM: ICD-10-CM

## 2018-03-19 DIAGNOSIS — I10 ESSENTIAL HYPERTENSION: ICD-10-CM

## 2018-03-19 DIAGNOSIS — N18.2 CKD (CHRONIC KIDNEY DISEASE) STAGE 2, GFR 60-89 ML/MIN: Chronic | ICD-10-CM

## 2018-03-19 DIAGNOSIS — E11.21 TYPE 2 DIABETES WITH NEPHROPATHY (HCC): Primary | ICD-10-CM

## 2018-03-19 DIAGNOSIS — D64.9 ANEMIA, UNSPECIFIED TYPE: ICD-10-CM

## 2018-03-19 PROBLEM — E66.01 SEVERE OBESITY (BMI 35.0-39.9) WITH COMORBIDITY (HCC): Status: ACTIVE | Noted: 2018-03-19

## 2018-03-19 RX ORDER — AMLODIPINE BESYLATE 10 MG/1
TABLET ORAL
COMMUNITY
Start: 2018-02-09 | End: 2018-03-19

## 2018-03-19 RX ORDER — INSULIN GLARGINE 100 [IU]/ML
10 INJECTION, SOLUTION SUBCUTANEOUS
Qty: 1 VIAL | Refills: 1 | Status: ON HOLD | OUTPATIENT
Start: 2018-03-19 | End: 2020-01-01

## 2018-03-19 RX ORDER — LEVOTHYROXINE SODIUM 150 UG/1
150 TABLET ORAL
Qty: 30 TAB | Refills: 5 | Status: SHIPPED | OUTPATIENT
Start: 2018-03-19 | End: 2018-09-29 | Stop reason: SDUPTHER

## 2018-03-19 RX ORDER — PNEUMOCOCCAL 13-VALENT CONJUGATE VACCINE 2.2; 2.2; 2.2; 2.2; 2.2; 4.4; 2.2; 2.2; 2.2; 2.2; 2.2; 2.2; 2.2 UG/.5ML; UG/.5ML; UG/.5ML; UG/.5ML; UG/.5ML; UG/.5ML; UG/.5ML; UG/.5ML; UG/.5ML; UG/.5ML; UG/.5ML; UG/.5ML; UG/.5ML
INJECTION, SUSPENSION INTRAMUSCULAR
COMMUNITY
Start: 2018-02-22 | End: 2018-03-19

## 2018-03-19 NOTE — MR AVS SNAPSHOT
[address] [facility] 
 
 
 [address] [facility] 
[phone] Patient: Sherwin José MRN: [id number] [id number] Visit Information Date & Time Provider Department Dept. Phone Encounter #  
 3/19/2018  1:45 PM Ion Gupta [phone] Follow-up Instructions Return in about 1 week (around 3/26/2018), or if symptoms worsen or fail to improve, for Need labs/follow up. Your Appointments 4/2/2018 10:20 AM  
ESTABLISHED PATIENT with Lisette Johnson MD  
[facility] ([address]) Appt Note: hospital follow up for Gonzalez Burrell [address] [facility] 600 1007 [address] [address] [address] Upcoming Health Maintenance Date Due DTaP/Tdap/Td series (1 - Tdap) 9/23/1970 ZOSTER VACCINE AGE 60> 7/23/2009 GLAUCOMA SCREENING Q2Y 9/23/2014 FOBT Q 1 YEAR AGE 50-75 7/18/2015 EYE EXAM RETINAL OR DILATED Q1 7/30/2015 HEMOGLOBIN A1C Q6M 8/20/2018 FOOT EXAM Q1 2/20/2019 MICROALBUMIN Q1 2/20/2019 LIPID PANEL Q1 2/20/2019 MEDICARE YEARLY EXAM 2/21/2019 Allergies as of 3/19/2018  Review Complete On: 3/19/2018 By: Malcolm Mittal MD  
 No Known Allergies Current Immunizations  Reviewed on 2/20/2018 Name Date Pneumococcal Conjugate (PCV-13) 2/22/2018 Pneumococcal Polysaccharide (PPSV-23) 10/6/2014 10:02 AM  
  
 Not reviewed this visit You Were Diagnosed With   
  
 Codes Comments Type 2 diabetes with nephropathy (HCC)    -  Primary ICD-10-CM: E11.21 
ICD-9-CM: 250.40, 583.81   
 CKD (chronic kidney disease) stage 2, GFR 60-89 ml/min     ICD-10-CM: N18.2 ICD-9-CM: 545. 2 Anemia, unspecified type     ICD-10-CM: D64.9 ICD-9-CM: 285.9 Essential hypertension     ICD-10-CM: I10 
ICD-9-CM: 401.9 Subclinical iodine-deficiency hypothyroidism     ICD-10-CM: E02 ICD-9-CM: 244.8 Vitals BP Pulse Temp Resp Height(growth percentile) Weight(growth percentile) 138/84 (BP 1 Location: Left arm, BP Patient Position: Sitting) 75 98.4 °F (36.9 °C) (Oral) 18 5' 8\" (1.727 m) 248 lb (112.5 kg) SpO2 BMI Smoking Status 100% 37.71 kg/m2 Former Smoker Vitals History BMI and BSA Data Body Mass Index Body Surface Area  
 37.71 kg/m 2 2.32 m 2 Preferred Pharmacy Pharmacy Name Phone [address] [phone] Your Updated Medication List  
  
   
This list is accurate as of 3/19/18  2:20 PM.  Always use your most recent med list.  
  
  
  
  
 aspirin 81 mg chewable tablet Take 1 Tab by mouth daily. atorvastatin 40 mg tablet Commonly known as:  LIPITOR Take 1 Tab by mouth daily. carvedilol 6.25 mg tablet Commonly known as:  [employer] Take 1 Tab by mouth two (2) times daily (with meals). clopidogrel 75 mg Tab Commonly known as:  PLAVIX Take 1 Tab by mouth daily. ferrous sulfate 325 mg (65 mg iron) tablet Take 325 mg by mouth daily. hydrALAZINE 25 mg tablet Commonly known as:  APRESOLINE Take 1.5 Tabs by mouth three (3) times daily. insulin glargine 100 unit/mL injection Commonly known as:  LANTUS  
10 Units by SubCUTAneous route nightly. isosorbide mononitrate ER 30 mg tablet Commonly known as:  IMDUR Take 1 Tab by mouth daily. levothyroxine 150 mcg tablet Commonly known as:  SYNTHROID Take 1 Tab by mouth Daily (before breakfast). pantoprazole 40 mg tablet Commonly known as:  PROTONIX Take 1 Tab by mouth daily. Prescriptions Sent to Pharmacy Refills  
 insulin glargine (LANTUS) 100 unit/mL injection 1 Sig: 10 Units by SubCUTAneous route nightly.   
 Class: Normal  
 Pharmacy: [facility] [address] [facility] Jose Antonio Hager Ph #: [phone] Route: SubCUTAneous  
 levothyroxine (SYNTHROID) 150 mcg tablet 5 Sig: Take 1 Tab by mouth Daily (before breakfast). Class: Normal  
 Pharmacy: [facility] [address] [facility] [place] Hager Ph #: [phone] Route: Oral  
  
Follow-up Instructions Return in about 1 week (around 3/26/2018), or if symptoms worsen or fail to improve, for Need labs/follow up. To-Do List   
 03/20/2018 To Be Determined Appointment with Chandler Garcia LPN at [place] [address] Patient Instructions Dear 1 [place] Nurse 1. Please check on status of the patient's insulin and glucometer kit. 2. He needs to check BS 3-4 x per day. 3. Insulin 10 U at bedtime. 4. Please also check the status of his Lipitor and Synthroid medications. They were not in his bag during the office visit. 5. Needs to see me in 1-week to evaluate labs and medications. 6. Please remind the patient to always bring his medications with him. Introducing [facility]! Opal Jacobson introduces [employer] patient portal. Now you can access parts of your medical record, email your doctor's office, and request medication refills online. 1. In your internet browser, go to [email or web address] 2. Click on the First Time User? Click Here link in the Sign In box. You will see the New Member Sign Up page. 3. Enter your [employer] Access Code exactly as it appears below. You will not need to use this code after youve completed the sign-up process. If you do not sign up before the expiration date, you must request a new code. · [employer] Access Code: [email or web address] Expires: 5/21/2018 11:54 AM 
 
4. Enter the last four digits of your Social Security Number (xxxx) and Date of Birth (mm/dd/yyyy) as indicated and click Submit. You will be taken to the next sign-up page. 5. Create a mSpoke ID. This will be your mSpoke login ID and cannot be changed, so think of one that is secure and easy to remember. 6. Create a mSpoke password. You can change your password at any time. 7. Enter your Password Reset Question and Answer. This can be used at a later time if you forget your password. 8. Enter your e-mail address. You will receive e-mail notification when new information is available in [address] 9. Click Sign Up. You can now view and download portions of your medical record. 10. Click the Download Summary menu link to download a portable copy of your medical information. If you have questions, please visit the Frequently Asked Questions section of the mSpoke website. Remember, mSpoke is NOT to be used for urgent needs. For medical emergencies, dial 911. Now available from your iPhone and Android! Please provide this summary of care documentation to your next provider. Your primary care clinician is listed as [address]. If you have any questions after today's visit, please call [phone].

## 2018-03-19 NOTE — PROGRESS NOTES
[address] [facility] Discharge Follow-Up      Date/Time:  3/19/2018 11:38 AM    Patient listed on discharge [facility]) report on 3/17/18. Patient discharged from [address] [place] [address] for Acute Hypoxic Respiratory Failure. RRAT score: 23   High   Medical Records Reviewed    Medical History:     Past Medical History:   Diagnosis Date    Anemia     Bronchitis     Chronic kidney disease     UTI    Diabetes (HCC)     Gastrointestinal disorder     anemia    Hypertension     Kidney disease, chronic, stage II (GFR 60-89 ml/min)     Neurological disorder     Metabolic Brain Disorder       Nurse Navigator(NN) contacted the patient by telephone to perform post   hospital discharge assessment. Verified  and address with patient as identifiers. Provided introduction to self, and explanation of the Nurses Navigator role. Pt said that he felt Well. I believe that he was napping in his bedroom when NN called. He said that he had picked up all but one of his medicines. The pharmacy was ordering the missing medication and will pick it up today. Pt also told the NN that he did remember that he had a follow up appointment today with Dr. Tessie Montesinos. He was planning on coming to that appointment. Diet:   Patient reports: Diabetic Diet    Activity:    Patient reports: mostly moving around the house    Medication:   Performed medication reconciliation with patient, and patient verbalizes understanding of administration of home medications. There were no barriers to obtaining medications identified at this time. Support system:  Relatives    Discharge Instructions :  Reviewed discharge instructions with patient. Patient verbalizes understanding of discharge instructions and follow-up care.        Red Flags:  SOB/MCDERMOTT  B/P >  150/80    Labs Reviewed:  Recent Labs      18   0347   WBC  5.3   HGB  9.6*   HCT  30.4*   PLT  250     Recent Labs      18   0347   NA  138   K  3.8   CL  106   CO2  20* GLU  141*   BUN  20   CREA  2.13*   CA  8.6   PHOS  3.3   ALB  2.5*   SGOT  14*   ALT  24     No components found for: GLPOC  No results for input(s): PH, PCO2, PO2, HCO3, FIO2 in the last 72 hours. No results for input(s): INR in the last 72 hours. No lab exists for component: INREXT      PCP/Specialist follow up: Patient scheduled to follow up with Lc Elizondo MD on 3/19/18 at 1:45pm.  Reviewed red flags with patient, and patient verbalizes understanding. Patient given an opportunity to ask questions. No other clinical/social/functional needs noted. The patient agrees to contact the PCP office for questions related to their healthcare. The patient expressed thanks, offered no additional questions and ended the call.

## 2018-03-19 NOTE — PROGRESS NOTES
Transition of Care Visit:  Chief Complaint   Patient presents with    Transitions Of Care     pt discharged from [facility] on 3/17/18 for Respiratory Failure. pt states he is feeling good. Admitted:  [facility] on   Discharged:  [facility] on 3/17/2018    Subjective:   67M who presents today with h/o CHF, Type II DM, HTN, CKD, Metabolic brain disorder with learning disability. He presents today for [facility] visit after recent hospitalization of dyspnea and found to be profoundly hypoxic. The patient had not been routinely taking his medications at home and was poorly compliant. Unfortunately, this all caught up with him and he went in to pulmonary edema and hypoxia. The patient reports that he could not afford a few of his medications sent home from the hospital (he could not remember which one). However, he is having a visiting nurse come to his home to weigh him and make sure that he is compliant to prevent CHF exacerbation. Hospital course:     Michelle Coreas was admitted into the Family Medicine Service from 03/12/2018 to 3/17/2018 for acute hypoxic respiratory failure due to HFrEF. During the course of this admission, the following conditions were addressed/managed;     Acute hypoxic respiratory failure likely due to HFrEF:   -On admission, patient complained of orthopnea and worsening SOB with exertion. Pro- BNP was 2369. -CTA showed no pulmonary embolism.    -He was started on  BiPAP and given IV lasix x2 doses. BiPAP was weaned off to room air in <24 hours.  -Echo (3/13/18) showed cardiomyopathy with EF 35-40% and moderate diffuse hypokinesia (compared with that of 07/2014). -Patient was seen by cardiology and had an Abnormal Lexiscan (03/15/18) which showed LVEF 38% and severe risk of ischemia.  -He then had a cardiac cath with PCI (3/16/18).    -Patient started on DAPT with ASA 81 mg and plavix 75 mg for a year.  -Also discharged with hydralazine 37.5mg TID, Isosorbide mononitrate 30mg daily and Coreg 6.25mg BID, which were all used during this admission given his LVEF 40%  -Has outpatient follow up with Cardiology on (04/2/2018). They will consider RAAS once stability of renal function established.     HTN   Patient initially presented with significantly elevated BP (204/99) but this has been stabilized with Coreg 6.25mg BID and Hydralazine 37.5mg TID. Scripts for both Coreg and Hydralazine given to patient. We discontinued his home Amlodipine.      Type 2 DM: Last A1C is 7.8 (2/20/2018). Sugars have been well controlled since admission using 8 units Lantus at night. Resumed home Lantus 10 units at qhs upon discharge. Patient encouraged to check his sugar about 4 times daily and be compliant with his medications.     Hypothyroidism   - Low TSH (0.08) and normal free T4. Patient labs reflective of subclinical hyperthyroidism. He however has a history of hypothyroidism but non compliant with Synthroid. Can continue home Synthroid. Will follow up outpatient with PCP and will need repeat TSH and free T4 in about 6 weeks.      Acute on chronic kidney disease:  - Cr 2.13 (POA Cr 1.99, baseline ~1.8)  - Seen and evaluated by Dr. Cristal Montalvo (Nephro), agrees with cardiology on starting patient on RAAS once creatinine stabilizes.       Anemia: Hgb 9.6 (POA 9.1, baseline~10)  Worked up in the past (2014) by Dr. Nelson Mccabe and found to be multifactoria (CKD, Anemia of chronic disease and B12 def)  -Iron profile (3/14/18):  Fe 30 (L), TIBC 260 (normal), Fe sats 12% (L), Ferritin normal. Folate is and B12 are normal  -Continued home Iron with bowel regimen         Past Medical History:  Patient Active Problem List    Diagnosis Date Noted    Status post insertion of drug eluting coronary artery stent 03/16/2018    Ground glass opacity present on imaging of lung 03/14/2018    Type 2 diabetes with nephropathy ([address] 02/20/2018    Noncompliance with diabetes treatment 02/20/2018    Edema of right ankle 10/31/2017    Anemia 07/17/2014    Protein calorie malnutrition ([facility] [address] 06/23/2014    HTN (hypertension) 06/23/2014    CKD (chronic kidney disease) stage 2, GFR 60-89 ml/min 06/23/2014       Medications:  Current Outpatient Prescriptions   Medication Sig Dispense Refill    aspirin 81 mg chewable tablet Take 1 Tab by mouth daily. 90 Tab 3    carvedilol (COREG) 6.25 mg tablet Take 1 Tab by mouth two (2) times daily (with meals). 60 Tab 5    hydrALAZINE (APRESOLINE) 25 mg tablet Take 1.5 Tabs by mouth three (3) times daily. 90 Tab 8    isosorbide mononitrate ER (IMDUR) 30 mg tablet Take 1 Tab by mouth daily. 30 Tab 5    clopidogrel (PLAVIX) 75 mg tab Take 1 Tab by mouth daily. 90 Tab 1    pantoprazole (PROTONIX) 40 mg tablet Take 1 Tab by mouth daily. 60 Tab 0    atorvastatin (LIPITOR) 40 mg tablet Take 1 Tab by mouth daily. 90 Tab 3    ferrous sulfate 325 mg (65 mg iron) tablet Take 325 mg by mouth daily.  levothyroxine (SYNTHROID) 150 mcg tablet TAKE ONE TABLET BY MOUTH ONCE DAILY BEFORE BREAKFAST 30 Tab 5    PREVNAR 13, PF, 0.5 mL syrg injection       insulin glargine (LANTUS) 100 unit/mL injection 10 Units by SubCUTAneous route nightly.  1 Vial 1       Allergies:  No Known Allergies       Past Medical History:  Past Medical History:   Diagnosis Date    Anemia     Bronchitis     Chronic kidney disease     UTI    Diabetes (HCC)     Gastrointestinal disorder     anemia    Hypertension     Kidney disease, chronic, stage II (GFR 60-89 ml/min)     Neurological disorder     Metabolic Brain Disorder       Past Surgical History:  Past Surgical History:   Procedure Laterality Date    HX OTHER SURGICAL      never       Family History:  Family History   Problem Relation Age of Onset    Diabetes Mother     Cancer Sister        Social History:  Social History   Substance Use Topics    Smoking status: Former Smoker     Packs/day: 0.50     Years: 30.00     Types: Cigarettes     Quit date: 1/1/1994    Smokeless tobacco: Never Used   Carltios Elkins Alcohol use No      Review of Systems  Pertinent items are noted in HPI. Objective:     /84 (BP 1 Location: Left arm, BP Patient Position: Sitting)  Pulse 75  Temp 98.4 °F (36.9 °C) (Oral)   Resp 18  Ht 5' 8\" (1.727 m)  Wt 248 lb (112.5 kg)  SpO2 100%  BMI 37.71 kg/m2   Visit Vitals    /84 (BP 1 Location: Left arm, BP Patient Position: Sitting)    Pulse 75    Temp 98.4 °F (36.9 °C) (Oral)    Resp 18    Ht 5' 8\" (1.727 m)    Wt 248 lb (112.5 kg)    SpO2 100%    BMI 37.71 kg/m2     General:  Alert, cooperative, no distress, appears stated age. Head:  Normocephalic, without obvious abnormality, atraumatic. Eyes:  Conjunctivae/corneas clear. PERRL, EOMs intact. Fundi benign. Ears:  Normal TMs and external ear canals both ears. Nose: Nares normal. Septum midline. Mucosa normal. No drainage or sinus tenderness. Throat: Lips, mucosa, and tongue normal. Teeth and gums normal.   Neck: Supple, symmetrical, trachea midline, no adenopathy, thyroid: no enlargement/tenderness/nodules, no carotid bruit and no JVD. Back:   Symmetric, no curvature. ROM normal. No CVA tenderness. Lungs:   Clear to auscultation bilaterally. Heart:  Regular rate and rhythm, S1, S2 normal, no murmur, click, rub or gallop. Extremities: Extremities normal, atraumatic, no cyanosis or edema. Pulses: 2+ and symmetric all extremities. Skin: Skin color, texture, turgor normal. No rashes or lesions. Lymph nodes: Cervical, supraclavicular, and axillary nodes normal.   Neurologic: CNII-XII intact. Normal strength, sensation and reflexes throughout. Assessment/Plan:     67M who presents for transitional visit. He was admitted for Respiratory Failure. He is stable and feeling better at this time. I have discussed the diagnosis with the patient and the intended treatment plan as seen in the above orders.  The patient has received an after-visit summary and questions were answered concerning future plans. Asked to return should symptoms worsen or not improve with treatment. Any pending labs and studies will be relayed to patient when they become available. Pt verbalizes understanding of plan of care and denies further questions or concerns at this time. Follow-up Disposition:  Return in about 1 week (around 3/26/2018), or if symptoms worsen or fail to improve, for Need labs/follow up.

## 2018-03-19 NOTE — PROGRESS NOTES
Chief Complaint   Patient presents with   [address]     pt discharged from [facility] on 3/17/18 for Respiratory Failure. pt states he is feeling good. \"REVIEWED RECORD IN PREPARATION FOR VISIT AND HAVE OBTAINED THE NECESSARY DOCUMENTATION\"  1. Have you been to the ER, urgent care clinic since your last visit? Hospitalized since your last visit? Yes Where: see above    2. Have you seen or consulted any other health care providers outside of the [address] [place] [address] since your last visit? Include any pap smears or colon screening. No  Patient does not have advanced directives.

## 2018-03-19 NOTE — PATIENT INSTRUCTIONS
Dear [facility] Nurse  1. Please check on status of the patient's insulin and glucometer kit. 2. He needs to check BS 3-4 x per day. 3. Insulin 10 U at bedtime. 4. Please also check the status of his Lipitor and Synthroid medications. They were not in his bag during the office visit. 5. Needs to see me in 1-week to evaluate labs and medications. 6. Please remind the patient to always bring his medications with him.

## 2018-03-20 ENCOUNTER — HOME CARE VISIT (OUTPATIENT)
Dept: SCHEDULING | Facility: HOME HEALTH | Age: 68
End: 2018-03-20

## 2018-03-20 PROCEDURE — G0299 HHS/HOSPICE OF RN EA 15 MIN: HCPCS

## 2018-03-21 RX ORDER — LANCING DEVICE
EACH MISCELLANEOUS
Qty: 100 EACH | Refills: 3 | Status: ON HOLD | OUTPATIENT
Start: 2018-03-21 | End: 2020-01-01

## 2018-03-21 RX ORDER — LANCETS
EACH MISCELLANEOUS
Qty: 400 EACH | Refills: 3 | Status: ON HOLD | OUTPATIENT
Start: 2018-03-21 | End: 2020-01-01

## 2018-03-21 RX ORDER — INSULIN PUMP SYRINGE, 3 ML
EACH MISCELLANEOUS
Qty: 1 KIT | Refills: 0 | Status: ON HOLD | OUTPATIENT
Start: 2018-03-21 | End: 2020-01-01

## 2018-03-21 RX ORDER — ISOPROPYL ALCOHOL 70 ML/100ML
400 SWAB TOPICAL
Qty: 400 PAD | Refills: 3 | Status: ON HOLD | OUTPATIENT
Start: 2018-03-21 | End: 2020-01-01

## 2018-03-22 ENCOUNTER — TELEPHONE (OUTPATIENT)
Dept: FAMILY MEDICINE CLINIC | Age: 68
End: 2018-03-22

## 2018-03-22 DIAGNOSIS — R53.81 PHYSICAL DECONDITIONING: Primary | ICD-10-CM

## 2018-03-22 NOTE — TELEPHONE ENCOUNTER
Denita with [facility] is calling and asking if Dr. Ludwin Conway can put in orders for skilled nursing and a medical social worker. Any questions Brigitte  can be reached at [phone]. She did ask that we let her know once this is done so that they can get patient scheduled right away.

## 2018-03-23 ENCOUNTER — HOME HEALTH ADMISSION (OUTPATIENT)
Dept: HOME HEALTH SERVICES | Facility: HOME HEALTH | Age: 68
End: 2018-03-23
Payer: MEDICARE

## 2018-03-25 ENCOUNTER — HOME CARE VISIT (OUTPATIENT)
Dept: SCHEDULING | Facility: HOME HEALTH | Age: 68
End: 2018-03-25
Payer: MEDICARE

## 2018-03-25 VITALS
SYSTOLIC BLOOD PRESSURE: 138 MMHG | HEART RATE: 88 BPM | OXYGEN SATURATION: 95 % | HEIGHT: 68 IN | WEIGHT: 239.86 LBS | DIASTOLIC BLOOD PRESSURE: 82 MMHG | BODY MASS INDEX: 36.35 KG/M2 | TEMPERATURE: 97.8 F | RESPIRATION RATE: 20 BRPM

## 2018-03-25 PROCEDURE — 3331090001 HH PPS REVENUE CREDIT

## 2018-03-25 PROCEDURE — G0299 HHS/HOSPICE OF RN EA 15 MIN: HCPCS

## 2018-03-25 PROCEDURE — 3331090002 HH PPS REVENUE DEBIT

## 2018-03-25 PROCEDURE — 400013 HH SOC

## 2018-03-26 PROCEDURE — 3331090002 HH PPS REVENUE DEBIT

## 2018-03-26 PROCEDURE — 3331090001 HH PPS REVENUE CREDIT

## 2018-03-27 ENCOUNTER — HOME CARE VISIT (OUTPATIENT)
Dept: SCHEDULING | Facility: HOME HEALTH | Age: 68
End: 2018-03-27
Payer: MEDICARE

## 2018-03-27 PROCEDURE — 3331090002 HH PPS REVENUE DEBIT

## 2018-03-27 PROCEDURE — 3331090001 HH PPS REVENUE CREDIT

## 2018-03-28 ENCOUNTER — HOME CARE VISIT (OUTPATIENT)
Dept: SCHEDULING | Facility: HOME HEALTH | Age: 68
End: 2018-03-28
Payer: MEDICARE

## 2018-03-28 PROCEDURE — 3331090001 HH PPS REVENUE CREDIT

## 2018-03-28 PROCEDURE — 3331090002 HH PPS REVENUE DEBIT

## 2018-03-29 PROCEDURE — 3331090002 HH PPS REVENUE DEBIT

## 2018-03-29 PROCEDURE — 3331090001 HH PPS REVENUE CREDIT

## 2018-03-30 ENCOUNTER — HOME CARE VISIT (OUTPATIENT)
Dept: SCHEDULING | Facility: HOME HEALTH | Age: 68
End: 2018-03-30
Payer: MEDICARE

## 2018-03-30 PROCEDURE — 3331090001 HH PPS REVENUE CREDIT

## 2018-03-30 PROCEDURE — G0300 HHS/HOSPICE OF LPN EA 15 MIN: HCPCS

## 2018-03-30 PROCEDURE — 3331090002 HH PPS REVENUE DEBIT

## 2018-03-31 PROCEDURE — 3331090001 HH PPS REVENUE CREDIT

## 2018-03-31 PROCEDURE — 3331090002 HH PPS REVENUE DEBIT

## 2018-04-01 PROCEDURE — 3331090001 HH PPS REVENUE CREDIT

## 2018-04-01 PROCEDURE — 3331090002 HH PPS REVENUE DEBIT

## 2018-04-02 VITALS
SYSTOLIC BLOOD PRESSURE: 158 MMHG | HEART RATE: 76 BPM | OXYGEN SATURATION: 99 % | RESPIRATION RATE: 17 BRPM | TEMPERATURE: 97.9 F | DIASTOLIC BLOOD PRESSURE: 78 MMHG

## 2018-04-02 PROCEDURE — 3331090002 HH PPS REVENUE DEBIT

## 2018-04-02 PROCEDURE — 3331090001 HH PPS REVENUE CREDIT

## 2018-04-03 PROCEDURE — 3331090001 HH PPS REVENUE CREDIT

## 2018-04-03 PROCEDURE — 3331090002 HH PPS REVENUE DEBIT

## 2018-04-04 ENCOUNTER — HOME CARE VISIT (OUTPATIENT)
Dept: SCHEDULING | Facility: HOME HEALTH | Age: 68
End: 2018-04-04
Payer: MEDICARE

## 2018-04-04 PROCEDURE — 3331090001 HH PPS REVENUE CREDIT

## 2018-04-04 PROCEDURE — 3331090002 HH PPS REVENUE DEBIT

## 2018-04-04 PROCEDURE — G0300 HHS/HOSPICE OF LPN EA 15 MIN: HCPCS

## 2018-04-05 PROCEDURE — 3331090001 HH PPS REVENUE CREDIT

## 2018-04-05 PROCEDURE — 3331090002 HH PPS REVENUE DEBIT

## 2018-04-06 PROCEDURE — 3331090002 HH PPS REVENUE DEBIT

## 2018-04-06 PROCEDURE — 3331090001 HH PPS REVENUE CREDIT

## 2018-04-07 PROCEDURE — 3331090001 HH PPS REVENUE CREDIT

## 2018-04-07 PROCEDURE — 3331090002 HH PPS REVENUE DEBIT

## 2018-04-08 PROCEDURE — 3331090002 HH PPS REVENUE DEBIT

## 2018-04-08 PROCEDURE — 3331090001 HH PPS REVENUE CREDIT

## 2018-04-09 PROCEDURE — 3331090001 HH PPS REVENUE CREDIT

## 2018-04-09 PROCEDURE — 3331090002 HH PPS REVENUE DEBIT

## 2018-04-10 PROCEDURE — 3331090001 HH PPS REVENUE CREDIT

## 2018-04-10 PROCEDURE — 3331090002 HH PPS REVENUE DEBIT

## 2018-04-11 PROCEDURE — 3331090001 HH PPS REVENUE CREDIT

## 2018-04-11 PROCEDURE — 3331090002 HH PPS REVENUE DEBIT

## 2018-04-12 PROCEDURE — 3331090002 HH PPS REVENUE DEBIT

## 2018-04-12 PROCEDURE — 3331090001 HH PPS REVENUE CREDIT

## 2018-04-13 PROCEDURE — 3331090002 HH PPS REVENUE DEBIT

## 2018-04-13 PROCEDURE — 3331090001 HH PPS REVENUE CREDIT

## 2018-04-14 PROCEDURE — 3331090002 HH PPS REVENUE DEBIT

## 2018-04-14 PROCEDURE — 3331090001 HH PPS REVENUE CREDIT

## 2018-04-15 PROCEDURE — 3331090002 HH PPS REVENUE DEBIT

## 2018-04-15 PROCEDURE — 3331090001 HH PPS REVENUE CREDIT

## 2018-04-16 PROCEDURE — 3331090001 HH PPS REVENUE CREDIT

## 2018-04-16 PROCEDURE — 3331090002 HH PPS REVENUE DEBIT

## 2018-04-17 VITALS
SYSTOLIC BLOOD PRESSURE: 148 MMHG | DIASTOLIC BLOOD PRESSURE: 84 MMHG | TEMPERATURE: 98 F | HEART RATE: 96 BPM | OXYGEN SATURATION: 98 % | RESPIRATION RATE: 18 BRPM

## 2018-04-17 PROCEDURE — 3331090001 HH PPS REVENUE CREDIT

## 2018-04-17 PROCEDURE — 3331090002 HH PPS REVENUE DEBIT

## 2018-04-18 ENCOUNTER — HOME CARE VISIT (OUTPATIENT)
Dept: SCHEDULING | Facility: HOME HEALTH | Age: 68
End: 2018-04-18
Payer: MEDICARE

## 2018-04-18 PROCEDURE — 3331090001 HH PPS REVENUE CREDIT

## 2018-04-18 PROCEDURE — G0155 HHCP-SVS OF CSW,EA 15 MIN: HCPCS

## 2018-04-18 PROCEDURE — 3331090002 HH PPS REVENUE DEBIT

## 2018-04-19 ENCOUNTER — HOME CARE VISIT (OUTPATIENT)
Dept: SCHEDULING | Facility: HOME HEALTH | Age: 68
End: 2018-04-19
Payer: MEDICARE

## 2018-04-19 ENCOUNTER — TELEPHONE (OUTPATIENT)
Dept: FAMILY MEDICINE CLINIC | Age: 68
End: 2018-04-19

## 2018-04-19 PROCEDURE — 3331090002 HH PPS REVENUE DEBIT

## 2018-04-19 PROCEDURE — 3331090001 HH PPS REVENUE CREDIT

## 2018-04-19 NOTE — TELEPHONE ENCOUNTER
Timmy Said wants to dismiss pt from home health next week either 4/26 or 4/27 and  meets with pt to make sure he will be taken care of. Pt's needles were wrong and was informed for pt to go back to pharmacy to get it straight.  Home health needs to make sure doctor is on same page for pt to be dismissed from home health

## 2018-04-19 NOTE — TELEPHONE ENCOUNTER
Returned call to [facility] nurse and left message advising that Dr. Meena Snyder has not seen pt since his initial hospital follow up appt on 03/19/2018. Pt was advised to return a week later and no showed for that appt. I advised the nurse pt will need to be seen so Dr. Meena Snyder can evaluate his progress and determine if he needs to continue [facility] or can d/c. I also contacted the pt and reminded him that Dr Meena Snyder requested for him to follow up one week from last appt in March and he did not show for that appt. Pt was advised he is scheduled to come in at 115p on 04/24/2018 and it is important that he come to this appt and bring his medications with him. He verbalized understanding.

## 2018-04-20 PROCEDURE — 3331090001 HH PPS REVENUE CREDIT

## 2018-04-20 PROCEDURE — 3331090002 HH PPS REVENUE DEBIT

## 2018-04-21 PROCEDURE — 3331090002 HH PPS REVENUE DEBIT

## 2018-04-21 PROCEDURE — 3331090001 HH PPS REVENUE CREDIT

## 2018-04-22 PROCEDURE — 3331090001 HH PPS REVENUE CREDIT

## 2018-04-22 PROCEDURE — 3331090002 HH PPS REVENUE DEBIT

## 2018-04-23 PROCEDURE — 3331090002 HH PPS REVENUE DEBIT

## 2018-04-23 PROCEDURE — 3331090001 HH PPS REVENUE CREDIT

## 2018-04-24 ENCOUNTER — TELEPHONE (OUTPATIENT)
Dept: FAMILY MEDICINE CLINIC | Age: 68
End: 2018-04-24

## 2018-04-24 ENCOUNTER — OFFICE VISIT (OUTPATIENT)
Dept: FAMILY MEDICINE CLINIC | Age: 68
End: 2018-04-24

## 2018-04-24 VITALS
HEIGHT: 67 IN | OXYGEN SATURATION: 100 % | DIASTOLIC BLOOD PRESSURE: 82 MMHG | SYSTOLIC BLOOD PRESSURE: 148 MMHG | BODY MASS INDEX: 38.01 KG/M2 | HEART RATE: 88 BPM | WEIGHT: 242.2 LBS | TEMPERATURE: 97.7 F | RESPIRATION RATE: 20 BRPM

## 2018-04-24 DIAGNOSIS — M79.89 SWELLING OF LEFT HAND: ICD-10-CM

## 2018-04-24 DIAGNOSIS — E66.01 SEVERE OBESITY (BMI 35.0-39.9) WITH COMORBIDITY (HCC): ICD-10-CM

## 2018-04-24 DIAGNOSIS — E11.21 TYPE 2 DIABETES WITH NEPHROPATHY (HCC): Primary | ICD-10-CM

## 2018-04-24 DIAGNOSIS — E44.1 MILD PROTEIN-CALORIE MALNUTRITION (HCC): ICD-10-CM

## 2018-04-24 DIAGNOSIS — N18.30 STAGE 3 CHRONIC KIDNEY DISEASE (HCC): ICD-10-CM

## 2018-04-24 DIAGNOSIS — D64.9 ANEMIA, UNSPECIFIED TYPE: ICD-10-CM

## 2018-04-24 PROCEDURE — 3331090002 HH PPS REVENUE DEBIT

## 2018-04-24 PROCEDURE — 3331090001 HH PPS REVENUE CREDIT

## 2018-04-24 RX ORDER — BLOOD-GLUCOSE METER
EACH MISCELLANEOUS
Status: ON HOLD | COMMUNITY
Start: 2018-03-21 | End: 2020-01-01

## 2018-04-24 NOTE — PROGRESS NOTES
Identified pt with two pt identifiers(name and ). Chief Complaint   Patient presents with    Follow Up Chronic Condition        Health Maintenance Due   Topic    DTaP/Tdap/Td series (1 - Tdap)    ZOSTER VACCINE AGE 60>     GLAUCOMA SCREENING Q2Y     FOBT Q 1 YEAR AGE 50-75     EYE EXAM RETINAL OR DILATED Q1        Wt Readings from Last 3 Encounters:   18 239 lb 13.8 oz (108.8 kg)   18 248 lb (112.5 kg)   18 239 lb 13.8 oz (108.8 kg)     Temp Readings from Last 3 Encounters:   18 98 °F (36.7 °C) (Temporal)   18 97.9 °F (36.6 °C) (Temporal)   18 97.8 °F (36.6 °C)     BP Readings from Last 3 Encounters:   18 148/84   18 158/78   18 138/82     Pulse Readings from Last 3 Encounters:   18 96   18 76   18 88         Learning Assessment:  :     Learning Assessment 2016   PRIMARY LEARNER Patient Patient   HIGHEST LEVEL OF EDUCATION - PRIMARY LEARNER  - DID NOT GRADUATE HIGH SCHOOL   BARRIERS PRIMARY LEARNER - NONE   CO-LEARNER CAREGIVER - No   PRIMARY LANGUAGE ENGLISH ENGLISH   LEARNER PREFERENCE PRIMARY VIDEOS LISTENING   ANSWERED BY patient Patient   RELATIONSHIP SELF SELF       Depression Screening:  :     PHQ over the last two weeks 2018   Little interest or pleasure in doing things Not at all   Feeling down, depressed or hopeless Not at all   Total Score PHQ 2 0       Fall Risk Assessment:  :     Fall Risk Assessment, last 12 mths 2018   Able to walk? Yes   Fall in past 12 months? No       Abuse Screening:  :     Abuse Screening Questionnaire 10/11/2016   Do you ever feel afraid of your partner? N   Are you in a relationship with someone who physically or mentally threatens you? N   Is it safe for you to go home?  Y       Coordination of Care Questionnaire:  :     1) Have you been to an emergency room, urgent care clinic since your last visit? no   Hospitalized since your last visit? no             2) Have you seen or consulted any other health care providers outside of [address] [place] [address] since your last visit? no  (Include any pap smears or colon screenings in this section.)    3) Do you have an Advance Directive on file? no  Are you interested in receiving information about Advance Directives? no    Reviewed record in preparation for visit and have obtained necessary documentation. Medication reconciliation up to date and corrected with patient at this time. Patient complains of left hand hurting \"a little\" and swelling noted in left arm and hand. Voices that left arm and hand feels numb most times.

## 2018-04-24 NOTE — MR AVS SNAPSHOT
[address] [facility] 
 
 
 [place]  [address] [facility] 
[phone] Patient: Kolton Tobar MRN: [id number] ZCS:[id number] Visit Information Date & Time Provider Department Dept. Phone Encounter #  
 4/24/2018  1:15 PM Ion Bland  Upcoming Health Maintenance Date Due DTaP/Tdap/Td series (1 - Tdap) 9/23/1970 ZOSTER VACCINE AGE 60> 7/23/2009 GLAUCOMA SCREENING Q2Y 9/23/2014 FOBT Q 1 YEAR AGE 50-75 7/18/2015 EYE EXAM RETINAL OR DILATED Q1 7/30/2015 HEMOGLOBIN A1C Q6M 8/20/2018 FOOT EXAM Q1 2/20/2019 MICROALBUMIN Q1 2/20/2019 LIPID PANEL Q1 2/20/2019 MEDICARE YEARLY EXAM 2/21/2019 Allergies as of 4/24/2018  Review Complete On: 4/24/2018 By: Phil Montague LPN No Known Allergies Current Immunizations  Reviewed on 2/20/2018 Name Date Pneumococcal Conjugate (PCV-13) 2/22/2018 Pneumococcal Polysaccharide (PPSV-23) 10/6/2014 10:02 AM  
  
 Not reviewed this visit You Were Diagnosed With   
  
 Codes Comments Type 2 diabetes with nephropathy (HCC)    -  Primary ICD-10-CM: E11.21 
ICD-9-CM: 250.40, 583.81 Swelling of left hand     ICD-10-CM: M79.89 ICD-9-CM: 729.81 Anemia, unspecified type     ICD-10-CM: D64.9 ICD-9-CM: 285.9 Stage 3 chronic kidney disease     ICD-10-CM: N18.3 ICD-9-CM: 997. 3 Vitals BP Pulse Temp Resp Height(growth percentile) Weight(growth percentile) 148/82 88 97.7 °F (36.5 °C) (Oral) 20 5' 7\" (1.702 m) 242 lb 3.2 oz (109.9 kg) SpO2 BMI Smoking Status 100% 37.93 kg/m2 Former Smoker Vitals History BMI and BSA Data Body Mass Index Body Surface Area  
 37.93 kg/m 2 2.28 m 2 Preferred Pharmacy Pharmacy Name Phone [address] [phone] Your Updated Medication List  
  
   
 This list is accurate as of 4/24/18  2:08 PM.  Always use your most recent med list.  
  
  
  
  
 alcohol swabs Padm Commonly known as:  ALCOHOL PADS  
400 Each by Apply Externally route daily as needed. Dx Code: E11.21 Checking blood sugars 4 times daily  
  
 aspirin 81 mg chewable tablet Take 1 Tab by mouth daily. atorvastatin 40 mg tablet Commonly known as:  LIPITOR Take 1 Tab by mouth daily. * Blood-Glucose Meter monitoring kit Dx Code: E11.21 Checking blood sugars 4 times daily * ACCU-CHEK CHRISTOPHER PLUS METER [facility] Generic drug:  Blood-Glucose Meter  
  
 carvedilol 6.25 mg tablet Commonly known as:  Juan [place] Lacrosse Take 1 Tab by mouth two (2) times daily (with meals). clopidogrel 75 mg Tab Commonly known as:  PLAVIX Take 1 Tab by mouth daily. ferrous sulfate 325 mg (65 mg iron) tablet Take 325 mg by mouth daily. glucose blood VI test strips strip Commonly known as:  blood glucose test  
Dx Code: E11.21 Checking blood sugars 4 times daily  
  
 hydrALAZINE 25 mg tablet Commonly known as:  APRESOLINE Take 1.5 Tabs by mouth three (3) times daily. insulin glargine 100 unit/mL injection Commonly known as:  LANTUS  
10 Units by SubCUTAneous route nightly. isosorbide mononitrate ER 30 mg tablet Commonly known as:  IMDUR Take 1 Tab by mouth daily. Lancets Misc Dx Code: E11.21 Checking blood sugars 4 times daily Lancing Device Misc Dx Code: E11.21 Checking blood sugars 4 times daily  
  
 levothyroxine 150 mcg tablet Commonly known as:  SYNTHROID Take 1 Tab by mouth Daily (before breakfast). pantoprazole 40 mg tablet Commonly known as:  PROTONIX Take 1 Tab by mouth daily. * Notice: This list has 2 medication(s) that are the same as other medications prescribed for you. Read the directions carefully, and ask your doctor or other care provider to review them with you. We Performed the Following CBC WITH AUTOMATED DIFF [73339 CPT(R)] FERRITIN [92857 CPT(R)] HEMOGLOBIN A1C WITH EAG [67282 CPT(R)] IRON PROFILE H937861 CPT(R)] METABOLIC PANEL, COMPREHENSIVE [60960 CPT(R)] To-Do List   
 04/25/2018 1:00 PM  
  Appointment with Avtar Tinajero at [place] [address]  
  
 04/26/2018 Imaging:  DUPLEX UPPER EXT VENOUS BILAT Patient Instructions Eating Healthy Foods: Care Instructions Your Care Instructions Eating healthy foods can help lower your risk for disease. Healthy food gives you energy and keeps your heart strong, your brain active, your muscles working, and your bones strong. A healthy diet includes a variety of foods from the basic food groups: grains, vegetables, fruits, milk and milk products, and meat and beans. Some people may eat more of their favorite foods from only one food group and, as a result, miss getting the nutrients they need. So, it is important to pay attention not only to what you eat but also to what you are missing from your diet. You can eat a healthy, balanced diet by making a few small changes. Follow-up care is a key part of your treatment and safety. Be sure to make and go to all appointments, and call your doctor if you are having problems. It's also a good idea to know your test results and keep a list of the medicines you take. How can you care for yourself at home? Look at what you eat · Keep a food diary for a week or two and record everything you eat or drink. Track the number of servings you eat from each food group. · For a balanced diet every day, eat a variety of: ¨ 6 or more ounce-equivalents of grains, such as cereals, breads, crackers, rice, or pasta, every day. An ounce-equivalent is 1 slice of bread, 1 cup of ready-to-eat cereal, or ½ cup of cooked rice, cooked pasta, or cooked cereal. 
¨ 2½ cups of vegetables, especially: § Dark-green vegetables such as broccoli and spinach. § Orange vegetables such as carrots and sweet potatoes. § Dry beans (such as martínez and kidney beans) and peas (such as lentils). ¨ 2 cups of fresh, frozen, or canned fruit. A small apple or 1 banana or orange equals 1 cup. ¨ 3 cups of nonfat or low-fat milk, yogurt, or other milk products. ¨ 5½ ounces of meat and beans, such as chicken, fish, lean meat, beans, nuts, and seeds. One egg, 1 tablespoon of peanut butter, ½ ounce nuts or seeds, or ¼ cup of cooked beans equals 1 ounce of meat. · Learn how to read food labels for serving sizes and ingredients. Fast-food and convenience-food meals often contain few or no fruits or vegetables. Make sure you eat some fruits and vegetables to make the meal more nutritious. · Look at your food diary. For each food group, add up what you have eaten and then divide the total by the number of days. This will give you an idea of how much you are eating from each food group. See if you can find some ways to change your diet to make it more healthy. Start small · Do not try to make dramatic changes to your diet all at once. You might feel that you are missing out on your favorite foods and then be more likely to fail. · Start slowly, and gradually change your habits. Try some of the following: ¨ Use whole wheat bread instead of white bread. ¨ Use nonfat or low-fat milk instead of whole milk. ¨ Eat brown rice instead of white rice, and eat whole wheat pasta instead of white-flour pasta. ¨ Try low-fat cheeses and low-fat yogurt. ¨ Add more fruits and vegetables to meals and have them for snacks. ¨ Add lettuce, tomato, cucumber, and onion to sandwiches. ¨ Add fruit to yogurt and cereal. 
Enjoy food · You can still eat your favorite foods. You just may need to eat less of them. If your favorite foods are high in fat, salt, and sugar, limit how often you eat them, but do not cut them out entirely. · Eat a wide variety of foods. Make healthy choices when eating out · The type of restaurant you choose can help you make healthy choices. Even fast-food chains are now offering more low-fat or healthier choices on the menu. · Choose smaller portions, or take half of your meal home. · When eating out, try: ¨ A veggie pizza with a whole wheat crust or grilled chicken (instead of sausage or pepperoni). ¨ Pasta with roasted vegetables, grilled chicken, or marinara sauce instead of cream sauce. ¨ A vegetable wrap or grilled chicken wrap. ¨ Broiled or poached food instead of fried or breaded items. Make healthy choices easy · Buy packaged, prewashed, ready-to-eat fresh vegetables and fruits, such as baby carrots, salad mixes, and chopped or shredded broccoli and cauliflower. · Buy packaged, presliced fruits, such as melon or pineapple. · Choose 100% fruit or vegetable juice instead of soda. Limit juice intake to 4 to 6 oz (½ to ¾ cup) a day. · Blend low-fat yogurt, fruit juice, and canned or frozen fruit to make a smoothie for breakfast or a snack. Where can you learn more? Go to http://vandana-sherri.info/. Enter T756 in the search box to learn more about \"Eating Healthy Foods: Care Instructions. \" Current as of: May 12, 2017 Content Version: 11.4 © 9684-1584 [employer]. Care instructions adapted under license by [employer] (which disclaims liability or warranty for this information). If you have questions about a medical condition or this instruction, always ask your healthcare professional. [place] [address] any warranty or liability for your use of this information. Introducing \[facility]\"" & HEALTH SERVICES! Akanksha Reece introduces [employer] patient portal. Now you can access parts of your medical record, email your doctor's office, and request medication refills online. 1. In your internet browser, go to [email or web address] 2. Click on the First Time User? Click Here link in the Sign In box. You will see the New Member Sign Up page. 3. Enter your [employer] Access Code exactly as it appears below. You will not need to use this code after youve completed the sign-up process. If you do not sign up before the expiration date, you must request a new code. · [employer] Access Code: [email or web address] Expires: 5/21/2018 11:54 AM 
 
4. Enter the last four digits of your Social Security Number (xxxx) and Date of Birth (mm/dd/yyyy) as indicated and click Submit. You will be taken to the next sign-up page. 5. Create a [employer] ID. This will be your [employer] login ID and cannot be changed, so think of one that is secure and easy to remember. 6. Create a [employer] password. You can change your password at any time. 7. Enter your Password Reset Question and Answer. This can be used at a later time if you forget your password. 8. Enter your e-mail address. You will receive e-mail notification when new information is available in [address] 9. Click Sign Up. You can now view and download portions of your medical record. 10. Click the Download Summary menu link to download a portable copy of your medical information. If you have questions, please visit the Frequently Asked Questions section of the [employer] website. Remember, [employer] is NOT to be used for urgent needs. For medical emergencies, dial 911. Now available from your iPhone and Android! Please provide this summary of care documentation to your next provider. Your primary care clinician is listed as [address]. If you have any questions after today's visit, please call [phone].

## 2018-04-24 NOTE — ASSESSMENT & PLAN NOTE
Stable, based on history, physical exam and review of pertinent labs, studies and medications; meds reconciled; continue current treatment plan. Key Antihyperglycemic Medications             insulin glargine (LANTUS) 100 unit/mL injection 10 Units by SubCUTAneous route nightly. Other Key Diabetic Medications             atorvastatin (LIPITOR) 40 mg tablet  (Taking) Take 1 Tab by mouth daily.         Lab Results   Component Value Date/Time    Hemoglobin A1c 7.8 02/20/2018 11:17 AM    Glucose 141 03/17/2018 03:47 AM    Creatinine 2.13 03/17/2018 03:47 AM    Cholesterol, total 217 02/20/2018 11:17 AM    HDL Cholesterol 38 02/20/2018 11:17 AM    LDL, calculated 157 02/20/2018 11:17 AM    Triglyceride 110 02/20/2018 11:17 AM     Diabetic Foot and Eye Exam HM Status   Topic Date Due    Eye Exam  07/30/2015    Diabetic Foot Care  02/20/2019

## 2018-04-24 NOTE — TELEPHONE ENCOUNTER
Called pt's home and LM with his brother-in-law advising when pt was seen today he forgot to have his labs drawn. Brother in law reports they will bring pt in tomorrow to have done.

## 2018-04-24 NOTE — PATIENT INSTRUCTIONS

## 2018-04-24 NOTE — ASSESSMENT & PLAN NOTE
Improving, based on history, physical exam and review of pertinent labs, studies and medications; meds reconciled; continue current treatment plan, lifestyle modifications recommended. Key Obesity Meds             levothyroxine (SYNTHROID) 150 mcg tablet  (Taking) Take 1 Tab by mouth Daily (before breakfast).         Lab Results   Component Value Date/Time    Hemoglobin A1c 7.8 02/20/2018 11:17 AM    Glucose 141 03/17/2018 03:47 AM    Cholesterol, total 217 02/20/2018 11:17 AM    HDL Cholesterol 38 02/20/2018 11:17 AM    LDL, calculated 157 02/20/2018 11:17 AM    Triglyceride 110 02/20/2018 11:17 AM    TSH 0.08 03/12/2018 07:12 PM    Sodium 138 03/17/2018 03:47 AM    Potassium 3.8 03/17/2018 03:47 AM    ALT (SGPT) 24 03/17/2018 03:47 AM    AST (SGOT) 14 03/17/2018 03:47 AM    C-Reactive protein 20.44 10/07/2017 12:47 AM

## 2018-04-24 NOTE — PROGRESS NOTES
Subjective:      67M who presents today with h/o CHF, Type II DM, HTN, CKD, Metabolic brain disorder with learning disability. He presents today for follow up from his last visit in March. I note that in between, he has missed appointments. The patient does have a HHN coming to see him every week. The patient is poorly compliant and an unreliable historian. Hard to determine what he is actually doing. However, it appears that he is taking his medications and per his report, a cousin is helping him to set up his medications at home. Complaint:  Has noticed increased swelling in the L-hand. He denies pain or any other swelling in his legs. Patient Active Problem List   Diagnosis Code    Protein calorie malnutrition ([facility] E46    HTN (hypertension) I10    CKD (chronic kidney disease) stage 2, GFR 60-89 ml/min N18.2    Anemia D64.9    Type 2 diabetes with nephropathy ([address] E11.21    Edema of right ankle M25.471    Noncompliance with diabetes treatment Z91.19    Ground glass opacity present on imaging of lung R91.8    Status post insertion of drug eluting coronary artery stent Z95.5    Severe obesity (BMI 35.0-39. 9) with comorbidity ([address] E66.01     Patient Active Problem List    Diagnosis Date Noted    Severe obesity (BMI 35.0-39. 9) with comorbidity ([address] 03/19/2018    Status post insertion of drug eluting coronary artery stent 03/16/2018    Ground glass opacity present on imaging of lung 03/14/2018    Type 2 diabetes with nephropathy ([address] 02/20/2018    Noncompliance with diabetes treatment 02/20/2018    Edema of right ankle 10/31/2017    Anemia 07/17/2014    Protein calorie malnutrition ([address] 06/23/2014    HTN (hypertension) 06/23/2014    CKD (chronic kidney disease) stage 2, GFR 60-89 ml/min 06/23/2014     Current Outpatient Prescriptions   Medication Sig Dispense Refill    Blood-Glucose Meter monitoring kit Dx Code: E11.21 Checking blood sugars 4 times daily 1 Kit 0    Lancing Device misc Dx Code: E11.21 Checking blood sugars 4 times daily 100 Each 3    Lancets misc Dx Code: E11.21 Checking blood sugars 4 times daily 400 Each 3    glucose blood VI test strips (BLOOD GLUCOSE TEST) strip Dx Code: E11.21 Checking blood sugars 4 times daily 400 Strip 3    alcohol swabs (ALCOHOL PADS) padm 400 Each by Apply Externally route daily as needed. Dx Code: E11.21 Checking blood sugars 4 times daily 400 Pad 3    levothyroxine (SYNTHROID) 150 mcg tablet Take 1 Tab by mouth Daily (before breakfast). 30 Tab 5    aspirin 81 mg chewable tablet Take 1 Tab by mouth daily. 90 Tab 3    carvedilol (COREG) 6.25 mg tablet Take 1 Tab by mouth two (2) times daily (with meals). 60 Tab 5    hydrALAZINE (APRESOLINE) 25 mg tablet Take 1.5 Tabs by mouth three (3) times daily. 90 Tab 8    isosorbide mononitrate ER (IMDUR) 30 mg tablet Take 1 Tab by mouth daily. 30 Tab 5    clopidogrel (PLAVIX) 75 mg tab Take 1 Tab by mouth daily. 90 Tab 1    pantoprazole (PROTONIX) 40 mg tablet Take 1 Tab by mouth daily. 60 Tab 0    atorvastatin (LIPITOR) 40 mg tablet Take 1 Tab by mouth daily. 90 Tab 3    insulin glargine (LANTUS) 100 unit/mL injection 10 Units by SubCUTAneous route nightly. 1 Vial 1    ferrous sulfate 325 mg (65 mg iron) tablet Take 325 mg by mouth daily.        No Known Allergies  Past Medical History:   Diagnosis Date    Anemia     Bronchitis     Chronic kidney disease     UTI    Diabetes (HCC)     Gastrointestinal disorder     anemia    Hypertension     Kidney disease, chronic, stage II (GFR 60-89 ml/min)     Neurological disorder     Metabolic Brain Disorder     Past Surgical History:   Procedure Laterality Date    HX OTHER SURGICAL      never     Family History   Problem Relation Age of Onset    Diabetes Mother     Cancer Sister      Social History   Substance Use Topics    Smoking status: Former Smoker     Packs/day: 0.50     Years: 30.00     Types: Cigarettes     Quit date: 1/1/1994   Dionne Schafer Smokeless tobacco: Never Used    Alcohol use No      Review of Systems  Pertinent items are noted in HPI. Objective:   /82  Pulse 88  Temp 97.7 °F (36.5 °C) (Oral)   Resp 20  Ht 5' 7\" (1.702 m)  Wt 242 lb 3.2 oz (109.9 kg)  SpO2 100%  BMI 37.93 kg/m2  General appearance: alert, well appearing, and in no distress. Chest: clear to auscultation, no wheezes, rales or rhonchi, symmetric air entry. CVS exam: normal rate, regular rhythm, normal S1, S2, no murmurs, rubs, clicks or gallops. Exam of extremities: L-hand swollen, 2+ brachial and radial pulses. Assessment/Plan:       ICD-10-CM ICD-9-CM    1. Type 2 diabetes with nephropathy (HCC) E11.21 250.40 HEMOGLOBIN A1C WITH EAG     583.81    2. Swelling of left hand M79.89 729.81 DUPLEX UPPER EXT VENOUS BILAT   3. Anemia, unspecified type D64.9 285.9 CBC WITH AUTOMATED DIFF      IRON PROFILE      FERRITIN   4. Stage 3 chronic kidney disease G11.5 322.4 METABOLIC PANEL, COMPREHENSIVE     67M with multiple medical problems including CHT, CKD, and now new swelling of the L-hand. Concern is possible subclavian vein clot or other abnormalities with venous supply on that arm. I have discussed the diagnosis with the patient and the intended treatment plan as seen in the above orders. The patient has received an after-visit summary and questions were answered concerning future plans. Asked to return should symptoms worsen or not improve with treatment. Any pending labs and studies will be relayed to patient when they become available. Pt verbalizes understanding of plan of care and denies further questions or concerns at this time. Follow-up Disposition:  Return in about 3 months (around 7/24/2018), or if symptoms worsen or fail to improve.

## 2018-04-24 NOTE — ASSESSMENT & PLAN NOTE
Stable, based on history, physical exam and review of pertinent labs, studies and medications; meds reconciled; continue current treatment plan.   Lab Results   Component Value Date/Time    WBC 5.3 03/17/2018 03:47 AM    HGB 9.6 03/17/2018 03:47 AM    HCT 30.4 03/17/2018 03:47 AM    PLATELET 642 10/52/6306 03:47 AM    Creatinine 2.13 03/17/2018 03:47 AM    BUN 20 03/17/2018 03:47 AM    Potassium 3.8 03/17/2018 03:47 AM    INR 1.1 07/09/2017 09:01 PM    Prothrombin time 10.8 07/09/2017 09:01 PM

## 2018-04-25 ENCOUNTER — LAB ONLY (OUTPATIENT)
Dept: FAMILY MEDICINE CLINIC | Age: 68
End: 2018-04-25

## 2018-04-25 PROCEDURE — 3331090002 HH PPS REVENUE DEBIT

## 2018-04-25 PROCEDURE — 3331090001 HH PPS REVENUE CREDIT

## 2018-04-26 LAB
ALBUMIN SERPL-MCNC: 3.6 G/DL (ref 3.6–4.8)
ALBUMIN/GLOB SERPL: 0.9 {RATIO} (ref 1.2–2.2)
ALP SERPL-CCNC: 98 IU/L (ref 39–117)
ALT SERPL-CCNC: 38 IU/L (ref 0–44)
AST SERPL-CCNC: 36 IU/L (ref 0–40)
BASOPHILS # BLD AUTO: 0 X10E3/UL (ref 0–0.2)
BASOPHILS NFR BLD AUTO: 0 %
BILIRUB SERPL-MCNC: 0.3 MG/DL (ref 0–1.2)
BUN SERPL-MCNC: 19 MG/DL (ref 8–27)
BUN/CREAT SERPL: 10 (ref 10–24)
CALCIUM SERPL-MCNC: 9.6 MG/DL (ref 8.6–10.2)
CHLORIDE SERPL-SCNC: 100 MMOL/L (ref 96–106)
CO2 SERPL-SCNC: 22 MMOL/L (ref 18–29)
CREAT SERPL-MCNC: 1.88 MG/DL (ref 0.76–1.27)
EOSINOPHIL # BLD AUTO: 0.1 X10E3/UL (ref 0–0.4)
EOSINOPHIL NFR BLD AUTO: 1 %
ERYTHROCYTE [DISTWIDTH] IN BLOOD BY AUTOMATED COUNT: 15.6 % (ref 12.3–15.4)
EST. AVERAGE GLUCOSE BLD GHB EST-MCNC: 169 MG/DL
FERRITIN SERPL-MCNC: 209 NG/ML (ref 30–400)
GFR SERPLBLD CREATININE-BSD FMLA CKD-EPI: 36 ML/MIN/1.73
GFR SERPLBLD CREATININE-BSD FMLA CKD-EPI: 41 ML/MIN/1.73
GLOBULIN SER CALC-MCNC: 3.9 G/DL (ref 1.5–4.5)
GLUCOSE SERPL-MCNC: 117 MG/DL (ref 65–99)
HBA1C MFR BLD: 7.5 % (ref 4.8–5.6)
HCT VFR BLD AUTO: 31.7 % (ref 37.5–51)
HGB BLD-MCNC: 9.7 G/DL (ref 13–17.7)
IMM GRANULOCYTES # BLD: 0 X10E3/UL (ref 0–0.1)
IMM GRANULOCYTES NFR BLD: 0 %
INTERPRETATION: NORMAL
IRON SATN MFR SERPL: 8 % (ref 15–55)
IRON SERPL-MCNC: 19 UG/DL (ref 38–169)
LYMPHOCYTES # BLD AUTO: 1.9 X10E3/UL (ref 0.7–3.1)
LYMPHOCYTES NFR BLD AUTO: 24 %
Lab: NORMAL
MCH RBC QN AUTO: 24.3 PG (ref 26.6–33)
MCHC RBC AUTO-ENTMCNC: 30.6 G/DL (ref 31.5–35.7)
MCV RBC AUTO: 79 FL (ref 79–97)
MONOCYTES # BLD AUTO: 0.6 X10E3/UL (ref 0.1–0.9)
MONOCYTES NFR BLD AUTO: 7 %
NEUTROPHILS # BLD AUTO: 5.4 X10E3/UL (ref 1.4–7)
NEUTROPHILS NFR BLD AUTO: 68 %
PLATELET # BLD AUTO: 307 X10E3/UL (ref 150–379)
POTASSIUM SERPL-SCNC: 4.4 MMOL/L (ref 3.5–5.2)
PROT SERPL-MCNC: 7.5 G/DL (ref 6–8.5)
RBC # BLD AUTO: 4 X10E6/UL (ref 4.14–5.8)
SODIUM SERPL-SCNC: 139 MMOL/L (ref 134–144)
TIBC SERPL-MCNC: 239 UG/DL (ref 250–450)
UIBC SERPL-MCNC: 220 UG/DL (ref 111–343)
WBC # BLD AUTO: 8 X10E3/UL (ref 3.4–10.8)

## 2018-04-26 PROCEDURE — 3331090001 HH PPS REVENUE CREDIT

## 2018-04-26 PROCEDURE — 3331090002 HH PPS REVENUE DEBIT

## 2018-04-27 ENCOUNTER — HOME CARE VISIT (OUTPATIENT)
Dept: SCHEDULING | Facility: HOME HEALTH | Age: 68
End: 2018-04-27
Payer: MEDICARE

## 2018-04-27 PROCEDURE — G0155 HHCP-SVS OF CSW,EA 15 MIN: HCPCS

## 2018-04-27 PROCEDURE — 3331090001 HH PPS REVENUE CREDIT

## 2018-04-27 PROCEDURE — 3331090002 HH PPS REVENUE DEBIT

## 2018-04-28 PROCEDURE — 3331090002 HH PPS REVENUE DEBIT

## 2018-04-28 PROCEDURE — 3331090001 HH PPS REVENUE CREDIT

## 2018-04-29 PROCEDURE — 3331090001 HH PPS REVENUE CREDIT

## 2018-04-29 PROCEDURE — 3331090002 HH PPS REVENUE DEBIT

## 2018-04-30 PROCEDURE — 3331090001 HH PPS REVENUE CREDIT

## 2018-04-30 PROCEDURE — 3331090002 HH PPS REVENUE DEBIT

## 2018-05-01 PROCEDURE — 3331090002 HH PPS REVENUE DEBIT

## 2018-05-01 PROCEDURE — 3331090001 HH PPS REVENUE CREDIT

## 2018-05-02 ENCOUNTER — HOME CARE VISIT (OUTPATIENT)
Dept: SCHEDULING | Facility: HOME HEALTH | Age: 68
End: 2018-05-02
Payer: MEDICARE

## 2018-05-02 PROCEDURE — 3331090002 HH PPS REVENUE DEBIT

## 2018-05-02 PROCEDURE — 3331090001 HH PPS REVENUE CREDIT

## 2018-05-03 PROCEDURE — 3331090001 HH PPS REVENUE CREDIT

## 2018-05-03 PROCEDURE — 3331090002 HH PPS REVENUE DEBIT

## 2018-05-04 PROCEDURE — 3331090001 HH PPS REVENUE CREDIT

## 2018-05-04 PROCEDURE — 3331090002 HH PPS REVENUE DEBIT

## 2018-05-05 PROCEDURE — 3331090001 HH PPS REVENUE CREDIT

## 2018-05-05 PROCEDURE — 3331090002 HH PPS REVENUE DEBIT

## 2018-05-06 PROCEDURE — 3331090002 HH PPS REVENUE DEBIT

## 2018-05-06 PROCEDURE — 3331090001 HH PPS REVENUE CREDIT

## 2018-05-07 PROCEDURE — 3331090001 HH PPS REVENUE CREDIT

## 2018-05-07 PROCEDURE — 3331090002 HH PPS REVENUE DEBIT

## 2018-05-08 PROCEDURE — 3331090001 HH PPS REVENUE CREDIT

## 2018-05-08 PROCEDURE — 3331090002 HH PPS REVENUE DEBIT

## 2018-05-09 PROCEDURE — 3331090002 HH PPS REVENUE DEBIT

## 2018-05-09 PROCEDURE — 3331090001 HH PPS REVENUE CREDIT

## 2018-05-10 ENCOUNTER — HOME CARE VISIT (OUTPATIENT)
Dept: HOME HEALTH SERVICES | Facility: HOME HEALTH | Age: 68
End: 2018-05-10
Payer: MEDICARE

## 2018-05-10 PROCEDURE — 3331090002 HH PPS REVENUE DEBIT

## 2018-05-10 PROCEDURE — G0299 HHS/HOSPICE OF RN EA 15 MIN: HCPCS

## 2018-05-10 PROCEDURE — 3331090001 HH PPS REVENUE CREDIT

## 2018-05-11 PROCEDURE — 3331090002 HH PPS REVENUE DEBIT

## 2018-05-11 PROCEDURE — 3331090001 HH PPS REVENUE CREDIT

## 2018-05-12 PROCEDURE — 3331090001 HH PPS REVENUE CREDIT

## 2018-05-12 PROCEDURE — 3331090002 HH PPS REVENUE DEBIT

## 2018-05-13 PROCEDURE — 3331090002 HH PPS REVENUE DEBIT

## 2018-05-13 PROCEDURE — 3331090001 HH PPS REVENUE CREDIT

## 2018-05-14 PROCEDURE — 3331090002 HH PPS REVENUE DEBIT

## 2018-05-14 PROCEDURE — 3331090001 HH PPS REVENUE CREDIT

## 2018-05-15 PROCEDURE — 3331090001 HH PPS REVENUE CREDIT

## 2018-05-15 PROCEDURE — 3331090002 HH PPS REVENUE DEBIT

## 2018-05-16 PROCEDURE — 3331090001 HH PPS REVENUE CREDIT

## 2018-05-16 PROCEDURE — 3331090002 HH PPS REVENUE DEBIT

## 2018-05-17 ENCOUNTER — HOSPITAL ENCOUNTER (OUTPATIENT)
Dept: VASCULAR SURGERY | Age: 68
Discharge: HOME | End: 2018-05-17
Attending: INTERNAL MEDICINE
Payer: MEDICARE

## 2018-05-17 ENCOUNTER — TELEPHONE (OUTPATIENT)
Dept: FAMILY MEDICINE CLINIC | Age: 68
End: 2018-05-17

## 2018-05-17 DIAGNOSIS — M79.89 SWELLING OF LEFT HAND: ICD-10-CM

## 2018-05-17 DIAGNOSIS — M79.89 LEFT ARM SWELLING: Primary | ICD-10-CM

## 2018-05-17 PROCEDURE — 93971 EXTREMITY STUDY: CPT

## 2018-05-17 PROCEDURE — 3331090002 HH PPS REVENUE DEBIT

## 2018-05-17 PROCEDURE — 3331090001 HH PPS REVENUE CREDIT

## 2018-05-17 NOTE — PROCEDURES
[facility]  *** FINAL REPORT ***    Name: Prince Marie  MRN: [id number]    Outpatient  : 23 Sep 1950  HIS Order #: [id number]  [id number] [facility] Visit #: [id number]  Date: 17 May 2018    TYPE OF TEST: Peripheral Venous Testing    REASON FOR TEST  Limb swelling    Left Arm:-  Deep venous thrombosis:           No  Superficial venous thrombosis:    No      INTERPRETATION/FINDINGS  PROCEDURE:  LEFT UPPER EXTREMITY VENOUS DUPLEX. Evaluation of upper  extremity veins with ultrasound (B-mode imaging, pulsed Doppler, color   Doppler). Includes the internal jugular, subclavian, axillary,  brachial, radial, ulnar, basilic, and cephalic veins. FINDINGS:  Robbert Sky scale and color flow duplex images of the veins of the   left upper extremity and bilateral internal jugular veins demonstrate   compressibility, absence of filling defects, reflux or phlebitic  changes of the bilateral internal jugular, subclavian, axillary, upper   arm and forearm veins of the right arm. CONCLUSION:  Normal leftt upper extremity venous duplex. No deep vein  thrombosis or thrombophlebitis. No evidence of thrombus in  contralateral right subclavian vein. ADDITIONAL COMMENTS    I have personally reviewed the data relevant to the interpretation of  this  study. TECHNOLOGIST: Susi Murphy RVT  Signed: 2018 10:47 AM    PHYSICIAN: Pita Cole.  Mimi Patel MD  Signed: 2018 04:36 PM

## 2018-05-17 NOTE — TELEPHONE ENCOUNTER
[facility] [facility] is calling because the pt has an appt at 10 am today and they are concered because the pt is ordered to get a duplex of both arms and is only having left arm pain and swelling. With the diag. Code matching only left arm also. They are thinking insurance will not cover it this way.      Brittani with the vascular lab can be reached [phone]

## 2018-05-18 PROCEDURE — 3331090001 HH PPS REVENUE CREDIT

## 2018-05-18 PROCEDURE — 3331090002 HH PPS REVENUE DEBIT

## 2018-05-18 NOTE — PROGRESS NOTES
Duplex US does not show a DVT of the left arm. Reason for swelling unclear. Will continue to monitor.

## 2018-05-19 PROCEDURE — 3331090001 HH PPS REVENUE CREDIT

## 2018-05-19 PROCEDURE — 3331090002 HH PPS REVENUE DEBIT

## 2018-05-20 PROCEDURE — 3331090002 HH PPS REVENUE DEBIT

## 2018-05-20 PROCEDURE — 3331090001 HH PPS REVENUE CREDIT

## 2018-05-21 PROCEDURE — 3331090002 HH PPS REVENUE DEBIT

## 2018-05-21 PROCEDURE — 3331090001 HH PPS REVENUE CREDIT

## 2018-05-22 VITALS
OXYGEN SATURATION: 97 % | SYSTOLIC BLOOD PRESSURE: 132 MMHG | DIASTOLIC BLOOD PRESSURE: 78 MMHG | RESPIRATION RATE: 16 BRPM | TEMPERATURE: 97.5 F | HEART RATE: 77 BPM

## 2018-05-22 PROCEDURE — 3331090002 HH PPS REVENUE DEBIT

## 2018-05-22 PROCEDURE — 3331090001 HH PPS REVENUE CREDIT

## 2018-05-23 PROCEDURE — 3331090001 HH PPS REVENUE CREDIT

## 2018-05-23 PROCEDURE — 3331090002 HH PPS REVENUE DEBIT

## 2018-05-31 RX ORDER — PANTOPRAZOLE SODIUM 40 MG/1
40 TABLET, DELAYED RELEASE ORAL DAILY
Qty: 90 TAB | Refills: 1 | Status: ON HOLD | OUTPATIENT
Start: 2018-05-31 | End: 2020-01-01

## 2018-09-29 DIAGNOSIS — E02 SUBCLINICAL IODINE-DEFICIENCY HYPOTHYROIDISM: ICD-10-CM

## 2018-09-29 RX ORDER — LEVOTHYROXINE SODIUM 150 UG/1
TABLET ORAL
Qty: 30 TAB | Refills: 5 | Status: SHIPPED | OUTPATIENT
Start: 2018-09-29 | End: 2019-04-24 | Stop reason: SDUPTHER

## 2018-10-19 RX ORDER — ISOSORBIDE MONONITRATE 30 MG/1
30 TABLET, EXTENDED RELEASE ORAL DAILY
Qty: 30 TAB | Refills: 5 | Status: SHIPPED | OUTPATIENT
Start: 2018-10-19 | End: 2019-04-29 | Stop reason: SDUPTHER

## 2019-04-24 DIAGNOSIS — E02 SUBCLINICAL IODINE-DEFICIENCY HYPOTHYROIDISM: ICD-10-CM

## 2019-04-25 RX ORDER — LEVOTHYROXINE SODIUM 150 UG/1
TABLET ORAL
Qty: 30 TAB | Refills: 5 | Status: ON HOLD | OUTPATIENT
Start: 2019-04-25 | End: 2020-01-01 | Stop reason: SDUPTHER

## 2019-04-29 RX ORDER — ISOSORBIDE MONONITRATE 30 MG/1
30 TABLET, EXTENDED RELEASE ORAL DAILY
Qty: 30 TAB | Refills: 5 | Status: ON HOLD | OUTPATIENT
Start: 2019-04-29 | End: 2020-01-01

## 2020-01-01 ENCOUNTER — TELEPHONE (OUTPATIENT)
Dept: CASE MANAGEMENT | Age: 70
End: 2020-01-01

## 2020-01-01 ENCOUNTER — TELEPHONE (OUTPATIENT)
Dept: FAMILY MEDICINE CLINIC | Age: 70
End: 2020-01-01

## 2020-01-01 ENCOUNTER — HOME HEALTH ADMISSION (OUTPATIENT)
Dept: HOME HEALTH SERVICES | Facility: HOME HEALTH | Age: 70
End: 2020-01-01

## 2020-01-01 ENCOUNTER — HOME CARE VISIT (OUTPATIENT)
Dept: SCHEDULING | Facility: HOME HEALTH | Age: 70
End: 2020-01-01
Payer: MEDICARE

## 2020-01-01 ENCOUNTER — HOSPITAL ENCOUNTER (OUTPATIENT)
Age: 70
Setting detail: OBSERVATION
Discharge: OTHER INSTITUTION | End: 2020-08-19
Attending: EMERGENCY MEDICINE | Admitting: HOSPITALIST
Payer: MEDICARE

## 2020-01-01 ENCOUNTER — APPOINTMENT (OUTPATIENT)
Dept: GENERAL RADIOLOGY | Age: 70
DRG: 683 | End: 2020-01-01
Attending: INTERNAL MEDICINE
Payer: MEDICARE

## 2020-01-01 ENCOUNTER — APPOINTMENT (OUTPATIENT)
Dept: VASCULAR SURGERY | Age: 70
DRG: 683 | End: 2020-01-01
Attending: EMERGENCY MEDICINE
Payer: MEDICARE

## 2020-01-01 ENCOUNTER — HOME CARE VISIT (OUTPATIENT)
Dept: HOME HEALTH SERVICES | Facility: HOME HEALTH | Age: 70
End: 2020-01-01
Payer: MEDICARE

## 2020-01-01 ENCOUNTER — HOSPITAL ENCOUNTER (OUTPATIENT)
Dept: ULTRASOUND IMAGING | Age: 70
Discharge: HOME | DRG: 683 | End: 2020-08-03
Attending: INTERNAL MEDICINE
Payer: MEDICARE

## 2020-01-01 ENCOUNTER — APPOINTMENT (OUTPATIENT)
Dept: GENERAL RADIOLOGY | Age: 70
End: 2020-01-01
Attending: HOSPITALIST
Payer: MEDICARE

## 2020-01-01 ENCOUNTER — PATIENT OUTREACH (OUTPATIENT)
Dept: CASE MANAGEMENT | Age: 70
End: 2020-01-01

## 2020-01-01 ENCOUNTER — APPOINTMENT (OUTPATIENT)
Dept: CT IMAGING | Age: 70
DRG: 683 | End: 2020-01-01
Attending: EMERGENCY MEDICINE
Payer: MEDICARE

## 2020-01-01 ENCOUNTER — APPOINTMENT (OUTPATIENT)
Dept: GENERAL RADIOLOGY | Age: 70
DRG: 683 | End: 2020-01-01
Attending: EMERGENCY MEDICINE
Payer: MEDICARE

## 2020-01-01 ENCOUNTER — HOSPITAL ENCOUNTER (OUTPATIENT)
Age: 70
Setting detail: OBSERVATION
Discharge: HOME | End: 2020-08-28
Attending: STUDENT IN AN ORGANIZED HEALTH CARE EDUCATION/TRAINING PROGRAM | Admitting: STUDENT IN AN ORGANIZED HEALTH CARE EDUCATION/TRAINING PROGRAM
Payer: MEDICARE

## 2020-01-01 ENCOUNTER — HOSPITAL ENCOUNTER (INPATIENT)
Age: 70
LOS: 8 days | Discharge: HOME HEALTH | DRG: 683 | End: 2020-08-10
Attending: EMERGENCY MEDICINE | Admitting: INTERNAL MEDICINE
Payer: MEDICARE

## 2020-01-01 ENCOUNTER — HOME HEALTH ADMISSION (OUTPATIENT)
Dept: HOME HEALTH SERVICES | Facility: HOME HEALTH | Age: 70
End: 2020-01-01
Payer: MEDICARE

## 2020-01-01 ENCOUNTER — VIRTUAL VISIT (OUTPATIENT)
Dept: FAMILY MEDICINE CLINIC | Age: 70
End: 2020-01-01
Payer: MEDICARE

## 2020-01-01 VITALS
RESPIRATION RATE: 20 BRPM | OXYGEN SATURATION: 100 % | DIASTOLIC BLOOD PRESSURE: 92 MMHG | TEMPERATURE: 97.6 F | HEIGHT: 67 IN | SYSTOLIC BLOOD PRESSURE: 152 MMHG | WEIGHT: 274 LBS | HEART RATE: 86 BPM | BODY MASS INDEX: 43 KG/M2

## 2020-01-01 VITALS
OXYGEN SATURATION: 98 % | RESPIRATION RATE: 16 BRPM | DIASTOLIC BLOOD PRESSURE: 89 MMHG | TEMPERATURE: 97.8 F | HEART RATE: 73 BPM | SYSTOLIC BLOOD PRESSURE: 169 MMHG

## 2020-01-01 VITALS
HEART RATE: 72 BPM | TEMPERATURE: 97.9 F | RESPIRATION RATE: 16 BRPM | DIASTOLIC BLOOD PRESSURE: 70 MMHG | SYSTOLIC BLOOD PRESSURE: 140 MMHG | OXYGEN SATURATION: 98 %

## 2020-01-01 VITALS
RESPIRATION RATE: 16 BRPM | TEMPERATURE: 97.9 F | DIASTOLIC BLOOD PRESSURE: 72 MMHG | HEART RATE: 70 BPM | SYSTOLIC BLOOD PRESSURE: 144 MMHG | OXYGEN SATURATION: 98 %

## 2020-01-01 VITALS
HEART RATE: 72 BPM | SYSTOLIC BLOOD PRESSURE: 136 MMHG | OXYGEN SATURATION: 99 % | TEMPERATURE: 98.1 F | DIASTOLIC BLOOD PRESSURE: 69 MMHG | RESPIRATION RATE: 17 BRPM

## 2020-01-01 VITALS
OXYGEN SATURATION: 97 % | RESPIRATION RATE: 18 BRPM | TEMPERATURE: 97.8 F | DIASTOLIC BLOOD PRESSURE: 76 MMHG | SYSTOLIC BLOOD PRESSURE: 135 MMHG | HEART RATE: 78 BPM

## 2020-01-01 VITALS — SYSTOLIC BLOOD PRESSURE: 140 MMHG | DIASTOLIC BLOOD PRESSURE: 67 MMHG | TEMPERATURE: 97.6 F

## 2020-01-01 VITALS
HEART RATE: 74 BPM | SYSTOLIC BLOOD PRESSURE: 147 MMHG | TEMPERATURE: 98.1 F | OXYGEN SATURATION: 98 % | DIASTOLIC BLOOD PRESSURE: 78 MMHG | RESPIRATION RATE: 18 BRPM

## 2020-01-01 VITALS
HEART RATE: 80 BPM | OXYGEN SATURATION: 100 % | RESPIRATION RATE: 18 BRPM | SYSTOLIC BLOOD PRESSURE: 140 MMHG | DIASTOLIC BLOOD PRESSURE: 70 MMHG | TEMPERATURE: 98.8 F

## 2020-01-01 VITALS
SYSTOLIC BLOOD PRESSURE: 170 MMHG | HEART RATE: 77 BPM | OXYGEN SATURATION: 98 % | RESPIRATION RATE: 16 BRPM | TEMPERATURE: 97.9 F | DIASTOLIC BLOOD PRESSURE: 89 MMHG

## 2020-01-01 VITALS
OXYGEN SATURATION: 94 % | SYSTOLIC BLOOD PRESSURE: 160 MMHG | HEART RATE: 82 BPM | TEMPERATURE: 97.4 F | DIASTOLIC BLOOD PRESSURE: 85 MMHG

## 2020-01-01 VITALS
TEMPERATURE: 98.1 F | DIASTOLIC BLOOD PRESSURE: 70 MMHG | SYSTOLIC BLOOD PRESSURE: 144 MMHG | HEART RATE: 72 BPM | OXYGEN SATURATION: 98 % | RESPIRATION RATE: 16 BRPM

## 2020-01-01 VITALS
DIASTOLIC BLOOD PRESSURE: 77 MMHG | SYSTOLIC BLOOD PRESSURE: 138 MMHG | TEMPERATURE: 97.9 F | HEART RATE: 75 BPM | RESPIRATION RATE: 16 BRPM | OXYGEN SATURATION: 98 %

## 2020-01-01 VITALS
HEART RATE: 90 BPM | DIASTOLIC BLOOD PRESSURE: 72 MMHG | HEIGHT: 67 IN | BODY MASS INDEX: 40.24 KG/M2 | RESPIRATION RATE: 18 BRPM | TEMPERATURE: 98.1 F | TEMPERATURE: 97.9 F | DIASTOLIC BLOOD PRESSURE: 83 MMHG | OXYGEN SATURATION: 98 % | HEART RATE: 93 BPM | SYSTOLIC BLOOD PRESSURE: 158 MMHG | WEIGHT: 256.39 LBS | SYSTOLIC BLOOD PRESSURE: 128 MMHG

## 2020-01-01 VITALS
HEART RATE: 74 BPM | SYSTOLIC BLOOD PRESSURE: 130 MMHG | OXYGEN SATURATION: 98 % | DIASTOLIC BLOOD PRESSURE: 69 MMHG | RESPIRATION RATE: 16 BRPM | TEMPERATURE: 97.9 F

## 2020-01-01 VITALS
HEART RATE: 87 BPM | DIASTOLIC BLOOD PRESSURE: 100 MMHG | OXYGEN SATURATION: 98 % | TEMPERATURE: 97.3 F | SYSTOLIC BLOOD PRESSURE: 170 MMHG

## 2020-01-01 VITALS
RESPIRATION RATE: 17 BRPM | DIASTOLIC BLOOD PRESSURE: 72 MMHG | SYSTOLIC BLOOD PRESSURE: 141 MMHG | HEART RATE: 74 BPM | OXYGEN SATURATION: 97 % | TEMPERATURE: 98.1 F

## 2020-01-01 VITALS
RESPIRATION RATE: 19 BRPM | HEART RATE: 74 BPM | SYSTOLIC BLOOD PRESSURE: 141 MMHG | DIASTOLIC BLOOD PRESSURE: 73 MMHG | TEMPERATURE: 98.2 F | OXYGEN SATURATION: 98 %

## 2020-01-01 VITALS
RESPIRATION RATE: 16 BRPM | TEMPERATURE: 98.1 F | DIASTOLIC BLOOD PRESSURE: 73 MMHG | HEART RATE: 73 BPM | SYSTOLIC BLOOD PRESSURE: 144 MMHG | OXYGEN SATURATION: 98 %

## 2020-01-01 VITALS
OXYGEN SATURATION: 98 % | HEART RATE: 81 BPM | TEMPERATURE: 98.2 F | DIASTOLIC BLOOD PRESSURE: 86 MMHG | SYSTOLIC BLOOD PRESSURE: 130 MMHG

## 2020-01-01 VITALS
OXYGEN SATURATION: 97 % | HEART RATE: 96 BPM | DIASTOLIC BLOOD PRESSURE: 80 MMHG | RESPIRATION RATE: 19 BRPM | SYSTOLIC BLOOD PRESSURE: 147 MMHG | TEMPERATURE: 97.5 F | BODY MASS INDEX: 40.1 KG/M2 | WEIGHT: 256 LBS

## 2020-01-01 VITALS
SYSTOLIC BLOOD PRESSURE: 154 MMHG | TEMPERATURE: 97.8 F | HEART RATE: 70 BPM | DIASTOLIC BLOOD PRESSURE: 81 MMHG | OXYGEN SATURATION: 98 % | RESPIRATION RATE: 19 BRPM

## 2020-01-01 VITALS
TEMPERATURE: 98.1 F | HEART RATE: 72 BPM | OXYGEN SATURATION: 98 % | DIASTOLIC BLOOD PRESSURE: 80 MMHG | SYSTOLIC BLOOD PRESSURE: 161 MMHG

## 2020-01-01 VITALS
SYSTOLIC BLOOD PRESSURE: 160 MMHG | HEART RATE: 90 BPM | DIASTOLIC BLOOD PRESSURE: 100 MMHG | RESPIRATION RATE: 18 BRPM | TEMPERATURE: 98.4 F | OXYGEN SATURATION: 96 %

## 2020-01-01 VITALS
SYSTOLIC BLOOD PRESSURE: 148 MMHG | RESPIRATION RATE: 16 BRPM | HEART RATE: 79 BPM | TEMPERATURE: 97.7 F | DIASTOLIC BLOOD PRESSURE: 76 MMHG | OXYGEN SATURATION: 98 %

## 2020-01-01 VITALS
DIASTOLIC BLOOD PRESSURE: 74 MMHG | HEART RATE: 76 BPM | OXYGEN SATURATION: 97 % | SYSTOLIC BLOOD PRESSURE: 140 MMHG | TEMPERATURE: 97.8 F | RESPIRATION RATE: 18 BRPM

## 2020-01-01 VITALS
OXYGEN SATURATION: 99 % | RESPIRATION RATE: 18 BRPM | DIASTOLIC BLOOD PRESSURE: 100 MMHG | HEART RATE: 94 BPM | TEMPERATURE: 97.9 F | SYSTOLIC BLOOD PRESSURE: 180 MMHG

## 2020-01-01 VITALS
TEMPERATURE: 98.2 F | HEART RATE: 94 BPM | SYSTOLIC BLOOD PRESSURE: 160 MMHG | DIASTOLIC BLOOD PRESSURE: 80 MMHG | OXYGEN SATURATION: 98 % | RESPIRATION RATE: 16 BRPM

## 2020-01-01 VITALS
HEART RATE: 96 BPM | OXYGEN SATURATION: 97 % | DIASTOLIC BLOOD PRESSURE: 76 MMHG | TEMPERATURE: 98 F | SYSTOLIC BLOOD PRESSURE: 132 MMHG

## 2020-01-01 VITALS
TEMPERATURE: 98 F | RESPIRATION RATE: 16 BRPM | HEART RATE: 71 BPM | SYSTOLIC BLOOD PRESSURE: 139 MMHG | OXYGEN SATURATION: 98 % | DIASTOLIC BLOOD PRESSURE: 71 MMHG

## 2020-01-01 VITALS
OXYGEN SATURATION: 97 % | HEART RATE: 71 BPM | TEMPERATURE: 97.8 F | DIASTOLIC BLOOD PRESSURE: 69 MMHG | RESPIRATION RATE: 16 BRPM | SYSTOLIC BLOOD PRESSURE: 140 MMHG

## 2020-01-01 VITALS
TEMPERATURE: 96.8 F | RESPIRATION RATE: 18 BRPM | HEART RATE: 75 BPM | SYSTOLIC BLOOD PRESSURE: 170 MMHG | OXYGEN SATURATION: 99 % | DIASTOLIC BLOOD PRESSURE: 100 MMHG

## 2020-01-01 VITALS
OXYGEN SATURATION: 98 % | TEMPERATURE: 97.9 F | DIASTOLIC BLOOD PRESSURE: 81 MMHG | SYSTOLIC BLOOD PRESSURE: 155 MMHG | RESPIRATION RATE: 17 BRPM | HEART RATE: 88 BPM

## 2020-01-01 VITALS
DIASTOLIC BLOOD PRESSURE: 80 MMHG | HEART RATE: 72 BPM | OXYGEN SATURATION: 98 % | SYSTOLIC BLOOD PRESSURE: 139 MMHG | TEMPERATURE: 97.8 F | RESPIRATION RATE: 17 BRPM

## 2020-01-01 VITALS
DIASTOLIC BLOOD PRESSURE: 67 MMHG | RESPIRATION RATE: 17 BRPM | SYSTOLIC BLOOD PRESSURE: 136 MMHG | HEART RATE: 71 BPM | TEMPERATURE: 97.6 F | OXYGEN SATURATION: 98 %

## 2020-01-01 VITALS
RESPIRATION RATE: 15 BRPM | TEMPERATURE: 97.9 F | SYSTOLIC BLOOD PRESSURE: 139 MMHG | HEART RATE: 79 BPM | OXYGEN SATURATION: 98 % | DIASTOLIC BLOOD PRESSURE: 72 MMHG

## 2020-01-01 DIAGNOSIS — I10 ESSENTIAL HYPERTENSION: ICD-10-CM

## 2020-01-01 DIAGNOSIS — Z95.5 STATUS POST INSERTION OF DRUG ELUTING CORONARY ARTERY STENT: ICD-10-CM

## 2020-01-01 DIAGNOSIS — E02 SUBCLINICAL IODINE-DEFICIENCY HYPOTHYROIDISM: Primary | ICD-10-CM

## 2020-01-01 DIAGNOSIS — M62.82 NON-TRAUMATIC RHABDOMYOLYSIS: ICD-10-CM

## 2020-01-01 DIAGNOSIS — E11.22 TYPE 2 DIABETES MELLITUS WITH STAGE 3 CHRONIC KIDNEY DISEASE, WITHOUT LONG-TERM CURRENT USE OF INSULIN (HCC): Chronic | ICD-10-CM

## 2020-01-01 DIAGNOSIS — E11.22 TYPE 2 DIABETES MELLITUS WITH STAGE 3A CHRONIC KIDNEY DISEASE, WITHOUT LONG-TERM CURRENT USE OF INSULIN (HCC): ICD-10-CM

## 2020-01-01 DIAGNOSIS — R53.1 GENERALIZED WEAKNESS: Primary | ICD-10-CM

## 2020-01-01 DIAGNOSIS — N18.30 CKD (CHRONIC KIDNEY DISEASE) STAGE 3, GFR 30-59 ML/MIN (HCC): Chronic | ICD-10-CM

## 2020-01-01 DIAGNOSIS — D50.8 OTHER IRON DEFICIENCY ANEMIA: ICD-10-CM

## 2020-01-01 DIAGNOSIS — N18.30 TYPE 2 DIABETES MELLITUS WITH STAGE 3 CHRONIC KIDNEY DISEASE, WITHOUT LONG-TERM CURRENT USE OF INSULIN, UNSPECIFIED WHETHER STAGE 3A OR 3B CKD (HCC): ICD-10-CM

## 2020-01-01 DIAGNOSIS — I10 HTN (HYPERTENSION), BENIGN: ICD-10-CM

## 2020-01-01 DIAGNOSIS — E02 SUBCLINICAL IODINE-DEFICIENCY HYPOTHYROIDISM: ICD-10-CM

## 2020-01-01 DIAGNOSIS — I10 HTN (HYPERTENSION), BENIGN: Primary | ICD-10-CM

## 2020-01-01 DIAGNOSIS — Z91.199 PROBLEM WITH MEDICAL CARE COMPLIANCE: ICD-10-CM

## 2020-01-01 DIAGNOSIS — N18.31 TYPE 2 DIABETES MELLITUS WITH STAGE 3A CHRONIC KIDNEY DISEASE, WITHOUT LONG-TERM CURRENT USE OF INSULIN (HCC): ICD-10-CM

## 2020-01-01 DIAGNOSIS — N17.9 ACUTE RENAL FAILURE, UNSPECIFIED ACUTE RENAL FAILURE TYPE (HCC): ICD-10-CM

## 2020-01-01 DIAGNOSIS — R53.1 GENERAL WEAKNESS: Primary | ICD-10-CM

## 2020-01-01 DIAGNOSIS — N18.30 CKD (CHRONIC KIDNEY DISEASE) STAGE 3, GFR 30-59 ML/MIN (HCC): Primary | ICD-10-CM

## 2020-01-01 DIAGNOSIS — N18.30 TYPE 2 DIABETES MELLITUS WITH STAGE 3 CHRONIC KIDNEY DISEASE, WITHOUT LONG-TERM CURRENT USE OF INSULIN, UNSPECIFIED WHETHER STAGE 3A OR 3B CKD (HCC): Primary | ICD-10-CM

## 2020-01-01 DIAGNOSIS — R26.2 INABILITY TO AMBULATE DUE TO KNEE: ICD-10-CM

## 2020-01-01 DIAGNOSIS — N18.30 TYPE 2 DIABETES MELLITUS WITH STAGE 3 CHRONIC KIDNEY DISEASE, WITHOUT LONG-TERM CURRENT USE OF INSULIN (HCC): Chronic | ICD-10-CM

## 2020-01-01 DIAGNOSIS — R53.81 PHYSICAL DEBILITY: Primary | ICD-10-CM

## 2020-01-01 DIAGNOSIS — N18.32 STAGE 3B CHRONIC KIDNEY DISEASE (HCC): Primary | ICD-10-CM

## 2020-01-01 DIAGNOSIS — E11.22 TYPE 2 DIABETES MELLITUS WITH STAGE 3 CHRONIC KIDNEY DISEASE, WITHOUT LONG-TERM CURRENT USE OF INSULIN, UNSPECIFIED WHETHER STAGE 3A OR 3B CKD (HCC): ICD-10-CM

## 2020-01-01 DIAGNOSIS — E11.22 TYPE 2 DIABETES MELLITUS WITH STAGE 3 CHRONIC KIDNEY DISEASE, WITHOUT LONG-TERM CURRENT USE OF INSULIN, UNSPECIFIED WHETHER STAGE 3A OR 3B CKD (HCC): Primary | ICD-10-CM

## 2020-01-01 DIAGNOSIS — N18.32 STAGE 3B CHRONIC KIDNEY DISEASE (HCC): ICD-10-CM

## 2020-01-01 LAB
ABO + RH BLD: NORMAL
ALBUMIN SERPL-MCNC: 2.2 G/DL (ref 3.5–5)
ALBUMIN SERPL-MCNC: 2.3 G/DL (ref 3.5–5)
ALBUMIN SERPL-MCNC: 2.4 G/DL (ref 3.5–5)
ALBUMIN SERPL-MCNC: 2.5 G/DL (ref 3.5–5)
ALBUMIN SERPL-MCNC: 2.5 G/DL (ref 3.5–5)
ALBUMIN SERPL-MCNC: 3.3 G/DL (ref 3.8–4.8)
ALBUMIN/GLOB SERPL: 0.3 {RATIO} (ref 1.1–2.2)
ALBUMIN/GLOB SERPL: 0.4 {RATIO} (ref 1.1–2.2)
ALBUMIN/GLOB SERPL: 0.9 {RATIO} (ref 1.2–2.2)
ALP SERPL-CCNC: 112 U/L (ref 45–117)
ALP SERPL-CCNC: 80 IU/L (ref 39–117)
ALP SERPL-CCNC: 87 U/L (ref 45–117)
ALP SERPL-CCNC: 89 U/L (ref 45–117)
ALP SERPL-CCNC: 91 U/L (ref 45–117)
ALT SERPL-CCNC: 18 U/L (ref 12–78)
ALT SERPL-CCNC: 19 U/L (ref 12–78)
ALT SERPL-CCNC: 20 U/L (ref 12–78)
ALT SERPL-CCNC: 26 U/L (ref 12–78)
ALT SERPL-CCNC: 8 IU/L (ref 0–44)
AMORPH CRY URNS QL MICRO: ABNORMAL
ANION GAP SERPL CALC-SCNC: 10 MMOL/L (ref 5–15)
ANION GAP SERPL CALC-SCNC: 11 MMOL/L (ref 5–15)
ANION GAP SERPL CALC-SCNC: 11 MMOL/L (ref 5–15)
ANION GAP SERPL CALC-SCNC: 12 MMOL/L (ref 5–15)
ANION GAP SERPL CALC-SCNC: 16 MMOL/L (ref 5–15)
ANION GAP SERPL CALC-SCNC: 6 MMOL/L (ref 5–15)
ANION GAP SERPL CALC-SCNC: 7 MMOL/L (ref 5–15)
ANION GAP SERPL CALC-SCNC: 7 MMOL/L (ref 5–15)
ANION GAP SERPL CALC-SCNC: 8 MMOL/L (ref 5–15)
ANION GAP SERPL CALC-SCNC: 9 MMOL/L (ref 5–15)
APPEARANCE UR: ABNORMAL
APPEARANCE UR: CLEAR
AST SERPL-CCNC: 10 U/L (ref 15–37)
AST SERPL-CCNC: 11 IU/L (ref 0–40)
AST SERPL-CCNC: 14 U/L (ref 15–37)
AST SERPL-CCNC: 14 U/L (ref 15–37)
AST SERPL-CCNC: 25 U/L (ref 15–37)
ATRIAL RATE: 89 BPM
ATRIAL RATE: 97 BPM
BACTERIA URNS QL MICRO: NEGATIVE /HPF
BACTERIA URNS QL MICRO: NEGATIVE /HPF
BASOPHILS # BLD: 0 K/UL (ref 0–0.1)
BASOPHILS # BLD: 0.1 K/UL (ref 0–0.1)
BASOPHILS # BLD: 0.1 K/UL (ref 0–0.1)
BASOPHILS NFR BLD: 0 % (ref 0–1)
BILIRUB SERPL-MCNC: 0.3 MG/DL (ref 0.2–1)
BILIRUB SERPL-MCNC: 0.3 MG/DL (ref 0.2–1)
BILIRUB SERPL-MCNC: 0.4 MG/DL (ref 0.2–1)
BILIRUB SERPL-MCNC: 0.4 MG/DL (ref 0.2–1)
BILIRUB SERPL-MCNC: 0.4 MG/DL (ref 0–1.2)
BILIRUB UR QL: NEGATIVE
BILIRUB UR QL: NEGATIVE
BLOOD GROUP ANTIBODIES SERPL: NORMAL
BNP SERPL-MCNC: 647 PG/ML
BUN SERPL-MCNC: 15 MG/DL (ref 8–27)
BUN SERPL-MCNC: 26 MG/DL (ref 6–20)
BUN SERPL-MCNC: 27 MG/DL (ref 6–20)
BUN SERPL-MCNC: 29 MG/DL (ref 6–20)
BUN SERPL-MCNC: 45 MG/DL (ref 6–20)
BUN SERPL-MCNC: 46 MG/DL (ref 6–20)
BUN SERPL-MCNC: 47 MG/DL (ref 6–20)
BUN SERPL-MCNC: 48 MG/DL (ref 6–20)
BUN SERPL-MCNC: 48 MG/DL (ref 6–20)
BUN SERPL-MCNC: 49 MG/DL (ref 6–20)
BUN SERPL-MCNC: 49 MG/DL (ref 6–20)
BUN SERPL-MCNC: 51 MG/DL (ref 6–20)
BUN SERPL-MCNC: 54 MG/DL (ref 6–20)
BUN SERPL-MCNC: 54 MG/DL (ref 6–20)
BUN SERPL-MCNC: 58 MG/DL (ref 6–20)
BUN SERPL-MCNC: 59 MG/DL (ref 6–20)
BUN SERPL-MCNC: 60 MG/DL (ref 6–20)
BUN SERPL-MCNC: 60 MG/DL (ref 6–20)
BUN SERPL-MCNC: 68 MG/DL (ref 6–20)
BUN SERPL-MCNC: 72 MG/DL (ref 6–20)
BUN SERPL-MCNC: 73 MG/DL (ref 6–20)
BUN/CREAT SERPL: 10 (ref 12–20)
BUN/CREAT SERPL: 11 (ref 12–20)
BUN/CREAT SERPL: 11 (ref 12–20)
BUN/CREAT SERPL: 14 (ref 12–20)
BUN/CREAT SERPL: 14 (ref 12–20)
BUN/CREAT SERPL: 15 (ref 12–20)
BUN/CREAT SERPL: 16 (ref 12–20)
BUN/CREAT SERPL: 17 (ref 12–20)
BUN/CREAT SERPL: 18 (ref 12–20)
BUN/CREAT SERPL: 19 (ref 12–20)
BUN/CREAT SERPL: 19 (ref 12–20)
BUN/CREAT SERPL: 7 (ref 10–24)
C3 SERPL-MCNC: 178 MG/DL (ref 82–167)
C4 SERPL-MCNC: 38 MG/DL (ref 14–44)
CALCIUM SERPL-MCNC: 8.1 MG/DL (ref 8.5–10.1)
CALCIUM SERPL-MCNC: 8.5 MG/DL (ref 8.5–10.1)
CALCIUM SERPL-MCNC: 8.7 MG/DL (ref 8.5–10.1)
CALCIUM SERPL-MCNC: 8.7 MG/DL (ref 8.5–10.1)
CALCIUM SERPL-MCNC: 8.8 MG/DL (ref 8.5–10.1)
CALCIUM SERPL-MCNC: 8.8 MG/DL (ref 8.6–10.2)
CALCIUM SERPL-MCNC: 8.9 MG/DL (ref 8.5–10.1)
CALCIUM SERPL-MCNC: 9 MG/DL (ref 8.5–10.1)
CALCIUM SERPL-MCNC: 9.2 MG/DL (ref 8.5–10.1)
CALCIUM SERPL-MCNC: 9.3 MG/DL (ref 8.5–10.1)
CALCIUM SERPL-MCNC: 9.3 MG/DL (ref 8.5–10.1)
CALCIUM SERPL-MCNC: 9.4 MG/DL (ref 8.5–10.1)
CALCIUM SERPL-MCNC: 9.6 MG/DL (ref 8.5–10.1)
CALCULATED P AXIS, ECG09: 31 DEGREES
CALCULATED P AXIS, ECG09: 38 DEGREES
CALCULATED R AXIS, ECG10: 48 DEGREES
CALCULATED R AXIS, ECG10: 51 DEGREES
CALCULATED T AXIS, ECG11: 127 DEGREES
CALCULATED T AXIS, ECG11: 85 DEGREES
CHLORIDE SERPL-SCNC: 101 MMOL/L (ref 97–108)
CHLORIDE SERPL-SCNC: 102 MMOL/L (ref 96–106)
CHLORIDE SERPL-SCNC: 102 MMOL/L (ref 97–108)
CHLORIDE SERPL-SCNC: 103 MMOL/L (ref 97–108)
CHLORIDE SERPL-SCNC: 103 MMOL/L (ref 97–108)
CHLORIDE SERPL-SCNC: 105 MMOL/L (ref 97–108)
CHLORIDE SERPL-SCNC: 105 MMOL/L (ref 97–108)
CHLORIDE SERPL-SCNC: 106 MMOL/L (ref 97–108)
CHLORIDE SERPL-SCNC: 107 MMOL/L (ref 97–108)
CHLORIDE SERPL-SCNC: 107 MMOL/L (ref 97–108)
CHLORIDE SERPL-SCNC: 108 MMOL/L (ref 97–108)
CHLORIDE SERPL-SCNC: 108 MMOL/L (ref 97–108)
CHLORIDE SERPL-SCNC: 109 MMOL/L (ref 97–108)
CHLORIDE SERPL-SCNC: 109 MMOL/L (ref 97–108)
CHLORIDE SERPL-SCNC: 110 MMOL/L (ref 97–108)
CHLORIDE SERPL-SCNC: 111 MMOL/L (ref 97–108)
CHLORIDE SERPL-SCNC: 112 MMOL/L (ref 97–108)
CHOLEST SERPL-MCNC: 156 MG/DL
CK SERPL-CCNC: 694 U/L (ref 39–308)
CO2 SERPL-SCNC: 17 MMOL/L (ref 21–32)
CO2 SERPL-SCNC: 18 MMOL/L (ref 21–32)
CO2 SERPL-SCNC: 19 MMOL/L (ref 21–32)
CO2 SERPL-SCNC: 20 MMOL/L (ref 21–32)
CO2 SERPL-SCNC: 23 MMOL/L (ref 20–29)
CO2 SERPL-SCNC: 24 MMOL/L (ref 21–32)
CO2 SERPL-SCNC: 24 MMOL/L (ref 21–32)
CO2 SERPL-SCNC: 29 MMOL/L (ref 21–32)
COLOR UR: ABNORMAL
COLOR UR: ABNORMAL
COMMENT, HOLDF: NORMAL
COVID-19, XGCOVT: NORMAL
CREAT SERPL-MCNC: 2.25 MG/DL (ref 0.76–1.27)
CREAT SERPL-MCNC: 2.54 MG/DL (ref 0.7–1.3)
CREAT SERPL-MCNC: 2.57 MG/DL (ref 0.7–1.3)
CREAT SERPL-MCNC: 2.67 MG/DL (ref 0.7–1.3)
CREAT SERPL-MCNC: 2.94 MG/DL (ref 0.7–1.3)
CREAT SERPL-MCNC: 2.95 MG/DL (ref 0.7–1.3)
CREAT SERPL-MCNC: 3.01 MG/DL (ref 0.7–1.3)
CREAT SERPL-MCNC: 3.01 MG/DL (ref 0.7–1.3)
CREAT SERPL-MCNC: 3.08 MG/DL (ref 0.7–1.3)
CREAT SERPL-MCNC: 3.15 MG/DL (ref 0.7–1.3)
CREAT SERPL-MCNC: 3.17 MG/DL (ref 0.7–1.3)
CREAT SERPL-MCNC: 3.17 MG/DL (ref 0.7–1.3)
CREAT SERPL-MCNC: 3.19 MG/DL (ref 0.7–1.3)
CREAT SERPL-MCNC: 3.22 MG/DL (ref 0.7–1.3)
CREAT SERPL-MCNC: 3.26 MG/DL (ref 0.7–1.3)
CREAT SERPL-MCNC: 3.3 MG/DL (ref 0.7–1.3)
CREAT SERPL-MCNC: 3.4 MG/DL (ref 0.7–1.3)
CREAT SERPL-MCNC: 3.43 MG/DL (ref 0.7–1.3)
CREAT SERPL-MCNC: 3.86 MG/DL (ref 0.7–1.3)
CREAT SERPL-MCNC: 4.46 MG/DL (ref 0.7–1.3)
CREAT SERPL-MCNC: 5.17 MG/DL (ref 0.7–1.3)
DIAGNOSIS, 93000: NORMAL
DIAGNOSIS, 93000: NORMAL
DIFFERENTIAL METHOD BLD: ABNORMAL
EOSINOPHIL # BLD: 0 K/UL (ref 0–0.4)
EOSINOPHIL # BLD: 0.1 K/UL (ref 0–0.4)
EOSINOPHIL NFR BLD: 0 % (ref 0–7)
EOSINOPHIL NFR BLD: 1 % (ref 0–7)
EPITH CASTS URNS QL MICRO: ABNORMAL /LPF
EPITH CASTS URNS QL MICRO: ABNORMAL /LPF
ERYTHROCYTE [DISTWIDTH] IN BLOOD BY AUTOMATED COUNT: 14.4 % (ref 11.5–14.5)
ERYTHROCYTE [DISTWIDTH] IN BLOOD BY AUTOMATED COUNT: 14.4 % (ref 11.5–14.5)
ERYTHROCYTE [DISTWIDTH] IN BLOOD BY AUTOMATED COUNT: 14.5 % (ref 11.5–14.5)
ERYTHROCYTE [DISTWIDTH] IN BLOOD BY AUTOMATED COUNT: 14.6 % (ref 11.5–14.5)
ERYTHROCYTE [DISTWIDTH] IN BLOOD BY AUTOMATED COUNT: 14.7 % (ref 11.5–14.5)
ERYTHROCYTE [DISTWIDTH] IN BLOOD BY AUTOMATED COUNT: 14.8 % (ref 11.5–14.5)
ERYTHROCYTE [DISTWIDTH] IN BLOOD BY AUTOMATED COUNT: 14.9 % (ref 11.5–14.5)
ERYTHROCYTE [DISTWIDTH] IN BLOOD BY AUTOMATED COUNT: 15.1 % (ref 11.5–14.5)
ERYTHROCYTE [DISTWIDTH] IN BLOOD BY AUTOMATED COUNT: 15.2 % (ref 11.5–14.5)
ERYTHROCYTE [DISTWIDTH] IN BLOOD BY AUTOMATED COUNT: 15.3 % (ref 11.5–14.5)
ERYTHROCYTE [DISTWIDTH] IN BLOOD BY AUTOMATED COUNT: 15.3 % (ref 11.5–14.5)
ERYTHROCYTE [DISTWIDTH] IN BLOOD BY AUTOMATED COUNT: 15.4 % (ref 11.5–14.5)
ERYTHROCYTE [DISTWIDTH] IN BLOOD BY AUTOMATED COUNT: 15.4 % (ref 11.5–14.5)
ERYTHROCYTE [DISTWIDTH] IN BLOOD BY AUTOMATED COUNT: 15.5 % (ref 11.5–14.5)
ERYTHROCYTE [DISTWIDTH] IN BLOOD BY AUTOMATED COUNT: 15.5 % (ref 11.5–14.5)
EST. AVERAGE GLUCOSE BLD GHB EST-MCNC: 209 MG/DL
FERRITIN SERPL-MCNC: 191 NG/ML (ref 26–388)
FOLATE SERPL-MCNC: 4.4 NG/ML (ref 5–21)
GLOBULIN SER CALC-MCNC: 3.5 G/DL (ref 1.5–4.5)
GLOBULIN SER CALC-MCNC: 5.8 G/DL (ref 2–4)
GLOBULIN SER CALC-MCNC: 5.9 G/DL (ref 2–4)
GLOBULIN SER CALC-MCNC: 6.1 G/DL (ref 2–4)
GLOBULIN SER CALC-MCNC: 7.1 G/DL (ref 2–4)
GLUCOSE BLD STRIP.AUTO-MCNC: 104 MG/DL (ref 65–100)
GLUCOSE BLD STRIP.AUTO-MCNC: 106 MG/DL (ref 65–100)
GLUCOSE BLD STRIP.AUTO-MCNC: 108 MG/DL (ref 65–100)
GLUCOSE BLD STRIP.AUTO-MCNC: 109 MG/DL (ref 65–100)
GLUCOSE BLD STRIP.AUTO-MCNC: 112 MG/DL (ref 65–100)
GLUCOSE BLD STRIP.AUTO-MCNC: 114 MG/DL (ref 65–100)
GLUCOSE BLD STRIP.AUTO-MCNC: 114 MG/DL (ref 65–100)
GLUCOSE BLD STRIP.AUTO-MCNC: 115 MG/DL (ref 65–100)
GLUCOSE BLD STRIP.AUTO-MCNC: 115 MG/DL (ref 65–100)
GLUCOSE BLD STRIP.AUTO-MCNC: 116 MG/DL (ref 65–100)
GLUCOSE BLD STRIP.AUTO-MCNC: 117 MG/DL (ref 65–100)
GLUCOSE BLD STRIP.AUTO-MCNC: 119 MG/DL (ref 65–100)
GLUCOSE BLD STRIP.AUTO-MCNC: 119 MG/DL (ref 65–100)
GLUCOSE BLD STRIP.AUTO-MCNC: 120 MG/DL (ref 65–100)
GLUCOSE BLD STRIP.AUTO-MCNC: 121 MG/DL (ref 65–100)
GLUCOSE BLD STRIP.AUTO-MCNC: 121 MG/DL (ref 65–100)
GLUCOSE BLD STRIP.AUTO-MCNC: 122 MG/DL (ref 65–100)
GLUCOSE BLD STRIP.AUTO-MCNC: 122 MG/DL (ref 65–100)
GLUCOSE BLD STRIP.AUTO-MCNC: 123 MG/DL (ref 65–100)
GLUCOSE BLD STRIP.AUTO-MCNC: 123 MG/DL (ref 65–100)
GLUCOSE BLD STRIP.AUTO-MCNC: 124 MG/DL (ref 65–100)
GLUCOSE BLD STRIP.AUTO-MCNC: 125 MG/DL (ref 65–100)
GLUCOSE BLD STRIP.AUTO-MCNC: 126 MG/DL (ref 65–100)
GLUCOSE BLD STRIP.AUTO-MCNC: 127 MG/DL (ref 65–100)
GLUCOSE BLD STRIP.AUTO-MCNC: 128 MG/DL (ref 65–100)
GLUCOSE BLD STRIP.AUTO-MCNC: 129 MG/DL (ref 65–100)
GLUCOSE BLD STRIP.AUTO-MCNC: 131 MG/DL (ref 65–100)
GLUCOSE BLD STRIP.AUTO-MCNC: 132 MG/DL (ref 65–100)
GLUCOSE BLD STRIP.AUTO-MCNC: 133 MG/DL (ref 65–100)
GLUCOSE BLD STRIP.AUTO-MCNC: 133 MG/DL (ref 65–100)
GLUCOSE BLD STRIP.AUTO-MCNC: 134 MG/DL (ref 65–100)
GLUCOSE BLD STRIP.AUTO-MCNC: 135 MG/DL (ref 65–100)
GLUCOSE BLD STRIP.AUTO-MCNC: 137 MG/DL (ref 65–100)
GLUCOSE BLD STRIP.AUTO-MCNC: 137 MG/DL (ref 65–100)
GLUCOSE BLD STRIP.AUTO-MCNC: 139 MG/DL (ref 65–100)
GLUCOSE BLD STRIP.AUTO-MCNC: 140 MG/DL (ref 65–100)
GLUCOSE BLD STRIP.AUTO-MCNC: 141 MG/DL (ref 65–100)
GLUCOSE BLD STRIP.AUTO-MCNC: 142 MG/DL (ref 65–100)
GLUCOSE BLD STRIP.AUTO-MCNC: 142 MG/DL (ref 65–100)
GLUCOSE BLD STRIP.AUTO-MCNC: 143 MG/DL (ref 65–100)
GLUCOSE BLD STRIP.AUTO-MCNC: 144 MG/DL (ref 65–100)
GLUCOSE BLD STRIP.AUTO-MCNC: 145 MG/DL (ref 65–100)
GLUCOSE BLD STRIP.AUTO-MCNC: 145 MG/DL (ref 65–100)
GLUCOSE BLD STRIP.AUTO-MCNC: 146 MG/DL (ref 65–100)
GLUCOSE BLD STRIP.AUTO-MCNC: 146 MG/DL (ref 65–100)
GLUCOSE BLD STRIP.AUTO-MCNC: 147 MG/DL (ref 65–100)
GLUCOSE BLD STRIP.AUTO-MCNC: 148 MG/DL (ref 65–100)
GLUCOSE BLD STRIP.AUTO-MCNC: 149 MG/DL (ref 65–100)
GLUCOSE BLD STRIP.AUTO-MCNC: 150 MG/DL (ref 65–100)
GLUCOSE BLD STRIP.AUTO-MCNC: 151 MG/DL (ref 65–100)
GLUCOSE BLD STRIP.AUTO-MCNC: 152 MG/DL (ref 65–100)
GLUCOSE BLD STRIP.AUTO-MCNC: 153 MG/DL (ref 65–100)
GLUCOSE BLD STRIP.AUTO-MCNC: 154 MG/DL (ref 65–100)
GLUCOSE BLD STRIP.AUTO-MCNC: 155 MG/DL (ref 65–100)
GLUCOSE BLD STRIP.AUTO-MCNC: 155 MG/DL (ref 65–100)
GLUCOSE BLD STRIP.AUTO-MCNC: 156 MG/DL (ref 65–100)
GLUCOSE BLD STRIP.AUTO-MCNC: 166 MG/DL (ref 65–100)
GLUCOSE BLD STRIP.AUTO-MCNC: 168 MG/DL (ref 65–100)
GLUCOSE BLD STRIP.AUTO-MCNC: 171 MG/DL (ref 65–100)
GLUCOSE BLD STRIP.AUTO-MCNC: 173 MG/DL (ref 65–100)
GLUCOSE BLD STRIP.AUTO-MCNC: 173 MG/DL (ref 65–100)
GLUCOSE BLD STRIP.AUTO-MCNC: 175 MG/DL (ref 65–100)
GLUCOSE BLD STRIP.AUTO-MCNC: 177 MG/DL (ref 65–100)
GLUCOSE BLD STRIP.AUTO-MCNC: 186 MG/DL (ref 65–100)
GLUCOSE BLD STRIP.AUTO-MCNC: 186 MG/DL (ref 65–100)
GLUCOSE BLD STRIP.AUTO-MCNC: 187 MG/DL (ref 65–100)
GLUCOSE BLD STRIP.AUTO-MCNC: 191 MG/DL (ref 65–100)
GLUCOSE BLD STRIP.AUTO-MCNC: 193 MG/DL (ref 65–100)
GLUCOSE BLD STRIP.AUTO-MCNC: 194 MG/DL (ref 65–100)
GLUCOSE BLD STRIP.AUTO-MCNC: 195 MG/DL (ref 65–100)
GLUCOSE BLD STRIP.AUTO-MCNC: 197 MG/DL (ref 65–100)
GLUCOSE BLD STRIP.AUTO-MCNC: 199 MG/DL (ref 65–100)
GLUCOSE BLD STRIP.AUTO-MCNC: 201 MG/DL (ref 65–100)
GLUCOSE BLD STRIP.AUTO-MCNC: 205 MG/DL (ref 65–100)
GLUCOSE BLD STRIP.AUTO-MCNC: 220 MG/DL (ref 65–100)
GLUCOSE BLD STRIP.AUTO-MCNC: 226 MG/DL (ref 65–100)
GLUCOSE BLD STRIP.AUTO-MCNC: 227 MG/DL (ref 65–100)
GLUCOSE BLD STRIP.AUTO-MCNC: 227 MG/DL (ref 65–100)
GLUCOSE BLD STRIP.AUTO-MCNC: 234 MG/DL (ref 65–100)
GLUCOSE BLD STRIP.AUTO-MCNC: 237 MG/DL (ref 65–100)
GLUCOSE BLD STRIP.AUTO-MCNC: 253 MG/DL (ref 65–100)
GLUCOSE BLD STRIP.AUTO-MCNC: 288 MG/DL (ref 65–100)
GLUCOSE BLD STRIP.AUTO-MCNC: 53 MG/DL (ref 65–100)
GLUCOSE BLD STRIP.AUTO-MCNC: 55 MG/DL (ref 65–100)
GLUCOSE BLD STRIP.AUTO-MCNC: 70 MG/DL (ref 65–100)
GLUCOSE BLD STRIP.AUTO-MCNC: 75 MG/DL (ref 65–100)
GLUCOSE BLD STRIP.AUTO-MCNC: 76 MG/DL (ref 65–100)
GLUCOSE BLD STRIP.AUTO-MCNC: 85 MG/DL (ref 65–100)
GLUCOSE BLD STRIP.AUTO-MCNC: 95 MG/DL (ref 65–100)
GLUCOSE SERPL-MCNC: 103 MG/DL (ref 65–99)
GLUCOSE SERPL-MCNC: 108 MG/DL (ref 65–100)
GLUCOSE SERPL-MCNC: 109 MG/DL (ref 65–100)
GLUCOSE SERPL-MCNC: 116 MG/DL (ref 65–100)
GLUCOSE SERPL-MCNC: 120 MG/DL (ref 65–100)
GLUCOSE SERPL-MCNC: 121 MG/DL (ref 65–100)
GLUCOSE SERPL-MCNC: 122 MG/DL (ref 65–100)
GLUCOSE SERPL-MCNC: 123 MG/DL (ref 65–100)
GLUCOSE SERPL-MCNC: 125 MG/DL (ref 65–100)
GLUCOSE SERPL-MCNC: 126 MG/DL (ref 65–100)
GLUCOSE SERPL-MCNC: 131 MG/DL (ref 65–100)
GLUCOSE SERPL-MCNC: 132 MG/DL (ref 65–100)
GLUCOSE SERPL-MCNC: 139 MG/DL (ref 65–100)
GLUCOSE SERPL-MCNC: 158 MG/DL (ref 65–100)
GLUCOSE SERPL-MCNC: 167 MG/DL (ref 65–100)
GLUCOSE SERPL-MCNC: 175 MG/DL (ref 65–100)
GLUCOSE SERPL-MCNC: 202 MG/DL (ref 65–100)
GLUCOSE SERPL-MCNC: 64 MG/DL (ref 65–100)
GLUCOSE SERPL-MCNC: 79 MG/DL (ref 65–100)
GLUCOSE UR STRIP.AUTO-MCNC: NEGATIVE MG/DL
GLUCOSE UR STRIP.AUTO-MCNC: NEGATIVE MG/DL
HBA1C MFR BLD: 8.9 % (ref 4–5.6)
HBV SURFACE AB SER QL: NONREACTIVE
HBV SURFACE AB SER-ACNC: <3.1 MIU/ML
HBV SURFACE AG SER QL: <0.1 INDEX
HBV SURFACE AG SER QL: NEGATIVE
HCT VFR BLD AUTO: 23.8 % (ref 36.6–50.3)
HCT VFR BLD AUTO: 24 % (ref 36.6–50.3)
HCT VFR BLD AUTO: 24.9 % (ref 36.6–50.3)
HCT VFR BLD AUTO: 25.1 % (ref 36.6–50.3)
HCT VFR BLD AUTO: 25.2 % (ref 36.6–50.3)
HCT VFR BLD AUTO: 25.6 % (ref 36.6–50.3)
HCT VFR BLD AUTO: 26 % (ref 36.6–50.3)
HCT VFR BLD AUTO: 26.5 % (ref 36.6–50.3)
HCT VFR BLD AUTO: 26.6 % (ref 36.6–50.3)
HCT VFR BLD AUTO: 26.7 % (ref 36.6–50.3)
HCT VFR BLD AUTO: 26.7 % (ref 36.6–50.3)
HCT VFR BLD AUTO: 26.9 % (ref 36.6–50.3)
HCT VFR BLD AUTO: 27.5 % (ref 36.6–50.3)
HCT VFR BLD AUTO: 27.6 % (ref 36.6–50.3)
HCT VFR BLD AUTO: 27.6 % (ref 36.6–50.3)
HCT VFR BLD AUTO: 27.9 % (ref 36.6–50.3)
HCT VFR BLD AUTO: 28.8 % (ref 36.6–50.3)
HCT VFR BLD AUTO: 29.4 % (ref 36.6–50.3)
HCT VFR BLD AUTO: 29.8 % (ref 36.6–50.3)
HCV AB SERPL QL IA: NONREACTIVE
HCV COMMENT,HCGAC: NORMAL
HDLC SERPL-MCNC: 44 MG/DL
HDLC SERPL: 3.5 {RATIO} (ref 0–5)
HEALTH STATUS, XMCV2T: NORMAL
HEALTH STATUS, XMCV2T: NORMAL
HEMOCCULT STL QL: NEGATIVE
HGB BLD-MCNC: 7.6 G/DL (ref 12.1–17)
HGB BLD-MCNC: 7.6 G/DL (ref 12.1–17)
HGB BLD-MCNC: 7.9 G/DL (ref 12.1–17)
HGB BLD-MCNC: 8.1 G/DL (ref 12.1–17)
HGB BLD-MCNC: 8.2 G/DL (ref 12.1–17)
HGB BLD-MCNC: 8.2 G/DL (ref 12.1–17)
HGB BLD-MCNC: 8.3 G/DL (ref 12.1–17)
HGB BLD-MCNC: 8.6 G/DL (ref 12.1–17)
HGB BLD-MCNC: 8.7 G/DL (ref 12.1–17)
HGB BLD-MCNC: 9 G/DL (ref 12.1–17)
HGB BLD-MCNC: 9.3 G/DL (ref 12.1–17)
HGB BLD-MCNC: 9.4 G/DL (ref 12.1–17)
HGB BLD-MCNC: 9.6 G/DL (ref 12.1–17)
HGB UR QL STRIP: ABNORMAL
HGB UR QL STRIP: ABNORMAL
HYALINE CASTS URNS QL MICRO: ABNORMAL /LPF (ref 0–5)
IGA SERPL-MCNC: 569 MG/DL (ref 61–437)
IGG SERPL-MCNC: 1849 MG/DL (ref 603–1613)
IGM SERPL-MCNC: 65 MG/DL (ref 20–172)
IMM GRANULOCYTES # BLD AUTO: 0 K/UL (ref 0–0.04)
IMM GRANULOCYTES # BLD AUTO: 0.1 K/UL (ref 0–0.04)
IMM GRANULOCYTES NFR BLD AUTO: 0 % (ref 0–0.5)
IMM GRANULOCYTES NFR BLD AUTO: 1 % (ref 0–0.5)
IRON SATN MFR SERPL: 12 % (ref 20–50)
IRON SATN MFR SERPL: 28 % (ref 20–50)
IRON SERPL-MCNC: 24 UG/DL (ref 35–150)
IRON SERPL-MCNC: 55 UG/DL (ref 35–150)
KAPPA LC FREE SER-MCNC: 194.9 MG/L (ref 3.3–19.4)
KAPPA LC FREE/LAMBDA FREE SER: 1.39 {RATIO} (ref 0.26–1.65)
KETONES UR QL STRIP.AUTO: NEGATIVE MG/DL
KETONES UR QL STRIP.AUTO: NEGATIVE MG/DL
LAMBDA LC FREE SERPL-MCNC: 140 MG/L (ref 5.7–26.3)
LDLC SERPL CALC-MCNC: 90.6 MG/DL (ref 0–100)
LEUKOCYTE ESTERASE UR QL STRIP.AUTO: NEGATIVE
LEUKOCYTE ESTERASE UR QL STRIP.AUTO: NEGATIVE
LIPASE SERPL-CCNC: 162 U/L (ref 73–393)
LIPID PROFILE,FLP: NORMAL
LYMPHOCYTES # BLD: 0.6 K/UL (ref 0.8–3.5)
LYMPHOCYTES # BLD: 1.1 K/UL (ref 0.8–3.5)
LYMPHOCYTES # BLD: 1.2 K/UL (ref 0.8–3.5)
LYMPHOCYTES # BLD: 1.3 K/UL (ref 0.8–3.5)
LYMPHOCYTES # BLD: 1.4 K/UL (ref 0.8–3.5)
LYMPHOCYTES # BLD: 1.7 K/UL (ref 0.8–3.5)
LYMPHOCYTES # BLD: 2.1 K/UL (ref 0.8–3.5)
LYMPHOCYTES NFR BLD: 11 % (ref 12–49)
LYMPHOCYTES NFR BLD: 12 % (ref 12–49)
LYMPHOCYTES NFR BLD: 12 % (ref 12–49)
LYMPHOCYTES NFR BLD: 13 % (ref 12–49)
LYMPHOCYTES NFR BLD: 13 % (ref 12–49)
LYMPHOCYTES NFR BLD: 15 % (ref 12–49)
LYMPHOCYTES NFR BLD: 15 % (ref 12–49)
LYMPHOCYTES NFR BLD: 16 % (ref 12–49)
LYMPHOCYTES NFR BLD: 16 % (ref 12–49)
LYMPHOCYTES NFR BLD: 18 % (ref 12–49)
LYMPHOCYTES NFR BLD: 6 % (ref 12–49)
LYMPHOCYTES NFR BLD: 8 % (ref 12–49)
LYMPHOCYTES NFR BLD: 8 % (ref 12–49)
LYMPHOCYTES NFR BLD: 9 % (ref 12–49)
LYMPHOCYTES NFR BLD: 9 % (ref 12–49)
MAGNESIUM SERPL-MCNC: 1.5 MG/DL (ref 1.6–2.4)
MAGNESIUM SERPL-MCNC: 1.7 MG/DL (ref 1.6–2.4)
MAGNESIUM SERPL-MCNC: 1.9 MG/DL (ref 1.6–2.4)
MAGNESIUM SERPL-MCNC: 2 MG/DL (ref 1.6–2.4)
MAGNESIUM SERPL-MCNC: 2.1 MG/DL (ref 1.6–2.4)
MAGNESIUM SERPL-MCNC: 2.4 MG/DL (ref 1.6–2.4)
MAGNESIUM SERPL-MCNC: 2.5 MG/DL (ref 1.6–2.4)
MAGNESIUM SERPL-MCNC: 2.6 MG/DL (ref 1.6–2.4)
MCH RBC QN AUTO: 25.2 PG (ref 26–34)
MCH RBC QN AUTO: 25.6 PG (ref 26–34)
MCH RBC QN AUTO: 25.7 PG (ref 26–34)
MCH RBC QN AUTO: 25.8 PG (ref 26–34)
MCH RBC QN AUTO: 25.8 PG (ref 26–34)
MCH RBC QN AUTO: 25.9 PG (ref 26–34)
MCH RBC QN AUTO: 26 PG (ref 26–34)
MCH RBC QN AUTO: 26.1 PG (ref 26–34)
MCH RBC QN AUTO: 26.2 PG (ref 26–34)
MCH RBC QN AUTO: 26.2 PG (ref 26–34)
MCH RBC QN AUTO: 26.4 PG (ref 26–34)
MCHC RBC AUTO-ENTMCNC: 30.9 G/DL (ref 30–36.5)
MCHC RBC AUTO-ENTMCNC: 30.9 G/DL (ref 30–36.5)
MCHC RBC AUTO-ENTMCNC: 31.1 G/DL (ref 30–36.5)
MCHC RBC AUTO-ENTMCNC: 31.2 G/DL (ref 30–36.5)
MCHC RBC AUTO-ENTMCNC: 31.2 G/DL (ref 30–36.5)
MCHC RBC AUTO-ENTMCNC: 31.3 G/DL (ref 30–36.5)
MCHC RBC AUTO-ENTMCNC: 31.5 G/DL (ref 30–36.5)
MCHC RBC AUTO-ENTMCNC: 31.5 G/DL (ref 30–36.5)
MCHC RBC AUTO-ENTMCNC: 31.7 G/DL (ref 30–36.5)
MCHC RBC AUTO-ENTMCNC: 31.7 G/DL (ref 30–36.5)
MCHC RBC AUTO-ENTMCNC: 31.9 G/DL (ref 30–36.5)
MCHC RBC AUTO-ENTMCNC: 32 G/DL (ref 30–36.5)
MCHC RBC AUTO-ENTMCNC: 32 G/DL (ref 30–36.5)
MCHC RBC AUTO-ENTMCNC: 32.1 G/DL (ref 30–36.5)
MCHC RBC AUTO-ENTMCNC: 32.2 G/DL (ref 30–36.5)
MCHC RBC AUTO-ENTMCNC: 32.2 G/DL (ref 30–36.5)
MCHC RBC AUTO-ENTMCNC: 32.3 G/DL (ref 30–36.5)
MCHC RBC AUTO-ENTMCNC: 32.3 G/DL (ref 30–36.5)
MCHC RBC AUTO-ENTMCNC: 32.6 G/DL (ref 30–36.5)
MCV RBC AUTO: 80.5 FL (ref 80–99)
MCV RBC AUTO: 80.5 FL (ref 80–99)
MCV RBC AUTO: 80.6 FL (ref 80–99)
MCV RBC AUTO: 80.7 FL (ref 80–99)
MCV RBC AUTO: 81 FL (ref 80–99)
MCV RBC AUTO: 81.4 FL (ref 80–99)
MCV RBC AUTO: 81.5 FL (ref 80–99)
MCV RBC AUTO: 81.5 FL (ref 80–99)
MCV RBC AUTO: 81.6 FL (ref 80–99)
MCV RBC AUTO: 81.6 FL (ref 80–99)
MCV RBC AUTO: 82.1 FL (ref 80–99)
MCV RBC AUTO: 82.5 FL (ref 80–99)
MCV RBC AUTO: 82.5 FL (ref 80–99)
MCV RBC AUTO: 82.6 FL (ref 80–99)
MCV RBC AUTO: 82.7 FL (ref 80–99)
MCV RBC AUTO: 82.8 FL (ref 80–99)
MCV RBC AUTO: 83.4 FL (ref 80–99)
MONOCYTES # BLD: 0.4 K/UL (ref 0–1)
MONOCYTES # BLD: 0.5 K/UL (ref 0–1)
MONOCYTES # BLD: 0.6 K/UL (ref 0–1)
MONOCYTES # BLD: 0.7 K/UL (ref 0–1)
MONOCYTES NFR BLD: 4 % (ref 5–13)
MONOCYTES NFR BLD: 5 % (ref 5–13)
MONOCYTES NFR BLD: 6 % (ref 5–13)
MONOCYTES NFR BLD: 6 % (ref 5–13)
NEUTS SEG # BLD: 10.7 K/UL (ref 1.8–8)
NEUTS SEG # BLD: 10.8 K/UL (ref 1.8–8)
NEUTS SEG # BLD: 10.8 K/UL (ref 1.8–8)
NEUTS SEG # BLD: 11.4 K/UL (ref 1.8–8)
NEUTS SEG # BLD: 11.5 K/UL (ref 1.8–8)
NEUTS SEG # BLD: 12.4 K/UL (ref 1.8–8)
NEUTS SEG # BLD: 5.6 K/UL (ref 1.8–8)
NEUTS SEG # BLD: 6.3 K/UL (ref 1.8–8)
NEUTS SEG # BLD: 6.5 K/UL (ref 1.8–8)
NEUTS SEG # BLD: 7.2 K/UL (ref 1.8–8)
NEUTS SEG # BLD: 7.7 K/UL (ref 1.8–8)
NEUTS SEG # BLD: 8.2 K/UL (ref 1.8–8)
NEUTS SEG # BLD: 8.9 K/UL (ref 1.8–8)
NEUTS SEG # BLD: 9 K/UL (ref 1.8–8)
NEUTS SEG # BLD: 9.9 K/UL (ref 1.8–8)
NEUTS SEG NFR BLD: 76 % (ref 32–75)
NEUTS SEG NFR BLD: 77 % (ref 32–75)
NEUTS SEG NFR BLD: 78 % (ref 32–75)
NEUTS SEG NFR BLD: 78 % (ref 32–75)
NEUTS SEG NFR BLD: 79 % (ref 32–75)
NEUTS SEG NFR BLD: 80 % (ref 32–75)
NEUTS SEG NFR BLD: 81 % (ref 32–75)
NEUTS SEG NFR BLD: 81 % (ref 32–75)
NEUTS SEG NFR BLD: 83 % (ref 32–75)
NEUTS SEG NFR BLD: 84 % (ref 32–75)
NEUTS SEG NFR BLD: 85 % (ref 32–75)
NEUTS SEG NFR BLD: 86 % (ref 32–75)
NEUTS SEG NFR BLD: 86 % (ref 32–75)
NEUTS SEG NFR BLD: 88 % (ref 32–75)
NEUTS SEG NFR BLD: 89 % (ref 32–75)
NITRITE UR QL STRIP.AUTO: NEGATIVE
NITRITE UR QL STRIP.AUTO: NEGATIVE
NRBC # BLD: 0 K/UL (ref 0–0.01)
NRBC BLD-RTO: 0 PER 100 WBC
P-R INTERVAL, ECG05: 164 MS
P-R INTERVAL, ECG05: 192 MS
PH UR STRIP: 5 [PH] (ref 5–8)
PH UR STRIP: 5.5 [PH] (ref 5–8)
PHOSPHATE SERPL-MCNC: 3.9 MG/DL (ref 2.6–4.7)
PHOSPHATE SERPL-MCNC: 3.9 MG/DL (ref 2.6–4.7)
PHOSPHATE SERPL-MCNC: 4 MG/DL (ref 2.6–4.7)
PHOSPHATE SERPL-MCNC: 4.5 MG/DL (ref 2.6–4.7)
PHOSPHATE SERPL-MCNC: 4.6 MG/DL (ref 2.6–4.7)
PLATELET # BLD AUTO: 299 K/UL (ref 150–400)
PLATELET # BLD AUTO: 322 K/UL (ref 150–400)
PLATELET # BLD AUTO: 325 K/UL (ref 150–400)
PLATELET # BLD AUTO: 344 K/UL (ref 150–400)
PLATELET # BLD AUTO: 350 K/UL (ref 150–400)
PLATELET # BLD AUTO: 356 K/UL (ref 150–400)
PLATELET # BLD AUTO: 360 K/UL (ref 150–400)
PLATELET # BLD AUTO: 380 K/UL (ref 150–400)
PLATELET # BLD AUTO: 386 K/UL (ref 150–400)
PLATELET # BLD AUTO: 397 K/UL (ref 150–400)
PLATELET # BLD AUTO: 402 K/UL (ref 150–400)
PLATELET # BLD AUTO: 417 K/UL (ref 150–400)
PLATELET # BLD AUTO: 420 K/UL (ref 150–400)
PLATELET # BLD AUTO: 446 K/UL (ref 150–400)
PLATELET # BLD AUTO: 469 K/UL (ref 150–400)
PLATELET # BLD AUTO: 474 K/UL (ref 150–400)
PLATELET # BLD AUTO: 477 K/UL (ref 150–400)
PLATELET # BLD AUTO: 500 K/UL (ref 150–400)
PLATELET # BLD AUTO: 509 K/UL (ref 150–400)
PMV BLD AUTO: 10 FL (ref 8.9–12.9)
PMV BLD AUTO: 10.1 FL (ref 8.9–12.9)
PMV BLD AUTO: 10.2 FL (ref 8.9–12.9)
PMV BLD AUTO: 10.4 FL (ref 8.9–12.9)
PMV BLD AUTO: 10.4 FL (ref 8.9–12.9)
PMV BLD AUTO: 8.6 FL (ref 8.9–12.9)
PMV BLD AUTO: 9.2 FL (ref 8.9–12.9)
PMV BLD AUTO: 9.3 FL (ref 8.9–12.9)
PMV BLD AUTO: 9.4 FL (ref 8.9–12.9)
PMV BLD AUTO: 9.5 FL (ref 8.9–12.9)
PMV BLD AUTO: 9.6 FL (ref 8.9–12.9)
PMV BLD AUTO: 9.6 FL (ref 8.9–12.9)
PMV BLD AUTO: 9.7 FL (ref 8.9–12.9)
PMV BLD AUTO: 9.8 FL (ref 8.9–12.9)
PMV BLD AUTO: 9.8 FL (ref 8.9–12.9)
POTASSIUM SERPL-SCNC: 2.8 MMOL/L (ref 3.5–5.1)
POTASSIUM SERPL-SCNC: 3.2 MMOL/L (ref 3.5–5.1)
POTASSIUM SERPL-SCNC: 3.2 MMOL/L (ref 3.5–5.1)
POTASSIUM SERPL-SCNC: 3.3 MMOL/L (ref 3.5–5.1)
POTASSIUM SERPL-SCNC: 3.4 MMOL/L (ref 3.5–5.1)
POTASSIUM SERPL-SCNC: 3.5 MMOL/L (ref 3.5–5.1)
POTASSIUM SERPL-SCNC: 3.6 MMOL/L (ref 3.5–5.1)
POTASSIUM SERPL-SCNC: 3.6 MMOL/L (ref 3.5–5.1)
POTASSIUM SERPL-SCNC: 3.9 MMOL/L (ref 3.5–5.1)
POTASSIUM SERPL-SCNC: 4.3 MMOL/L (ref 3.5–5.2)
PROT PATTERN SERPL IFE-IMP: ABNORMAL
PROT SERPL-MCNC: 6.8 G/DL (ref 6–8.5)
PROT SERPL-MCNC: 8.2 G/DL (ref 6.4–8.2)
PROT SERPL-MCNC: 8.3 G/DL (ref 6.4–8.2)
PROT SERPL-MCNC: 8.6 G/DL (ref 6.4–8.2)
PROT SERPL-MCNC: 9.5 G/DL (ref 6.4–8.2)
PROT UR STRIP-MCNC: 100 MG/DL
PROT UR STRIP-MCNC: 300 MG/DL
Q-T INTERVAL, ECG07: 366 MS
Q-T INTERVAL, ECG07: 444 MS
QRS DURATION, ECG06: 104 MS
QRS DURATION, ECG06: 112 MS
QTC CALCULATION (BEZET), ECG08: 464 MS
QTC CALCULATION (BEZET), ECG08: 540 MS
RBC # BLD AUTO: 2.95 M/UL (ref 4.1–5.7)
RBC # BLD AUTO: 2.95 M/UL (ref 4.1–5.7)
RBC # BLD AUTO: 3.05 M/UL (ref 4.1–5.7)
RBC # BLD AUTO: 3.07 M/UL (ref 4.1–5.7)
RBC # BLD AUTO: 3.08 M/UL (ref 4.1–5.7)
RBC # BLD AUTO: 3.09 M/UL (ref 4.1–5.7)
RBC # BLD AUTO: 3.14 M/UL (ref 4.1–5.7)
RBC # BLD AUTO: 3.23 M/UL (ref 4.1–5.7)
RBC # BLD AUTO: 3.25 M/UL (ref 4.1–5.7)
RBC # BLD AUTO: 3.26 M/UL (ref 4.1–5.7)
RBC # BLD AUTO: 3.3 M/UL (ref 4.1–5.7)
RBC # BLD AUTO: 3.31 M/UL (ref 4.1–5.7)
RBC # BLD AUTO: 3.34 M/UL (ref 4.1–5.7)
RBC # BLD AUTO: 3.35 M/UL (ref 4.1–5.7)
RBC # BLD AUTO: 3.38 M/UL (ref 4.1–5.7)
RBC # BLD AUTO: 3.43 M/UL (ref 4.1–5.7)
RBC # BLD AUTO: 3.57 M/UL (ref 4.1–5.7)
RBC # BLD AUTO: 3.63 M/UL (ref 4.1–5.7)
RBC # BLD AUTO: 3.7 M/UL (ref 4.1–5.7)
RBC #/AREA URNS HPF: ABNORMAL /HPF (ref 0–5)
RBC #/AREA URNS HPF: ABNORMAL /HPF (ref 0–5)
RBC MORPH BLD: ABNORMAL
SAMPLES BEING HELD,HOLD: NORMAL
SARS-COV-2, COV2: NOT DETECTED
SARS-COV-2, COV2: NOT DETECTED
SERVICE CMNT-IMP: ABNORMAL
SERVICE CMNT-IMP: NORMAL
SODIUM SERPL-SCNC: 132 MMOL/L (ref 136–145)
SODIUM SERPL-SCNC: 133 MMOL/L (ref 136–145)
SODIUM SERPL-SCNC: 134 MMOL/L (ref 136–145)
SODIUM SERPL-SCNC: 135 MMOL/L (ref 136–145)
SODIUM SERPL-SCNC: 136 MMOL/L (ref 136–145)
SODIUM SERPL-SCNC: 137 MMOL/L (ref 134–144)
SODIUM SERPL-SCNC: 137 MMOL/L (ref 136–145)
SODIUM SERPL-SCNC: 138 MMOL/L (ref 136–145)
SODIUM SERPL-SCNC: 139 MMOL/L (ref 136–145)
SODIUM SERPL-SCNC: 139 MMOL/L (ref 136–145)
SOURCE, COVRS: NORMAL
SOURCE, COVRS: NORMAL
SP GR UR REFRACTOMETRY: 1.01 (ref 1–1.03)
SP GR UR REFRACTOMETRY: 1.02 (ref 1–1.03)
SPECIMEN EXP DATE BLD: NORMAL
SPECIMEN SOURCE, FCOV2M: NORMAL
SPECIMEN SOURCE, FCOV2M: NORMAL
SPECIMEN TYPE, XMCV1T: NORMAL
SPECIMEN TYPE, XMCV1T: NORMAL
TIBC SERPL-MCNC: 194 UG/DL (ref 250–450)
TIBC SERPL-MCNC: 201 UG/DL (ref 250–450)
TRIGL SERPL-MCNC: 107 MG/DL (ref ?–150)
TROPONIN I SERPL-MCNC: <0.05 NG/ML
TSH SERPL DL<=0.005 MIU/L-ACNC: 0.75 UIU/ML (ref 0.45–4.5)
TSH SERPL DL<=0.005 MIU/L-ACNC: 8.63 UIU/ML (ref 0.45–4.5)
TSH SERPL DL<=0.05 MIU/L-ACNC: 0.26 UIU/ML (ref 0.36–3.74)
UA: UC IF INDICATED,UAUC: ABNORMAL
UR CULT HOLD, URHOLD: NORMAL
UROBILINOGEN UR QL STRIP.AUTO: 0.2 EU/DL (ref 0.2–1)
UROBILINOGEN UR QL STRIP.AUTO: 1 EU/DL (ref 0.2–1)
VENTRICULAR RATE, ECG03: 89 BPM
VENTRICULAR RATE, ECG03: 97 BPM
VIT B12 SERPL-MCNC: 447 PG/ML (ref 193–986)
VLDLC SERPL CALC-MCNC: 21.4 MG/DL
WBC # BLD AUTO: 10.1 K/UL (ref 4.1–11.1)
WBC # BLD AUTO: 10.1 K/UL (ref 4.1–11.1)
WBC # BLD AUTO: 10.2 K/UL (ref 4.1–11.1)
WBC # BLD AUTO: 10.4 K/UL (ref 4.1–11.1)
WBC # BLD AUTO: 11 K/UL (ref 4.1–11.1)
WBC # BLD AUTO: 12.6 K/UL (ref 4.1–11.1)
WBC # BLD AUTO: 12.6 K/UL (ref 4.1–11.1)
WBC # BLD AUTO: 12.7 K/UL (ref 4.1–11.1)
WBC # BLD AUTO: 12.8 K/UL (ref 4.1–11.1)
WBC # BLD AUTO: 12.8 K/UL (ref 4.1–11.1)
WBC # BLD AUTO: 13.4 K/UL (ref 4.1–11.1)
WBC # BLD AUTO: 13.9 K/UL (ref 4.1–11.1)
WBC # BLD AUTO: 14.2 K/UL (ref 4.1–11.1)
WBC # BLD AUTO: 7.3 K/UL (ref 4.1–11.1)
WBC # BLD AUTO: 8.2 K/UL (ref 4.1–11.1)
WBC # BLD AUTO: 8.4 K/UL (ref 4.1–11.1)
WBC # BLD AUTO: 9 K/UL (ref 4.1–11.1)
WBC # BLD AUTO: 9.1 K/UL (ref 4.1–11.1)
WBC # BLD AUTO: 9.5 K/UL (ref 4.1–11.1)
WBC #/AREA STL HPF: NORMAL /HPF (ref 0–4)
WBC URNS QL MICRO: ABNORMAL /HPF (ref 0–4)
WBC URNS QL MICRO: ABNORMAL /HPF (ref 0–4)

## 2020-01-01 PROCEDURE — 74011250636 HC RX REV CODE- 250/636: Performed by: STUDENT IN AN ORGANIZED HEALTH CARE EDUCATION/TRAINING PROGRAM

## 2020-01-01 PROCEDURE — 80061 LIPID PANEL: CPT

## 2020-01-01 PROCEDURE — 83735 ASSAY OF MAGNESIUM: CPT

## 2020-01-01 PROCEDURE — 74011250637 HC RX REV CODE- 250/637: Performed by: STUDENT IN AN ORGANIZED HEALTH CARE EDUCATION/TRAINING PROGRAM

## 2020-01-01 PROCEDURE — G0157 HHC PT ASSISTANT EA 15: HCPCS

## 2020-01-01 PROCEDURE — 97530 THERAPEUTIC ACTIVITIES: CPT

## 2020-01-01 PROCEDURE — G0300 HHS/HOSPICE OF LPN EA 15 MIN: HCPCS

## 2020-01-01 PROCEDURE — 99218 HC RM OBSERVATION: CPT

## 2020-01-01 PROCEDURE — 80048 BASIC METABOLIC PNL TOTAL CA: CPT

## 2020-01-01 PROCEDURE — 74011636637 HC RX REV CODE- 636/637: Performed by: INTERNAL MEDICINE

## 2020-01-01 PROCEDURE — 74011250637 HC RX REV CODE- 250/637: Performed by: INTERNAL MEDICINE

## 2020-01-01 PROCEDURE — 97530 THERAPEUTIC ACTIVITIES: CPT | Performed by: PHYSICAL THERAPIST

## 2020-01-01 PROCEDURE — 74011250636 HC RX REV CODE- 250/636: Performed by: INTERNAL MEDICINE

## 2020-01-01 PROCEDURE — 97161 PT EVAL LOW COMPLEX 20 MIN: CPT

## 2020-01-01 PROCEDURE — 74011000250 HC RX REV CODE- 250: Performed by: INTERNAL MEDICINE

## 2020-01-01 PROCEDURE — 96372 THER/PROPH/DIAG INJ SC/IM: CPT

## 2020-01-01 PROCEDURE — 82728 ASSAY OF FERRITIN: CPT

## 2020-01-01 PROCEDURE — 36415 COLL VENOUS BLD VENIPUNCTURE: CPT

## 2020-01-01 PROCEDURE — 82962 GLUCOSE BLOOD TEST: CPT

## 2020-01-01 PROCEDURE — 65660000000 HC RM CCU STEPDOWN

## 2020-01-01 PROCEDURE — 84100 ASSAY OF PHOSPHORUS: CPT

## 2020-01-01 PROCEDURE — 97110 THERAPEUTIC EXERCISES: CPT

## 2020-01-01 PROCEDURE — 20610 DRAIN/INJ JOINT/BURSA W/O US: CPT

## 2020-01-01 PROCEDURE — 80069 RENAL FUNCTION PANEL: CPT

## 2020-01-01 PROCEDURE — 80053 COMPREHEN METABOLIC PANEL: CPT

## 2020-01-01 PROCEDURE — 74011250637 HC RX REV CODE- 250/637: Performed by: HOSPITALIST

## 2020-01-01 PROCEDURE — 74011250636 HC RX REV CODE- 250/636: Performed by: HOSPITALIST

## 2020-01-01 PROCEDURE — 85025 COMPLETE CBC W/AUTO DIFF WBC: CPT

## 2020-01-01 PROCEDURE — 89055 LEUKOCYTE ASSESSMENT FECAL: CPT

## 2020-01-01 PROCEDURE — 97161 PT EVAL LOW COMPLEX 20 MIN: CPT | Performed by: PHYSICAL THERAPIST

## 2020-01-01 PROCEDURE — 74011250637 HC RX REV CODE- 250/637: Performed by: FAMILY MEDICINE

## 2020-01-01 PROCEDURE — 99285 EMERGENCY DEPT VISIT HI MDM: CPT

## 2020-01-01 PROCEDURE — 83540 ASSAY OF IRON: CPT

## 2020-01-01 PROCEDURE — 85027 COMPLETE CBC AUTOMATED: CPT

## 2020-01-01 PROCEDURE — 83883 ASSAY NEPHELOMETRY NOT SPEC: CPT

## 2020-01-01 PROCEDURE — 97116 GAIT TRAINING THERAPY: CPT

## 2020-01-01 PROCEDURE — 400013 HH SOC

## 2020-01-01 PROCEDURE — 96361 HYDRATE IV INFUSION ADD-ON: CPT

## 2020-01-01 PROCEDURE — 81001 URINALYSIS AUTO W/SCOPE: CPT

## 2020-01-01 PROCEDURE — G0299 HHS/HOSPICE OF RN EA 15 MIN: HCPCS

## 2020-01-01 PROCEDURE — 97165 OT EVAL LOW COMPLEX 30 MIN: CPT

## 2020-01-01 PROCEDURE — 97535 SELF CARE MNGMENT TRAINING: CPT | Performed by: OCCUPATIONAL THERAPIST

## 2020-01-01 PROCEDURE — 73562 X-RAY EXAM OF KNEE 3: CPT

## 2020-01-01 PROCEDURE — 97535 SELF CARE MNGMENT TRAINING: CPT

## 2020-01-01 PROCEDURE — 76770 US EXAM ABDO BACK WALL COMP: CPT

## 2020-01-01 PROCEDURE — 84484 ASSAY OF TROPONIN QUANT: CPT

## 2020-01-01 PROCEDURE — G0152 HHCP-SERV OF OT,EA 15 MIN: HCPCS

## 2020-01-01 PROCEDURE — 83880 ASSAY OF NATRIURETIC PEPTIDE: CPT

## 2020-01-01 PROCEDURE — 71045 X-RAY EXAM CHEST 1 VIEW: CPT

## 2020-01-01 PROCEDURE — 86160 COMPLEMENT ANTIGEN: CPT

## 2020-01-01 PROCEDURE — 87340 HEPATITIS B SURFACE AG IA: CPT

## 2020-01-01 PROCEDURE — 73502 X-RAY EXAM HIP UNI 2-3 VIEWS: CPT

## 2020-01-01 PROCEDURE — 96360 HYDRATION IV INFUSION INIT: CPT

## 2020-01-01 PROCEDURE — 99496 TRANSJ CARE MGMT HIGH F2F 7D: CPT | Performed by: INTERNAL MEDICINE

## 2020-01-01 PROCEDURE — 82550 ASSAY OF CK (CPK): CPT

## 2020-01-01 PROCEDURE — 97165 OT EVAL LOW COMPLEX 30 MIN: CPT | Performed by: OCCUPATIONAL THERAPIST

## 2020-01-01 PROCEDURE — 83036 HEMOGLOBIN GLYCOSYLATED A1C: CPT

## 2020-01-01 PROCEDURE — 74011250637 HC RX REV CODE- 250/637: Performed by: NURSE PRACTITIONER

## 2020-01-01 PROCEDURE — 82746 ASSAY OF FOLIC ACID SERUM: CPT

## 2020-01-01 PROCEDURE — 74011636637 HC RX REV CODE- 636/637: Performed by: HOSPITALIST

## 2020-01-01 PROCEDURE — 82607 VITAMIN B-12: CPT

## 2020-01-01 PROCEDURE — 97116 GAIT TRAINING THERAPY: CPT | Performed by: PHYSICAL THERAPIST

## 2020-01-01 PROCEDURE — 74011250636 HC RX REV CODE- 250/636: Performed by: PHYSICIAN ASSISTANT

## 2020-01-01 PROCEDURE — G8428 CUR MEDS NOT DOCUMENT: HCPCS | Performed by: INTERNAL MEDICINE

## 2020-01-01 PROCEDURE — 87635 SARS-COV-2 COVID-19 AMP PRB: CPT

## 2020-01-01 PROCEDURE — 86900 BLOOD TYPING SEROLOGIC ABO: CPT

## 2020-01-01 PROCEDURE — 92610 EVALUATE SWALLOWING FUNCTION: CPT

## 2020-01-01 PROCEDURE — 82272 OCCULT BLD FECES 1-3 TESTS: CPT

## 2020-01-01 PROCEDURE — 86803 HEPATITIS C AB TEST: CPT

## 2020-01-01 PROCEDURE — 82784 ASSAY IGA/IGD/IGG/IGM EACH: CPT

## 2020-01-01 PROCEDURE — 74011250636 HC RX REV CODE- 250/636: Performed by: EMERGENCY MEDICINE

## 2020-01-01 PROCEDURE — 93971 EXTREMITY STUDY: CPT

## 2020-01-01 PROCEDURE — 93005 ELECTROCARDIOGRAM TRACING: CPT

## 2020-01-01 PROCEDURE — G0151 HHCP-SERV OF PT,EA 15 MIN: HCPCS

## 2020-01-01 PROCEDURE — 86706 HEP B SURFACE ANTIBODY: CPT

## 2020-01-01 PROCEDURE — 83690 ASSAY OF LIPASE: CPT

## 2020-01-01 PROCEDURE — 400014 HH F/U

## 2020-01-01 PROCEDURE — 70450 CT HEAD/BRAIN W/O DYE: CPT

## 2020-01-01 PROCEDURE — 74018 RADEX ABDOMEN 1 VIEW: CPT

## 2020-01-01 PROCEDURE — 65270000029 HC RM PRIVATE

## 2020-01-01 PROCEDURE — 84443 ASSAY THYROID STIM HORMONE: CPT

## 2020-01-01 PROCEDURE — 3E0U33Z INTRODUCTION OF ANTI-INFLAMMATORY INTO JOINTS, PERCUTANEOUS APPROACH: ICD-10-PCS | Performed by: INTERNAL MEDICINE

## 2020-01-01 PROCEDURE — 74011636637 HC RX REV CODE- 636/637: Performed by: STUDENT IN AN ORGANIZED HEALTH CARE EDUCATION/TRAINING PROGRAM

## 2020-01-01 PROCEDURE — G0155 HHCP-SVS OF CSW,EA 15 MIN: HCPCS

## 2020-01-01 PROCEDURE — 74011000250 HC RX REV CODE- 250: Performed by: PHYSICIAN ASSISTANT

## 2020-01-01 RX ORDER — ONDANSETRON 2 MG/ML
4 INJECTION INTRAMUSCULAR; INTRAVENOUS
Status: DISCONTINUED | OUTPATIENT
Start: 2020-01-01 | End: 2020-01-01 | Stop reason: HOSPADM

## 2020-01-01 RX ORDER — FOLIC ACID 1 MG/1
1 TABLET ORAL DAILY
Status: DISCONTINUED | OUTPATIENT
Start: 2020-01-01 | End: 2020-01-01 | Stop reason: HOSPADM

## 2020-01-01 RX ORDER — GLIMEPIRIDE 2 MG/1
1 TABLET ORAL
Qty: 15 TAB | Refills: 0 | Status: SHIPPED | OUTPATIENT
Start: 2020-01-01 | End: 2020-01-01

## 2020-01-01 RX ORDER — CLOPIDOGREL BISULFATE 75 MG/1
75 TABLET ORAL DAILY
Status: DISCONTINUED | OUTPATIENT
Start: 2020-01-01 | End: 2020-01-01 | Stop reason: HOSPADM

## 2020-01-01 RX ORDER — HEPARIN SODIUM 5000 [USP'U]/ML
5000 INJECTION, SOLUTION INTRAVENOUS; SUBCUTANEOUS EVERY 8 HOURS
Status: DISCONTINUED | OUTPATIENT
Start: 2020-01-01 | End: 2020-01-01 | Stop reason: HOSPADM

## 2020-01-01 RX ORDER — LIDOCAINE HYDROCHLORIDE 10 MG/ML
2 INJECTION INFILTRATION; PERINEURAL ONCE
Status: COMPLETED | OUTPATIENT
Start: 2020-01-01 | End: 2020-01-01

## 2020-01-01 RX ORDER — HYDRALAZINE HYDROCHLORIDE 25 MG/1
50 TABLET, FILM COATED ORAL 3 TIMES DAILY
Status: DISCONTINUED | OUTPATIENT
Start: 2020-01-01 | End: 2020-01-01 | Stop reason: HOSPADM

## 2020-01-01 RX ORDER — LANOLIN ALCOHOL/MO/W.PET/CERES
325 CREAM (GRAM) TOPICAL 2 TIMES DAILY WITH MEALS
Status: DISCONTINUED | OUTPATIENT
Start: 2020-01-01 | End: 2020-01-01 | Stop reason: HOSPADM

## 2020-01-01 RX ORDER — SODIUM BICARBONATE 650 MG/1
650 TABLET ORAL 2 TIMES DAILY
Status: DISCONTINUED | OUTPATIENT
Start: 2020-01-01 | End: 2020-01-01

## 2020-01-01 RX ORDER — LOSARTAN POTASSIUM 25 MG/1
25 TABLET ORAL DAILY
Status: DISCONTINUED | OUTPATIENT
Start: 2020-01-01 | End: 2020-01-01

## 2020-01-01 RX ORDER — INSULIN LISPRO 100 [IU]/ML
INJECTION, SOLUTION INTRAVENOUS; SUBCUTANEOUS
Status: DISCONTINUED | OUTPATIENT
Start: 2020-01-01 | End: 2020-01-01 | Stop reason: HOSPADM

## 2020-01-01 RX ORDER — ACETAMINOPHEN 325 MG/1
650 TABLET ORAL
Status: DISCONTINUED | OUTPATIENT
Start: 2020-01-01 | End: 2020-01-01

## 2020-01-01 RX ORDER — SODIUM CHLORIDE 9 MG/ML
125 INJECTION, SOLUTION INTRAVENOUS ONCE
Status: COMPLETED | OUTPATIENT
Start: 2020-01-01 | End: 2020-01-01

## 2020-01-01 RX ORDER — POTASSIUM CHLORIDE 750 MG/1
40 TABLET, FILM COATED, EXTENDED RELEASE ORAL
Status: COMPLETED | OUTPATIENT
Start: 2020-01-01 | End: 2020-01-01

## 2020-01-01 RX ORDER — SODIUM CHLORIDE 0.9 % (FLUSH) 0.9 %
5-40 SYRINGE (ML) INJECTION AS NEEDED
Status: DISCONTINUED | OUTPATIENT
Start: 2020-01-01 | End: 2020-01-01 | Stop reason: HOSPADM

## 2020-01-01 RX ORDER — POTASSIUM CHLORIDE 750 MG/1
10 TABLET, FILM COATED, EXTENDED RELEASE ORAL
Status: COMPLETED | OUTPATIENT
Start: 2020-01-01 | End: 2020-01-01

## 2020-01-01 RX ORDER — DICLOFENAC SODIUM 10 MG/G
4 GEL TOPICAL
Status: DISCONTINUED | OUTPATIENT
Start: 2020-01-01 | End: 2020-01-01 | Stop reason: HOSPADM

## 2020-01-01 RX ORDER — ISOSORBIDE MONONITRATE 20 MG/1
20 TABLET ORAL 2 TIMES DAILY
Status: DISCONTINUED | OUTPATIENT
Start: 2020-01-01 | End: 2020-01-01 | Stop reason: HOSPADM

## 2020-01-01 RX ORDER — SODIUM CHLORIDE, SODIUM LACTATE, POTASSIUM CHLORIDE, CALCIUM CHLORIDE 600; 310; 30; 20 MG/100ML; MG/100ML; MG/100ML; MG/100ML
100 INJECTION, SOLUTION INTRAVENOUS CONTINUOUS
Status: DISCONTINUED | OUTPATIENT
Start: 2020-01-01 | End: 2020-01-01

## 2020-01-01 RX ORDER — GLIMEPIRIDE 2 MG/1
2 TABLET ORAL DAILY
Qty: 30 TAB | Refills: 5 | Status: SHIPPED | OUTPATIENT
Start: 2020-01-01 | End: 2020-01-01 | Stop reason: SDUPTHER

## 2020-01-01 RX ORDER — HYDRALAZINE HYDROCHLORIDE 50 MG/1
50 TABLET, FILM COATED ORAL 3 TIMES DAILY
Qty: 90 TAB | Refills: 0 | Status: ON HOLD | OUTPATIENT
Start: 2020-01-01 | End: 2020-01-01 | Stop reason: SDUPTHER

## 2020-01-01 RX ORDER — ACETAMINOPHEN 325 MG/1
650 TABLET ORAL
Status: DISCONTINUED | OUTPATIENT
Start: 2020-01-01 | End: 2020-01-01 | Stop reason: HOSPADM

## 2020-01-01 RX ORDER — POTASSIUM CHLORIDE 750 MG/1
20 TABLET, FILM COATED, EXTENDED RELEASE ORAL
Status: COMPLETED | OUTPATIENT
Start: 2020-01-01 | End: 2020-01-01

## 2020-01-01 RX ORDER — METOPROLOL TARTRATE 25 MG/1
25 TABLET, FILM COATED ORAL EVERY 12 HOURS
Status: DISCONTINUED | OUTPATIENT
Start: 2020-01-01 | End: 2020-01-01

## 2020-01-01 RX ORDER — LOSARTAN POTASSIUM 25 MG/1
25 TABLET ORAL DAILY
Qty: 30 TAB | Refills: 0 | Status: SHIPPED | OUTPATIENT
Start: 2020-01-01 | End: 2020-01-01

## 2020-01-01 RX ORDER — SODIUM BICARBONATE 650 MG/1
1300 TABLET ORAL 2 TIMES DAILY
Qty: 120 TAB | Refills: 3 | Status: SHIPPED | OUTPATIENT
Start: 2020-01-01 | End: 2020-01-01 | Stop reason: SDUPTHER

## 2020-01-01 RX ORDER — HYDRALAZINE HYDROCHLORIDE 20 MG/ML
10 INJECTION INTRAMUSCULAR; INTRAVENOUS
Status: DISCONTINUED | OUTPATIENT
Start: 2020-01-01 | End: 2020-01-01 | Stop reason: HOSPADM

## 2020-01-01 RX ORDER — LOSARTAN POTASSIUM 50 MG/1
50 TABLET ORAL DAILY
Status: DISCONTINUED | OUTPATIENT
Start: 2020-01-01 | End: 2020-01-01 | Stop reason: HOSPADM

## 2020-01-01 RX ORDER — LOSARTAN POTASSIUM 50 MG/1
50 TABLET ORAL DAILY
Qty: 30 TAB | Refills: 0 | Status: ON HOLD | OUTPATIENT
Start: 2020-01-01 | End: 2020-01-01 | Stop reason: SDUPTHER

## 2020-01-01 RX ORDER — LANOLIN ALCOHOL/MO/W.PET/CERES
1 CREAM (GRAM) TOPICAL 2 TIMES DAILY WITH MEALS
Status: DISCONTINUED | OUTPATIENT
Start: 2020-01-01 | End: 2020-01-01 | Stop reason: HOSPADM

## 2020-01-01 RX ORDER — HEPARIN SODIUM 5000 [USP'U]/ML
7500 INJECTION, SOLUTION INTRAVENOUS; SUBCUTANEOUS EVERY 8 HOURS
Status: DISCONTINUED | OUTPATIENT
Start: 2020-01-01 | End: 2020-01-01 | Stop reason: HOSPADM

## 2020-01-01 RX ORDER — ALOGLIPTIN 6.25 MG/1
6.25 TABLET, FILM COATED ORAL DAILY
Qty: 30 TAB | Refills: 0 | Status: ON HOLD | OUTPATIENT
Start: 2020-01-01 | End: 2020-01-01 | Stop reason: SDUPTHER

## 2020-01-01 RX ORDER — DEXTROSE 50 % IN WATER (D50W) INTRAVENOUS SYRINGE
12.5-25 AS NEEDED
Status: DISCONTINUED | OUTPATIENT
Start: 2020-01-01 | End: 2020-01-01 | Stop reason: HOSPADM

## 2020-01-01 RX ORDER — TRIAMCINOLONE ACETONIDE 40 MG/ML
40 INJECTION, SUSPENSION INTRA-ARTICULAR; INTRAMUSCULAR ONCE
Status: COMPLETED | OUTPATIENT
Start: 2020-01-01 | End: 2020-01-01

## 2020-01-01 RX ORDER — GLIMEPIRIDE 2 MG/1
1 TABLET ORAL
Status: DISCONTINUED | OUTPATIENT
Start: 2020-01-01 | End: 2020-01-01

## 2020-01-01 RX ORDER — ISOSORBIDE MONONITRATE 30 MG/1
TABLET, EXTENDED RELEASE ORAL
Status: DISPENSED
Start: 2020-01-01 | End: 2020-01-01

## 2020-01-01 RX ORDER — LANCETS
EACH MISCELLANEOUS
Qty: 100 EACH | Refills: 11 | Status: SHIPPED | OUTPATIENT
Start: 2020-01-01

## 2020-01-01 RX ORDER — SODIUM BICARBONATE 650 MG/1
1300 TABLET ORAL 2 TIMES DAILY
Status: DISCONTINUED | OUTPATIENT
Start: 2020-01-01 | End: 2020-01-01 | Stop reason: HOSPADM

## 2020-01-01 RX ORDER — BUPIVACAINE HYDROCHLORIDE 5 MG/ML
3 INJECTION, SOLUTION EPIDURAL; INTRACAUDAL ONCE
Status: COMPLETED | OUTPATIENT
Start: 2020-01-01 | End: 2020-01-01

## 2020-01-01 RX ORDER — LANOLIN ALCOHOL/MO/W.PET/CERES
325 CREAM (GRAM) TOPICAL 2 TIMES DAILY WITH MEALS
Qty: 60 TAB | Refills: 0 | Status: ON HOLD | OUTPATIENT
Start: 2020-01-01 | End: 2020-01-01 | Stop reason: SDUPTHER

## 2020-01-01 RX ORDER — LANOLIN ALCOHOL/MO/W.PET/CERES
325 CREAM (GRAM) TOPICAL 2 TIMES DAILY WITH MEALS
Qty: 60 TAB | Refills: 0 | Status: SHIPPED | OUTPATIENT
Start: 2020-01-01 | End: 2020-01-01 | Stop reason: SDUPTHER

## 2020-01-01 RX ORDER — POLYETHYLENE GLYCOL 3350 17 G/17G
17 POWDER, FOR SOLUTION ORAL DAILY PRN
Status: DISCONTINUED | OUTPATIENT
Start: 2020-01-01 | End: 2020-01-01 | Stop reason: HOSPADM

## 2020-01-01 RX ORDER — ISOSORBIDE MONONITRATE 30 MG/1
30 TABLET, EXTENDED RELEASE ORAL 2 TIMES DAILY
Status: DISCONTINUED | OUTPATIENT
Start: 2020-01-01 | End: 2020-01-01 | Stop reason: HOSPADM

## 2020-01-01 RX ORDER — SODIUM CHLORIDE 0.9 % (FLUSH) 0.9 %
5-40 SYRINGE (ML) INJECTION EVERY 8 HOURS
Status: DISCONTINUED | OUTPATIENT
Start: 2020-01-01 | End: 2020-01-01

## 2020-01-01 RX ORDER — LEVOTHYROXINE SODIUM 150 UG/1
150 TABLET ORAL
Status: DISCONTINUED | OUTPATIENT
Start: 2020-01-01 | End: 2020-01-01 | Stop reason: HOSPADM

## 2020-01-01 RX ORDER — DEXTROSE MONOHYDRATE 100 MG/ML
0-250 INJECTION, SOLUTION INTRAVENOUS AS NEEDED
Status: DISCONTINUED | OUTPATIENT
Start: 2020-01-01 | End: 2020-01-01 | Stop reason: HOSPADM

## 2020-01-01 RX ORDER — SODIUM BICARBONATE 650 MG/1
650 TABLET ORAL 2 TIMES DAILY
Qty: 60 TAB | Refills: 0 | Status: ON HOLD | OUTPATIENT
Start: 2020-01-01 | End: 2020-01-01 | Stop reason: SDUPTHER

## 2020-01-01 RX ORDER — LEVOTHYROXINE SODIUM 50 UG/1
150 TABLET ORAL
Status: DISCONTINUED | OUTPATIENT
Start: 2020-01-01 | End: 2020-01-01

## 2020-01-01 RX ORDER — HYDRALAZINE HYDROCHLORIDE 50 MG/1
50 TABLET, FILM COATED ORAL 3 TIMES DAILY
Qty: 270 TAB | Refills: 0 | Status: SHIPPED | OUTPATIENT
Start: 2020-01-01 | End: 2021-01-25

## 2020-01-01 RX ORDER — ACETAMINOPHEN 500 MG
1000 TABLET ORAL 3 TIMES DAILY
Status: DISCONTINUED | OUTPATIENT
Start: 2020-01-01 | End: 2020-01-01 | Stop reason: HOSPADM

## 2020-01-01 RX ORDER — LANOLIN ALCOHOL/MO/W.PET/CERES
400 CREAM (GRAM) TOPICAL ONCE
Status: COMPLETED | OUTPATIENT
Start: 2020-01-01 | End: 2020-01-01

## 2020-01-01 RX ORDER — ALOGLIPTIN 6.25 MG/1
6.25 TABLET, FILM COATED ORAL DAILY
Status: DISCONTINUED | OUTPATIENT
Start: 2020-01-01 | End: 2020-01-01 | Stop reason: HOSPADM

## 2020-01-01 RX ORDER — HYDRALAZINE HYDROCHLORIDE 50 MG/1
50 TABLET, FILM COATED ORAL 3 TIMES DAILY
Status: DISCONTINUED | OUTPATIENT
Start: 2020-01-01 | End: 2020-01-01 | Stop reason: HOSPADM

## 2020-01-01 RX ORDER — ACETAMINOPHEN 325 MG/1
325 TABLET ORAL ONCE
Status: COMPLETED | OUTPATIENT
Start: 2020-01-01 | End: 2020-01-01

## 2020-01-01 RX ORDER — LANOLIN ALCOHOL/MO/W.PET/CERES
325 CREAM (GRAM) TOPICAL 2 TIMES DAILY WITH MEALS
Qty: 180 TAB | Refills: 0 | Status: SHIPPED | OUTPATIENT
Start: 2020-01-01 | End: 2021-01-25

## 2020-01-01 RX ORDER — ALOGLIPTIN 6.25 MG/1
6.25 TABLET, FILM COATED ORAL DAILY
Qty: 30 TAB | Refills: 0 | Status: SHIPPED | OUTPATIENT
Start: 2020-01-01

## 2020-01-01 RX ORDER — SODIUM BICARBONATE 650 MG/1
1950 TABLET ORAL 3 TIMES DAILY
Status: DISCONTINUED | OUTPATIENT
Start: 2020-01-01 | End: 2020-01-01

## 2020-01-01 RX ORDER — HYDRALAZINE HYDROCHLORIDE 50 MG/1
50 TABLET, FILM COATED ORAL 3 TIMES DAILY
Qty: 90 TAB | Refills: 0 | Status: SHIPPED | OUTPATIENT
Start: 2020-01-01 | End: 2020-01-01 | Stop reason: SDUPTHER

## 2020-01-01 RX ORDER — SODIUM BICARBONATE 650 MG/1
650 TABLET ORAL 2 TIMES DAILY
Status: DISCONTINUED | OUTPATIENT
Start: 2020-01-01 | End: 2020-01-01 | Stop reason: HOSPADM

## 2020-01-01 RX ORDER — LOSARTAN POTASSIUM 50 MG/1
50 TABLET ORAL DAILY
Qty: 30 TAB | Refills: 0 | Status: SHIPPED | OUTPATIENT
Start: 2020-01-01 | End: 2020-01-01 | Stop reason: DRUGHIGH

## 2020-01-01 RX ORDER — MAGNESIUM SULFATE 100 %
4 CRYSTALS MISCELLANEOUS AS NEEDED
Status: DISCONTINUED | OUTPATIENT
Start: 2020-01-01 | End: 2020-01-01 | Stop reason: HOSPADM

## 2020-01-01 RX ORDER — CLOPIDOGREL BISULFATE 75 MG/1
75 TABLET ORAL DAILY
Qty: 30 TAB | Refills: 5 | Status: SHIPPED | OUTPATIENT
Start: 2020-01-01 | End: 2020-01-01 | Stop reason: SDUPTHER

## 2020-01-01 RX ORDER — SODIUM CHLORIDE 9 MG/ML
100 INJECTION, SOLUTION INTRAVENOUS CONTINUOUS
Status: DISCONTINUED | OUTPATIENT
Start: 2020-01-01 | End: 2020-01-01

## 2020-01-01 RX ORDER — LANCETS
EACH MISCELLANEOUS
Qty: 1 EACH | Refills: 11 | Status: SHIPPED | OUTPATIENT
Start: 2020-01-01 | End: 2020-01-01

## 2020-01-01 RX ORDER — LEVOTHYROXINE SODIUM 137 UG/1
137 TABLET ORAL
Qty: 90 TAB | Refills: 0 | Status: SHIPPED | OUTPATIENT
Start: 2020-01-01 | End: 2021-01-25

## 2020-01-01 RX ORDER — SODIUM BICARBONATE 650 MG/1
TABLET ORAL
Status: DISPENSED
Start: 2020-01-01 | End: 2020-01-01

## 2020-01-01 RX ORDER — HEPARIN SODIUM 5000 [USP'U]/ML
5000 INJECTION, SOLUTION INTRAVENOUS; SUBCUTANEOUS EVERY 12 HOURS
Status: DISCONTINUED | OUTPATIENT
Start: 2020-01-01 | End: 2020-01-01 | Stop reason: HOSPADM

## 2020-01-01 RX ORDER — ACETAMINOPHEN 500 MG
1000 TABLET ORAL 3 TIMES DAILY
Qty: 180 TAB | Refills: 0 | Status: SHIPPED | OUTPATIENT
Start: 2020-01-01

## 2020-01-01 RX ORDER — GABAPENTIN 100 MG/1
100 CAPSULE ORAL DAILY
Status: DISCONTINUED | OUTPATIENT
Start: 2020-01-01 | End: 2020-01-01 | Stop reason: HOSPADM

## 2020-01-01 RX ORDER — SODIUM CHLORIDE 0.9 % (FLUSH) 0.9 %
5-40 SYRINGE (ML) INJECTION EVERY 8 HOURS
Status: DISCONTINUED | OUTPATIENT
Start: 2020-01-01 | End: 2020-01-01 | Stop reason: HOSPADM

## 2020-01-01 RX ORDER — PROMETHAZINE HYDROCHLORIDE 25 MG/1
12.5 TABLET ORAL
Status: DISCONTINUED | OUTPATIENT
Start: 2020-01-01 | End: 2020-01-01 | Stop reason: HOSPADM

## 2020-01-01 RX ORDER — HYDRALAZINE HYDROCHLORIDE 50 MG/1
50 TABLET, FILM COATED ORAL 3 TIMES DAILY
Qty: 90 TAB | Refills: 5 | Status: SHIPPED | OUTPATIENT
Start: 2020-01-01 | End: 2020-01-01 | Stop reason: SDUPTHER

## 2020-01-01 RX ORDER — LEVOTHYROXINE SODIUM 137 UG/1
137 TABLET ORAL
Qty: 90 TAB | Refills: 0 | Status: SHIPPED | OUTPATIENT
Start: 2020-01-01 | End: 2020-01-01 | Stop reason: SDUPTHER

## 2020-01-01 RX ORDER — ISOSORBIDE MONONITRATE 30 MG/1
30 TABLET, EXTENDED RELEASE ORAL 2 TIMES DAILY
Qty: 60 TAB | Refills: 0 | Status: SHIPPED | OUTPATIENT
Start: 2020-01-01 | End: 2020-01-01 | Stop reason: SDUPTHER

## 2020-01-01 RX ORDER — SODIUM BICARBONATE 650 MG/1
1300 TABLET ORAL 2 TIMES DAILY
Qty: 120 TAB | Refills: 0 | Status: SHIPPED | OUTPATIENT
Start: 2020-01-01 | End: 2020-01-01 | Stop reason: SDUPTHER

## 2020-01-01 RX ORDER — DICLOFENAC SODIUM 10 MG/G
4 GEL TOPICAL 4 TIMES DAILY
Status: DISCONTINUED | OUTPATIENT
Start: 2020-01-01 | End: 2020-01-01

## 2020-01-01 RX ORDER — BLOOD SUGAR DIAGNOSTIC
STRIP MISCELLANEOUS
Qty: 100 STRIP | Refills: 3 | Status: SHIPPED | OUTPATIENT
Start: 2020-01-01 | End: 2020-01-01

## 2020-01-01 RX ORDER — ISOSORBIDE MONONITRATE 30 MG/1
30 TABLET, EXTENDED RELEASE ORAL DAILY
Status: DISCONTINUED | OUTPATIENT
Start: 2020-01-01 | End: 2020-01-01 | Stop reason: HOSPADM

## 2020-01-01 RX ORDER — ACETAMINOPHEN 650 MG/1
650 SUPPOSITORY RECTAL
Status: DISCONTINUED | OUTPATIENT
Start: 2020-01-01 | End: 2020-01-01

## 2020-01-01 RX ORDER — HEPARIN SODIUM 5000 [USP'U]/ML
5000 INJECTION, SOLUTION INTRAVENOUS; SUBCUTANEOUS EVERY 12 HOURS
Status: DISCONTINUED | OUTPATIENT
Start: 2020-01-01 | End: 2020-01-01 | Stop reason: DRUGHIGH

## 2020-01-01 RX ORDER — PROMETHAZINE HYDROCHLORIDE 25 MG/1
12.5 TABLET ORAL
Status: DISCONTINUED | OUTPATIENT
Start: 2020-01-01 | End: 2020-01-01

## 2020-01-01 RX ORDER — CLONIDINE HYDROCHLORIDE 0.1 MG/1
0.2 TABLET ORAL
Status: COMPLETED | OUTPATIENT
Start: 2020-01-01 | End: 2020-01-01

## 2020-01-01 RX ORDER — ISOSORBIDE MONONITRATE 30 MG/1
30 TABLET, EXTENDED RELEASE ORAL 2 TIMES DAILY
Status: ON HOLD | COMMUNITY
End: 2020-01-01 | Stop reason: SDUPTHER

## 2020-01-01 RX ORDER — HEPARIN SODIUM 5000 [USP'U]/ML
INJECTION, SOLUTION INTRAVENOUS; SUBCUTANEOUS
Status: DISPENSED
Start: 2020-01-01 | End: 2020-01-01

## 2020-01-01 RX ORDER — GLIMEPIRIDE 2 MG/1
2 TABLET ORAL DAILY
Qty: 90 TAB | Refills: 0 | Status: SHIPPED | OUTPATIENT
Start: 2020-01-01 | End: 2021-01-25

## 2020-01-01 RX ORDER — LEVOTHYROXINE SODIUM 75 UG/1
150 TABLET ORAL
Status: DISCONTINUED | OUTPATIENT
Start: 2020-01-01 | End: 2020-01-01 | Stop reason: HOSPADM

## 2020-01-01 RX ORDER — FOLIC ACID 1 MG/1
1 TABLET ORAL DAILY
Qty: 30 TAB | Refills: 5 | Status: SHIPPED | OUTPATIENT
Start: 2020-01-01 | End: 2020-01-01 | Stop reason: SDUPTHER

## 2020-01-01 RX ORDER — ACETAMINOPHEN 650 MG/1
650 SUPPOSITORY RECTAL
Status: DISCONTINUED | OUTPATIENT
Start: 2020-01-01 | End: 2020-01-01 | Stop reason: HOSPADM

## 2020-01-01 RX ORDER — ISOSORBIDE MONONITRATE 30 MG/1
30 TABLET, EXTENDED RELEASE ORAL 2 TIMES DAILY
Status: DISCONTINUED | OUTPATIENT
Start: 2020-01-01 | End: 2020-01-01

## 2020-01-01 RX ORDER — LOSARTAN POTASSIUM 100 MG/1
100 TABLET ORAL DAILY
Qty: 90 TAB | Refills: 0 | Status: SHIPPED | OUTPATIENT
Start: 2020-01-01 | End: 2020-01-01 | Stop reason: SDUPTHER

## 2020-01-01 RX ORDER — ONDANSETRON 4 MG/1
4 TABLET, ORALLY DISINTEGRATING ORAL
Status: DISCONTINUED | OUTPATIENT
Start: 2020-01-01 | End: 2020-01-01 | Stop reason: HOSPADM

## 2020-01-01 RX ORDER — LANOLIN ALCOHOL/MO/W.PET/CERES
325 CREAM (GRAM) TOPICAL 2 TIMES DAILY WITH MEALS
Qty: 60 TAB | Refills: 5 | Status: SHIPPED | OUTPATIENT
Start: 2020-01-01 | End: 2020-01-01 | Stop reason: SDUPTHER

## 2020-01-01 RX ORDER — LEVOTHYROXINE SODIUM 125 UG/1
125 TABLET ORAL
Qty: 30 TAB | Refills: 0 | Status: SHIPPED | OUTPATIENT
Start: 2020-01-01 | End: 2020-01-01 | Stop reason: DRUGHIGH

## 2020-01-01 RX ORDER — DEXTROSE 50 % IN WATER (D50W) INTRAVENOUS SYRINGE
25-50 AS NEEDED
Status: DISCONTINUED | OUTPATIENT
Start: 2020-01-01 | End: 2020-01-01 | Stop reason: HOSPADM

## 2020-01-01 RX ORDER — HYDRALAZINE HYDROCHLORIDE 25 MG/1
37.5 TABLET, FILM COATED ORAL 3 TIMES DAILY
Status: DISCONTINUED | OUTPATIENT
Start: 2020-01-01 | End: 2020-01-01

## 2020-01-01 RX ORDER — DICLOFENAC SODIUM 10 MG/G
2 GEL TOPICAL 4 TIMES DAILY
Status: DISCONTINUED | OUTPATIENT
Start: 2020-01-01 | End: 2020-01-01 | Stop reason: DRUGHIGH

## 2020-01-01 RX ORDER — GLIMEPIRIDE 1 MG/1
1 TABLET ORAL
Status: DISCONTINUED | OUTPATIENT
Start: 2020-01-01 | End: 2020-01-01

## 2020-01-01 RX ORDER — CLOPIDOGREL BISULFATE 75 MG/1
75 TABLET ORAL DAILY
Qty: 30 TAB | Refills: 0 | Status: SHIPPED | OUTPATIENT
Start: 2020-01-01 | End: 2020-01-01 | Stop reason: SDUPTHER

## 2020-01-01 RX ORDER — LANCETS
EACH MISCELLANEOUS
Qty: 100 EACH | Refills: 11 | Status: SHIPPED | OUTPATIENT
Start: 2020-01-01 | End: 2020-01-01 | Stop reason: SDUPTHER

## 2020-01-01 RX ORDER — ISOSORBIDE MONONITRATE 30 MG/1
30 TABLET, EXTENDED RELEASE ORAL 2 TIMES DAILY
Qty: 180 TAB | Refills: 0 | Status: SHIPPED | OUTPATIENT
Start: 2020-01-01 | End: 2021-01-25

## 2020-01-01 RX ORDER — FOLIC ACID 1 MG/1
1 TABLET ORAL DAILY
Qty: 30 TAB | Refills: 0 | Status: ON HOLD | OUTPATIENT
Start: 2020-01-01 | End: 2020-01-01 | Stop reason: SDUPTHER

## 2020-01-01 RX ORDER — GLIMEPIRIDE 2 MG/1
2 TABLET ORAL
Status: DISCONTINUED | OUTPATIENT
Start: 2020-01-01 | End: 2020-01-01

## 2020-01-01 RX ORDER — SODIUM CHLORIDE 0.9 % (FLUSH) 0.9 %
5-40 SYRINGE (ML) INJECTION AS NEEDED
Status: DISCONTINUED | OUTPATIENT
Start: 2020-01-01 | End: 2020-01-01

## 2020-01-01 RX ORDER — SODIUM BICARBONATE 650 MG/1
1300 TABLET ORAL 2 TIMES DAILY
Status: DISCONTINUED | OUTPATIENT
Start: 2020-01-01 | End: 2020-01-01

## 2020-01-01 RX ORDER — SODIUM BICARBONATE 650 MG/1
1300 TABLET ORAL 2 TIMES DAILY
Qty: 360 TAB | Refills: 0 | Status: SHIPPED | OUTPATIENT
Start: 2020-01-01 | End: 2021-01-25

## 2020-01-01 RX ORDER — METOPROLOL TARTRATE 25 MG/1
12.5 TABLET, FILM COATED ORAL EVERY 12 HOURS
Status: DISCONTINUED | OUTPATIENT
Start: 2020-01-01 | End: 2020-01-01 | Stop reason: HOSPADM

## 2020-01-01 RX ORDER — GLIMEPIRIDE 2 MG/1
2 TABLET ORAL
Qty: 30 TAB | Refills: 0 | Status: SHIPPED | OUTPATIENT
Start: 2020-01-01 | End: 2020-01-01 | Stop reason: SDUPTHER

## 2020-01-01 RX ORDER — ONDANSETRON 2 MG/ML
4 INJECTION INTRAMUSCULAR; INTRAVENOUS
Status: DISCONTINUED | OUTPATIENT
Start: 2020-01-01 | End: 2020-01-01

## 2020-01-01 RX ORDER — LOSARTAN POTASSIUM 100 MG/1
100 TABLET ORAL DAILY
Qty: 90 TAB | Refills: 0 | Status: SHIPPED | OUTPATIENT
Start: 2020-01-01 | End: 2021-01-25

## 2020-01-01 RX ORDER — ALOGLIPTIN 12.5 MG/1
6.25 TABLET, FILM COATED ORAL DAILY
Status: DISCONTINUED | OUTPATIENT
Start: 2020-01-01 | End: 2020-01-01 | Stop reason: HOSPADM

## 2020-01-01 RX ORDER — CLOPIDOGREL BISULFATE 75 MG/1
75 TABLET ORAL DAILY
Qty: 90 TAB | Refills: 0 | Status: SHIPPED | OUTPATIENT
Start: 2020-01-01 | End: 2021-01-25

## 2020-01-01 RX ORDER — FOLIC ACID 1 MG/1
1 TABLET ORAL DAILY
Qty: 90 TAB | Refills: 0 | Status: SHIPPED | OUTPATIENT
Start: 2020-01-01 | End: 2021-01-25

## 2020-01-01 RX ORDER — LIDOCAINE 4 G/100G
1 PATCH TOPICAL EVERY 24 HOURS
Status: DISCONTINUED | OUTPATIENT
Start: 2020-01-01 | End: 2020-01-01 | Stop reason: HOSPADM

## 2020-01-01 RX ORDER — FOLIC ACID 1 MG/1
1 TABLET ORAL DAILY
Qty: 30 TAB | Refills: 0 | Status: SHIPPED | OUTPATIENT
Start: 2020-01-01 | End: 2020-01-01 | Stop reason: SDUPTHER

## 2020-01-01 RX ADMIN — Medication 10 ML: at 22:00

## 2020-01-01 RX ADMIN — HYDRALAZINE HYDROCHLORIDE 37.5 MG: 25 TABLET, FILM COATED ORAL at 08:14

## 2020-01-01 RX ADMIN — SODIUM BICARBONATE 1300 MG: 650 TABLET ORAL at 18:10

## 2020-01-01 RX ADMIN — SODIUM BICARBONATE 650 MG: 650 TABLET ORAL at 08:50

## 2020-01-01 RX ADMIN — LOSARTAN POTASSIUM 50 MG: 50 TABLET, FILM COATED ORAL at 09:22

## 2020-01-01 RX ADMIN — LEVOTHYROXINE SODIUM 125 MCG: 25 TABLET ORAL at 05:33

## 2020-01-01 RX ADMIN — ACETAMINOPHEN 1000 MG: 500 TABLET ORAL at 16:31

## 2020-01-01 RX ADMIN — Medication 10 ML: at 14:30

## 2020-01-01 RX ADMIN — HYDRALAZINE HYDROCHLORIDE 50 MG: 25 TABLET, FILM COATED ORAL at 08:15

## 2020-01-01 RX ADMIN — GABAPENTIN 100 MG: 100 CAPSULE ORAL at 08:38

## 2020-01-01 RX ADMIN — Medication 10 ML: at 05:37

## 2020-01-01 RX ADMIN — LOSARTAN POTASSIUM 50 MG: 50 TABLET, FILM COATED ORAL at 09:51

## 2020-01-01 RX ADMIN — ACETAMINOPHEN 1000 MG: 500 TABLET ORAL at 16:48

## 2020-01-01 RX ADMIN — HYDRALAZINE HYDROCHLORIDE 50 MG: 25 TABLET, FILM COATED ORAL at 16:54

## 2020-01-01 RX ADMIN — LEVOTHYROXINE SODIUM 150 MCG: 0.15 TABLET ORAL at 05:01

## 2020-01-01 RX ADMIN — HEPARIN SODIUM 5000 UNITS: 5000 INJECTION INTRAVENOUS; SUBCUTANEOUS at 08:08

## 2020-01-01 RX ADMIN — HEPARIN SODIUM 5000 UNITS: 5000 INJECTION INTRAVENOUS; SUBCUTANEOUS at 20:45

## 2020-01-01 RX ADMIN — SODIUM BICARBONATE 650 MG: 650 TABLET ORAL at 08:15

## 2020-01-01 RX ADMIN — FERROUS SULFATE TAB 325 MG (65 MG ELEMENTAL FE) 325 MG: 325 (65 FE) TAB at 16:31

## 2020-01-01 RX ADMIN — Medication 10 ML: at 06:00

## 2020-01-01 RX ADMIN — SODIUM CHLORIDE, SODIUM LACTATE, POTASSIUM CHLORIDE, AND CALCIUM CHLORIDE 100 ML/HR: 600; 310; 30; 20 INJECTION, SOLUTION INTRAVENOUS at 00:10

## 2020-01-01 RX ADMIN — Medication 10 ML: at 05:16

## 2020-01-01 RX ADMIN — SODIUM BICARBONATE 1950 MG: 650 TABLET ORAL at 09:30

## 2020-01-01 RX ADMIN — CLOPIDOGREL BISULFATE 75 MG: 75 TABLET ORAL at 09:50

## 2020-01-01 RX ADMIN — FERROUS SULFATE TAB 325 MG (65 MG ELEMENTAL FE) 325 MG: 325 (65 FE) TAB at 08:39

## 2020-01-01 RX ADMIN — LEVOTHYROXINE SODIUM 125 MCG: 25 TABLET ORAL at 06:06

## 2020-01-01 RX ADMIN — FERROUS SULFATE TAB 325 MG (65 MG ELEMENTAL FE) 325 MG: 325 (65 FE) TAB at 09:16

## 2020-01-01 RX ADMIN — DICLOFENAC 4 G: 10 GEL TOPICAL at 13:37

## 2020-01-01 RX ADMIN — HEPARIN SODIUM 5000 UNITS: 5000 INJECTION INTRAVENOUS; SUBCUTANEOUS at 21:29

## 2020-01-01 RX ADMIN — Medication 10 ML: at 05:00

## 2020-01-01 RX ADMIN — HYDRALAZINE HYDROCHLORIDE 37.5 MG: 25 TABLET, FILM COATED ORAL at 21:31

## 2020-01-01 RX ADMIN — HEPARIN SODIUM 7500 UNITS: 5000 INJECTION, SOLUTION INTRAVENOUS; SUBCUTANEOUS at 14:23

## 2020-01-01 RX ADMIN — CLOPIDOGREL BISULFATE 75 MG: 75 TABLET, FILM COATED ORAL at 09:07

## 2020-01-01 RX ADMIN — FERROUS SULFATE TAB 325 MG (65 MG ELEMENTAL FE) 325 MG: 325 (65 FE) TAB at 16:42

## 2020-01-01 RX ADMIN — FOLIC ACID 1 MG: 1 TABLET ORAL at 09:07

## 2020-01-01 RX ADMIN — INSULIN HUMAN 10 UNITS: 100 INJECTION, SUSPENSION SUBCUTANEOUS at 17:26

## 2020-01-01 RX ADMIN — Medication 10 ML: at 06:29

## 2020-01-01 RX ADMIN — SODIUM BICARBONATE 650 MG: 650 TABLET ORAL at 17:01

## 2020-01-01 RX ADMIN — HEPARIN SODIUM 5000 UNITS: 5000 INJECTION INTRAVENOUS; SUBCUTANEOUS at 08:16

## 2020-01-01 RX ADMIN — CLOPIDOGREL BISULFATE 75 MG: 75 TABLET, FILM COATED ORAL at 08:39

## 2020-01-01 RX ADMIN — HYDRALAZINE HYDROCHLORIDE 50 MG: 25 TABLET, FILM COATED ORAL at 21:49

## 2020-01-01 RX ADMIN — HEPARIN SODIUM 7500 UNITS: 5000 INJECTION, SOLUTION INTRAVENOUS; SUBCUTANEOUS at 00:23

## 2020-01-01 RX ADMIN — POTASSIUM BICARBONATE 40 MEQ: 782 TABLET, EFFERVESCENT ORAL at 12:19

## 2020-01-01 RX ADMIN — LOSARTAN POTASSIUM 50 MG: 50 TABLET, FILM COATED ORAL at 08:40

## 2020-01-01 RX ADMIN — HYDRALAZINE HYDROCHLORIDE 50 MG: 25 TABLET, FILM COATED ORAL at 09:47

## 2020-01-01 RX ADMIN — ISOSORBIDE MONONITRATE 30 MG: 30 TABLET, EXTENDED RELEASE ORAL at 09:47

## 2020-01-01 RX ADMIN — INSULIN LISPRO 3 UNITS: 100 INJECTION, SOLUTION INTRAVENOUS; SUBCUTANEOUS at 16:33

## 2020-01-01 RX ADMIN — LOSARTAN POTASSIUM 50 MG: 50 TABLET, FILM COATED ORAL at 09:52

## 2020-01-01 RX ADMIN — SODIUM BICARBONATE 1950 MG: 650 TABLET ORAL at 16:06

## 2020-01-01 RX ADMIN — ALOGLIPTIN 6.25 MG: 12.5 TABLET, FILM COATED ORAL at 08:35

## 2020-01-01 RX ADMIN — HEPARIN SODIUM 7500 UNITS: 5000 INJECTION, SOLUTION INTRAVENOUS; SUBCUTANEOUS at 22:09

## 2020-01-01 RX ADMIN — FERROUS SULFATE TAB 325 MG (65 MG ELEMENTAL FE) 325 MG: 325 (65 FE) TAB at 08:14

## 2020-01-01 RX ADMIN — HEPARIN SODIUM 7500 UNITS: 5000 INJECTION, SOLUTION INTRAVENOUS; SUBCUTANEOUS at 14:52

## 2020-01-01 RX ADMIN — INSULIN LISPRO 2 UNITS: 100 INJECTION, SOLUTION INTRAVENOUS; SUBCUTANEOUS at 11:53

## 2020-01-01 RX ADMIN — ACETAMINOPHEN 650 MG: 325 TABLET ORAL at 21:00

## 2020-01-01 RX ADMIN — HEPARIN SODIUM 5000 UNITS: 5000 INJECTION INTRAVENOUS; SUBCUTANEOUS at 20:04

## 2020-01-01 RX ADMIN — INSULIN LISPRO 2 UNITS: 100 INJECTION, SOLUTION INTRAVENOUS; SUBCUTANEOUS at 08:15

## 2020-01-01 RX ADMIN — SODIUM BICARBONATE 650 MG: 650 TABLET ORAL at 09:47

## 2020-01-01 RX ADMIN — HEPARIN SODIUM 5000 UNITS: 5000 INJECTION INTRAVENOUS; SUBCUTANEOUS at 09:00

## 2020-01-01 RX ADMIN — FERROUS SULFATE TAB 325 MG (65 MG ELEMENTAL FE) 325 MG: 325 (65 FE) TAB at 16:51

## 2020-01-01 RX ADMIN — SODIUM BICARBONATE 1300 MG: 650 TABLET ORAL at 09:16

## 2020-01-01 RX ADMIN — HEPARIN SODIUM 7500 UNITS: 5000 INJECTION, SOLUTION INTRAVENOUS; SUBCUTANEOUS at 05:13

## 2020-01-01 RX ADMIN — POTASSIUM CHLORIDE 40 MEQ: 750 TABLET, EXTENDED RELEASE ORAL at 09:05

## 2020-01-01 RX ADMIN — HYDRALAZINE HYDROCHLORIDE 50 MG: 50 TABLET, FILM COATED ORAL at 08:41

## 2020-01-01 RX ADMIN — POTASSIUM CHLORIDE 40 MEQ: 750 TABLET, FILM COATED, EXTENDED RELEASE ORAL at 08:36

## 2020-01-01 RX ADMIN — LEVOTHYROXINE SODIUM 150 MCG: 0.07 TABLET ORAL at 06:10

## 2020-01-01 RX ADMIN — LEVOTHYROXINE SODIUM 150 MCG: 0.15 TABLET ORAL at 05:20

## 2020-01-01 RX ADMIN — FERROUS SULFATE TAB 325 MG (65 MG ELEMENTAL FE) 325 MG: 325 (65 FE) TAB at 17:33

## 2020-01-01 RX ADMIN — GLIMEPIRIDE 2 MG: 2 TABLET ORAL at 09:47

## 2020-01-01 RX ADMIN — FERROUS SULFATE TAB 325 MG (65 MG ELEMENTAL FE) 325 MG: 325 (65 FE) TAB at 17:17

## 2020-01-01 RX ADMIN — FOLIC ACID 1 MG: 1 TABLET ORAL at 08:15

## 2020-01-01 RX ADMIN — LOSARTAN POTASSIUM 50 MG: 50 TABLET, FILM COATED ORAL at 08:50

## 2020-01-01 RX ADMIN — HYDRALAZINE HYDROCHLORIDE 50 MG: 25 TABLET, FILM COATED ORAL at 21:58

## 2020-01-01 RX ADMIN — HYDRALAZINE HYDROCHLORIDE 50 MG: 50 TABLET, FILM COATED ORAL at 21:25

## 2020-01-01 RX ADMIN — FOLIC ACID 1 MG: 1 TABLET ORAL at 10:42

## 2020-01-01 RX ADMIN — HEPARIN SODIUM 7500 UNITS: 5000 INJECTION, SOLUTION INTRAVENOUS; SUBCUTANEOUS at 21:54

## 2020-01-01 RX ADMIN — POTASSIUM CHLORIDE 20 MEQ: 750 TABLET, FILM COATED, EXTENDED RELEASE ORAL at 12:21

## 2020-01-01 RX ADMIN — ISOSORBIDE MONONITRATE 30 MG: 30 TABLET, EXTENDED RELEASE ORAL at 08:09

## 2020-01-01 RX ADMIN — INSULIN HUMAN 10 UNITS: 100 INJECTION, SUSPENSION SUBCUTANEOUS at 08:46

## 2020-01-01 RX ADMIN — FERROUS SULFATE TAB 325 MG (65 MG ELEMENTAL FE) 325 MG: 325 (65 FE) TAB at 18:01

## 2020-01-01 RX ADMIN — LOSARTAN POTASSIUM 25 MG: 25 TABLET ORAL at 08:47

## 2020-01-01 RX ADMIN — HEPARIN SODIUM 7500 UNITS: 5000 INJECTION, SOLUTION INTRAVENOUS; SUBCUTANEOUS at 15:17

## 2020-01-01 RX ADMIN — ISOSORBIDE MONONITRATE 20 MG: 20 TABLET ORAL at 15:10

## 2020-01-01 RX ADMIN — LOSARTAN POTASSIUM 50 MG: 50 TABLET, FILM COATED ORAL at 08:09

## 2020-01-01 RX ADMIN — LEVOTHYROXINE SODIUM 125 MCG: 25 TABLET ORAL at 06:18

## 2020-01-01 RX ADMIN — FERROUS SULFATE TAB 325 MG (65 MG ELEMENTAL FE) 325 MG: 325 (65 FE) TAB at 10:42

## 2020-01-01 RX ADMIN — ACETAMINOPHEN 650 MG: 325 TABLET, FILM COATED ORAL at 10:32

## 2020-01-01 RX ADMIN — SODIUM BICARBONATE 650 MG: 650 TABLET ORAL at 17:08

## 2020-01-01 RX ADMIN — HEPARIN SODIUM 7500 UNITS: 5000 INJECTION, SOLUTION INTRAVENOUS; SUBCUTANEOUS at 06:18

## 2020-01-01 RX ADMIN — ACETAMINOPHEN 1000 MG: 500 TABLET ORAL at 21:20

## 2020-01-01 RX ADMIN — EPOETIN ALFA-EPBX 20000 UNITS: 10000 INJECTION, SOLUTION INTRAVENOUS; SUBCUTANEOUS at 18:10

## 2020-01-01 RX ADMIN — HEPARIN SODIUM 5000 UNITS: 5000 INJECTION INTRAVENOUS; SUBCUTANEOUS at 14:05

## 2020-01-01 RX ADMIN — SODIUM BICARBONATE 650 MG: 650 TABLET ORAL at 17:39

## 2020-01-01 RX ADMIN — CLONIDINE HYDROCHLORIDE 0.2 MG: 0.1 TABLET ORAL at 06:17

## 2020-01-01 RX ADMIN — SODIUM BICARBONATE 650 MG: 650 TABLET ORAL at 12:13

## 2020-01-01 RX ADMIN — LEVOTHYROXINE SODIUM 150 MCG: 50 TABLET ORAL at 06:26

## 2020-01-01 RX ADMIN — ALOGLIPTIN 6.25 MG: 6.25 TABLET, FILM COATED ORAL at 08:13

## 2020-01-01 RX ADMIN — HEPARIN SODIUM 5000 UNITS: 5000 INJECTION INTRAVENOUS; SUBCUTANEOUS at 21:52

## 2020-01-01 RX ADMIN — HEPARIN SODIUM 5000 UNITS: 5000 INJECTION INTRAVENOUS; SUBCUTANEOUS at 20:18

## 2020-01-01 RX ADMIN — CLOPIDOGREL BISULFATE 75 MG: 75 TABLET ORAL at 08:14

## 2020-01-01 RX ADMIN — HYDRALAZINE HYDROCHLORIDE 50 MG: 50 TABLET, FILM COATED ORAL at 08:33

## 2020-01-01 RX ADMIN — HYDRALAZINE HYDROCHLORIDE 37.5 MG: 25 TABLET, FILM COATED ORAL at 09:49

## 2020-01-01 RX ADMIN — SODIUM BICARBONATE 1950 MG: 650 TABLET ORAL at 00:18

## 2020-01-01 RX ADMIN — SODIUM BICARBONATE 650 MG: 650 TABLET ORAL at 17:14

## 2020-01-01 RX ADMIN — GABAPENTIN 100 MG: 100 CAPSULE ORAL at 12:19

## 2020-01-01 RX ADMIN — HEPARIN SODIUM 5000 UNITS: 5000 INJECTION INTRAVENOUS; SUBCUTANEOUS at 21:31

## 2020-01-01 RX ADMIN — SODIUM BICARBONATE 1300 MG: 650 TABLET ORAL at 09:19

## 2020-01-01 RX ADMIN — LEVOTHYROXINE SODIUM 150 MCG: 0.07 TABLET ORAL at 06:29

## 2020-01-01 RX ADMIN — FERROUS SULFATE TAB 325 MG (65 MG ELEMENTAL FE) 325 MG: 325 (65 FE) TAB at 09:47

## 2020-01-01 RX ADMIN — FOLIC ACID 1 MG: 1 TABLET ORAL at 09:51

## 2020-01-01 RX ADMIN — ISOSORBIDE MONONITRATE 30 MG: 30 TABLET, EXTENDED RELEASE ORAL at 17:31

## 2020-01-01 RX ADMIN — HYDRALAZINE HYDROCHLORIDE 50 MG: 25 TABLET, FILM COATED ORAL at 21:46

## 2020-01-01 RX ADMIN — SODIUM BICARBONATE 650 MG: 650 TABLET ORAL at 08:00

## 2020-01-01 RX ADMIN — POTASSIUM CHLORIDE 20 MEQ: 750 TABLET, FILM COATED, EXTENDED RELEASE ORAL at 08:14

## 2020-01-01 RX ADMIN — HEPARIN SODIUM 5000 UNITS: 5000 INJECTION INTRAVENOUS; SUBCUTANEOUS at 22:26

## 2020-01-01 RX ADMIN — LEVOTHYROXINE SODIUM 150 MCG: 0.07 TABLET ORAL at 06:04

## 2020-01-01 RX ADMIN — SODIUM BICARBONATE 1950 MG: 650 TABLET ORAL at 16:41

## 2020-01-01 RX ADMIN — FERROUS SULFATE TAB 325 MG (65 MG ELEMENTAL FE) 325 MG: 325 (65 FE) TAB at 08:47

## 2020-01-01 RX ADMIN — SODIUM BICARBONATE 1950 MG: 650 TABLET ORAL at 08:39

## 2020-01-01 RX ADMIN — CLOPIDOGREL BISULFATE 75 MG: 75 TABLET ORAL at 08:25

## 2020-01-01 RX ADMIN — INSULIN LISPRO 2 UNITS: 100 INJECTION, SOLUTION INTRAVENOUS; SUBCUTANEOUS at 21:31

## 2020-01-01 RX ADMIN — ACETAMINOPHEN 1000 MG: 500 TABLET ORAL at 22:00

## 2020-01-01 RX ADMIN — HEPARIN SODIUM 5000 UNITS: 5000 INJECTION INTRAVENOUS; SUBCUTANEOUS at 08:50

## 2020-01-01 RX ADMIN — CLOPIDOGREL BISULFATE 75 MG: 75 TABLET, FILM COATED ORAL at 09:19

## 2020-01-01 RX ADMIN — ISOSORBIDE MONONITRATE 30 MG: 30 TABLET, EXTENDED RELEASE ORAL at 08:15

## 2020-01-01 RX ADMIN — LOSARTAN POTASSIUM 25 MG: 25 TABLET ORAL at 12:11

## 2020-01-01 RX ADMIN — HEPARIN SODIUM 5000 UNITS: 5000 INJECTION INTRAVENOUS; SUBCUTANEOUS at 06:10

## 2020-01-01 RX ADMIN — HEPARIN SODIUM 5000 UNITS: 5000 INJECTION INTRAVENOUS; SUBCUTANEOUS at 22:07

## 2020-01-01 RX ADMIN — SODIUM BICARBONATE 1950 MG: 650 TABLET ORAL at 23:24

## 2020-01-01 RX ADMIN — SODIUM BICARBONATE 1950 MG: 650 TABLET ORAL at 22:32

## 2020-01-01 RX ADMIN — Medication 1 CAPSULE: at 08:39

## 2020-01-01 RX ADMIN — ISOSORBIDE MONONITRATE 20 MG: 20 TABLET ORAL at 08:39

## 2020-01-01 RX ADMIN — HYDRALAZINE HYDROCHLORIDE 50 MG: 25 TABLET, FILM COATED ORAL at 09:00

## 2020-01-01 RX ADMIN — LOSARTAN POTASSIUM 50 MG: 50 TABLET, FILM COATED ORAL at 09:00

## 2020-01-01 RX ADMIN — ONDANSETRON 4 MG: 2 INJECTION INTRAMUSCULAR; INTRAVENOUS at 06:29

## 2020-01-01 RX ADMIN — SODIUM BICARBONATE 650 MG: 650 TABLET ORAL at 20:17

## 2020-01-01 RX ADMIN — HYDRALAZINE HYDROCHLORIDE 50 MG: 25 TABLET, FILM COATED ORAL at 08:14

## 2020-01-01 RX ADMIN — HEPARIN SODIUM 7500 UNITS: 5000 INJECTION, SOLUTION INTRAVENOUS; SUBCUTANEOUS at 13:38

## 2020-01-01 RX ADMIN — HYDRALAZINE HYDROCHLORIDE 50 MG: 50 TABLET, FILM COATED ORAL at 21:20

## 2020-01-01 RX ADMIN — CLOPIDOGREL BISULFATE 75 MG: 75 TABLET ORAL at 08:15

## 2020-01-01 RX ADMIN — ISOSORBIDE MONONITRATE 30 MG: 30 TABLET, EXTENDED RELEASE ORAL at 18:10

## 2020-01-01 RX ADMIN — CLOPIDOGREL BISULFATE 75 MG: 75 TABLET, FILM COATED ORAL at 09:53

## 2020-01-01 RX ADMIN — ISOSORBIDE MONONITRATE 30 MG: 30 TABLET, EXTENDED RELEASE ORAL at 18:37

## 2020-01-01 RX ADMIN — GLIMEPIRIDE 2 MG: 2 TABLET ORAL at 08:15

## 2020-01-01 RX ADMIN — HEPARIN SODIUM 5000 UNITS: 5000 INJECTION INTRAVENOUS; SUBCUTANEOUS at 21:45

## 2020-01-01 RX ADMIN — ISOSORBIDE MONONITRATE 30 MG: 30 TABLET, EXTENDED RELEASE ORAL at 09:41

## 2020-01-01 RX ADMIN — ALOGLIPTIN: 12.5 TABLET, FILM COATED ORAL at 09:53

## 2020-01-01 RX ADMIN — HEPARIN SODIUM 5000 UNITS: 5000 INJECTION INTRAVENOUS; SUBCUTANEOUS at 06:50

## 2020-01-01 RX ADMIN — HYDRALAZINE HYDROCHLORIDE 50 MG: 25 TABLET, FILM COATED ORAL at 21:04

## 2020-01-01 RX ADMIN — HEPARIN SODIUM 5000 UNITS: 5000 INJECTION INTRAVENOUS; SUBCUTANEOUS at 21:59

## 2020-01-01 RX ADMIN — SODIUM BICARBONATE 1950 MG: 650 TABLET ORAL at 22:09

## 2020-01-01 RX ADMIN — HYDRALAZINE HYDROCHLORIDE 50 MG: 50 TABLET, FILM COATED ORAL at 21:27

## 2020-01-01 RX ADMIN — LEVOTHYROXINE SODIUM 150 MCG: 0.15 TABLET ORAL at 05:45

## 2020-01-01 RX ADMIN — ALOGLIPTIN 6.25 MG: 6.25 TABLET, FILM COATED ORAL at 09:47

## 2020-01-01 RX ADMIN — HYDRALAZINE HYDROCHLORIDE 50 MG: 50 TABLET, FILM COATED ORAL at 23:24

## 2020-01-01 RX ADMIN — Medication 10 ML: at 06:05

## 2020-01-01 RX ADMIN — HYDRALAZINE HYDROCHLORIDE 50 MG: 50 TABLET, FILM COATED ORAL at 08:43

## 2020-01-01 RX ADMIN — ISOSORBIDE MONONITRATE 30 MG: 30 TABLET, EXTENDED RELEASE ORAL at 09:49

## 2020-01-01 RX ADMIN — HEPARIN SODIUM 7500 UNITS: 5000 INJECTION, SOLUTION INTRAVENOUS; SUBCUTANEOUS at 06:00

## 2020-01-01 RX ADMIN — SODIUM BICARBONATE 650 MG: 650 TABLET ORAL at 17:11

## 2020-01-01 RX ADMIN — LOSARTAN POTASSIUM 50 MG: 50 TABLET, FILM COATED ORAL at 08:39

## 2020-01-01 RX ADMIN — HEPARIN SODIUM 5000 UNITS: 5000 INJECTION INTRAVENOUS; SUBCUTANEOUS at 10:44

## 2020-01-01 RX ADMIN — SODIUM BICARBONATE 650 MG: 650 TABLET ORAL at 17:58

## 2020-01-01 RX ADMIN — FERROUS SULFATE TAB 325 MG (65 MG ELEMENTAL FE) 325 MG: 325 (65 FE) TAB at 17:26

## 2020-01-01 RX ADMIN — HYDRALAZINE HYDROCHLORIDE 50 MG: 50 TABLET, FILM COATED ORAL at 21:55

## 2020-01-01 RX ADMIN — FOLIC ACID 1 MG: 1 TABLET ORAL at 09:52

## 2020-01-01 RX ADMIN — HYDRALAZINE HYDROCHLORIDE 50 MG: 25 TABLET, FILM COATED ORAL at 17:26

## 2020-01-01 RX ADMIN — Medication 400 MG: at 12:20

## 2020-01-01 RX ADMIN — LEVOTHYROXINE SODIUM 150 MCG: 0.07 TABLET ORAL at 05:36

## 2020-01-01 RX ADMIN — HEPARIN SODIUM 5000 UNITS: 5000 INJECTION INTRAVENOUS; SUBCUTANEOUS at 06:05

## 2020-01-01 RX ADMIN — ISOSORBIDE MONONITRATE 30 MG: 30 TABLET, EXTENDED RELEASE ORAL at 09:51

## 2020-01-01 RX ADMIN — LOSARTAN POTASSIUM 50 MG: 50 TABLET, FILM COATED ORAL at 08:14

## 2020-01-01 RX ADMIN — LEVOTHYROXINE SODIUM 150 MCG: 0.07 TABLET ORAL at 05:09

## 2020-01-01 RX ADMIN — HEPARIN SODIUM 5000 UNITS: 5000 INJECTION INTRAVENOUS; SUBCUTANEOUS at 09:50

## 2020-01-01 RX ADMIN — HYDRALAZINE HYDROCHLORIDE 50 MG: 25 TABLET, FILM COATED ORAL at 17:18

## 2020-01-01 RX ADMIN — ISOSORBIDE MONONITRATE 30 MG: 30 TABLET, EXTENDED RELEASE ORAL at 09:52

## 2020-01-01 RX ADMIN — INSULIN LISPRO 2 UNITS: 100 INJECTION, SOLUTION INTRAVENOUS; SUBCUTANEOUS at 17:09

## 2020-01-01 RX ADMIN — LEVOTHYROXINE SODIUM 150 MCG: 0.07 TABLET ORAL at 05:00

## 2020-01-01 RX ADMIN — SODIUM BICARBONATE 650 MG: 650 TABLET ORAL at 09:50

## 2020-01-01 RX ADMIN — ISOSORBIDE MONONITRATE 30 MG: 30 TABLET, EXTENDED RELEASE ORAL at 08:00

## 2020-01-01 RX ADMIN — ACETAMINOPHEN 1000 MG: 500 TABLET ORAL at 18:20

## 2020-01-01 RX ADMIN — HYDRALAZINE HYDROCHLORIDE 50 MG: 50 TABLET, FILM COATED ORAL at 22:09

## 2020-01-01 RX ADMIN — HYDRALAZINE HYDROCHLORIDE 50 MG: 25 TABLET, FILM COATED ORAL at 08:25

## 2020-01-01 RX ADMIN — LEVOTHYROXINE SODIUM 125 MCG: 25 TABLET ORAL at 05:13

## 2020-01-01 RX ADMIN — LEVOTHYROXINE SODIUM 125 MCG: 25 TABLET ORAL at 05:27

## 2020-01-01 RX ADMIN — FERROUS SULFATE TAB 325 MG (65 MG ELEMENTAL FE) 325 MG: 325 (65 FE) TAB at 09:19

## 2020-01-01 RX ADMIN — ISOSORBIDE MONONITRATE 30 MG: 30 TABLET, EXTENDED RELEASE ORAL at 20:18

## 2020-01-01 RX ADMIN — CLOPIDOGREL BISULFATE 75 MG: 75 TABLET ORAL at 08:09

## 2020-01-01 RX ADMIN — ISOSORBIDE MONONITRATE 30 MG: 30 TABLET, EXTENDED RELEASE ORAL at 18:18

## 2020-01-01 RX ADMIN — Medication 1 CAPSULE: at 09:19

## 2020-01-01 RX ADMIN — HEPARIN SODIUM 7500 UNITS: 5000 INJECTION, SOLUTION INTRAVENOUS; SUBCUTANEOUS at 14:09

## 2020-01-01 RX ADMIN — SODIUM BICARBONATE 1300 MG: 650 TABLET ORAL at 18:23

## 2020-01-01 RX ADMIN — Medication 10 ML: at 05:54

## 2020-01-01 RX ADMIN — LOSARTAN POTASSIUM 50 MG: 50 TABLET, FILM COATED ORAL at 08:25

## 2020-01-01 RX ADMIN — HYDRALAZINE HYDROCHLORIDE 50 MG: 25 TABLET, FILM COATED ORAL at 10:42

## 2020-01-01 RX ADMIN — ISOSORBIDE MONONITRATE 30 MG: 30 TABLET, EXTENDED RELEASE ORAL at 08:36

## 2020-01-01 RX ADMIN — HYDRALAZINE HYDROCHLORIDE 50 MG: 25 TABLET, FILM COATED ORAL at 21:09

## 2020-01-01 RX ADMIN — HYDRALAZINE HYDROCHLORIDE 50 MG: 25 TABLET, FILM COATED ORAL at 17:08

## 2020-01-01 RX ADMIN — HYDRALAZINE HYDROCHLORIDE 50 MG: 25 TABLET, FILM COATED ORAL at 17:48

## 2020-01-01 RX ADMIN — GABAPENTIN 100 MG: 100 CAPSULE ORAL at 09:16

## 2020-01-01 RX ADMIN — SODIUM BICARBONATE 650 MG: 650 TABLET ORAL at 17:26

## 2020-01-01 RX ADMIN — Medication 1 CAPSULE: at 08:40

## 2020-01-01 RX ADMIN — Medication 10 ML: at 13:40

## 2020-01-01 RX ADMIN — ISOSORBIDE MONONITRATE 30 MG: 30 TABLET, EXTENDED RELEASE ORAL at 10:42

## 2020-01-01 RX ADMIN — HEPARIN SODIUM 5000 UNITS: 5000 INJECTION INTRAVENOUS; SUBCUTANEOUS at 05:09

## 2020-01-01 RX ADMIN — HEPARIN SODIUM 5000 UNITS: 5000 INJECTION INTRAVENOUS; SUBCUTANEOUS at 05:15

## 2020-01-01 RX ADMIN — CLOPIDOGREL BISULFATE 75 MG: 75 TABLET ORAL at 09:47

## 2020-01-01 RX ADMIN — ALOGLIPTIN 6.25 MG: 6.25 TABLET, FILM COATED ORAL at 09:00

## 2020-01-01 RX ADMIN — SODIUM CHLORIDE, SODIUM LACTATE, POTASSIUM CHLORIDE, AND CALCIUM CHLORIDE 100 ML/HR: 600; 310; 30; 20 INJECTION, SOLUTION INTRAVENOUS at 13:25

## 2020-01-01 RX ADMIN — HEPARIN SODIUM 7500 UNITS: 5000 INJECTION, SOLUTION INTRAVENOUS; SUBCUTANEOUS at 14:08

## 2020-01-01 RX ADMIN — HYDRALAZINE HYDROCHLORIDE 50 MG: 50 TABLET, FILM COATED ORAL at 09:19

## 2020-01-01 RX ADMIN — SODIUM BICARBONATE 1950 MG: 650 TABLET ORAL at 21:20

## 2020-01-01 RX ADMIN — FERROUS SULFATE TAB 325 MG (65 MG ELEMENTAL FE) 325 MG: 325 (65 FE) TAB at 17:14

## 2020-01-01 RX ADMIN — Medication 10 ML: at 15:27

## 2020-01-01 RX ADMIN — DICLOFENAC 4 G: 10 GEL TOPICAL at 13:39

## 2020-01-01 RX ADMIN — SODIUM BICARBONATE 1950 MG: 650 TABLET ORAL at 16:51

## 2020-01-01 RX ADMIN — LOSARTAN POTASSIUM 50 MG: 50 TABLET, FILM COATED ORAL at 08:15

## 2020-01-01 RX ADMIN — Medication 10 ML: at 05:09

## 2020-01-01 RX ADMIN — ISOSORBIDE MONONITRATE 30 MG: 30 TABLET, EXTENDED RELEASE ORAL at 18:00

## 2020-01-01 RX ADMIN — HEPARIN SODIUM 7500 UNITS: 5000 INJECTION, SOLUTION INTRAVENOUS; SUBCUTANEOUS at 05:26

## 2020-01-01 RX ADMIN — ISOSORBIDE MONONITRATE 30 MG: 30 TABLET, EXTENDED RELEASE ORAL at 17:37

## 2020-01-01 RX ADMIN — HEPARIN SODIUM 7500 UNITS: 5000 INJECTION, SOLUTION INTRAVENOUS; SUBCUTANEOUS at 21:20

## 2020-01-01 RX ADMIN — ACETAMINOPHEN 1000 MG: 500 TABLET ORAL at 09:16

## 2020-01-01 RX ADMIN — SODIUM BICARBONATE 1950 MG: 650 TABLET ORAL at 15:19

## 2020-01-01 RX ADMIN — Medication 10 ML: at 21:46

## 2020-01-01 RX ADMIN — SODIUM BICARBONATE 650 MG: 650 TABLET ORAL at 08:25

## 2020-01-01 RX ADMIN — SODIUM BICARBONATE 1950 MG: 650 TABLET ORAL at 09:22

## 2020-01-01 RX ADMIN — HYDRALAZINE HYDROCHLORIDE 50 MG: 25 TABLET, FILM COATED ORAL at 21:29

## 2020-01-01 RX ADMIN — HEPARIN SODIUM 5000 UNITS: 5000 INJECTION INTRAVENOUS; SUBCUTANEOUS at 05:36

## 2020-01-01 RX ADMIN — Medication 10 ML: at 21:30

## 2020-01-01 RX ADMIN — FERROUS SULFATE TAB 325 MG (65 MG ELEMENTAL FE) 325 MG: 325 (65 FE) TAB at 17:11

## 2020-01-01 RX ADMIN — Medication 1 CAPSULE: at 09:16

## 2020-01-01 RX ADMIN — FERROUS SULFATE TAB 325 MG (65 MG ELEMENTAL FE) 325 MG: 325 (65 FE) TAB at 08:00

## 2020-01-01 RX ADMIN — SODIUM BICARBONATE 650 MG: 650 TABLET ORAL at 08:07

## 2020-01-01 RX ADMIN — HYDRALAZINE HYDROCHLORIDE 50 MG: 25 TABLET, FILM COATED ORAL at 08:00

## 2020-01-01 RX ADMIN — ALOGLIPTIN 6.25 MG: 6.25 TABLET, FILM COATED ORAL at 08:50

## 2020-01-01 RX ADMIN — SODIUM BICARBONATE 650 MG: 650 TABLET ORAL at 17:48

## 2020-01-01 RX ADMIN — HEPARIN SODIUM 5000 UNITS: 5000 INJECTION INTRAVENOUS; SUBCUTANEOUS at 13:38

## 2020-01-01 RX ADMIN — HYDRALAZINE HYDROCHLORIDE 50 MG: 25 TABLET, FILM COATED ORAL at 08:09

## 2020-01-01 RX ADMIN — DICLOFENAC 4 G: 10 GEL TOPICAL at 20:09

## 2020-01-01 RX ADMIN — LEVOTHYROXINE SODIUM 150 MCG: 0.15 TABLET ORAL at 05:21

## 2020-01-01 RX ADMIN — HEPARIN SODIUM 5000 UNITS: 5000 INJECTION INTRAVENOUS; SUBCUTANEOUS at 20:46

## 2020-01-01 RX ADMIN — Medication 10 ML: at 15:10

## 2020-01-01 RX ADMIN — LOSARTAN POTASSIUM 50 MG: 50 TABLET, FILM COATED ORAL at 09:06

## 2020-01-01 RX ADMIN — DICLOFENAC 4 G: 10 GEL TOPICAL at 09:20

## 2020-01-01 RX ADMIN — FERROUS SULFATE TAB 325 MG (65 MG ELEMENTAL FE) 325 MG: 325 (65 FE) TAB at 18:00

## 2020-01-01 RX ADMIN — HEPARIN SODIUM 7500 UNITS: 5000 INJECTION, SOLUTION INTRAVENOUS; SUBCUTANEOUS at 05:30

## 2020-01-01 RX ADMIN — Medication 10 ML: at 06:08

## 2020-01-01 RX ADMIN — Medication 10 ML: at 22:27

## 2020-01-01 RX ADMIN — HYDRALAZINE HYDROCHLORIDE 50 MG: 50 TABLET, FILM COATED ORAL at 16:48

## 2020-01-01 RX ADMIN — HEPARIN SODIUM 7500 UNITS: 5000 INJECTION, SOLUTION INTRAVENOUS; SUBCUTANEOUS at 21:25

## 2020-01-01 RX ADMIN — HEPARIN SODIUM 7500 UNITS: 5000 INJECTION, SOLUTION INTRAVENOUS; SUBCUTANEOUS at 05:34

## 2020-01-01 RX ADMIN — INSULIN HUMAN 10 UNITS: 100 INJECTION, SUSPENSION SUBCUTANEOUS at 17:15

## 2020-01-01 RX ADMIN — ALOGLIPTIN 6.25 MG: 12.5 TABLET, FILM COATED ORAL at 08:42

## 2020-01-01 RX ADMIN — HYDRALAZINE HYDROCHLORIDE 50 MG: 25 TABLET, FILM COATED ORAL at 08:47

## 2020-01-01 RX ADMIN — TRIAMCINOLONE ACETONIDE 40 MG: 40 INJECTION, SUSPENSION INTRA-ARTICULAR; INTRAMUSCULAR at 09:30

## 2020-01-01 RX ADMIN — LEVOTHYROXINE SODIUM 150 MCG: 0.07 TABLET ORAL at 05:15

## 2020-01-01 RX ADMIN — HYDRALAZINE HYDROCHLORIDE 37.5 MG: 25 TABLET, FILM COATED ORAL at 08:37

## 2020-01-01 RX ADMIN — LOSARTAN POTASSIUM 50 MG: 50 TABLET, FILM COATED ORAL at 10:42

## 2020-01-01 RX ADMIN — ACETAMINOPHEN 325 MG: 325 TABLET, FILM COATED ORAL at 12:43

## 2020-01-01 RX ADMIN — FOLIC ACID 1 MG: 1 TABLET ORAL at 09:16

## 2020-01-01 RX ADMIN — BUPIVACAINE HYDROCHLORIDE 15 MG: 5 INJECTION, SOLUTION EPIDURAL; INTRACAUDAL at 09:30

## 2020-01-01 RX ADMIN — Medication 10 ML: at 21:32

## 2020-01-01 RX ADMIN — HYDRALAZINE HYDROCHLORIDE 50 MG: 50 TABLET, FILM COATED ORAL at 22:00

## 2020-01-01 RX ADMIN — Medication 10 ML: at 06:51

## 2020-01-01 RX ADMIN — FOLIC ACID 1 MG: 1 TABLET ORAL at 08:50

## 2020-01-01 RX ADMIN — ALOGLIPTIN 6.25 MG: 12.5 TABLET, FILM COATED ORAL at 09:06

## 2020-01-01 RX ADMIN — ISOSORBIDE MONONITRATE 30 MG: 30 TABLET, EXTENDED RELEASE ORAL at 08:25

## 2020-01-01 RX ADMIN — FOLIC ACID 1 MG: 1 TABLET ORAL at 09:00

## 2020-01-01 RX ADMIN — FERROUS SULFATE TAB 325 MG (65 MG ELEMENTAL FE) 325 MG: 325 (65 FE) TAB at 09:06

## 2020-01-01 RX ADMIN — LEVOTHYROXINE SODIUM 125 MCG: 25 TABLET ORAL at 05:30

## 2020-01-01 RX ADMIN — SODIUM CHLORIDE 125 ML/HR: 900 INJECTION, SOLUTION INTRAVENOUS at 21:53

## 2020-01-01 RX ADMIN — ACETAMINOPHEN 650 MG: 325 TABLET, FILM COATED ORAL at 22:03

## 2020-01-01 RX ADMIN — SODIUM BICARBONATE 1950 MG: 650 TABLET ORAL at 08:32

## 2020-01-01 RX ADMIN — SODIUM BICARBONATE 650 MG: 650 TABLET ORAL at 17:46

## 2020-01-01 RX ADMIN — FERROUS SULFATE TAB 325 MG (65 MG ELEMENTAL FE) 325 MG: 325 (65 FE) TAB at 09:24

## 2020-01-01 RX ADMIN — HEPARIN SODIUM 7500 UNITS: 5000 INJECTION, SOLUTION INTRAVENOUS; SUBCUTANEOUS at 05:31

## 2020-01-01 RX ADMIN — ISOSORBIDE MONONITRATE 30 MG: 30 TABLET, EXTENDED RELEASE ORAL at 08:14

## 2020-01-01 RX ADMIN — HYDRALAZINE HYDROCHLORIDE 37.5 MG: 25 TABLET, FILM COATED ORAL at 22:07

## 2020-01-01 RX ADMIN — HYDRALAZINE HYDROCHLORIDE 50 MG: 50 TABLET, FILM COATED ORAL at 18:22

## 2020-01-01 RX ADMIN — HEPARIN SODIUM 5000 UNITS: 5000 INJECTION INTRAVENOUS; SUBCUTANEOUS at 13:08

## 2020-01-01 RX ADMIN — HEPARIN SODIUM 5000 UNITS: 5000 INJECTION INTRAVENOUS; SUBCUTANEOUS at 15:26

## 2020-01-01 RX ADMIN — ISOSORBIDE MONONITRATE 30 MG: 30 TABLET, EXTENDED RELEASE ORAL at 17:48

## 2020-01-01 RX ADMIN — FERROUS SULFATE TAB 325 MG (65 MG ELEMENTAL FE) 325 MG: 325 (65 FE) TAB at 08:41

## 2020-01-01 RX ADMIN — DICLOFENAC 4 G: 10 GEL TOPICAL at 16:18

## 2020-01-01 RX ADMIN — ALOGLIPTIN 6.25 MG: 6.25 TABLET, FILM COATED ORAL at 08:07

## 2020-01-01 RX ADMIN — ISOSORBIDE MONONITRATE 20 MG: 20 TABLET ORAL at 15:18

## 2020-01-01 RX ADMIN — HYDRALAZINE HYDROCHLORIDE 37.5 MG: 25 TABLET, FILM COATED ORAL at 16:30

## 2020-01-01 RX ADMIN — SODIUM BICARBONATE 1950 MG: 650 TABLET ORAL at 09:53

## 2020-01-01 RX ADMIN — EPOETIN ALFA-EPBX 20000 UNITS: 10000 INJECTION, SOLUTION INTRAVENOUS; SUBCUTANEOUS at 12:33

## 2020-01-01 RX ADMIN — Medication 10 ML: at 09:42

## 2020-01-01 RX ADMIN — CLOPIDOGREL BISULFATE 75 MG: 75 TABLET ORAL at 09:49

## 2020-01-01 RX ADMIN — ISOSORBIDE MONONITRATE 20 MG: 20 TABLET ORAL at 09:16

## 2020-01-01 RX ADMIN — Medication 1 CAPSULE: at 09:07

## 2020-01-01 RX ADMIN — ACETAMINOPHEN 1000 MG: 500 TABLET ORAL at 09:18

## 2020-01-01 RX ADMIN — INSULIN HUMAN 10 UNITS: 100 INJECTION, SUSPENSION SUBCUTANEOUS at 09:50

## 2020-01-01 RX ADMIN — FOLIC ACID 1 MG: 1 TABLET ORAL at 08:39

## 2020-01-01 RX ADMIN — FERROUS SULFATE TAB 325 MG (65 MG ELEMENTAL FE) 325 MG: 325 (65 FE) TAB at 09:53

## 2020-01-01 RX ADMIN — HYDRALAZINE HYDROCHLORIDE 50 MG: 25 TABLET, FILM COATED ORAL at 21:52

## 2020-01-01 RX ADMIN — ISOSORBIDE MONONITRATE 30 MG: 30 TABLET, EXTENDED RELEASE ORAL at 08:47

## 2020-01-01 RX ADMIN — HYDRALAZINE HYDROCHLORIDE 50 MG: 25 TABLET, FILM COATED ORAL at 16:03

## 2020-01-01 RX ADMIN — LEVOTHYROXINE SODIUM 125 MCG: 25 TABLET ORAL at 05:31

## 2020-01-01 RX ADMIN — ALOGLIPTIN 6.25 MG: 12.5 TABLET, FILM COATED ORAL at 09:16

## 2020-01-01 RX ADMIN — LEVOTHYROXINE SODIUM 150 MCG: 0.15 TABLET ORAL at 06:00

## 2020-01-01 RX ADMIN — Medication 10 ML: at 21:04

## 2020-01-01 RX ADMIN — ACETAMINOPHEN 1000 MG: 500 TABLET ORAL at 08:43

## 2020-01-01 RX ADMIN — LOSARTAN POTASSIUM 50 MG: 50 TABLET, FILM COATED ORAL at 09:18

## 2020-01-01 RX ADMIN — SODIUM CHLORIDE, SODIUM LACTATE, POTASSIUM CHLORIDE, AND CALCIUM CHLORIDE 100 ML/HR: 600; 310; 30; 20 INJECTION, SOLUTION INTRAVENOUS at 11:00

## 2020-01-01 RX ADMIN — HEPARIN SODIUM 5000 UNITS: 5000 INJECTION INTRAVENOUS; SUBCUTANEOUS at 13:20

## 2020-01-01 RX ADMIN — HYDRALAZINE HYDROCHLORIDE 37.5 MG: 25 TABLET, FILM COATED ORAL at 16:56

## 2020-01-01 RX ADMIN — ALOGLIPTIN 6.25 MG: 6.25 TABLET, FILM COATED ORAL at 10:41

## 2020-01-01 RX ADMIN — HEPARIN SODIUM 7500 UNITS: 5000 INJECTION, SOLUTION INTRAVENOUS; SUBCUTANEOUS at 13:55

## 2020-01-01 RX ADMIN — HYDRALAZINE HYDROCHLORIDE 50 MG: 50 TABLET, FILM COATED ORAL at 16:42

## 2020-01-01 RX ADMIN — INSULIN LISPRO 2 UNITS: 100 INJECTION, SOLUTION INTRAVENOUS; SUBCUTANEOUS at 11:48

## 2020-01-01 RX ADMIN — SODIUM BICARBONATE 650 MG: 650 TABLET ORAL at 18:00

## 2020-01-01 RX ADMIN — SODIUM BICARBONATE 650 MG: 650 TABLET ORAL at 17:06

## 2020-01-01 RX ADMIN — HEPARIN SODIUM 5000 UNITS: 5000 INJECTION INTRAVENOUS; SUBCUTANEOUS at 05:53

## 2020-01-01 RX ADMIN — HEPARIN SODIUM 5000 UNITS: 5000 INJECTION INTRAVENOUS; SUBCUTANEOUS at 14:30

## 2020-01-01 RX ADMIN — GABAPENTIN 100 MG: 100 CAPSULE ORAL at 09:19

## 2020-01-01 RX ADMIN — POTASSIUM BICARBONATE 40 MEQ: 782 TABLET, EFFERVESCENT ORAL at 15:30

## 2020-01-01 RX ADMIN — DICLOFENAC 4 G: 10 GEL TOPICAL at 17:17

## 2020-01-01 RX ADMIN — LEVOTHYROXINE SODIUM 125 MCG: 25 TABLET ORAL at 07:21

## 2020-01-01 RX ADMIN — CLOPIDOGREL BISULFATE 75 MG: 75 TABLET ORAL at 08:37

## 2020-01-01 RX ADMIN — Medication 10 ML: at 23:27

## 2020-01-01 RX ADMIN — ISOSORBIDE MONONITRATE 20 MG: 20 TABLET ORAL at 14:43

## 2020-01-01 RX ADMIN — HEPARIN SODIUM 7500 UNITS: 5000 INJECTION, SOLUTION INTRAVENOUS; SUBCUTANEOUS at 22:32

## 2020-01-01 RX ADMIN — ALOGLIPTIN 6.25 MG: 6.25 TABLET, FILM COATED ORAL at 08:15

## 2020-01-01 RX ADMIN — HEPARIN SODIUM 5000 UNITS: 5000 INJECTION INTRAVENOUS; SUBCUTANEOUS at 06:29

## 2020-01-01 RX ADMIN — HEPARIN SODIUM 5000 UNITS: 5000 INJECTION INTRAVENOUS; SUBCUTANEOUS at 21:04

## 2020-01-01 RX ADMIN — FERROUS SULFATE TAB 325 MG (65 MG ELEMENTAL FE) 325 MG: 325 (65 FE) TAB at 09:51

## 2020-01-01 RX ADMIN — CLOPIDOGREL BISULFATE 75 MG: 75 TABLET ORAL at 08:00

## 2020-01-01 RX ADMIN — POTASSIUM BICARBONATE 40 MEQ: 782 TABLET, EFFERVESCENT ORAL at 16:07

## 2020-01-01 RX ADMIN — HYDRALAZINE HYDROCHLORIDE 50 MG: 25 TABLET, FILM COATED ORAL at 09:51

## 2020-01-01 RX ADMIN — DICLOFENAC 4 G: 10 GEL TOPICAL at 11:24

## 2020-01-01 RX ADMIN — CLOPIDOGREL BISULFATE 75 MG: 75 TABLET, FILM COATED ORAL at 08:38

## 2020-01-01 RX ADMIN — FERROUS SULFATE TAB 325 MG (65 MG ELEMENTAL FE) 325 MG: 325 (65 FE) TAB at 17:18

## 2020-01-01 RX ADMIN — FERROUS SULFATE TAB 325 MG (65 MG ELEMENTAL FE) 325 MG: 325 (65 FE) TAB at 16:54

## 2020-01-01 RX ADMIN — POTASSIUM CHLORIDE 20 MEQ: 750 TABLET, FILM COATED, EXTENDED RELEASE ORAL at 13:39

## 2020-01-01 RX ADMIN — LIDOCAINE HYDROCHLORIDE 2 ML: 10 INJECTION, SOLUTION INFILTRATION; PERINEURAL at 09:30

## 2020-01-01 RX ADMIN — SODIUM BICARBONATE 1950 MG: 650 TABLET ORAL at 21:27

## 2020-01-01 RX ADMIN — ISOSORBIDE MONONITRATE 30 MG: 30 TABLET, EXTENDED RELEASE ORAL at 08:41

## 2020-01-01 RX ADMIN — FERROUS SULFATE TAB 325 MG (65 MG ELEMENTAL FE) 325 MG: 325 (65 FE) TAB at 17:48

## 2020-01-01 RX ADMIN — HEPARIN SODIUM 7500 UNITS: 5000 INJECTION, SOLUTION INTRAVENOUS; SUBCUTANEOUS at 07:21

## 2020-01-01 RX ADMIN — CLOPIDOGREL BISULFATE 75 MG: 75 TABLET, FILM COATED ORAL at 09:17

## 2020-01-01 RX ADMIN — Medication 10 ML: at 06:10

## 2020-01-01 RX ADMIN — ISOSORBIDE MONONITRATE 20 MG: 20 TABLET ORAL at 08:41

## 2020-01-01 RX ADMIN — FERROUS SULFATE TAB 325 MG (65 MG ELEMENTAL FE) 325 MG: 325 (65 FE) TAB at 17:06

## 2020-01-01 RX ADMIN — HYDRALAZINE HYDROCHLORIDE 50 MG: 50 TABLET, FILM COATED ORAL at 09:23

## 2020-01-01 RX ADMIN — CLOPIDOGREL BISULFATE 75 MG: 75 TABLET, FILM COATED ORAL at 08:41

## 2020-01-01 RX ADMIN — HEPARIN SODIUM 5000 UNITS: 5000 INJECTION INTRAVENOUS; SUBCUTANEOUS at 20:01

## 2020-01-01 RX ADMIN — SODIUM BICARBONATE 650 MG: 650 TABLET ORAL at 08:47

## 2020-01-01 RX ADMIN — HYDRALAZINE HYDROCHLORIDE 50 MG: 50 TABLET, FILM COATED ORAL at 16:52

## 2020-01-01 RX ADMIN — HYDRALAZINE HYDROCHLORIDE 50 MG: 25 TABLET, FILM COATED ORAL at 16:31

## 2020-01-01 RX ADMIN — Medication 10 ML: at 22:09

## 2020-01-01 RX ADMIN — SODIUM BICARBONATE 650 MG: 650 TABLET ORAL at 08:13

## 2020-01-01 RX ADMIN — POTASSIUM CHLORIDE 20 MEQ: 750 TABLET, FILM COATED, EXTENDED RELEASE ORAL at 12:27

## 2020-01-01 RX ADMIN — HEPARIN SODIUM 5000 UNITS: 5000 INJECTION INTRAVENOUS; SUBCUTANEOUS at 21:51

## 2020-01-01 RX ADMIN — HYDRALAZINE HYDROCHLORIDE 50 MG: 25 TABLET, FILM COATED ORAL at 21:51

## 2020-01-01 RX ADMIN — FOLIC ACID 1 MG: 1 TABLET ORAL at 08:13

## 2020-01-01 RX ADMIN — ISOSORBIDE MONONITRATE 30 MG: 30 TABLET, EXTENDED RELEASE ORAL at 08:50

## 2020-01-01 RX ADMIN — HEPARIN SODIUM 5000 UNITS: 5000 INJECTION INTRAVENOUS; SUBCUTANEOUS at 21:03

## 2020-01-01 RX ADMIN — HEPARIN SODIUM 7500 UNITS: 5000 INJECTION, SOLUTION INTRAVENOUS; SUBCUTANEOUS at 23:26

## 2020-01-01 RX ADMIN — CLOPIDOGREL BISULFATE 75 MG: 75 TABLET ORAL at 08:47

## 2020-01-01 RX ADMIN — CLOPIDOGREL BISULFATE 75 MG: 75 TABLET, FILM COATED ORAL at 09:01

## 2020-01-01 RX ADMIN — INSULIN HUMAN 10 UNITS: 100 INJECTION, SUSPENSION SUBCUTANEOUS at 17:11

## 2020-01-01 RX ADMIN — FOLIC ACID 1 MG: 1 TABLET ORAL at 09:22

## 2020-01-01 RX ADMIN — HYDRALAZINE HYDROCHLORIDE 50 MG: 50 TABLET, FILM COATED ORAL at 08:39

## 2020-01-01 RX ADMIN — FERROUS SULFATE TAB 325 MG (65 MG ELEMENTAL FE) 325 MG: 325 (65 FE) TAB at 09:00

## 2020-01-01 RX ADMIN — POTASSIUM CHLORIDE 40 MEQ: 750 TABLET, FILM COATED, EXTENDED RELEASE ORAL at 08:15

## 2020-01-01 RX ADMIN — EPOETIN ALFA-EPBX 20000 UNITS: 10000 INJECTION, SOLUTION INTRAVENOUS; SUBCUTANEOUS at 18:52

## 2020-01-01 RX ADMIN — HYDRALAZINE HYDROCHLORIDE 50 MG: 50 TABLET, FILM COATED ORAL at 15:19

## 2020-01-01 RX ADMIN — ISOSORBIDE MONONITRATE 30 MG: 30 TABLET, EXTENDED RELEASE ORAL at 17:58

## 2020-01-01 RX ADMIN — HYDRALAZINE HYDROCHLORIDE 50 MG: 25 TABLET, FILM COATED ORAL at 21:00

## 2020-01-01 RX ADMIN — FERROUS SULFATE TAB 325 MG (65 MG ELEMENTAL FE) 325 MG: 325 (65 FE) TAB at 08:25

## 2020-01-01 RX ADMIN — LEVOTHYROXINE SODIUM 150 MCG: 0.15 TABLET ORAL at 05:18

## 2020-01-01 RX ADMIN — ISOSORBIDE MONONITRATE 30 MG: 30 TABLET, EXTENDED RELEASE ORAL at 17:19

## 2020-01-01 RX ADMIN — FERROUS SULFATE TAB 325 MG (65 MG ELEMENTAL FE) 325 MG: 325 (65 FE) TAB at 16:03

## 2020-01-01 RX ADMIN — SODIUM BICARBONATE 1950 MG: 650 TABLET ORAL at 16:31

## 2020-01-01 RX ADMIN — HYDRALAZINE HYDROCHLORIDE 50 MG: 50 TABLET, FILM COATED ORAL at 09:17

## 2020-01-01 RX ADMIN — ACETAMINOPHEN 650 MG: 325 TABLET ORAL at 21:03

## 2020-01-01 RX ADMIN — FOLIC ACID 1 MG: 1 TABLET ORAL at 08:09

## 2020-01-01 RX ADMIN — LOSARTAN POTASSIUM 50 MG: 50 TABLET, FILM COATED ORAL at 09:47

## 2020-01-01 RX ADMIN — HYDRALAZINE HYDROCHLORIDE 50 MG: 25 TABLET, FILM COATED ORAL at 17:06

## 2020-01-01 RX ADMIN — CLOPIDOGREL BISULFATE 75 MG: 75 TABLET ORAL at 08:50

## 2020-01-01 RX ADMIN — HEPARIN SODIUM 5000 UNITS: 5000 INJECTION INTRAVENOUS; SUBCUTANEOUS at 17:06

## 2020-01-01 RX ADMIN — ISOSORBIDE MONONITRATE 30 MG: 30 TABLET, EXTENDED RELEASE ORAL at 17:08

## 2020-01-01 RX ADMIN — ISOSORBIDE MONONITRATE 20 MG: 20 TABLET ORAL at 09:19

## 2020-01-01 RX ADMIN — FERROUS SULFATE TAB 325 MG (65 MG ELEMENTAL FE) 325 MG: 325 (65 FE) TAB at 18:30

## 2020-01-01 RX ADMIN — HYDRALAZINE HYDROCHLORIDE 37.5 MG: 25 TABLET, FILM COATED ORAL at 17:59

## 2020-01-01 RX ADMIN — FOLIC ACID 1 MG: 1 TABLET ORAL at 09:19

## 2020-01-01 RX ADMIN — HEPARIN SODIUM 5000 UNITS: 5000 INJECTION INTRAVENOUS; SUBCUTANEOUS at 08:15

## 2020-01-01 RX ADMIN — SODIUM BICARBONATE 650 MG: 650 TABLET ORAL at 09:00

## 2020-01-01 RX ADMIN — SODIUM BICARBONATE 1950 MG: 650 TABLET ORAL at 21:55

## 2020-01-01 RX ADMIN — FERROUS SULFATE TAB 325 MG (65 MG ELEMENTAL FE) 325 MG: 325 (65 FE) TAB at 08:50

## 2020-01-01 RX ADMIN — ISOSORBIDE MONONITRATE 30 MG: 30 TABLET, EXTENDED RELEASE ORAL at 09:07

## 2020-01-01 RX ADMIN — INSULIN LISPRO 1 UNITS: 100 INJECTION, SOLUTION INTRAVENOUS; SUBCUTANEOUS at 22:26

## 2020-01-01 RX ADMIN — ALOGLIPTIN 6.25 MG: 12.5 TABLET, FILM COATED ORAL at 08:40

## 2020-01-01 RX ADMIN — ACETAMINOPHEN 650 MG: 325 TABLET ORAL at 09:41

## 2020-01-01 RX ADMIN — INSULIN LISPRO 2 UNITS: 100 INJECTION, SOLUTION INTRAVENOUS; SUBCUTANEOUS at 12:10

## 2020-01-01 RX ADMIN — HYDRALAZINE HYDROCHLORIDE 50 MG: 50 TABLET, FILM COATED ORAL at 16:31

## 2020-01-01 RX ADMIN — Medication 10 ML: at 17:06

## 2020-01-01 RX ADMIN — POTASSIUM CHLORIDE 20 MEQ: 750 TABLET, FILM COATED, EXTENDED RELEASE ORAL at 08:56

## 2020-01-01 RX ADMIN — CLOPIDOGREL BISULFATE 75 MG: 75 TABLET ORAL at 09:00

## 2020-01-01 RX ADMIN — HEPARIN SODIUM 7500 UNITS: 5000 INJECTION, SOLUTION INTRAVENOUS; SUBCUTANEOUS at 22:06

## 2020-01-01 RX ADMIN — FERROUS SULFATE TAB 325 MG (65 MG ELEMENTAL FE) 325 MG: 325 (65 FE) TAB at 16:41

## 2020-01-01 RX ADMIN — HEPARIN SODIUM 7500 UNITS: 5000 INJECTION, SOLUTION INTRAVENOUS; SUBCUTANEOUS at 15:15

## 2020-01-01 RX ADMIN — SODIUM CHLORIDE 100 ML/HR: 900 INJECTION, SOLUTION INTRAVENOUS at 09:41

## 2020-01-01 RX ADMIN — FERROUS SULFATE TAB 325 MG (65 MG ELEMENTAL FE) 325 MG: 325 (65 FE) TAB at 16:48

## 2020-01-01 RX ADMIN — INSULIN LISPRO 2 UNITS: 100 INJECTION, SOLUTION INTRAVENOUS; SUBCUTANEOUS at 16:41

## 2020-01-01 RX ADMIN — ISOSORBIDE MONONITRATE 20 MG: 20 TABLET ORAL at 15:14

## 2020-01-01 RX ADMIN — POTASSIUM CHLORIDE 40 MEQ: 750 TABLET, FILM COATED, EXTENDED RELEASE ORAL at 09:47

## 2020-01-01 RX ADMIN — ALOGLIPTIN 6.25 MG: 12.5 TABLET, FILM COATED ORAL at 08:39

## 2020-01-01 RX ADMIN — SODIUM BICARBONATE 650 MG: 650 TABLET ORAL at 17:19

## 2020-01-01 RX ADMIN — SODIUM CHLORIDE 100 ML/HR: 900 INJECTION, SOLUTION INTRAVENOUS at 03:14

## 2020-01-01 RX ADMIN — FERROUS SULFATE TAB 325 MG (65 MG ELEMENTAL FE) 325 MG: 325 (65 FE) TAB at 12:10

## 2020-01-01 RX ADMIN — FOLIC ACID 1 MG: 1 TABLET ORAL at 08:40

## 2020-01-01 RX ADMIN — FERROUS SULFATE TAB 325 MG (65 MG ELEMENTAL FE) 325 MG: 325 (65 FE) TAB at 08:15

## 2020-01-01 RX ADMIN — HYDRALAZINE HYDROCHLORIDE 37.5 MG: 25 TABLET, FILM COATED ORAL at 21:03

## 2020-01-01 RX ADMIN — LEVOTHYROXINE SODIUM 150 MCG: 0.07 TABLET ORAL at 06:50

## 2020-01-01 RX ADMIN — FERROUS SULFATE TAB 325 MG (65 MG ELEMENTAL FE) 325 MG: 325 (65 FE) TAB at 17:08

## 2020-01-01 RX ADMIN — HYDRALAZINE HYDROCHLORIDE 50 MG: 25 TABLET, FILM COATED ORAL at 22:26

## 2020-01-01 RX ADMIN — POTASSIUM CHLORIDE 10 MEQ: 750 TABLET, FILM COATED, EXTENDED RELEASE ORAL at 14:43

## 2020-01-01 RX ADMIN — HEPARIN SODIUM 7500 UNITS: 5000 INJECTION, SOLUTION INTRAVENOUS; SUBCUTANEOUS at 21:27

## 2020-01-01 RX ADMIN — LEVOTHYROXINE SODIUM 150 MCG: 0.15 TABLET ORAL at 06:16

## 2020-01-01 RX ADMIN — METOPROLOL TARTRATE 12.5 MG: 25 TABLET, FILM COATED ORAL at 11:58

## 2020-01-01 RX ADMIN — ISOSORBIDE MONONITRATE 30 MG: 30 TABLET, EXTENDED RELEASE ORAL at 09:00

## 2020-01-01 RX ADMIN — HYDRALAZINE HYDROCHLORIDE 50 MG: 25 TABLET, FILM COATED ORAL at 15:26

## 2020-01-01 RX ADMIN — Medication 10 ML: at 13:08

## 2020-01-01 RX ADMIN — ACETAMINOPHEN 1000 MG: 500 TABLET ORAL at 21:25

## 2020-01-01 RX ADMIN — HYDRALAZINE HYDROCHLORIDE 50 MG: 50 TABLET, FILM COATED ORAL at 09:07

## 2020-01-01 RX ADMIN — HYDRALAZINE HYDROCHLORIDE 50 MG: 50 TABLET, FILM COATED ORAL at 09:53

## 2020-01-01 RX ADMIN — HYDRALAZINE HYDROCHLORIDE 50 MG: 50 TABLET, FILM COATED ORAL at 17:33

## 2020-01-01 RX ADMIN — ACETAMINOPHEN 650 MG: 325 TABLET ORAL at 11:48

## 2020-01-01 RX ADMIN — ISOSORBIDE MONONITRATE 30 MG: 30 TABLET, EXTENDED RELEASE ORAL at 17:46

## 2020-01-01 RX ADMIN — HYDRALAZINE HYDROCHLORIDE 50 MG: 25 TABLET, FILM COATED ORAL at 18:01

## 2020-01-01 RX ADMIN — ALOGLIPTIN: 12.5 TABLET, FILM COATED ORAL at 09:23

## 2020-01-01 RX ADMIN — FERROUS SULFATE TAB 325 MG (65 MG ELEMENTAL FE) 325 MG: 325 (65 FE) TAB at 08:09

## 2020-01-01 RX ADMIN — HYDRALAZINE HYDROCHLORIDE 50 MG: 50 TABLET, FILM COATED ORAL at 16:10

## 2020-01-01 RX ADMIN — LOSARTAN POTASSIUM 50 MG: 50 TABLET, FILM COATED ORAL at 08:41

## 2020-01-01 RX ADMIN — HEPARIN SODIUM 5000 UNITS: 5000 INJECTION INTRAVENOUS; SUBCUTANEOUS at 05:00

## 2020-01-01 RX ADMIN — HYDRALAZINE HYDROCHLORIDE 50 MG: 50 TABLET, FILM COATED ORAL at 22:32

## 2020-01-01 RX ADMIN — HYDRALAZINE HYDROCHLORIDE 50 MG: 50 TABLET, FILM COATED ORAL at 16:41

## 2020-01-01 RX ADMIN — INSULIN LISPRO 2 UNITS: 100 INJECTION, SOLUTION INTRAVENOUS; SUBCUTANEOUS at 17:11

## 2020-01-01 RX ADMIN — HYDRALAZINE HYDROCHLORIDE 50 MG: 25 TABLET, FILM COATED ORAL at 22:15

## 2020-01-01 RX ADMIN — ALOGLIPTIN 6.25 MG: 12.5 TABLET, FILM COATED ORAL at 09:19

## 2020-01-01 RX ADMIN — HEPARIN SODIUM 5000 UNITS: 5000 INJECTION INTRAVENOUS; SUBCUTANEOUS at 13:05

## 2020-01-01 RX ADMIN — HYDRALAZINE HYDROCHLORIDE 50 MG: 25 TABLET, FILM COATED ORAL at 08:50

## 2020-01-01 RX ADMIN — ACETAMINOPHEN 650 MG: 325 TABLET, FILM COATED ORAL at 10:07

## 2020-01-01 RX ADMIN — HYDRALAZINE HYDROCHLORIDE 50 MG: 25 TABLET, FILM COATED ORAL at 17:11

## 2020-01-01 RX ADMIN — FERROUS SULFATE TAB 325 MG (65 MG ELEMENTAL FE) 325 MG: 325 (65 FE) TAB at 18:23

## 2020-01-01 RX ADMIN — HEPARIN SODIUM 7500 UNITS: 5000 INJECTION, SOLUTION INTRAVENOUS; SUBCUTANEOUS at 06:26

## 2020-01-01 RX ADMIN — CLOPIDOGREL BISULFATE 75 MG: 75 TABLET ORAL at 10:42

## 2020-01-01 RX ADMIN — INSULIN HUMAN 10 UNITS: 100 INJECTION, SUSPENSION SUBCUTANEOUS at 08:00

## 2020-01-01 RX ADMIN — SODIUM BICARBONATE 650 MG: 650 TABLET ORAL at 10:42

## 2020-01-01 RX ADMIN — SODIUM BICARBONATE 1950 MG: 650 TABLET ORAL at 18:37

## 2020-01-01 RX ADMIN — SODIUM BICARBONATE 1950 MG: 650 TABLET ORAL at 09:07

## 2020-01-01 RX ADMIN — ALOGLIPTIN 6.25 MG: 6.25 TABLET, FILM COATED ORAL at 09:50

## 2020-08-02 PROBLEM — R60.0 LEG EDEMA, RIGHT: Status: ACTIVE | Noted: 2020-01-01

## 2020-08-02 PROBLEM — E11.22 DM TYPE 2 CAUSING CKD STAGE 3 (HCC): Chronic | Status: ACTIVE | Noted: 2020-01-01

## 2020-08-02 PROBLEM — N18.30 CKD (CHRONIC KIDNEY DISEASE) STAGE 3, GFR 30-59 ML/MIN (HCC): Chronic | Status: ACTIVE | Noted: 2020-01-01

## 2020-08-02 PROBLEM — N17.9 ARF (ACUTE RENAL FAILURE) (HCC): Status: ACTIVE | Noted: 2020-01-01

## 2020-08-02 PROBLEM — R53.1 WEAKNESS: Status: ACTIVE | Noted: 2020-01-01

## 2020-08-02 PROBLEM — N18.30 DM TYPE 2 CAUSING CKD STAGE 3 (HCC): Chronic | Status: ACTIVE | Noted: 2020-01-01

## 2020-08-02 PROBLEM — E11.21 TYPE 2 DIABETES WITH NEPHROPATHY (HCC): Chronic | Status: ACTIVE | Noted: 2018-02-20

## 2020-08-02 PROBLEM — E66.01 SEVERE OBESITY (BMI 35.0-39.9) WITH COMORBIDITY (HCC): Chronic | Status: ACTIVE | Noted: 2018-03-19

## 2020-08-02 NOTE — PROGRESS NOTES
Admission Medication Reconciliation: 
 
Patient is unable to provide any details or information regarding home medication list. Unable to tell me which pharmacy he uses, who his PCP is, or if anybody helps him with his medications. No RxQuery information in the past 6 months. I called [facility] listed in chart at [phone] and pharmacist told me patient has not filled any medications with them since 4/25/2019. Called emergency contact Krysten Mathews in chart who told me he would call me back later if he can find patient's prescription bottles Update: Did not hear back. Spoke to both emergency contacts listed again, Connor Helms told me he was unable to find pill bottles because he believes they are in the hospital. Pill bottles not stored in pharmacy, but I found them in patient's room. Attempted to discuss with patient again and he did admit that he doesn't take medications as prescribed. Noncompliance is evident by pill bottles being filled in November 2019 with multiple tablets still remaining. Medications added: None Medications removed: Aspirin, atorvastatin, carvedilol, ferrous sulfate, Lantus insulin 10 units daily, pantoprazole Medications changed: Isosorbide mononitrate from daily to BID based on directions on prescription bottle Information obtained from: Patient, emergency contacts, no RxQuery, chart review, [address] [place] [address] pharmacy, prescription bottles in room Significant PMH/Disease States:  
Past Medical History:  
Diagnosis Date Anemia Bronchitis Chronic kidney disease UTI Diabetes ([facility] [address] Gastrointestinal disorder   
 anemia Hypertension Kidney disease, chronic, stage II (GFR 60-89 ml/min) Neurological disorder Metabolic Brain Disorder Chief Complaint for this Admission: Chief Complaint Patient presents with Lethargy Allergies:  Patient has no known allergies. Prior to Admission Medications:  
Prior to Admission Medications Prescriptions Last Dose Informant Patient Reported? Taking? clopidogrel (PLAVIX) 75 mg tab   No Yes Sig: Take 1 Tab by mouth daily. hydrALAZINE (APRESOLINE) 25 mg tablet   No Yes Sig: Take 1.5 Tabs by mouth three (3) times daily. isosorbide mononitrate ER (IMDUR) 30 mg tablet   Yes Yes Sig: Take 30 mg by mouth two (2) times a day. levothyroxine (SYNTHROID) 150 mcg tablet   No Yes Sig: TAKE 1 TABLET BY MOUTH ONCE DAILY BEFORE BREAKFAST Facility-Administered Medications: None Thank you, 
Debora Hernandez, PHARMD

## 2020-08-02 NOTE — ED PROVIDER NOTES
Patient is a 79-year-old male diabetes, heart disease status post stent, hypertension, and renal insufficiency who was brought in by EMS from home for evaluation of profound weakness for the past week which prevented him from getting off the couch this entire time. Patient denies any falls or injuries. He denies any fevers or chills or known exposure to any sick contacts to include COVID. Patient states that he does feel short of breath from time to time but mostly when he exerts himself. He states additionally that he has had a problem with swelling in his right leg in the past but denies any history of blood clots that he is aware of. He does feel like his right leg is more swollen than his baseline. Patient denies any active pain. He does admit to not taking his medications as prescribed. History somewhat limited by patient being a vague historian. Past Medical History:  
Diagnosis Date  Anemia  Bronchitis  Chronic kidney disease UTI  Diabetes ([facility] [address]  Gastrointestinal disorder   
 anemia  Hypertension  Kidney disease, chronic, stage II (GFR 60-89 ml/min)  Neurological disorder Metabolic Brain Disorder Past Surgical History:  
Procedure Laterality Date  HX OTHER SURGICAL    
 never Family History:  
Problem Relation Age of Onset  Diabetes Mother  Cancer Sister Social History Socioeconomic History  Marital status:  Spouse name: Not on file  Number of children: Not on file  Years of education: Not on file  Highest education level: Not on file Occupational History  Not on file Social Needs  Financial resource strain: Not on file  Food insecurity Worry: Not on file Inability: Not on file  Transportation needs Medical: Not on file Non-medical: Not on file Tobacco Use  Smoking status: Former Smoker Packs/day: 0.50 Years: 30.00   Pack years: 15.00  
 Types: Cigarettes Last attempt to quit: 1994 Years since quittin.6  Smokeless tobacco: Never Used Substance and Sexual Activity  Alcohol use: No  
 Drug use: No  
 Sexual activity: Yes  
  Partners: Female Lifestyle  Physical activity Days per week: Not on file Minutes per session: Not on file  Stress: Not on file Relationships  Social connections Talks on phone: Not on file Gets together: Not on file Attends Taoism service: Not on file Active member of club or organization: Not on file Attends meetings of clubs or organizations: Not on file Relationship status: Not on file  Intimate partner violence Fear of current or ex partner: Not on file Emotionally abused: Not on file Physically abused: Not on file Forced sexual activity: Not on file Other Topics Concern  Not on file Social History Narrative  Not on file ALLERGIES: Patient has no known allergies. Review of Systems Constitutional: Positive for activity change and fatigue. Negative for appetite change, chills, diaphoresis and fever. HENT: Negative for drooling, nosebleeds and trouble swallowing. Eyes: Negative for photophobia. Respiratory: Positive for shortness of breath. Negative for cough and stridor. Gastrointestinal: Negative for abdominal pain, nausea and vomiting. Genitourinary: Positive for decreased urine volume. Negative for difficulty urinating. Patient states he noted recently his urine looks quite dark. Musculoskeletal: Positive for arthralgias. Negative for back pain, neck pain and neck stiffness. Skin: Negative for rash. Neurological: Negative for seizures and syncope. Hematological: Does not bruise/bleed easily. Psychiatric/Behavioral: Negative. Vitals:  
 20 2108 20 2215 20 2242 20 2300 BP:  (!) 156/91 147/75 (!) 144/95 Pulse:      
Resp:      
Temp:      
 SpO2: 100% 98% 100% 98% Weight:      
Height:      
      
 
Physical Exam 
Vitals signs and nursing note reviewed. Constitutional:   
   General: He is not in acute distress. Appearance: He is obese. He is not toxic-appearing or diaphoretic. Comments: Unkept HENT:  
   Head: Normocephalic and atraumatic. Right Ear: External ear normal.  
   Left Ear: External ear normal.  
   Nose: Nose normal.  
   Mouth/Throat:  
   Mouth: Mucous membranes are dry. Eyes:  
   General: Scleral icterus present. Pupils: Pupils are equal, round, and reactive to light. Neck: Musculoskeletal: Neck supple. Cardiovascular:  
   Rate and Rhythm: Normal rate and regular rhythm. Pulses: Normal pulses. Heart sounds: Normal heart sounds. No friction rub. Pulmonary:  
   Effort: Pulmonary effort is normal. No respiratory distress. Breath sounds: Normal breath sounds. No stridor. Abdominal:  
   Palpations: Abdomen is soft. Tenderness: There is no abdominal tenderness. There is no guarding. Comments: Protuberant belly Musculoskeletal:     
   General: No deformity. Right lower leg: Edema present. Comments: Mild pain in right knee with range of motion. Patient denies significant pain in right hip Skin: 
   General: Skin is warm and dry. Neurological:  
   Mental Status: He is alert and oriented to person, place, and time. Comments: Nonfocal exam  
Psychiatric:     
   Mood and Affect: Mood normal.     
   Behavior: Behavior normal.     
   Thought Content: Thought content normal.     
   Judgment: Judgment normal.  
 
  
 
MDM Number of Diagnoses or Management Options Acute renal failure, unspecified acute renal failure type [facility]): new and requires workup General weakness: new and requires workup Inability to ambulate due to knee:  
Non-traumatic rhabdomyolysis: new and requires workup Amount and/or Complexity of Data Reviewed Clinical lab tests: reviewed and ordered Tests in the radiology section of CPT®: reviewed and ordered Decide to obtain previous medical records or to obtain history from someone other than the patient: yes Discuss the patient with other providers: yes Independent visualization of images, tracings, or specimens: yes Risk of Complications, Morbidity, and/or Mortality Presenting problems: high EKG Date/Time: 8/1/2020 9:33 PM 
Performed by: Althea Crandall MD 
Authorized by: Althea Crandall MD  
 
ECG reviewed by ED Physician in the absence of a cardiologist: yes Previous ECG:  
  Previous ECG:  Compared to current Comparison ECG info:  T wave abnormalities noted on prior EKG of actually improved on today's EKG Similarity:  Changes noted Interpretation: Interpretation: abnormal   
Rate:  
  ECG rate:  89 ECG rate assessment: normal   
Rhythm:  
  Rhythm: sinus rhythm Ectopy:  
  Ectopy: none QRS:  
  QRS axis:  Normal 
  QRS intervals:  Normal 
Conduction:  
  Conduction: abnormal   
  Abnormal conduction: incomplete LBBB T waves:  
  T waves: inverted Inverted:  V6, V5, V4, I and aVL Hospitalist Perfect Serve for Admission 12:38 AM 
 
ED Room Number: OK65/03 Patient Name and age:  Felecia Gentleman 78 y.o.  male Working Diagnosis:  
1. General weakness 2. Inability to ambulate due to knee 3. Acute renal failure, unspecified acute renal failure type ([address] 4. Non-traumatic rhabdomyolysis COVID-19 Suspicion:  no 
 
Code Status:  Full Code Readmission: no 
Isolation Requirements:  no 
Recommended Level of Care:  tele Department:[facility] ED - [phone] Other:  Pt is a 79 y/o with a h/o dm, htn, and CAD s/p stent,  has been so weak for the past week that he has been unable to get up off his couch. Denies any trauma.   Notes swelling of the right leg and discomfort of his knee however x-rays negative and ultrasound negative for DVT. Patient with acute kidney injury (Cr. 5.17) and mild rhabdomyolysis ck [id number]

## 2020-08-02 NOTE — ED TRIAGE NOTES
Arrives via [facility] with complaints of weakness, not able to get off couch x 1 week. Homa Loges in yard x 1 week ago. Difficult to get back in house. Has not seen Home Health nurse this week. Unknown if he has taken his medications appropriately. A&Ox 3. Family says he's a little slower than normal. But has not moved off the couch since the fall.

## 2020-08-02 NOTE — PROGRESS NOTES
Admission Medication Reconciliation: 
 
Patient is unable to provide any details or information regarding home medication list. Unable to tell me which pharmacy he uses, who his PCP is, or if anybody helps him with his medications. No RxQuery information. I called [facility] DR SOY MENCHACA listed in chart at [phone] and pharmacist told me patient has not filled any medications with them since 4/25/2019. Called emergency contact Jacquelyn Berman in chart who told me he would call me back later if he can find patient's prescription bottles Pharmacy will follow up Thank you, 
Madeline Baker, PHARMD

## 2020-08-02 NOTE — PROGRESS NOTES
Pt seen and examined by my colleague Dr. Johan Mendoza for today. Hospitalists will continue to follow. Cr appears to be downtrending with IVF's.

## 2020-08-02 NOTE — PROGRESS NOTES
0730- Bedside and Verbal shift change report given to  RN  (oncoming nurse) by Naty Hoffman RN  (offgoing nurse). Report included the following information SBAR, Kardex and Recent Results. .. 1930- Bedside and Verbal shift change report given to Katerina Union  (oncoming nurse) by Vickie Mohan RN  (offgoing nurse). Report included the following information SBAR, Kardex and Recent Results.

## 2020-08-02 NOTE — H&P
[address] 
[phone] Admission History and Physical 
 
 
NAME:  Gerald Robbins :   1950 MRN:  [id number] PCP:  Ran Villalobos MD  
 
Date/Time:  2020 Subjective: CHIEF COMPLAINT: weakness HISTORY OF PRESENT ILLNESS:    
Mr. Burt Buitrago is a 78 y.o.  male who is admitted with ARF. Mr. Burt Buitrago presented to the Emergency Department last night complaining of weakness: for the past week, constant, severe, has just been lying on couch, too weak to do anything, associated with some RLE swelling. Denies any injuries History obtained from chart review and the patient. Previous records reviewed: outpatient PCP records, routine care for HTN and DM complicated by patient non-compliance Past Medical History:  
Diagnosis Date  Anemia  Bronchitis  Chronic kidney disease UTI  Diabetes ([address]  Gastrointestinal disorder   
 anemia  Hypertension  Kidney disease, chronic, stage II (GFR 60-89 ml/min)  Neurological disorder Metabolic Brain Disorder Past Surgical History:  
Procedure Laterality Date  HX OTHER SURGICAL    
 never Social History Tobacco Use  Smoking status: Former Smoker Packs/day: 0.50 Years: 30.00 Pack years: 15.00 Types: Cigarettes Last attempt to quit: 1994 Years since quittin.6  Smokeless tobacco: Never Used Substance Use Topics  Alcohol use: No  
  
 
Family History Problem Relation Age of Onset  Diabetes Mother  Cancer Sister No Known Allergies Prior to Admission medications Medication Sig Start Date End Date Taking? Authorizing Provider  
isosorbide mononitrate ER (IMDUR) 30 mg tablet Take 1 Tab by mouth daily.  19  Yes Juancarlos Ahumada MD  
levothyroxine (SYNTHROID) 150 mcg tablet TAKE 1 TABLET BY MOUTH ONCE DAILY BEFORE BREAKFAST 4/25/19  Yes Cristo Merino MD  
insulin glargine (LANTUS) 100 unit/mL injection 10 Units by SubCUTAneous route nightly. 3/19/18  Yes Cristo Merino MD  
aspirin 81 mg chewable tablet Take 1 Tab by mouth daily. 3/17/18  Yes Arya Turcios MD  
hydrALAZINE (APRESOLINE) 25 mg tablet Take 1.5 Tabs by mouth three (3) times daily. 3/17/18  Yes Arya Turcios MD  
clopidogrel (PLAVIX) 75 mg tab Take 1 Tab by mouth daily. 3/18/18  Yes Arya Turcios MD  
pantoprazole (PROTONIX) 40 mg tablet Take 1 Tab by mouth daily. 5/31/18   Cristo Merino MD  
ACCU-CHEK CHRISTOPHER PLUS METER [facility]  3/21/18   Provider, Historical  
Blood-Glucose Meter monitoring kit Dx Code: E11.21 Checking blood sugars 4 times daily 3/21/18   Cristo Merino MD  
Lancing Device misc Dx Code: E11.21 Checking blood sugars 4 times daily 3/21/18   Cristo Merino MD  
Lancets misc Dx Code: E11.21 Checking blood sugars 4 times daily 3/21/18   Cristo Merino MD  
glucose blood VI test strips (BLOOD GLUCOSE TEST) strip Dx Code: E11.21 Checking blood sugars 4 times daily 3/21/18   Cristo Merino MD  
alcohol swabs (ALCOHOL PADS) padm 400 Each by Apply Externally route daily as needed. Dx Code: E11.21 Checking blood sugars 4 times daily 3/21/18   Cristo Merino MD  
carvedilol (COREG) 6.25 mg tablet Take 1 Tab by mouth two (2) times daily (with meals). 3/17/18   Leah You MD  
atorvastatin (LIPITOR) 40 mg tablet Take 1 Tab by mouth daily. 3/17/18   Leah You MD  
ferrous sulfate 325 mg (65 mg iron) tablet Take 325 mg by mouth daily. Provider, Historical  
 
 
 
Review of Systems: 
(bold if positive, if negative) Gen:  fatigueEyes:  ENT:  CVS:  edemaPulm:  dyspneaGI:   
:   
MS:  PainswellingSkin:  Psych:  Endo:   
Hem:  Renal:   
Neuro:    
 
 
  
Objective: VITALS:   
Vital signs reviewed; most recent are: 
 
Visit Vitals /88 Pulse 89 Temp 98.6 °F (37 °C) Resp 14 Ht 5' 7\" (1.702 m) Wt 113.4 kg (250 lb) SpO2 98% BMI 39.16 kg/m² SpO2 Readings from Last 6 Encounters:  
08/02/20 98% 05/22/18 97% 04/24/18 100% 04/16/18 98% 03/30/18 99% 03/25/18 95% No intake or output data in the 24 hours ending 08/02/20 0248 Exam:  
 
Physical Exam: 
 
Gen: Well-developed, well-nourished, in no acute distress HEENT:  Pink conjunctivae, PERRL, hearing intact to voice, moist mucous membranes Neck: Supple, without masses, thyroid non-tender Resp: No accessory muscle use, clear breath sounds without wheezes rales or rhonchi 
Card: No murmurs, normal S1, S2 without thrills, bruits; trace peripheral edema RLE, 2+ LE peripheral pulses Abd:  Soft, non-tender, non-distended, normoactive bowel sounds are present, no palpable organomegaly and no detectable hernias Lymph:  No cervical or inguinal adenopathy Musc: No cyanosis or clubbing Skin: No rashes or ulcers, skin turgor is good, cap refill <2 sec Neuro:  Cranial nerves are grossly intact, no focal motor weakness, follows commands appropriately Psych:  Poor insight, oriented to person, place but not time, alert Labs: 
 
Recent Labs 08/01/20 
2125 WBC 11.0 HGB 9.6* HCT 29.8* * Recent Labs 08/01/20 
2125 * K 3.6  CO2 18* * BUN 73* CREA 5.17* CA 8.8 MG 2.6* ALB 2.4* TBILI 0.4 ALT 26 Lab Results Component Value Date/Time Glucose (POC) 183 (H) 03/17/2018 10:40 AM  
 Glucose (POC) 98 03/17/2018 08:21 AM  
 
No results for input(s): PH, PCO2, PO2, HCO3, FIO2 in the last 72 hours. No results for input(s): INR, INREXT, INREXT in the last 72 hours. Additional testing:  Chest X-ray: no acute process, visualized by me.   Results not reviewed with Radiologist. 
 
  
Assessment/Plan:   
  
Principal Problem: 
  ARF (acute renal failure) ([facility] [address] (8/2/2020) on CKD (chronic kidney disease) stage 3, GFR 30-59 ml/min ([facility]) (8/2/2020) 
 - history severely limited, patient's understanding of his medical conditions is poor and this is reflected in Dr. Ravi Menendez notes from when she used to follow him 
 - creatinine up acutely, cause not clear 
 - hydrated an follow 
 - consult Renal 
 - CK is barely elevated 2x, this is not rhabdomyolysis and this is not causing ARF Active Problems: 
  HTN (hypertension) (6/23/2014) - BP up, suspect chronic non-compliance with medications 
 - restart home meds once they are confirmed and follow Severe obesity (BMI 35.0-39. 9) with comorbidity ([address] (3/19/2018) - counseled on weight loss DM type 2 causing CKD stage 3 ([address] (8/2/2020) 
 - follow BS on SSI Leg edema, right (8/2/2020) - plain films were negative for fracture, some effusion in right knee, may have some arthritis 
 - no DVT on doppler, may be swollen due to inactivity Weakness (8/2/2020) - non-focal exam, Head CT negative, consult PT/OT Code status: 
 - patient is FULL CODE, no AMD on file, unclear who his decision maker is Total time spent on patient care: 70 Minutes Care Plan discussed with: Patient, Nursing Staff and Dr. Jd Gonzalez Discussed:  Code Status, Care Plan and D/C Planning Prophylaxis:  Hep SQ and SCD's 
 
Probable Disposition:  TBD 
        
___________________________________________________ Attending Physician: Kathe Law MD

## 2020-08-02 NOTE — ED NOTES
TRANSFER - OUT REPORT: 
 
Verbal report given to Judith(name) on Remigio   being transferred to 318(unit) for routine progression of care Report consisted of patients Situation, Background, Assessment and  
Recommendations(SBAR). Information from the following report(s) SBAR, Kardex, ED Summary, [facility] and Recent Results was reviewed with the receiving nurse. Lines:  
Peripheral IV 08/01/20 Left Antecubital (Active) Site Assessment Clean, dry, & intact 08/01/20 2123 Phlebitis Assessment 0 08/01/20 2123 Infiltration Assessment 0 08/01/20 2123 Dressing Status Clean, dry, & intact 08/01/20 2123 Dressing Type Transparent 08/01/20 2123 Hub Color/Line Status Pink;Flushed 08/01/20 2123 Action Taken Blood drawn 08/01/20 2123 Opportunity for questions and clarification was provided. Patient transported with: 
 Monitor Registered Nurse

## 2020-08-02 NOTE — PROGRESS NOTES
FUNCTIONAL STATUS PRIOR TO ADMISSION: Patient reports that he was independent living UNTIL past few weeks. Noted increased weakness, inability to to ambulate or perform self care. HOME SUPPORT PRIOR TO ADMISSION: The patient lived with sister and brother in law. Physical Therapy Goals Initiated 8/2/2020 1. Patient will move from supine to sit and sit to supine  in bed with minimal assistance/contact guard assist within 7 day(s). 2.  Patient will transfer from bed to chair and chair to bed with moderate assistance  using the least restrictive device within 7 day(s). 3.  Patient will perform sit to stand with minimal assistance/contact guard assist within 7 day(s). 4.  Patient will ambulate with moderate assistance  for 50 feet with the least restrictive device within 7 day(s). 5.  Patient will ascend/descend 3 stairs with no handrail(s) with moderate assistance  within 7 day(s). PHYSICAL THERAPY EVALUATION Patient: Jelena Little (2 y.o. male) Date: 8/2/2020 Primary Diagnosis: ARF (acute renal failure) ([facility] [address] [N17.9] Precautions:   Fall ASSESSMENT Based on the objective data described below, the patient presents with significant weakness with inability to walk with mild non-traumatic rhabdomyolysis with , Cr 3.17, with acute kidney injury. Significant amount of LE edema but negative DVT. Pt reports that he was ambulatory prior to this point in time. Patient reports sister/brother in law provided assistance as needed, but would be beneficial to confirm patients true functional status prior to admission to help guide discharge recommendation needs/services. Hemodynamically stable throughout HR 81--> 86 /85-->158/91, SPO2 100% on RA. Pt required min/mod assistance with bed mobility, and mod A x 2 to come to EOB sitting. He reported felt good to be upright.  Sit -->stand with RW required mod A x 2, and pt unable to bear weight on right foot/leg ( inability to straighten knee either, but swelling improved since admission). Required max A x 2 for modified stand pivot transfer from bed to chair due to inability to bear wt RLE. Positioned to comfort, and really appreciated assistance for upright seating for lunch meal. Required assistance with cutting, spreading with meal. Pt demonstrated ability however to self feed after set up. Pt requiring mod/max assistance with mobility at this time, and may require skilled services at discharge to optimize return to functional baseline ( after confirmed by family members). Pt denied exacerbation of pain with activity however. Current Level of Function Impacting Discharge (mobility/balance): Bed mobility requires mod Ax 2, and standing /transfers requires Max A x 2 . Pt unable to bear wt on RLE, and due to increased physical assistance required currently, will need to confirm family support/supervision/assistance. Functional Outcome Measure: The patient scored 35 on the Bathel Index outcome measure which is indicative of severe debilitation Other factors to consider for discharge: home support/supervision/assistance by family Patient will benefit from skilled therapy intervention to address the above noted impairments. PLAN : 
Recommendations and Planned Interventions: bed mobility training, transfer training, gait training, therapeutic exercises, edema management/control, patient and family training/education, and therapeutic activities Frequency/Duration: Patient will be followed by physical therapy:  5 times a week to address goals. Recommendation for discharge: (in order for the patient to meet his/her long term goals) To be determined: This discharge recommendation: 
Has not yet been discussed the attending provider and/or case management IF patient discharges home will need the following DME: patient reports that he has RW  
 
 
 
 SUBJECTIVE:  
Patient stated It feels good to sit up. I couldn't walk before I came to the hospital. OBJECTIVE DATA SUMMARY:  
HISTORY:   
Past Medical History:  
Diagnosis Date Anemia Bronchitis Chronic kidney disease UTI Diabetes ([address] Gastrointestinal disorder   
 anemia Hypertension Kidney disease, chronic, stage II (GFR 60-89 ml/min) Neurological disorder Metabolic Brain Disorder Past Surgical History:  
Procedure Laterality Date HX OTHER SURGICAL    
 never Personal factors and/or comorbidities impacting plan of care: dependency with all aspects of care currently, obesity, reduced stamina, decreased strength, dependency with all mobility Home Situation Home Environment: Private residence # Steps to Enter: 3 Rails to Enter: No 
Wheelchair Ramp: No 
One/Two Story Residence: One story Living Alone: No 
Support Systems: Family member(s) Patient Expects to be Discharged to[de-identified] Private residence Current DME Used/Available at Home: Walker, rolling Tub or Shower Type: Tub/Shower combination(Patient reports sponge bedside bathing due to malfunctioning of bathroom) EXAMINATION/PRESENTATION/DECISION MAKING:  
Critical Behavior: 
Neurologic State: Alert, Drowsy, Eyes open spontaneously, Eyes open to voice, Sleeping Orientation Level: Oriented X4 Cognition: Follows commands Safety/Judgement: Insight into deficits Hearing: Auditory Auditory Impairment: Hard of hearing, bilateral 
Skin:  intact Edema:  edematous LE Range Of Motion: 
Within functional range BUE, RLE. Lacks full extension right knee Strength:   
 Generally functional BUE, RLE, decreased strength RLE Functional Mobility: 
Bed Mobility: 
Rolling: Minimum assistance Supine to Sit: Moderate assistance;Assist x2; Additional time Scooting: Moderate assistance; Additional time Transfers: 
Sit to Stand: Moderate assistance;Assist x2 Stand to Sit: Moderate assistance;Assist x2 
 Bed to Chair: Assist x2; Additional time;Maximum assistance Modified squat pivot transfer bed--> chair due to tolerance of TTWB only right LE. Balance:  
Sitting: Intact Standing: Impaired; Without support;Pull to stand(required mod A x 2 to come to standing, unable to achieve full erect stance) Standing - Static: Constant support; Fair 
Standing - Dynamic : Constant support;Poor Ambulation/Gait Training: 
Distance (ft): 5 Feet (ft) Assistive Device: Gait belt;Walker, rolling Gait Description (WDL): Exceptions to [facility] Gait Abnormalities: Antalgic; Step to gait; Decreased step clearance Right Side Weight Bearing: Toe touch(patient unable to bear weight on RLE for transfer/gait activities, right knee flexed throughout) Left Side Weight Bearing: Full Base of Support: Narrowed; Shift to left Stance: Left increased;Right decreased Speed/Mayda: Slow Step Length: Right shortened;Left shortened Stairs: TBD Functional Measure: 
Barthel Index: 
 
Bathin Bladder: 10 Bowels: 10 
Groomin Dressin Feedin Mobility: 0 Stairs: 0 Toilet Use: 0 Transfer (Bed to Chair and Back): 5 Total: 35/100 The Barthel ADL Index: Guidelines 1. The index should be used as a record of what a patient does, not as a record of what a patient could do. 2. The main aim is to establish degree of independence from any help, physical or verbal, however minor and for whatever reason. 3. The need for supervision renders the patient not independent. 4. A patient's performance should be established using the best available evidence. Asking the patient, friends/relatives and nurses are the usual sources, but direct observation and common sense are also important. However direct testing is not needed. 5. Usually the patient's performance over the preceding 24-48 hours is important, but occasionally longer periods will be relevant.  
6. Middle categories imply that the patient supplies over 50 per cent of the effort. 7. Use of aids to be independent is allowed. Joyce Thomas., Barthel, DAsherWAsher ([id number]). Functional evaluation: the Barthel Index. [address] (14)2. JOCELINE Pavon Shann Sides., Amaya Inna., [place], [address] (1999). Measuring the change indisability after inpatient rehabilitation; comparison of the responsiveness of the Barthel Index and Functional [place] Measure. Journal of Neurology, Neurosurgery, and Psychiatry, 66(4), 298-950. SONY Burnett, MODESTA Galarza, & Sharad Magdaleno M.A. (2004.) Assessment of post-stroke quality of life in cost-effectiveness studies: The usefulness of the Barthel Index and the EuroQoL-5D. [facility], 13, 241-02 Physical Therapy Evaluation Charge Determination History Examination Presentation Decision-Making MEDIUM  Complexity : 1-2 comorbidities / personal factors will impact the outcome/ POC  MEDIUM Complexity : 3 Standardized tests and measures addressing body structure, function, activity limitation and / or participation in recreation  MEDIUM Complexity : Evolving with changing characteristics  MEDIUM Complexity : FOTO score of 26-74 Based on the above components, the patient evaluation is determined to be of the following complexity level: MEDIUM Pain Rating: 
Denies pain Activity Tolerance:  
Fair Please refer to the flowsheet for vital signs taken during this treatment. After treatment patient left in no apparent distress:  
Sitting in chair, Call bell within reach, and Bed / chair alarm activated COMMUNICATION/EDUCATION:  
The patients plan of care was discussed with: Occupational therapist and Registered nurse. Fall prevention education was provided and the patient/caregiver indicated understanding., Patient/family have participated as able in goal setting and plan of care. , and Patient/family agree to work toward stated goals and plan of care.  
 
Thank you for this referral. 
 Antonio Wilhelm, PT Time Calculation: 25 mins

## 2020-08-02 NOTE — CONSULTS
NEPHROLOGY CONSULT NOTE Patient: Shanna Lawrence MRN: [id number]  PCP: Eri Johnson MD  
:     1950  Age:   78 y.o. Sex:  male Referring physician: Misty Momin MD 
Reason for consultation: 78 y.o. male with ARF (acute renal failure) ([facility] [address] [L29.9] complicated by KELIN Admission Date: 2020  8:39 PM  LOS: 0 days DISCUSSION / PLAN :  
Acute kidney injury due to volume depletion. Patient is not oliguric. Kidney function is improved. - Please avoid nephrotoxins such as NSAIDs, aminoglycosides and iodinated contrast agents. Avoid hypotension. Volume status appears to be improved. HCO3 is low so would give physiologic fluids. Blood pressures are low. RECOMMEND: 
· Switch to LR. · Follow labs. · Cont off Entresto. · Serial labs as ordered. Subjective: HPI: 78 y.o.  male who is admitted with AKF. Mr. Deal  is a poor historian. He presented to the Emergency Department last night complaining of weakness for the past week, characterized by just  lying on couch, too weak to do anything, associated with some RLE swelling. In ED had severe AKF. Bladder scan is normal.  Urinating freely per nurse. Thought to be dry and given IV volume. Cr is trending better this AM. On Entresto and diuretics; both are being held. 
  
History obtained from chart review and the patient.  
  
Previous records reviewed: outpatient PCP records, routine care for HTN and DM complicated by patient non-compliance. His comprehension seems poor. Past Medical Hx:  
Past Medical History:  
Diagnosis Date  Anemia  Bronchitis  Chronic kidney disease UTI  Diabetes ([facility] [address]  Gastrointestinal disorder   
 anemia  Hypertension  Kidney disease, chronic, stage II (GFR 60-89 ml/min)  Neurological disorder Metabolic Brain Disorder Past Surgical Hx: 
  
Past Surgical History:  
Procedure Laterality Date  HX OTHER SURGICAL    
 never Medications: 
Prior to Admission medications Medication Sig Start Date End Date Taking? Authorizing Provider  
isosorbide mononitrate ER (IMDUR) 30 mg tablet Take 1 Tab by mouth daily. 4/29/19  Yes Afia Willson MD  
levothyroxine (SYNTHROID) 150 mcg tablet TAKE 1 TABLET BY MOUTH ONCE DAILY BEFORE BREAKFAST 4/25/19  Yes Afia Willson MD  
insulin glargine (LANTUS) 100 unit/mL injection 10 Units by SubCUTAneous route nightly. 3/19/18  Yes Afia Willson MD  
aspirin 81 mg chewable tablet Take 1 Tab by mouth daily. 3/17/18  Yes Bernabe Turcios MD  
hydrALAZINE (APRESOLINE) 25 mg tablet Take 1.5 Tabs by mouth three (3) times daily. 3/17/18  Yes Bernabe Turcios MD  
clopidogrel (PLAVIX) 75 mg tab Take 1 Tab by mouth daily. 3/18/18  Yes Bernabe Turcios MD  
pantoprazole (PROTONIX) 40 mg tablet Take 1 Tab by mouth daily. 5/31/18   Afia Willson MD  
ACCU-CHEK CHRISTOPHER PLUS METER [facility]  3/21/18   Provider, Historical  
Blood-Glucose Meter monitoring kit Dx Code: E11.21 Checking blood sugars 4 times daily 3/21/18   Afia Willson MD  
Lancing Device misc Dx Code: E11.21 Checking blood sugars 4 times daily 3/21/18   Afia Willson MD  
Lancets misc Dx Code: E11.21 Checking blood sugars 4 times daily 3/21/18   Afia Willson MD  
glucose blood VI test strips (BLOOD GLUCOSE TEST) strip Dx Code: E11.21 Checking blood sugars 4 times daily 3/21/18   Afia Willson MD  
alcohol swabs (ALCOHOL PADS) padm 400 Each by Apply Externally route daily as needed. Dx Code: E11.21 Checking blood sugars 4 times daily 3/21/18   Afia Willson MD  
carvedilol (COREG) 6.25 mg tablet Take 1 Tab by mouth two (2) times daily (with meals). 3/17/18   Kristen Mccabe MD  
atorvastatin (LIPITOR) 40 mg tablet Take 1 Tab by mouth daily. 3/17/18   Kristen Mccabe MD  
ferrous sulfate 325 mg (65 mg iron) tablet Take 325 mg by mouth daily. Provider, Historical  
 
 
No Known Allergies Social Hx:  reports that he quit smoking about 26 years ago. His smoking use included cigarettes. He has a 15.00 pack-year smoking history. He has never used smokeless tobacco. He reports that he does not drink alcohol or use drugs. Family History Problem Relation Age of Onset  Diabetes Mother  Cancer Sister Review of Systems: A twelve point review of system was performed today. Pertinent positives and negatives are mentioned in the HPI. The reminder of the ROS is negative and noncontributory. Objective:   
Vitals:   
Vitals:  
 08/02/20 6060 08/02/20 2280 08/02/20 0943 08/02/20 1149 BP: (!) 175/94 160/87  163/89 Pulse: 81 80  85 Resp: 20 19  12 Temp: 97.6 °F (36.4 °C) 98 °F (36.7 °C)  97.6 °F (36.4 °C) SpO2: 97% 98%  98% Weight:   113.4 kg (250 lb) Height:   5' 7\" (1.702 m) I&O's:  08/01 0701 - 08/02 0700 In: 456.7 [P.O.:100; I.V.:356.7] Out: 225 [Urine:225] Visit Vitals /89 (BP 1 Location: Left arm, BP Patient Position: At rest) Pulse 85 Temp 97.6 °F (36.4 °C) Resp 12 Ht 5' 7\" (1.702 m) Wt 113.4 kg (250 lb) SpO2 98% BMI 39.16 kg/m² Physical Exam: 
General: Alert, No distress, HEENT: Eyes are PERRL. Conjunctiva without pallor, erythema. Neck: Supple,no mass palpable Lungs : Clears to auscultation Bilaterally, Normal respiratory effort CVS: RRR, S1 S2 normal, no rub, no LE edema Abdomen: Soft, Non tender, No hepatosplenomegaly, bowel sounds present Extremities: No cyanosis, No clubbing Skin: No rash or lesions. Lymph nodes: No palpable nodes MS: No joint swelling, erythema, warmth Neurologic: non focal, AAO x ? Psych: normal affect Laboratory Results: 
Recent Results (from the past 8 hour(s)) GLUCOSE, POC Collection Time: 08/02/20  8:00 AM  
Result Value Ref Range Glucose (POC) 151 (H) 65 - 100 mg/dL Performed by Edmundo Sim (PCT) CBC W/O DIFF  
 Collection Time: 08/02/20  9:30 AM  
Result Value Ref Range WBC 10.4 4.1 - 11.1 K/uL  
 RBC 3.57 (L) 4.10 - 5.70 M/uL HGB 9.3 (L) 12.1 - 17.0 g/dL HCT 28.8 (L) 36.6 - 50.3 % MCV 80.7 80.0 - 99.0 FL  
 MCH 26.1 26.0 - 34.0 PG  
 MCHC 32.3 30.0 - 36.5 g/dL  
 RDW 14.4 11.5 - 14.5 % PLATELET 837 (H) 745 - 400 K/uL MPV 10.2 8.9 - 12.9 FL  
 NRBC 0.0 0  WBC ABSOLUTE NRBC 0.00 0.00 - 0.01 K/uL MAGNESIUM Collection Time: 08/02/20  9:30 AM  
Result Value Ref Range Magnesium 2.5 (H) 1.6 - 2.4 mg/dL METABOLIC PANEL, BASIC Collection Time: 08/02/20  9:30 AM  
Result Value Ref Range Sodium 134 (L) 136 - 145 mmol/L Potassium 3.5 3.5 - 5.1 mmol/L Chloride 106 97 - 108 mmol/L  
 CO2 20 (L) 21 - 32 mmol/L Anion gap 8 5 - 15 mmol/L Glucose 167 (H) 65 - 100 mg/dL BUN 72 (H) 6 - 20 MG/DL Creatinine 4.46 (H) 0.70 - 1.30 MG/DL  
 BUN/Creatinine ratio 16 12 - 20 GFR est AA 16 (L) >60 ml/min/1.73m2 GFR est non-AA 13 (L) >60 ml/min/1.73m2 Calcium 8.8 8.5 - 10.1 MG/DL  
GLUCOSE, POC Collection Time: 08/02/20 11:29 AM  
Result Value Ref Range Glucose (POC) 199 (H) 65 - 100 mg/dL Performed by Riki Gallagher (PCT) I have reviewed the following: All pertinent labs, microbiology data, radiology imaging for my assessment ECG- Rev: Yes / No 
Xray/CT/US/MRI REV: Yes/ No 
 
Care Plan discussed with:  nurse Chart reviewed. Medications list Personally Reviewed   [x]      Yes     []               No   
 
Thank you for allowing us to participate in the care of this patient. We will follow patient. Please dont hesitate to call with any questions Adina Crisostomo MD 
8/2/2020 [facility] [address] [facility], [address] Phone - [phone] Fax - [phone] 
[email or web address]

## 2020-08-02 NOTE — PROGRESS NOTES
TRANSFER - IN REPORT: 
 
Verbal report received from Babatunde Foote RN(name) on Marianne Naik  being received from ED(unit) for routine progression of care Report consisted of patients Situation, Background, Assessment and  
Recommendations(SBAR). Information from the following report(s) SBAR, Kardex, ED Summary, Intake/Output, Recent Results, Med Rec Status and Cardiac Rhythm NSR wi L BBB was reviewed with the receiving nurse. Opportunity for questions and clarification was provided. Assessment completed upon patients arrival to unit and care assumed. 0630: Pt scratching back of head; causing skin to bleed. Pt reported his\" psoriasis is itching\" Bedside and Verbal shift change report given to Carmen Walker RN (oncoming nurse) by Pierre Hernandez (offgoing nurse). Report included the following information SBAR, Kardex, ED Summary, Intake/Output, Recent Results, Med Rec Status and Cardiac Rhythm NSR with L BBB.

## 2020-08-03 NOTE — PROGRESS NOTES
Problem: Falls - Risk of 
Goal: *Absence of Falls Description: Document Vu Ghosh Fall Risk and appropriate interventions in the flowsheet. Outcome: Progressing Towards Goal 
Note: Fall Risk Interventions: 
Mobility Interventions: (P) Bed/chair exit alarm, Communicate number of staff needed for ambulation/transfer, OT consult for ADLs, Patient to call before getting OOB, PT Consult for mobility concerns, PT Consult for assist device competence, Strengthening exercises (ROM-active/passive), Utilize walker, cane, or other assistive device, Utilize gait belt for transfers/ambulation Medication Interventions: (P) Bed/chair exit alarm, Evaluate medications/consider consulting pharmacy, Patient to call before getting OOB, Teach patient to arise slowly, Utilize gait belt for transfers/ambulation Elimination Interventions: Call light in reach History of Falls Interventions: Bed/chair exit alarm Problem: Patient Education: Go to Patient Education Activity Goal: Patient/Family Education Outcome: Progressing Towards Goal 
  
Problem: Diabetes Self-Management Goal: *Disease process and treatment process Description: Define diabetes and identify own type of diabetes; list 3 options for treating diabetes. Outcome: Progressing Towards Goal 
Goal: *Incorporating nutritional management into lifestyle Description: Describe effect of type, amount and timing of food on blood glucose; list 3 methods for planning meals. Outcome: Progressing Towards Goal 
Goal: *Incorporating physical activity into lifestyle Description: State effect of exercise on blood glucose levels. Outcome: Progressing Towards Goal 
Goal: *Developing strategies to promote health/change behavior Description: Define the ABC's of diabetes; identify appropriate screenings, schedule and personal plan for screenings. Outcome: Progressing Towards Goal 
Goal: *Using medications safely Description: State effect of diabetes medications on diabetes; name diabetes medication taking, action and side effects. Outcome: Progressing Towards Goal 
Goal: *Prevention, detection, treatment of acute complications Description: List symptoms of hyper- and hypoglycemia; describe how to treat low blood sugar and actions for lowering  high blood glucose level. Outcome: Progressing Towards Goal 
Goal: *Developing strategies to address psychosocial issues Description: Describe feelings about living with diabetes; identify support needed and support network Outcome: Progressing Towards Goal 
  
Problem: Patient Education: Go to Patient Education Activity Goal: Patient/Family Education Outcome: Progressing Towards Goal 
  
Problem: Patient Education: Go to Patient Education Activity Goal: Patient/Family Education Outcome: Progressing Towards Goal

## 2020-08-03 NOTE — PROGRESS NOTES
Problem: Self Care Deficits Care Plan (Adult) Goal: *Acute Goals and Plan of Care (Insert Text) Description:  FUNCTIONAL STATUS PRIOR TO ADMISSION: Patient reports that he was independent with self care and mobility until the past few weeks. Noted increased weakness, inability to to ambulate or perform self care. HOME SUPPORT PRIOR TO ADMISSION: The patient lived with sister and brother in law. Occupational Therapy Goals Initiated 8/3/2020 1. Patient will perform grooming with modified independence within 7 day(s). 2.  Patient will perform upper body dressing and bathing with modified independence within 7 day(s). 3.  Patient will perform lower body dressing and bathing with moderate assistance  within 7 day(s). 4.  Patient will perform toilet transfers to [facility] with moderate assistance  within 7 day(s). 5.  Patient will perform all aspects of toileting with moderate assistance  within 7 day(s). 6.  Patient will participate in upper extremity therapeutic exercise/activities with supervision/set-up for 10 minutes within 7 day(s). 7.  Patient will utilize energy conservation techniques during functional activities with verbal cues within 7 day(s). Outcome: Progressing Towards Goal 
 OCCUPATIONAL THERAPY EVALUATION Patient: Ada Zamorano (83 y.o. male) Date: 8/3/2020 Primary Diagnosis: ARF (acute renal failure) ([facility] [address] [N17.9] Precautions:  Fall ASSESSMENT Based on the objective data described below, the patient presents with decreased activity tolerance, generalized weakness, impaired balance, generally rigid one, and significant pain to R knee with any movement following admission on 8/1 for ARF with c/o weakness and falls. Patient was received in the chair agreeable to activity. Patient does have some difficulty answering background questions and providing details however oriented x4. Patient performed transfers with max A x2 for OOB transfers. He is unable to achieve full upright stance despite max cues (verbal and manual) d/t R knee pain. Patient engaged in ADLs with fair tolerance; He needs increased assistance for LB ADLs and unsafe to attempt standing ADLs. Education provided regarding adaptive ADL techniques and safe transfer techniques. Patient would benefit from continued skilled OT to progress towards goals and improve overall independence. Current Level of Function Impacting Discharge (ADLs/self-care): Patient required max A x2 for OOB transfers. Patient required setup to min A for UB ADLs and total A for LB ADLs. Functional Outcome Measure: The patient scored Total: 25/100 on the Barthel Index outcome measure. Patient will benefit from skilled therapy intervention to address the above noted impairments. PLAN : 
Recommendations and Planned Interventions: self care training, functional mobility training, therapeutic exercise, balance training, therapeutic activities, endurance activities, patient education, home safety training, and family training/education Frequency/Duration: Patient will be followed by occupational therapy 5 times a week to address goals. Recommendation for discharge: (in order for the patient to meet his/her long term goals) Therapy up to 5 days/week in SNF setting This discharge recommendation: 
Has been made in collaboration with the attending provider and/or case management IF patient discharges home will need the following DME: none SUBJECTIVE:  
Patient stated I just can't move this leg; It won't go.  OBJECTIVE DATA SUMMARY:  
HISTORY:  
Past Medical History:  
Diagnosis Date Anemia Bronchitis Chronic kidney disease UTI Diabetes ([facility] [address] Gastrointestinal disorder   
 anemia Hypertension Kidney disease, chronic, stage II (GFR 60-89 ml/min) Neurological disorder Metabolic Brain Disorder Past Surgical History: Procedure Laterality Date HX OTHER SURGICAL    
 never Expanded or extensive additional review of patient history:  
 
Home Situation Home Environment: Private residence # Steps to Enter: 3 Rails to Enter: No 
Wheelchair Ramp: No 
One/Two Story Residence: One story Living Alone: No 
Support Systems: Family member(s) Patient Expects to be Discharged to[de-identified] Private residence Current DME Used/Available at Home: Walker, rolling Tub or Shower Type: Tub/Shower combination(Patient reports sponge bedside bathing due to malfunctioning of bathroom) Hand dominance: Right EXAMINATION OF PERFORMANCE DEFICITS: 
Cognitive/Behavioral Status: 
Neurologic State: Alert Orientation Level: Oriented X4 Cognition: Follows commands; Impaired decision making;Poor safety awareness Perception: Cues to maintain midline in standing Perseveration: No perseveration noted Safety/Judgement: Awareness of environment Skin: Intact in the uppers Edema: none noted in the uppers Hearing: Auditory Auditory Impairment: Hard of hearing, bilateral 
 
Vision/Perceptual:   
Tracking: Able to track stimulus in all quadrants w/o difficulty Diplopia: No   
Acuity: Within Defined Limits Range of Motion: WDL in the uppers Strength: 
Decreased but functional in the uppers Coordination: 
 Fine motor: impaired bilaterally Gross motor: WDL Tone & Sensation: 
Tone: generally rigid Sensation: intact per pt report Balance: 
Sitting: Intact Standing: Impaired Standing - Static: Poor Standing - Dynamic : Poor Functional Mobility and Transfers for ADLs: 
Bed Mobility: 
Supine to Sit: Moderate assistance; Additional time Scooting: Moderate assistance; Additional time Transfers: 
Sit to Stand: Maximum assistance;Assist x2; Additional time Stand to Sit: Maximum assistance;Assist x2; Additional time Bed to Chair: Moderate assistance;Assist x1(lateral transfer) ADL Assessment: 
Feeding: Setup Oral Facial Hygiene/Grooming: Minimum assistance Bathing: Total assistance Upper Body Dressing: Minimum assistance Lower Body Dressing: Total assistance Toileting: Total assistance Cognitive Retraining Safety/Judgement: Awareness of environment Functional Measure: 
Barthel Index: 
 
Bathin Bladder: 10 Bowels: 5 Groomin Dressin Feedin Mobility: 0 Stairs: 0 Toilet Use: 0 Transfer (Bed to Chair and Back): 5 Total: 25/100 The Barthel ADL Index: Guidelines 1. The index should be used as a record of what a patient does, not as a record of what a patient could do. 2. The main aim is to establish degree of independence from any help, physical or verbal, however minor and for whatever reason. 3. The need for supervision renders the patient not independent. 4. A patient's performance should be established using the best available evidence. Asking the patient, friends/relatives and nurses are the usual sources, but direct observation and common sense are also important. However direct testing is not needed. 5. Usually the patient's performance over the preceding 24-48 hours is important, but occasionally longer periods will be relevant. 6. Middle categories imply that the patient supplies over 50 per cent of the effort. 7. Use of aids to be independent is allowed. Mya Crimes., Barthel, D.W. (3902). Functional evaluation: the Barthel Index. [facility] (14)2. JOCELINE Fried, Griselda Garrison., Claudette Washington., [place], [address] [place] [address] (). Measuring the change indisability after inpatient rehabilitation; comparison of the responsiveness of the Barthel Index and Functional [place] Measure. Journal of Neurology, Neurosurgery, and Psychiatry, 66(4), 029-581.  
Lisha Cueva, N.JUAN MIGUEL.A, MODESTA Galarza, & ELLA WilsonA. (2004.) Assessment of post-stroke quality of life in cost-effectiveness studies: The usefulness of the Barthel Index and the EuroQoL-5D. [facility], 13, [phone] Occupational Therapy Evaluation Charge Determination History Examination Decision-Making LOW Complexity : Brief history review  LOW Complexity : 1-3 performance deficits relating to physical, cognitive , or psychosocial skils that result in activity limitations and / or participation restrictions  LOW Complexity : No comorbidities that affect functional and no verbal or physical assistance needed to complete eval tasks Based on the above components, the patient evaluation is determined to be of the following complexity level: LOW Activity Tolerance:  
Fair Please refer to the flowsheet for vital signs taken during this treatment. After treatment patient left in no apparent distress:   
Sitting in chair, Call bell within reach, and Bed / chair alarm activated COMMUNICATION/EDUCATION:  
The patients plan of care was discussed with: Physical therapy assistant, Registered nurse, and patient . Home safety education was provided and the patient/caregiver indicated understanding., Patient/family have participated as able in goal setting and plan of care. , and Patient/family agree to work toward stated goals and plan of care. This patients plan of care is appropriate for delegation to [facility]. Thank you for this referral. 
Kostas Zapien, OTR/L Time Calculation: 23 mins

## 2020-08-03 NOTE — PROGRESS NOTES
Bryce Lai [facility] [address] 
[address] [facility], [address] [place] [address] 
[phone] Medical Progress Note NAME: Torrey Bryant :  1950 MRM:  [id number] Date/Time: 8/3/2020  3:04 PM 
 
 
  
Subjective: Chief Complaint:  \"I'm still weak\" Pt seen and examined. Unable to straighten his R knee. Denies trauma. ROS: 
(bold if positive, if negative) R knee pain Objective:  
 
 
Vitals:  
 
 
  
Last 24hrs VS reviewed since prior progress note. Most recent are: 
 
Visit Vitals /70 (BP 1 Location: Right arm, BP Patient Position: At rest) Pulse 88 Temp 97.7 °F (36.5 °C) Resp 20 Ht 5' 7\" (1.702 m) Wt 116.3 kg (256 lb 6.3 oz) SpO2 98% BMI 40.16 kg/m² SpO2 Readings from Last 6 Encounters:  
20 98% 18 97% 18 100% 18 98% 18 99% 18 95% Intake/Output Summary (Last 24 hours) at 8/3/2020 1504 Last data filed at 8/3/2020 1101 Gross per 24 hour Intake 2041.67 ml Output 700 ml Net 1341.67 ml Exam:  
 
Physical Exam: 
 
Gen:  Well-developed, well-nourished, in no acute distress HEENT:  Pink conjunctivae, PERRL, hearing intact to voice, moist mucous membranes Neck:  Supple, without masses, thyroid non-tender Resp:  No accessory muscle use, clear breath sounds without wheezes rales or rhonchi 
Card:  No murmurs, normal S1, S2 without thrills, bruits or peripheral edema Abd:  Soft, non-tender, non-distended, normoactive bowel sounds are present Musc:  No cyanosis or clubbing Skin:  No rashes or ulcers, skin turgor is good Neuro:  Cranial nerves 3-12 are grossly intact,  strength is 5/5 bilaterally and dorsi / plantarflexion is 5/5 bilaterally, follows commands appropriately Psych: Moderate insight R knee with poor range of motion on extension and flexion Medications Reviewed: (see below) Lab Data Reviewed: (see below) ______________________________________________________________________ Medications:  
 
Current Facility-Administered Medications Medication Dose Route Frequency  lidocaine 4 % patch 1 Patch  1 Patch TransDERmal Q24H  
 insulin lispro (HUMALOG) injection   SubCUTAneous AC&HS  
 glucose chewable tablet 16 g  4 Tab Oral PRN  
 dextrose (D50W) injection syrg 12.5-25 g  25-50 mL IntraVENous PRN  
 glucagon (GLUCAGEN) injection 1 mg  1 mg IntraMUSCular PRN  
 sodium chloride (NS) flush 5-40 mL  5-40 mL IntraVENous Q8H  
 sodium chloride (NS) flush 5-40 mL  5-40 mL IntraVENous PRN  
 acetaminophen (TYLENOL) tablet 650 mg  650 mg Oral Q6H PRN Or  
 acetaminophen (TYLENOL) suppository 650 mg  650 mg Rectal Q6H PRN  polyethylene glycol (MIRALAX) packet 17 g  17 g Oral DAILY PRN  promethazine (PHENERGAN) tablet 12.5 mg  12.5 mg Oral Q6H PRN Or  
 ondansetron (ZOFRAN) injection 4 mg  4 mg IntraVENous Q6H PRN  
 heparin (porcine) injection 5,000 Units  5,000 Units SubCUTAneous Q8H  
 lactated Ringers infusion  100 mL/hr IntraVENous CONTINUOUS  
 clopidogreL (PLAVIX) tablet 75 mg  75 mg Oral DAILY  hydrALAZINE (APRESOLINE) tablet 37.5 mg  37.5 mg Oral TID  isosorbide mononitrate ER (IMDUR) tablet 30 mg  30 mg Oral DAILY  levothyroxine (SYNTHROID) tablet 150 mcg  150 mcg Oral 6am  
  
 
 
 
Lab Review:  
 
Recent Labs 08/03/20 
0349 08/02/20 
0930 08/01/20 
2125 WBC 8.4 10.4 11.0 HGB 8.2* 9.3* 9.6* HCT 26.0* 28.8* 29.8*  402* 417* Recent Labs 08/03/20 
0349 08/02/20 
0930 08/01/20 
2125 * 134* 132* K 3.5 3.5 3.6 * 106 103 CO2 18* 20* 18* * 167* 158* BUN 68* 72* 73* CREA 3.86* 4.46* 5.17* CA 8.1* 8.8 8.8 MG 2.4 2.5* 2.6* PHOS 4.5  --   --   
ALB  --   --  2.4* ALT  --   --  26 No components found for: [place] Assessment / Plan:  
ARF (acute renal failure) ([facility] [address] (8/2/2020) on CKD (chronic kidney disease) stage 3, GFR 30-59 ml/min ([facility]) (8/2/2020) 
            - history severely limited, patient's understanding of his medical conditions is poor and this is reflected in Dr. Harjeet Ruiz notes from when she used to follow him 
            - improving with IVF's  
  
HTN - BP elevated on admission. Once reported home meds restarted much improved and home meds even ordered at lower doses than reported. ?compliance  
-continue Imdur, hydralazine  
  
  Severe obesity (BMI 35.0-39. 9) with comorbidity ([address] (3/19/2018) -weight loss  
  
  DM type 2 causing CKD stage 3 ([facility]) (8/2/2020) 
            - ISS  
 - BG checks AC TID and qHS  
  
  Leg edema, right (8/2/2020) - R knee x-ray did show an effusion and OA. ? possible pain from OA. Unable to use NSAIDS due to KELIN/CKD  
-lidocaine patch for now  
-?knee injection while here  
  
  Weakness (8/2/2020) - generalized  
-head CT without acute process -PT/OT on board -optimizing flexion/extension/pain of R knee will likely help with ambulation Code status: 
            - patient is FULL CODE, no AMD on file, unclear who his decision maker is Total time spent with patient: 35 Minutes Care Plan discussed with: Patient Discussed:  Code Status, Care Plan and D/C Planning Prophylaxis:  Hep SQ Disposition:  Home w/Family with home health PT/OT  
        
___________________________________________________ Attending Physician: Kristel Doty MD

## 2020-08-03 NOTE — PROGRESS NOTES
CMS Note 8/3/2020 Patient received and signed their 1st IMM letter. Patient was given a copy for their record. Watson Aguirre CMS

## 2020-08-03 NOTE — PROGRESS NOTES
Reason for Admission:   Acute renal failure, right lower extremity edema. Hx CKD2, UTI, diabetes, HTN. RUR Score:    17%/low risk Plan for utilizing home health:      Patient has been followed by [facility] home care in the past.  No DME. PCP: First and Last name: Patient states he has seen Dr Thalia Do \"but its been awhile\". Name of Practice:  
 Are you a current patient: Yes/No:  
 Approximate date of last visit:  
 Can you participate in a virtual visit with your PCP:  
                 
Current Advanced Directive/Advance Care Plan:  
No AMD on file, decision maker is patient's sister Dinora Barrera [phone] Transition of Care Plan:  
Chart reviewed, demographics verified. CM role and follow up discussed. Met with patient at bedside, face mask on. Patient lives with his sister Dinora Barrera and her  Radha Lynne. They are able to assist patient as needed. Patient lives in a one story home with 3 steps to enter home. Patient has prescription drug coverage, uses [facility]  pharmacy in [facility]. Patient is independent, drives, and provides self care. Spoke with patient's brother in law [place], family is supportive and able to assist patient at home. PLAN: 
1. Monitor patient response to treatment and recommendations. 2. Await further evaluation, nephrology following. 3. Patient is appropriate for home care placement. 4. Patient transport home per family at discharge. 4. CM to monitor for discharge needs. Care Management Interventions Transition of Care Consult (CM Consult): Home Health [facility] - INPATIENT: Yes Physical Therapy Consult: Yes Current Support Network: Lives with Caregiver Confirm Follow Up Transport: Family The Patient and/or Patient Representative was Provided with a Choice of Provider and Agrees with the Discharge Plan?: (S) Yes Freedom of Choice List was Provided with Basic Dialogue that Supports the Patient's Individualized Plan of Care/Goals, Treatment Preferences and Shares the Quality Data Associated with the Providers?: (S) Yes Discharge Location Discharge Placement: Home with home health Modesta Resendez RN, MSN, Care manager

## 2020-08-03 NOTE — PROGRESS NOTES
Order received for home health nursing, PT, OT. Home health follow up discussed with patient/family. Home health provider list given to patient. Choice is [facility], has been seen by [facility] health in the past, referral sent via [employer]. [place] of Choice letter signed. Await response. Pasquale Connelly, RN, MSN/Care manager

## 2020-08-03 NOTE — PROGRESS NOTES
Known OA of the R knee. Consult for knee pain causing debility. Plan for cortisone injection tomorrow morning after exam. Meds ordered for bedside. Please have available for 0900hrs tomorrow morning. Jaquelin Andrew PA-C Orthopaedic Surgery PA

## 2020-08-03 NOTE — PROGRESS NOTES
Bedside and Verbal shift change report given to Pierre Hernandez (oncoming nurse) by Carmen Walker RN (offgoing nurse). Report included the following information SBAR, Kardex, ED Summary, Intake/Output, Recent Results, Med Rec Status and Cardiac Rhythm NSR. Bedside and Verbal shift change report given to Michael Ledesma RN and Jennifer Lantigua RN (oncoming nurse) by Pierre Hernanedz (offgoing nurse). Report included the following information SBAR, Kardex, ED Summary, Intake/Output, Recent Results, Med Rec Status and Cardiac Rhythm NSR.

## 2020-08-03 NOTE — PROGRESS NOTES
Bedside and Verbal shift change report given to Ning 44 (oncoming nurse) by Josh Sanchez RN (offgoing nurse). Report included the following information SBAR, Kardex, Intake/Output, Accordion and Recent Results. SHIFT REPORT: 
 
 
 
 
 
Bedside and Verbal shift change report given to ---- (oncoming nurse) by Theodora Chawla RN  (offgoing nurse). Report included the following information SBAR, Kardex, Intake/Output, Accordion and Recent Results.

## 2020-08-03 NOTE — PROGRESS NOTES
Problem: Mobility Impaired (Adult and Pediatric) Goal: *Acute Goals and Plan of Care (Insert Text) Outcome: Progressing Towards Goal 
Note: PHYSICAL THERAPY TREATMENT Patient: Anni De (89 y.o. male) Date: 8/3/2020 Diagnosis: ARF (acute renal failure) ([facility] [address] [N17.9] ARF (acute renal failure) ([facility] [address] Precautions: Fall Chart, physical therapy assessment, plan of care and goals were reviewed. ASSESSMENT Patient continues with skilled PT services and progressing towards goals. pt very guarded and unable to achieve extension in R knee with passive and aarom. Mod A x 1 for bed mobility. Unable to stand with max A x2 and RW for steadying. Performed lateral transfer bed>chair with Min/Mod A x1 and verbal and tactile cues to initiate fwd lean. Current Level of Function Impacting Discharge (mobility/balance): mod/ max A x 2 Other factors to consider for discharge: unable extend or to tolerate WB on RLE 
    
 
PLAN : 
Patient continues to benefit from skilled intervention to address the above impairments. Continue treatment per established plan of care. to address goals. Recommendation for discharge: (in order for the patient to meet his/her long term goals) Therapy up to 5 days/week in SNF setting or intensive home health therapy program 
 
This discharge recommendation: 
Has been made in collaboration with the attending provider and/or case management IF patient discharges home will need the following DME: to be determined (TBD) SUBJECTIVE:  
Patient stated i need some aspercreme.  OBJECTIVE DATA SUMMARY:  
Critical Behavior: 
Neurologic State: Alert, Eyes open spontaneously Orientation Level: Oriented X4 Cognition: Appropriate decision making, Appropriate safety awareness, Follows commands Safety/Judgement: Insight into deficits Functional Mobility Training: 
Bed Mobility: 
  
Supine to Sit: Moderate assistance; Additional time Scooting: Moderate assistance; Additional time Transfers: 
Sit to Stand: Maximum assistance;Assist x2; Additional time Stand to Sit: Maximum assistance;Assist x2; Additional time Bed to Chair: Moderate assistance;Assist x1(lateral transfer) Balance: 
Sitting: Intact Standing: Impaired; With support Standing - Static: Poor Standing - Dynamic : Poor Ambulation/Gait Training: 
  
  
  
  
  
  
  
  
  
  
  
  
  
  
  
  
  
  
Stairs: Therapeutic Exercises:  
 
Pain Rating: 
 
 
Activity Tolerance:  
Fair Please refer to the flowsheet for vital signs taken during this treatment. After treatment patient left in no apparent distress:  
Sitting in chair, Call bell within reach, and Bed / chair alarm activated COMMUNICATION/COLLABORATION:  
The patients plan of care was discussed with: Registered nurse. Gelacio Villegas Time Calculation: 19 mins

## 2020-08-04 NOTE — PROGRESS NOTES
Bedside and Verbal shift change report given to Anastasia RN (oncoming nurse) by Lashawn Paul RN (offgoing nurse). Report included the following information SBAR, Kardex, MAR, Accordion and Recent Results.

## 2020-08-04 NOTE — PROGRESS NOTES
Problem: Self Care Deficits Care Plan (Adult) Goal: *Acute Goals and Plan of Care (Insert Text) Description:  FUNCTIONAL STATUS PRIOR TO ADMISSION: Patient reports that he was independent with self care and mobility until the past few weeks. Noted increased weakness, inability to to ambulate or perform self care. HOME SUPPORT PRIOR TO ADMISSION: The patient lived with sister and brother in law. Occupational Therapy Goals Initiated 8/3/2020 1. Patient will perform grooming with modified independence within 7 day(s). 2.  Patient will perform upper body dressing and bathing with modified independence within 7 day(s). 3.  Patient will perform lower body dressing and bathing with moderate assistance  within 7 day(s). 4.  Patient will perform toilet transfers to [facility] with moderate assistance  within 7 day(s). 5.  Patient will perform all aspects of toileting with moderate assistance  within 7 day(s). 6.  Patient will participate in upper extremity therapeutic exercise/activities with supervision/set-up for 10 minutes within 7 day(s). 7.  Patient will utilize energy conservation techniques during functional activities with verbal cues within 7 day(s). Outcome: Progressing Towards Goal 
 OCCUPATIONAL THERAPY TREATMENT Patient: Susanna Villa (65 y.o. male) Date: 8/4/2020 Diagnosis: ARF (acute renal failure) ([facility] [N17.9] ARF (acute renal failure) ([facility] Precautions: Fall Chart, occupational therapy assessment, plan of care, and goals were reviewed. ASSESSMENT Patient continues with skilled OT services and is progressing very slowly towards goals. Mr. Toyin Kenyon was received in the chair agreeable to OT. Activity today focused on repetitive sit to stand transfers in preparation for ADL transfers and therapeutic exercise. Patient required max A x2 to stand from the chair.   He completed x3 reps with no improvement noted with modified chair (elevated) and education for improved technique. Patient was educated on the benefits of exercise and proper technique for UE exercises. Tolerance was fair overall but limited by fatigue, weakness, and general rigidity. He fatigues easily and needs increased time for recovery between each exercises/activity. Patient would benefit from continued skilled OT to progress towards goals and improve overall independence. Current Level of Function Impacting Discharge (ADLs): Patient required max A x2 for functional mobility. Exercise tolerance was fair overall. PLAN : 
Patient continues to benefit from skilled intervention to address the above impairments. Continue treatment per established plan of care. to address goals. Recommend with staff:  
Recommend patient be OOB to chair as frequently as tolerated; Goal of 3x/day for all meals for 60 minutes at a time. For toileting needs, recommend BSC or bed level. Encourage patient involvement in personal care as able. Encourage exercises frequently throughout the day. Recommend next OT session: continue toward goals Recommendation for discharge: (in order for the patient to meet his/her long term goals) Therapy up to 5 days/week in SNF setting This discharge recommendation: 
Has been made in collaboration with the attending provider and/or case management IF patient discharges home will need the following DME: none SUBJECTIVE:  
Patient stated I will try.  OBJECTIVE DATA SUMMARY:  
Cognitive/Behavioral Status: 
Neurologic State: Alert Orientation Level: Oriented X4 Cognition: Follows commands; Impaired decision making;Poor safety awareness Perception: Cues to maintain midline in sitting;Cues to maintain midline in standing Perseveration: No perseveration noted Safety/Judgement: Decreased insight into deficits Functional Mobility and Transfers for ADLs: 
Bed Mobility: Supine to Sit: Minimum assistance; Additional time;Assist x1 Scooting: Contact guard assistance;Assist x1;Additional time Transfers: 
Sit to Stand: Maximum assistance;Assist x2 Bed to Chair: Moderate assistance;Assist x2(lateral transfer ) Balance: 
Sitting: Intact Standing: Impaired Standing - Static: Poor Standing - Dynamic : Poor Cognitive Retraining Safety/Judgement: Decreased insight into deficits Therapeutic Exercises:  
Patient educated on the benefits of exercise and proper technique for following exercises:  
 
 
EXERCISE Sets Reps Active Active Assist  
Passive Comments Shoulder Flexion 3 8 [x]    []    [] Shoulder Shrugs 3 8 [x]    []    [] Shoulder external rotation 3 8 [x]    []    [] Elbow flexion/extension 3 10 [x]    []    []      
Wrist flexion/extension 3 10 [x]    []    [] Finger flexion/extension 3 10 [x]    []    [] Activity Tolerance:  
Fair Please refer to the flowsheet for vital signs taken during this treatment. After treatment patient left in no apparent distress:  
Sitting in chair, Call bell within reach, and Bed / chair alarm activated COMMUNICATION/COLLABORATION:  
The patients plan of care was discussed with: Physical therapist, Registered nurse, and patient . June Palomares OTR/L Time Calculation: 25 mins

## 2020-08-04 NOTE — PROGRESS NOTES
Shift Summary, patient alert and oriented he has been up in the chair with PT and OT, right knee injection has been done by Jack SHANKAR, and pain patch applied. There has been discussion of discharge home however, Dr. Yusef Segura and I have gone in to the room and advised for patient to go to SNF in order to avoid falls and gain strength, patient agreed to a SNF close to home. Pt now has transfer order to 5th floor. ~ NILS Garcia RN  
 
TRANSFER - OUT REPORT: 
 
Verbal report given to Courtney AGUILAR (name) on Olga Lidia Mendez  being transferred to 5th floor (unit) for routine progression of care Report consisted of patients Situation, Background, Assessment and  
Recommendations(SBAR). Information from the following report(s) SBAR, Kardex and Recent Results was reviewed with the receiving nurse. Lines:  
Peripheral IV 08/04/20 Anterior; Left Hand (Active) Site Assessment Clean, dry, & intact 08/04/20 0800 Phlebitis Assessment 0 08/04/20 0800 Infiltration Assessment 0 08/04/20 0800 Dressing Status Clean, dry, & intact 08/04/20 0800 Dressing Type Transparent 08/04/20 0800 Hub Color/Line Status Pink 08/04/20 0800 Opportunity for questions and clarification was provided. Patient transported with: 
 Monitor Tech

## 2020-08-04 NOTE — PROGRESS NOTES
Problem: Falls - Risk of 
Goal: *Absence of Falls Description: Document Chance Callahan Fall Risk and appropriate interventions in the flowsheet. Outcome: Progressing Towards Goal 
Note: Fall Risk Interventions: 
Mobility Interventions: PT Consult for mobility concerns, PT Consult for assist device competence, OT consult for ADLs, Bed/chair exit alarm Medication Interventions: Bed/chair exit alarm, Patient to call before getting OOB, Teach patient to arise slowly Elimination Interventions: Bed/chair exit alarm, Call light in reach, Urinal in reach History of Falls Interventions: Bed/chair exit alarm, Room close to nurse's station Problem: Patient Education: Go to Patient Education Activity Goal: Patient/Family Education Outcome: Progressing Towards Goal 
  
Problem: Diabetes Self-Management Goal: *Disease process and treatment process Description: Define diabetes and identify own type of diabetes; list 3 options for treating diabetes. Outcome: Progressing Towards Goal 
Goal: *Incorporating nutritional management into lifestyle Description: Describe effect of type, amount and timing of food on blood glucose; list 3 methods for planning meals. Outcome: Progressing Towards Goal 
Goal: *Incorporating physical activity into lifestyle Description: State effect of exercise on blood glucose levels. Outcome: Progressing Towards Goal 
Goal: *Developing strategies to promote health/change behavior Description: Define the ABC's of diabetes; identify appropriate screenings, schedule and personal plan for screenings. Outcome: Progressing Towards Goal 
Goal: *Using medications safely Description: State effect of diabetes medications on diabetes; name diabetes medication taking, action and side effects. Outcome: Progressing Towards Goal 
Goal: *Monitoring blood glucose, interpreting and using results Description: Identify recommended blood glucose targets  and personal targets. Outcome: Progressing Towards Goal 
Goal: *Prevention, detection, treatment of acute complications Description: List symptoms of hyper- and hypoglycemia; describe how to treat low blood sugar and actions for lowering  high blood glucose level. Outcome: Progressing Towards Goal 
Goal: *Prevention, detection and treatment of chronic complications Description: Define the natural course of diabetes and describe the relationship of blood glucose levels to long term complications of diabetes. Outcome: Progressing Towards Goal 
Goal: *Developing strategies to address psychosocial issues Description: Describe feelings about living with diabetes; identify support needed and support network Outcome: Progressing Towards Goal 
Goal: *Insulin pump training Outcome: Progressing Towards Goal 
Goal: *Sick day guidelines Outcome: Progressing Towards Goal 
Goal: *Patient Specific Goal (EDIT GOAL, INSERT TEXT) Outcome: Progressing Towards Goal 
  
Problem: Patient Education: Go to Patient Education Activity Goal: Patient/Family Education Outcome: Progressing Towards Goal 
  
Problem: Pressure Injury - Risk of 
Goal: *Prevention of pressure injury Description: Document Juan Scale and appropriate interventions in the flowsheet. Outcome: Progressing Towards Goal 
Note: Pressure Injury Interventions: 
Sensory Interventions: Assess changes in LOC, Assess need for specialty bed, Discuss PT/OT consult with provider Activity Interventions: Increase time out of bed, PT/OT evaluation Mobility Interventions: HOB 30 degrees or less, PT/OT evaluation Nutrition Interventions: Document food/fluid/supplement intake Friction and Shear Interventions: HOB 30 degrees or less, Apply protective barrier, creams and emollients Problem: Patient Education: Go to Patient Education Activity Goal: Patient/Family Education Outcome: Progressing Towards Goal 
  
 Problem: Patient Education: Go to Patient Education Activity Goal: Patient/Family Education Outcome: Progressing Towards Goal

## 2020-08-04 NOTE — CONSULTS
ORTHO CONSULT Subjective:  
 
Date of Consultation:  2020 Referring Physician:  Dr. Peggy Amaya. Remigio Hardy is a 78 y.o. male we are consulted to see for R knee pain for 5 days. Pt. Denies injury. States he has a hx of Gout, typically in his feet but sometimes in his knee/shoulder. States pain is similar to previous gouty flare ups. Denies numbness. Patient Active Problem List  
 Diagnosis Date Noted  ARF (acute renal failure) ([facility] 2020  CKD (chronic kidney disease) stage 3, GFR 30-59 ml/min ([facility]) 2020  DM type 2 causing CKD stage 3 ([facility] 2020  Leg edema, right 2020  Weakness 2020  Severe obesity (BMI 35.0-39. 9) with comorbidity ([facility] 2018  Status post insertion of drug eluting coronary artery stent 2018  Ground glass opacity present on imaging of lung 2018  Noncompliance with diabetes treatment 2018  Edema of right ankle 10/31/2017  Anemia 2014  Protein calorie malnutrition ([facility] 2014  
 HTN (hypertension) 2014 Family History Problem Relation Age of Onset  Diabetes Mother  Cancer Sister Social History Tobacco Use  Smoking status: Former Smoker Packs/day: 0.50 Years: 30.00 Pack years: 15.00 Types: Cigarettes Last attempt to quit: 1994 Years since quittin.6  Smokeless tobacco: Never Used Substance Use Topics  Alcohol use: No  
 
Past Medical History:  
Diagnosis Date  Anemia  Bronchitis  Chronic kidney disease UTI  Diabetes ([facility]  Gastrointestinal disorder   
 anemia  Hypertension  Kidney disease, chronic, stage II (GFR 60-89 ml/min)  Neurological disorder Metabolic Brain Disorder Past Surgical History:  
Procedure Laterality Date  HX OTHER SURGICAL    
 never Prior to Admission medications Medication Sig Start Date End Date Taking? Authorizing Provider isosorbide mononitrate ER (IMDUR) 30 mg tablet Take 30 mg by mouth two (2) times a day. Yes Provider, Historical  
levothyroxine (SYNTHROID) 150 mcg tablet TAKE 1 TABLET BY MOUTH ONCE DAILY BEFORE BREAKFAST 4/25/19  Yes Catrachita Queen MD  
hydrALAZINE (APRESOLINE) 25 mg tablet Take 1.5 Tabs by mouth three (3) times daily. 3/17/18  Yes Glenn Turcios MD  
clopidogrel (PLAVIX) 75 mg tab Take 1 Tab by mouth daily. 3/18/18  Yes Glenn Turcios MD  
 
Current Facility-Administered Medications Medication Dose Route Frequency  lidocaine 4 % patch 1 Patch  1 Patch TransDERmal Q24H  
 insulin lispro (HUMALOG) injection   SubCUTAneous AC&HS  
 glucose chewable tablet 16 g  4 Tab Oral PRN  
 dextrose (D50W) injection syrg 12.5-25 g  25-50 mL IntraVENous PRN  
 glucagon (GLUCAGEN) injection 1 mg  1 mg IntraMUSCular PRN  
 sodium chloride (NS) flush 5-40 mL  5-40 mL IntraVENous Q8H  
 sodium chloride (NS) flush 5-40 mL  5-40 mL IntraVENous PRN  
 acetaminophen (TYLENOL) tablet 650 mg  650 mg Oral Q6H PRN Or  
 acetaminophen (TYLENOL) suppository 650 mg  650 mg Rectal Q6H PRN  polyethylene glycol (MIRALAX) packet 17 g  17 g Oral DAILY PRN  promethazine (PHENERGAN) tablet 12.5 mg  12.5 mg Oral Q6H PRN Or  
 ondansetron (ZOFRAN) injection 4 mg  4 mg IntraVENous Q6H PRN  
 heparin (porcine) injection 5,000 Units  5,000 Units SubCUTAneous Q8H  
 lactated Ringers infusion  100 mL/hr IntraVENous CONTINUOUS  
 clopidogreL (PLAVIX) tablet 75 mg  75 mg Oral DAILY  hydrALAZINE (APRESOLINE) tablet 37.5 mg  37.5 mg Oral TID  isosorbide mononitrate ER (IMDUR) tablet 30 mg  30 mg Oral DAILY  levothyroxine (SYNTHROID) tablet 150 mcg  150 mcg Oral 6am  
 
No Known Allergies Review of Systems:  A comprehensive review of systems was negative except for that written in the HPI. Objective:  
 
Visit Vitals /70 (BP 1 Location: Left arm, BP Patient Position: At rest) Pulse 80 Temp 98.1 °F (36.7 °C) Resp 18 Ht 5' 7\" (1.702 m) Wt 116.3 kg (256 lb 6.3 oz) SpO2 96% BMI 40.16 kg/m² EXAM: Afebrile, I examined pt's Rknee, difficulty with extension, No ligamentous laxity on exam. Neg Holmans sign. Mild effusion, no erythema noted. NVI distally BLE's. XR Results (most recent): 
Results from Hospital Encounter encounter on 08/01/20 XR KNEE RT 3 V Narrative EXAM: XR KNEE RT 3 V 
 
INDICATION: Right knee pain. COMPARISON: None. FINDINGS: Three views of the right knee demonstrate no fracture or dislocation. There is a small quantity of joint effusion. There is osteoarthrosis. Impression IMPRESSION: Small effusion. Osteoarthrosis. No apparent fracture. Assessment/Plan:  
 
Encounter Diagnoses ICD-10-CM ICD-9-CM 1. General weakness  R53.1 780.79  
2. Inability to ambulate due to knee  R26.2 719.7 3. Acute renal failure, unspecified acute renal failure type ([facility]  N17.9 584.9 4. Non-traumatic rhabdomyolysis  M62.82 728.88 Gouty OsteoArthritis Right Knee No evidence of infectious process. Decision to inject with cortisone. Pt. Mary Grace Gordon. Procedure: I explained the risk vs. Benefits to pt in regards to aspirating and injecting the R knee. Pt. states they understand and give verbal and written consent for the procedure. Under sterile conditions,  I injected the SQ area of the superior lateral aspect of the R knee with 2ml Lidocaine, I aspirated 2ml Clear, Serous yellow fluid from the R knee, No signs of infection noted. I then injected pt. With 40mg Kenalog mixed with 2ml 0.5% Marcaine in the intra-articular aspect of the R knee without difficulty. Pt. Tolerated the Procedure well. Bleeding controlled. Synovial fluid discarded. F/U with CHELSEY Lora In 2 weeks. Call [phone] for appointment. Reconsult as needed. Dr. Yandy Lucero agrees with plan. Yaa Rodarte PA-C Orthopaedic Surgery PA

## 2020-08-04 NOTE — PROGRESS NOTES
8-4-2020 CASE MANAGEMENT NOTE: 
Transition of Care: 1. Referral received for SNF for rehab 2. Met with pt and spoke to brother-in-law, Gail Ulrich ([phone]), on the phone and referral was sent to [facility] 3. Confirmed with [facility] that they received referral and they will initiate insurance auth. 4. Informed 5th floor CM of action NATHALIA CardosoW, CM

## 2020-08-04 NOTE — PROGRESS NOTES
Shift Change: 
Bedside and Verbal shift change report given to Leonardo Apodaca RN (oncoming nurse) by Francesca Vaughn RN (offgoing nurse). Report included the following information SBAR, Kardex and Quality Measures. Shift Summary: 
0230: Pt has had loose watery stools x3. 
0400: Pt stool was light and mucousy. 0600: Pt complained of stomach upset, gave zofran as ordered. End of Shift Report: 
Bedside and Verbal shift change report given to [address] (oncoming nurse) by Leonardo Apodaca RN (offgoing nurse). Report included the following information SBAR, Kardex and Quality Measures.

## 2020-08-04 NOTE — PROGRESS NOTES
CM follow up: [facility] home care unable to accept patient due to staffing. Referral placed to [facility] home care via [employer]. Await response. Referral sent to [facility], [facility], and [facility] - all out of network for patient's [employer]. Referral sent to [facility], await response. Liaison states they accept [employer]. Selvin Vargas RN, MSN/Care manager [phone]

## 2020-08-04 NOTE — PROGRESS NOTES
Problem: Mobility Impaired (Adult and Pediatric) Goal: *Acute Goals and Plan of Care (Insert Text) Outcome: Progressing Towards Goal 
Note: PHYSICAL THERAPY TREATMENT Patient: Chandrika Bush (61 y.o. male) Date: 8/4/2020 Diagnosis: ARF (acute renal failure) ([facility] [N17.9] ARF (acute renal failure) ([facility] Precautions: Fall Chart, physical therapy assessment, plan of care and goals were reviewed. ASSESSMENT Patient continues with skilled PT services and is not progressing towards goals. Pt received cortisone injection earlier this morning. Currently, received in bed R hip externally rotated and knee flexed to 75 degrees. Pt yells out with attempts passively or actively, place hip in neutral and extend knee. Performed limited ankle pumps. Increased time and Min A to come to sitting EOB. Unable to WB on RLE with max A x 2 and RW for support. Performed lateral transfer bed>chair with up to Max A x2, as pt reluctant to place wt on LLE, improved with max verbal and tactile cues to Mod A x 2. Current Level of Function Impacting Discharge (mobility/balance): max A x2 Other factors to consider for discharge: PLAN : 
Patient continues to benefit from skilled intervention to address the above impairments. Continue treatment per established plan of care. to address goals. Recommendation for discharge: (in order for the patient to meet his/her long term goals) Therapy up to 5 days/week in SNF setting This discharge recommendation: 
Has been made in collaboration with the attending provider and/or case management IF patient discharges home will need the following DME: wheelchair SUBJECTIVE:  
Patient stated my knee is too sore.  OBJECTIVE DATA SUMMARY:  
Critical Behavior: 
Neurologic State: Alert Orientation Level: Oriented X4 Cognition: Follows commands Safety/Judgement: Awareness of environment Functional Mobility Training: 
Bed Mobility: Supine to Sit: Minimum assistance; Additional time;Assist x1 Scooting: Contact guard assistance;Assist x1;Additional time Transfers: 
Sit to Stand: Maximum assistance;Assist x2 Stand to Sit: Maximum assistance;Assist x2 Bed to Chair: Moderate assistance;Assist x2(lateral trasnfer ) Balance: 
Sitting: Intact Standing: Impaired Standing - Static: Poor Standing - Dynamic : Poor Ambulation/Gait Training: 
  
  
  
  
  
  
  
  
  
  
  
  
  
  
  
  
  
  
Stairs: Therapeutic Exercises:  
 
Pain Ratin/10 with attempt to extend R knee Activity Tolerance:  
Fair Please refer to the flowsheet for vital signs taken during this treatment. After treatment patient left in no apparent distress:  
Sitting in chair, Call bell within reach, and Bed / chair alarm activated COMMUNICATION/COLLABORATION:  
The patients plan of care was discussed with: Registered nurseAsher Cade Time Calculation: 23 mins

## 2020-08-04 NOTE — PROGRESS NOTES
[address]ce [address] YOB: 1950 Assessment & Plan: 1. KELIN · Cr better at 3.4 mg 
· KELIN thought to be due to IVVD,Entresto · Looks euvolemic now, dc IVF · No HD needed · CKD: Cr 2 mg in 2018:serologies sent · HE WILL NEED CKD Visit on dc 2. Acidosis · KELIN, CL containing fluids · Now on LR 
· ADD po bicarb 3. DM 
· Insulin 4. Anemia · Iron def in 2018: needs eval to be repeated Subjective: CHIEF COMPLAIN:arf HPI: KELIN is slowly improving. Acidosis is stillpresent. Anemia+ Review of Systems A comprehensive review of systems was negative except for that written in the HPI. Past Medical History:  
Diagnosis Date  Anemia  Bronchitis  Chronic kidney disease UTI  Diabetes ([address]  Gastrointestinal disorder   
 anemia  Hypertension  Kidney disease, chronic, stage II (GFR 60-89 ml/min)  Neurological disorder Metabolic Brain Disorder Past Surgical History:  
Procedure Laterality Date  HX OTHER SURGICAL    
 never Social History Socioeconomic History  Marital status:  Spouse name: Not on file  Number of children: Not on file  Years of education: Not on file  Highest education level: Not on file Occupational History  Not on file Social Needs  Financial resource strain: Not on file  Food insecurity Worry: Not on file Inability: Not on file  Transportation needs Medical: Not on file Non-medical: Not on file Tobacco Use  Smoking status: Former Smoker Packs/day: 0.50 Years: 30.00 Pack years: 15.00 Types: Cigarettes Last attempt to quit: 1994 Years since quittin.6  Smokeless tobacco: Never Used Substance and Sexual Activity  Alcohol use: No  
 Drug use: No  
 Sexual activity: Yes  
  Partners: Female Lifestyle  Physical activity Days per week: Not on file Minutes per session: Not on file  Stress: Not on file Relationships  Social connections Talks on phone: Not on file Gets together: Not on file Attends Taoism service: Not on file Active member of club or organization: Not on file Attends meetings of clubs or organizations: Not on file Relationship status: Not on file  Intimate partner violence Fear of current or ex partner: Not on file Emotionally abused: Not on file Physically abused: Not on file Forced sexual activity: Not on file Other Topics Concern  Not on file Social History Narrative  Not on file Family History Problem Relation Age of Onset  Diabetes Mother  Cancer Sister Prior to Admission medications Medication Sig Start Date End Date Taking? Authorizing Provider  
isosorbide mononitrate ER (IMDUR) 30 mg tablet Take 30 mg by mouth two (2) times a day. Yes Provider, Historical  
levothyroxine (SYNTHROID) 150 mcg tablet TAKE 1 TABLET BY MOUTH ONCE DAILY BEFORE BREAKFAST 19  Yes Cristo Merino MD  
hydrALAZINE (APRESOLINE) 25 mg tablet Take 1.5 Tabs by mouth three (3) times daily. 3/17/18  Yes Arya Turcios MD  
clopidogrel (PLAVIX) 75 mg tab Take 1 Tab by mouth daily. 3/18/18  Yes Arya Turcios MD  
 
No Known Allergies Objective:  
 
Vitals: 
Blood pressure 136/70, pulse 80, temperature 98.1 °F (36.7 °C), resp. rate 18, height 5' 7\" (1.702 m), weight 116.3 kg (256 lb 6.3 oz), SpO2 96 %. Temp (24hrs), Av.9 °F (36.6 °C), Min:97.7 °F (36.5 °C), Max:98.2 °F (36.8 °C) Intake and Output: 
No intake/output data recorded.  1901 -  0700 In: 3206.7 [P.O.:710; I.V.:2496.7] Out: 1590 [[id number]] Physical Exam:              
 Patient is intubated:  no 
 
Physical Examination:  
GENERAL ASSESSMENT: well developed and well nourished SKIN: normal color, no lesions HEAD: normocephalic EYES: normal eyes NECK: normal 
 CHEST: normal air exchange, no rales, no rhonchi, no wheezes, respiratory effort normal with no retractions HEART: regular rate and rhythm, normal S1/S2, no murmurs ABDOMEN: soft, non-distended, no masses, no hepatosplenomegaly :Silva: no 
EXTREMITY: normal and symmetric movement, normal range of motion, no joint swelling NEURO: gross motor exam normal by observation ECG/rhythm[de-identified] Rev:yes Xray/CT/US/MRI REV:yes Data Review Recent Results (from the past 72 hour(s)) SAMPLES BEING HELD Collection Time: 08/01/20  9:25 PM  
Result Value Ref Range SAMPLES BEING HELD SST.LV.RED.SUSAN.PST.GRN   
 COMMENT Add-on orders for these samples will be processed based on acceptable specimen integrity and analyte stability, which may vary by analyte. CBC WITH AUTOMATED DIFF Collection Time: 08/01/20  9:25 PM  
Result Value Ref Range WBC 11.0 4.1 - 11.1 K/uL  
 RBC 3.70 (L) 4.10 - 5.70 M/uL HGB 9.6 (L) 12.1 - 17.0 g/dL HCT 29.8 (L) 36.6 - 50.3 % MCV 80.5 80.0 - 99.0 FL  
 MCH 25.9 (L) 26.0 - 34.0 PG  
 MCHC 32.2 30.0 - 36.5 g/dL  
 RDW 14.4 11.5 - 14.5 % PLATELET 321 (H) 310 - 400 K/uL MPV 10.4 8.9 - 12.9 FL  
 NRBC 0.0 0  WBC ABSOLUTE NRBC 0.00 0.00 - 0.01 K/uL NEUTROPHILS 81 (H) 32 - 75 % LYMPHOCYTES 12 12 - 49 % MONOCYTES 6 5 - 13 % EOSINOPHILS 0 0 - 7 % BASOPHILS 0 0 - 1 % IMMATURE GRANULOCYTES 1 (H) 0.0 - 0.5 % ABS. NEUTROPHILS 8.9 (H) 1.8 - 8.0 K/UL  
 ABS. LYMPHOCYTES 1.3 0.8 - 3.5 K/UL  
 ABS. MONOCYTES 0.7 0.0 - 1.0 K/UL  
 ABS. EOSINOPHILS 0.0 0.0 - 0.4 K/UL  
 ABS. BASOPHILS 0.0 0.0 - 0.1 K/UL  
 ABS. IMM. GRANS. 0.1 (H) 0.00 - 0.04 K/UL  
 DF AUTOMATED    
CK Collection Time: 08/01/20  9:25 PM  
Result Value Ref Range  (H) 39 - 563 U/L  
METABOLIC PANEL, COMPREHENSIVE Collection Time: 08/01/20  9:25 PM  
Result Value Ref Range Sodium 132 (L) 136 - 145 mmol/L Potassium 3.6 3.5 - 5.1 mmol/L  Chloride 103 97 - 108 mmol/L  
 CO2 18 (L) 21 - 32 mmol/L Anion gap 11 5 - 15 mmol/L Glucose 158 (H) 65 - 100 mg/dL BUN 73 (H) 6 - 20 MG/DL Creatinine 5.17 (H) 0.70 - 1.30 MG/DL  
 BUN/Creatinine ratio 14 12 - 20 GFR est AA 13 (L) >60 ml/min/1.73m2 GFR est non-AA 11 (L) >60 ml/min/1.73m2 Calcium 8.8 8.5 - 10.1 MG/DL Bilirubin, total 0.4 0.2 - 1.0 MG/DL  
 ALT (SGPT) 26 12 - 78 U/L  
 AST (SGOT) 25 15 - 37 U/L Alk. phosphatase 112 45 - 117 U/L Protein, total 9.5 (H) 6.4 - 8.2 g/dL Albumin 2.4 (L) 3.5 - 5.0 g/dL Globulin 7.1 (H) 2.0 - 4.0 g/dL A-G Ratio 0.3 (L) 1.1 - 2.2 LIPASE Collection Time: 08/01/20  9:25 PM  
Result Value Ref Range Lipase 162 73 - 393 U/L MAGNESIUM Collection Time: 08/01/20  9:25 PM  
Result Value Ref Range Magnesium 2.6 (H) 1.6 - 2.4 mg/dL NT-PRO BNP Collection Time: 08/01/20  9:25 PM  
Result Value Ref Range NT pro- (H) <125 PG/ML  
TROPONIN I Collection Time: 08/01/20  9:25 PM  
Result Value Ref Range Troponin-I, Qt. <0.05 <0.05 ng/mL EKG, 12 LEAD, INITIAL Collection Time: 08/01/20  9:32 PM  
Result Value Ref Range Ventricular Rate 89 BPM  
 Atrial Rate 89 BPM  
 P-R Interval 164 ms QRS Duration 112 ms  
 Q-T Interval 444 ms QTC Calculation (Bezet) 540 ms Calculated P Axis 31 degrees Calculated R Axis 51 degrees Calculated T Axis 127 degrees Diagnosis Normal sinus rhythm Incomplete left bundle branch block T wave abnormality, consider lateral ischemia Abnormal ECG When compared with ECG of 16-MAR-2018 11:37, T wave inversion no longer  
evident in Anterior leads Confirmed by Nayeli HERNANDEZ, Sonya Corea ([id number]) on 8/3/2020 4:41:06 PM 
  
TYPE & SCREEN Collection Time: 08/01/20  9:54 PM  
Result Value Ref Range Crossmatch Expiration 08/04/2020 ABO/Rh(D) A POSITIVE Antibody screen NEG   
URINALYSIS W/MICROSCOPIC Collection Time: 08/02/20  2:07 AM  
Result Value Ref Range Color YELLOW/STRAW Appearance CLOUDY (A) CLEAR Specific gravity 1.016 1.003 - 1.030    
 pH (UA) 5.0 5.0 - 8.0 Protein 100 (A) NEG mg/dL Glucose Negative NEG mg/dL Ketone Negative NEG mg/dL Bilirubin Negative NEG Blood SMALL (A) NEG Urobilinogen 1.0 0.2 - 1.0 EU/dL Nitrites Negative NEG Leukocyte Esterase Negative NEG    
 WBC 0-4 0 - 4 /hpf  
 RBC 0-5 0 - 5 /hpf Epithelial cells FEW FEW /lpf Bacteria Negative NEG /hpf Amorphous Crystals FEW (A) NEG URINE CULTURE HOLD SAMPLE Collection Time: 08/02/20  2:07 AM  
 Specimen: Serum; Urine Result Value Ref Range Urine culture hold Urine on hold in Microbiology dept for 2 days. If unpreserved urine is submitted, it cannot be used for addtional testing after 24 hours, recollection will be required. HEMOGLOBIN A1C WITH EAG Collection Time: 08/02/20  3:30 AM  
Result Value Ref Range Hemoglobin A1c 8.9 (H) 4.0 - 5.6 % Est. average glucose 209 mg/dL GLUCOSE, POC Collection Time: 08/02/20  8:00 AM  
Result Value Ref Range Glucose (POC) 151 (H) 65 - 100 mg/dL Performed by Shanon Dawkins (PCT) CBC W/O DIFF Collection Time: 08/02/20  9:30 AM  
Result Value Ref Range WBC 10.4 4.1 - 11.1 K/uL  
 RBC 3.57 (L) 4.10 - 5.70 M/uL HGB 9.3 (L) 12.1 - 17.0 g/dL HCT 28.8 (L) 36.6 - 50.3 % MCV 80.7 80.0 - 99.0 FL  
 MCH 26.1 26.0 - 34.0 PG  
 MCHC 32.3 30.0 - 36.5 g/dL  
 RDW 14.4 11.5 - 14.5 % PLATELET 829 (H) 048 - 400 K/uL MPV 10.2 8.9 - 12.9 FL  
 NRBC 0.0 0  WBC ABSOLUTE NRBC 0.00 0.00 - 0.01 K/uL MAGNESIUM Collection Time: 08/02/20  9:30 AM  
Result Value Ref Range Magnesium 2.5 (H) 1.6 - 2.4 mg/dL METABOLIC PANEL, BASIC Collection Time: 08/02/20  9:30 AM  
Result Value Ref Range Sodium 134 (L) 136 - 145 mmol/L Potassium 3.5 3.5 - 5.1 mmol/L Chloride 106 97 - 108 mmol/L  
 CO2 20 (L) 21 - 32 mmol/L Anion gap 8 5 - 15 mmol/L Glucose 167 (H) 65 - 100 mg/dL BUN 72 (H) 6 - 20 MG/DL Creatinine 4.46 (H) 0.70 - 1.30 MG/DL  
 BUN/Creatinine ratio 16 12 - 20 GFR est AA 16 (L) >60 ml/min/1.73m2 GFR est non-AA 13 (L) >60 ml/min/1.73m2 Calcium 8.8 8.5 - 10.1 MG/DL  
GLUCOSE, POC Collection Time: 08/02/20 11:29 AM  
Result Value Ref Range Glucose (POC) 199 (H) 65 - 100 mg/dL Performed by Ambrose Draper (PCT) GLUCOSE, POC Collection Time: 08/02/20  4:02 PM  
Result Value Ref Range Glucose (POC) 197 (H) 65 - 100 mg/dL Performed by Ambrose Draper (PCT) GLUCOSE, POC Collection Time: 08/02/20  9:01 PM  
Result Value Ref Range Glucose (POC) 173 (H) 65 - 100 mg/dL Performed by Claire Sneed (PCT) CBC WITH AUTOMATED DIFF Collection Time: 08/03/20  3:49 AM  
Result Value Ref Range WBC 8.4 4.1 - 11.1 K/uL  
 RBC 3.14 (L) 4.10 - 5.70 M/uL HGB 8.2 (L) 12.1 - 17.0 g/dL HCT 26.0 (L) 36.6 - 50.3 % MCV 82.8 80.0 - 99.0 FL  
 MCH 26.1 26.0 - 34.0 PG  
 MCHC 31.5 30.0 - 36.5 g/dL  
 RDW 14.7 (H) 11.5 - 14.5 % PLATELET 114 952 - 342 K/uL MPV 10.4 8.9 - 12.9 FL  
 NRBC 0.0 0  WBC ABSOLUTE NRBC 0.00 0.00 - 0.01 K/uL NEUTROPHILS 77 (H) 32 - 75 % LYMPHOCYTES 15 12 - 49 % MONOCYTES 6 5 - 13 % EOSINOPHILS 1 0 - 7 % BASOPHILS 0 0 - 1 % IMMATURE GRANULOCYTES 1 (H) 0.0 - 0.5 % ABS. NEUTROPHILS 6.5 1.8 - 8.0 K/UL  
 ABS. LYMPHOCYTES 1.3 0.8 - 3.5 K/UL  
 ABS. MONOCYTES 0.5 0.0 - 1.0 K/UL  
 ABS. EOSINOPHILS 0.1 0.0 - 0.4 K/UL  
 ABS. BASOPHILS 0.0 0.0 - 0.1 K/UL  
 ABS. IMM. GRANS. 0.0 0.00 - 0.04 K/UL  
 DF AUTOMATED METABOLIC PANEL, BASIC Collection Time: 08/03/20  3:49 AM  
Result Value Ref Range Sodium 135 (L) 136 - 145 mmol/L Potassium 3.5 3.5 - 5.1 mmol/L Chloride 109 (H) 97 - 108 mmol/L  
 CO2 18 (L) 21 - 32 mmol/L Anion gap 8 5 - 15 mmol/L Glucose 120 (H) 65 - 100 mg/dL BUN 68 (H) 6 - 20 MG/DL  Creatinine 3.86 (H) 0.70 - 1.30 MG/DL  
 BUN/Creatinine ratio 18 12 - 20 GFR est AA 19 (L) >60 ml/min/1.73m2 GFR est non-AA 16 (L) >60 ml/min/1.73m2 Calcium 8.1 (L) 8.5 - 10.1 MG/DL MAGNESIUM Collection Time: 08/03/20  3:49 AM  
Result Value Ref Range Magnesium 2.4 1.6 - 2.4 mg/dL PHOSPHORUS Collection Time: 08/03/20  3:49 AM  
Result Value Ref Range Phosphorus 4.5 2.6 - 4.7 MG/DL  
GLUCOSE, POC Collection Time: 08/03/20  7:29 AM  
Result Value Ref Range Glucose (POC) 109 (H) 65 - 100 mg/dL Performed by Thien Valenzuela (PCT) GLUCOSE, POC Collection Time: 08/03/20 11:00 AM  
Result Value Ref Range Glucose (POC) 191 (H) 65 - 100 mg/dL Performed by Thien Valenzuela (PCT) GLUCOSE, POC Collection Time: 08/03/20  4:20 PM  
Result Value Ref Range Glucose (POC) 186 (H) 65 - 100 mg/dL Performed by Thien Valenzuela (PCT) GLUCOSE, POC Collection Time: 08/03/20  9:23 PM  
Result Value Ref Range Glucose (POC) 120 (H) 65 - 100 mg/dL Performed by Jacinta Gagnon (PCT) CBC WITH AUTOMATED DIFF Collection Time: 08/04/20  6:19 AM  
Result Value Ref Range WBC 8.2 4.1 - 11.1 K/uL  
 RBC 3.30 (L) 4.10 - 5.70 M/uL HGB 8.6 (L) 12.1 - 17.0 g/dL HCT 26.6 (L) 36.6 - 50.3 % MCV 80.6 80.0 - 99.0 FL  
 MCH 26.1 26.0 - 34.0 PG  
 MCHC 32.3 30.0 - 36.5 g/dL  
 RDW 14.5 11.5 - 14.5 % PLATELET 069 (H) 931 - 400 K/uL MPV 10.1 8.9 - 12.9 FL  
 NRBC 0.0 0  WBC ABSOLUTE NRBC 0.00 0.00 - 0.01 K/uL NEUTROPHILS 78 (H) 32 - 75 % LYMPHOCYTES 16 12 - 49 % MONOCYTES 5 5 - 13 % EOSINOPHILS 1 0 - 7 % BASOPHILS 0 0 - 1 % IMMATURE GRANULOCYTES 0 0.0 - 0.5 % ABS. NEUTROPHILS 6.3 1.8 - 8.0 K/UL  
 ABS. LYMPHOCYTES 1.3 0.8 - 3.5 K/UL  
 ABS. MONOCYTES 0.4 0.0 - 1.0 K/UL  
 ABS. EOSINOPHILS 0.1 0.0 - 0.4 K/UL  
 ABS. BASOPHILS 0.0 0.0 - 0.1 K/UL  
 ABS. IMM. GRANS. 0.0 0.00 - 0.04 K/UL  
 DF AUTOMATED METABOLIC PANEL, BASIC Collection Time: 08/04/20  6:19 AM  
Result Value Ref Range Sodium 135 (L) 136 - 145 mmol/L Potassium 3.2 (L) 3.5 - 5.1 mmol/L Chloride 108 97 - 108 mmol/L  
 CO2 18 (L) 21 - 32 mmol/L Anion gap 9 5 - 15 mmol/L Glucose 109 (H) 65 - 100 mg/dL BUN 58 (H) 6 - 20 MG/DL Creatinine 3.40 (H) 0.70 - 1.30 MG/DL  
 BUN/Creatinine ratio 17 12 - 20 GFR est AA 22 (L) >60 ml/min/1.73m2 GFR est non-AA 18 (L) >60 ml/min/1.73m2 Calcium 8.8 8.5 - 10.1 MG/DL  
HEP B SURFACE AB Collection Time: 08/04/20  6:19 AM  
Result Value Ref Range Hepatitis B surface Ab <3.10 mIU/mL Hep B surface Ab Interp. NONREACTIVE NR    
SAMPLES BEING HELD Collection Time: 08/04/20  6:19 AM  
Result Value Ref Range SAMPLES BEING HELD 1PST COMMENT Add-on orders for these samples will be processed based on acceptable specimen integrity and analyte stability, which may vary by analyte. GLUCOSE, POC Collection Time: 08/04/20  7:54 AM  
Result Value Ref Range Glucose (POC) 112 (H) 65 - 100 mg/dL Performed by Eloy Ricardo (PCT) Discussed with:   
Patient Thank you so much to allow us to participate in this patient's care. We will follow. 
: Terra Dominguez MD 
8/4/2020 [facility]: 
[email or web address] [place] office: 
[address] [place] [address], [address] [place], [address] [facility] Phone: [phone] Fax :     [phone] [place] office: 
[address] [facility], [address] Phone - [phone] Fax - [phone]

## 2020-08-05 NOTE — PROGRESS NOTES
Problem: Self Care Deficits Care Plan (Adult) Goal: *Acute Goals and Plan of Care (Insert Text) Description:  FUNCTIONAL STATUS PRIOR TO ADMISSION: Patient reports that he was independent with self care and mobility until the past few weeks. Noted increased weakness, inability to to ambulate or perform self care. HOME SUPPORT PRIOR TO ADMISSION: The patient lived with sister and brother in law. Occupational Therapy Goals Initiated 8/3/2020 1. Patient will perform grooming with modified independence within 7 day(s). 2.  Patient will perform upper body dressing and bathing with modified independence within 7 day(s). 3.  Patient will perform lower body dressing and bathing with moderate assistance  within 7 day(s). 4.  Patient will perform toilet transfers to [facility] with moderate assistance  within 7 day(s). 5.  Patient will perform all aspects of toileting with moderate assistance  within 7 day(s). 6.  Patient will participate in upper extremity therapeutic exercise/activities with supervision/set-up for 10 minutes within 7 day(s). 7.  Patient will utilize energy conservation techniques during functional activities with verbal cues within 7 day(s). OCCUPATIONAL THERAPY TREATMENT Patient: Gerald Robbins (7 y.o. male) Date: 8/5/2020 Diagnosis: ARF (acute renal failure) ([facility] [address] [N17.9] ARF (acute renal failure) ([facility] [address] Precautions: Fall Chart, occupational therapy assessment, plan of care, and goals were reviewed. ASSESSMENT Patient continues with skilled OT services and is progressing towards goals. Pt stated he thinks his knee is \"a little better today. \" Pt transfers to [facility] with assist x 2, contact guard hygiene and clothing management. Pt left seated in chair at end of session and . Current Level of Function Impacting Discharge (ADLs): Moderate assist x 2 transfer to [facility], contact guard hygiene and cloth mgt Other factors to consider for discharge: PLAN : 
 Patient continues to benefit from skilled intervention to address the above impairments. Continue treatment per established plan of care. to address goals. Recommend with staff: Out of bed for meals/activity Recommend next OT session: Cont towards goals Recommendation for discharge: (in order for the patient to meet his/her long term goals) Occupational therapy at least 2 days/week in the home This discharge recommendation: 
Has not yet been discussed the attending provider and/or case management IF patient discharges home will need the following DME: Rolling walker SUBJECTIVE:  
Patient stated \"I think my knee is better than yesterday.  OBJECTIVE DATA SUMMARY:  
Cognitive/Behavioral Status: 
Neurologic State: Alert; Appropriate for age Orientation Level: Oriented X4 Cognition: Follows commands Functional Mobility and Transfers for ADLs: 
Bed Mobility: 
Supine to Sit: Stand-by assistance;Assist x1 Scooting: Contact guard assistance Transfers: 
Sit to Stand: Moderate assistance;Assist x2 Functional Transfers Toilet Transfer : Moderate assistance;Assist x2 Adaptive Equipment: Bedside commode;Walker (comment) Bed to Chair: Moderate assistance;Assist x1 Balance: 
Sitting: Intact Standing: Impaired; With support Standing - Static: Constant support; Fair 
Standing - Dynamic : Fair ADL Intervention: Toileting Toileting Assistance: Moderate assistance Bowel Hygiene: Contact guard assistance Activity Tolerance:  
Fair Please refer to the flowsheet for vital signs taken during this treatment. After treatment patient left in no apparent distress:  
Sitting in chair COMMUNICATION/COLLABORATION:  
The patients plan of care was discussed with: Physical therapy assistant and Occupational therapist.  
 
GEETHA Chacon/L Time Calculation: 28 mins

## 2020-08-05 NOTE — PROGRESS NOTES
Problem: Mobility Impaired (Adult and Pediatric) Goal: *Acute Goals and Plan of Care (Insert Text) Outcome: Progressing Towards Goal 
Note: PHYSICAL THERAPY TREATMENT Patient: Saji Jackson (7 y.o. male) Date: 8/5/2020 Diagnosis: ARF (acute renal failure) ([facility] [address] [N17.9] ARF (acute renal failure) ([facility] [address] Precautions: Fall Chart, physical therapy assessment, plan of care and goals were reviewed. ASSESSMENT Patient continues with skilled PT services and progressing towards goals. Improved tolerance for activity today, denies R knee pain. Mod A for SPT to [facility] using RW, gait training with chair follow x 10'. Demonstrates antalgic gait, Unable to achieve full extension of RLE, no overt buckling noted. Pt remained in chair at end of session  Current Level of Function Impacting Discharge (mobility/balance): Mod A Other factors to consider for discharge: PLAN : 
Patient continues to benefit from skilled intervention to address the above impairments. Continue treatment per established plan of care. to address goals. Recommendation for discharge: (in order for the patient to meet his/her long term goals) Therapy up to 5 days/week in SNF setting This discharge recommendation: 
Has been made in collaboration with the attending provider and/or case management IF patient discharges home will need the following DME: to be determined (TBD) SUBJECTIVE:  
Patient stated my knee doesn't hurt today.  OBJECTIVE DATA SUMMARY:  
Critical Behavior: 
Neurologic State: Alert, Eyes open spontaneously Orientation Level: Oriented X4 Cognition: Follows commands, Appropriate for age attention/concentration, Appropriate decision making, Appropriate safety awareness Safety/Judgement: Decreased insight into deficits Functional Mobility Training: 
Bed Mobility: 
  
Supine to Sit: Stand-by assistance;Assist x1 Scooting: Contact guard assistance Transfers: Sit to Stand: Moderate assistance;Assist x2 Stand to Sit: Moderate assistance;Assist x2 Bed to Chair: Moderate assistance;Assist x1 Balance: 
Sitting: Intact Standing: Impaired; With support Standing - Static: Constant support; Fair 
Standing - Dynamic : Fair Ambulation/Gait Training: 
Distance (ft): 10 Feet (ft) Assistive Device: Walker, rolling;Gait belt Gait Abnormalities: Antalgic; Step to gait Base of Support: Widened Speed/Mayda: Slow Stairs: Therapeutic Exercises:  
 
Pain Rating: 
Denies pain Activity Tolerance:  
Fair Please refer to the flowsheet for vital signs taken during this treatment. After treatment patient left in no apparent distress:  
Sitting in chair, Call bell within reach, and Bed / chair alarm activated COMMUNICATION/COLLABORATION:  
The patients plan of care was discussed with: Occupational therapy assistant and Registered nurse. Rogue Gowers Time Calculation: 26 mins

## 2020-08-05 NOTE — PROGRESS NOTES
Bryce Lai Kevinmichael [place] 78 [address] YOB: 1950 Assessment & Plan: 1. KELIN · Cr better at 3.3 mg 
· KELIN thought to be due to IVVD,Entresto · Looks euvolemic now, dc IVF · No HD needed but poor insight in his care will cause worsening GFR faster · CKD: Cr 2 mg in 2018:serologies sent · HE WILL NEED CKD Visit on dc 2. Acidosis · KELIN, CL containing fluids ·  po bicarb 3. DM 
· Insulin 4. Anemia · Iron def in 2018: needs eval to be repeated · PO Fesoi4 Subjective: CHIEF COMPLAIN:arf HPI: KELIN is slowly improving,cr now 3.3. Acidosis is stillpresent. Anemia+ Review of Systems A comprehensive review of systems was negative except for that written in the HPI. Past Medical History:  
Diagnosis Date  Anemia  Bronchitis  Chronic kidney disease UTI  Diabetes ([facility] [address]  Gastrointestinal disorder   
 anemia  Hypertension  Kidney disease, chronic, stage II (GFR 60-89 ml/min)  Neurological disorder Metabolic Brain Disorder Past Surgical History:  
Procedure Laterality Date  HX OTHER SURGICAL    
 never Social History Socioeconomic History  Marital status:  Spouse name: Not on file  Number of children: Not on file  Years of education: Not on file  Highest education level: Not on file Occupational History  Not on file Social Needs  Financial resource strain: Not on file  Food insecurity Worry: Not on file Inability: Not on file  Transportation needs Medical: Not on file Non-medical: Not on file Tobacco Use  Smoking status: Former Smoker Packs/day: 0.50 Years: 30.00 Pack years: 15.00 Types: Cigarettes Last attempt to quit: 1994 Years since quittin.6  Smokeless tobacco: Never Used Substance and Sexual Activity  Alcohol use: No  
 Drug use: No  
 Sexual activity: Yes  
  Partners: Female Lifestyle  Physical activity Days per week: Not on file Minutes per session: Not on file  Stress: Not on file Relationships  Social connections Talks on phone: Not on file Gets together: Not on file Attends L[employer]-day service: Not on file Active member of club or organization: Not on file Attends meetings of clubs or organizations: Not on file Relationship status: Not on file  Intimate partner violence Fear of current or ex partner: Not on file Emotionally abused: Not on file Physically abused: Not on file Forced sexual activity: Not on file Other Topics Concern  Not on file Social History Narrative  Not on file Family History Problem Relation Age of Onset  Diabetes Mother  Cancer Sister Prior to Admission medications Medication Sig Start Date End Date Taking? Authorizing Provider  
sodium bicarbonate 650 mg tablet Take 1 Tab by mouth two (2) times a day. 20  Yes Mark Johnson MD  
isosorbide mononitrate ER (IMDUR) 30 mg tablet Take 30 mg by mouth two (2) times a day. Yes Provider, Historical  
levothyroxine (SYNTHROID) 150 mcg tablet TAKE 1 TABLET BY MOUTH ONCE DAILY BEFORE BREAKFAST 19  Yes Reji Ruiz MD  
hydrALAZINE (APRESOLINE) 25 mg tablet Take 1.5 Tabs by mouth three (3) times daily. 3/17/18  Yes Juan Turcios MD  
clopidogrel (PLAVIX) 75 mg tab Take 1 Tab by mouth daily. 3/18/18  Yes Juan Turcios MD  
 
No Known Allergies Objective:  
 
Vitals: 
Blood pressure 156/76, pulse 80, temperature 97.7 °F (36.5 °C), resp. rate 19, height 5' 7\" (1.702 m), weight 116.3 kg (256 lb 6.3 oz), SpO2 99 %. Temp (24hrs), Av.7 °F (36.5 °C), Min:97.2 °F (36.2 °C), Max:98.2 °F (36.8 °C) Intake and Output: 
No intake/output data recorded.  1901 -  0700 In: 3131 [I.V.:1165] Out: 2100 [Urine:2100] Physical Exam:              
 Patient is intubated:  no 
 
 Physical Examination:  
GENERAL ASSESSMENT: well developed and well nourished SKIN: normal color, no lesions HEAD: normocephalic EYES: normal eyes NECK: normal 
CHEST: normal air exchange, no rales, no rhonchi, no wheezes, respiratory effort normal with no retractions HEART: regular rate and rhythm, normal S1/S2, no murmurs ABDOMEN: soft, non-distended, no masses, no hepatosplenomegaly :Silva: no 
EXTREMITY: normal and symmetric movement, normal range of motion, no joint swelling NEURO: gross motor exam normal by observation ECG/rhythm[de-identified] Rev:yes Xray/CT/US/MRI REV:yes Data Review Recent Results (from the past 72 hour(s)) GLUCOSE, POC Collection Time: 08/02/20 11:29 AM  
Result Value Ref Range Glucose (POC) 199 (H) 65 - 100 mg/dL Performed by Jeffrey Rodriguez (PCT) GLUCOSE, POC Collection Time: 08/02/20  4:02 PM  
Result Value Ref Range Glucose (POC) 197 (H) 65 - 100 mg/dL Performed by Jeffrey Rodriguez (PCT) GLUCOSE, POC Collection Time: 08/02/20  9:01 PM  
Result Value Ref Range Glucose (POC) 173 (H) 65 - 100 mg/dL Performed by Annette eBnnett (PCT) CBC WITH AUTOMATED DIFF Collection Time: 08/03/20  3:49 AM  
Result Value Ref Range WBC 8.4 4.1 - 11.1 K/uL  
 RBC 3.14 (L) 4.10 - 5.70 M/uL HGB 8.2 (L) 12.1 - 17.0 g/dL HCT 26.0 (L) 36.6 - 50.3 % MCV 82.8 80.0 - 99.0 FL  
 MCH 26.1 26.0 - 34.0 PG  
 MCHC 31.5 30.0 - 36.5 g/dL  
 RDW 14.7 (H) 11.5 - 14.5 % PLATELET 894 295 - 716 K/uL MPV 10.4 8.9 - 12.9 FL  
 NRBC 0.0 0  WBC ABSOLUTE NRBC 0.00 0.00 - 0.01 K/uL NEUTROPHILS 77 (H) 32 - 75 % LYMPHOCYTES 15 12 - 49 % MONOCYTES 6 5 - 13 % EOSINOPHILS 1 0 - 7 % BASOPHILS 0 0 - 1 % IMMATURE GRANULOCYTES 1 (H) 0.0 - 0.5 % ABS. NEUTROPHILS 6.5 1.8 - 8.0 K/UL  
 ABS. LYMPHOCYTES 1.3 0.8 - 3.5 K/UL  
 ABS. MONOCYTES 0.5 0.0 - 1.0 K/UL  
 ABS. EOSINOPHILS 0.1 0.0 - 0.4 K/UL  
 ABS. BASOPHILS 0.0 0.0 - 0.1 K/UL ABS. IMM. GRANS. 0.0 0.00 - 0.04 K/UL  
 DF AUTOMATED METABOLIC PANEL, BASIC Collection Time: 08/03/20  3:49 AM  
Result Value Ref Range Sodium 135 (L) 136 - 145 mmol/L Potassium 3.5 3.5 - 5.1 mmol/L Chloride 109 (H) 97 - 108 mmol/L  
 CO2 18 (L) 21 - 32 mmol/L Anion gap 8 5 - 15 mmol/L Glucose 120 (H) 65 - 100 mg/dL BUN 68 (H) 6 - 20 MG/DL Creatinine 3.86 (H) 0.70 - 1.30 MG/DL  
 BUN/Creatinine ratio 18 12 - 20 GFR est AA 19 (L) >60 ml/min/1.73m2 GFR est non-AA 16 (L) >60 ml/min/1.73m2 Calcium 8.1 (L) 8.5 - 10.1 MG/DL MAGNESIUM Collection Time: 08/03/20  3:49 AM  
Result Value Ref Range Magnesium 2.4 1.6 - 2.4 mg/dL PHOSPHORUS Collection Time: 08/03/20  3:49 AM  
Result Value Ref Range Phosphorus 4.5 2.6 - 4.7 MG/DL  
GLUCOSE, POC Collection Time: 08/03/20  7:29 AM  
Result Value Ref Range Glucose (POC) 109 (H) 65 - 100 mg/dL Performed by Chantel Villarreal ([facility]) GLUCOSE, POC Collection Time: 08/03/20 11:00 AM  
Result Value Ref Range Glucose (POC) 191 (H) 65 - 100 mg/dL Performed by Chantel Villarreal ([facility]) GLUCOSE, POC Collection Time: 08/03/20  4:20 PM  
Result Value Ref Range Glucose (POC) 186 (H) 65 - 100 mg/dL Performed by Chantel Villarreal ([facility]) GLUCOSE, POC Collection Time: 08/03/20  9:23 PM  
Result Value Ref Range Glucose (POC) 120 (H) 65 - 100 mg/dL Performed by Lars Brandt (PCT) CBC WITH AUTOMATED DIFF Collection Time: 08/04/20  6:19 AM  
Result Value Ref Range WBC 8.2 4.1 - 11.1 K/uL  
 RBC 3.30 (L) 4.10 - 5.70 M/uL HGB 8.6 (L) 12.1 - 17.0 g/dL HCT 26.6 (L) 36.6 - 50.3 % MCV 80.6 80.0 - 99.0 FL  
 MCH 26.1 26.0 - 34.0 PG  
 MCHC 32.3 30.0 - 36.5 g/dL  
 RDW 14.5 11.5 - 14.5 % PLATELET 367 (H) 330 - 400 K/uL MPV 10.1 8.9 - 12.9 FL  
 NRBC 0.0 0  WBC ABSOLUTE NRBC 0.00 0.00 - 0.01 K/uL NEUTROPHILS 78 (H) 32 - 75 % LYMPHOCYTES 16 12 - 49 % MONOCYTES 5 5 - 13 % EOSINOPHILS 1 0 - 7 % BASOPHILS 0 0 - 1 % IMMATURE GRANULOCYTES 0 0.0 - 0.5 % ABS. NEUTROPHILS 6.3 1.8 - 8.0 K/UL  
 ABS. LYMPHOCYTES 1.3 0.8 - 3.5 K/UL  
 ABS. MONOCYTES 0.4 0.0 - 1.0 K/UL  
 ABS. EOSINOPHILS 0.1 0.0 - 0.4 K/UL  
 ABS. BASOPHILS 0.0 0.0 - 0.1 K/UL  
 ABS. IMM. GRANS. 0.0 0.00 - 0.04 K/UL  
 DF AUTOMATED METABOLIC PANEL, BASIC Collection Time: 08/04/20  6:19 AM  
Result Value Ref Range Sodium 135 (L) 136 - 145 mmol/L Potassium 3.2 (L) 3.5 - 5.1 mmol/L Chloride 108 97 - 108 mmol/L  
 CO2 18 (L) 21 - 32 mmol/L Anion gap 9 5 - 15 mmol/L Glucose 109 (H) 65 - 100 mg/dL BUN 58 (H) 6 - 20 MG/DL Creatinine 3.40 (H) 0.70 - 1.30 MG/DL  
 BUN/Creatinine ratio 17 12 - 20 GFR est AA 22 (L) >60 ml/min/1.73m2 GFR est non-AA 18 (L) >60 ml/min/1.73m2 Calcium 8.8 8.5 - 10.1 MG/DL  
IMMUNOELECTROPHORESIS (IMMUNOFIX.) Collection Time: 08/04/20  6:19 AM  
Result Value Ref Range Immunofixation, serum PENDING Immunoglobulin G, Qt. 1,849 (H) 603 - 1,613 mg/dL Immunoglobulin A, Qt. 569 (H) 61 - 437 mg/dL Immunoglobulin M, Qt. 65 20 - 172 mg/dL COMPLEMENT, C4 Collection Time: 08/04/20  6:19 AM  
Result Value Ref Range Complement C4 38 14 - 44 mg/dL HEPATITIS C AB Collection Time: 08/04/20  6:19 AM  
Result Value Ref Range Hep C  virus Ab Interp. NONREACTIVE NR Hep C  virus Ab comment Method used is [facility] COMPLEMENT, C3 Collection Time: 08/04/20  6:19 AM  
Result Value Ref Range Complement C3 178 (H) 82 - 167 mg/dL HEP B SURFACE AG Collection Time: 08/04/20  6:19 AM  
Result Value Ref Range Hepatitis B surface Ag <0.10 Index Hep B surface Ag Interp. Negative NEG    
HEP B SURFACE AB Collection Time: 08/04/20  6:19 AM  
Result Value Ref Range Hepatitis B surface Ab <3.10 mIU/mL Hep B surface Ab Interp. NONREACTIVE NR    
SAMPLES BEING HELD  Collection Time: 08/04/20  6:19 AM  
 Result Value Ref Range SAMPLES BEING HELD 1PST COMMENT Add-on orders for these samples will be processed based on acceptable specimen integrity and analyte stability, which may vary by analyte. GLUCOSE, POC Collection Time: 08/04/20  7:54 AM  
Result Value Ref Range Glucose (POC) 112 (H) 65 - 100 mg/dL Performed by Shanae Lr (PCT) GLUCOSE, POC Collection Time: 08/04/20 11:19 AM  
Result Value Ref Range Glucose (POC) 155 (H) 65 - 100 mg/dL Performed by Shanae Lr (PCT) GLUCOSE, POC Collection Time: 08/04/20  4:29 PM  
Result Value Ref Range Glucose (POC) 288 (H) 65 - 100 mg/dL Performed by Shanae Lr (PCT) GLUCOSE, POC Collection Time: 08/04/20  9:04 PM  
Result Value Ref Range Glucose (POC) 253 (H) 65 - 100 mg/dL Performed by Soraya Delacruz (PCT) CBC WITH AUTOMATED DIFF Collection Time: 08/05/20  5:07 AM  
Result Value Ref Range WBC 10.1 4.1 - 11.1 K/uL  
 RBC 3.43 (L) 4.10 - 5.70 M/uL HGB 9.0 (L) 12.1 - 17.0 g/dL HCT 27.6 (L) 36.6 - 50.3 % MCV 80.5 80.0 - 99.0 FL  
 MCH 26.2 26.0 - 34.0 PG  
 MCHC 32.6 30.0 - 36.5 g/dL  
 RDW 14.6 (H) 11.5 - 14.5 % PLATELET 111 (H) 800 - 400 K/uL MPV 10.0 8.9 - 12.9 FL  
 NRBC 0.0 0  WBC ABSOLUTE NRBC 0.00 0.00 - 0.01 K/uL NEUTROPHILS 89 (H) 32 - 75 % LYMPHOCYTES 6 (L) 12 - 49 % MONOCYTES 4 (L) 5 - 13 % EOSINOPHILS 0 0 - 7 % BASOPHILS 0 0 - 1 % IMMATURE GRANULOCYTES 1 (H) 0.0 - 0.5 % ABS. NEUTROPHILS 9.0 (H) 1.8 - 8.0 K/UL  
 ABS. LYMPHOCYTES 0.6 (L) 0.8 - 3.5 K/UL  
 ABS. MONOCYTES 0.4 0.0 - 1.0 K/UL  
 ABS. EOSINOPHILS 0.0 0.0 - 0.4 K/UL  
 ABS. BASOPHILS 0.0 0.0 - 0.1 K/UL  
 ABS. IMM. GRANS. 0.1 (H) 0.00 - 0.04 K/UL  
 DF SMEAR SCANNED    
 RBC COMMENTS ROULEAUX PRESENT 
    
METABOLIC PANEL, BASIC Collection Time: 08/05/20  5:07 AM  
Result Value Ref Range Sodium 133 (L) 136 - 145 mmol/L  Potassium 3.9 3.5 - 5.1 mmol/L  
 Chloride 105 97 - 108 mmol/L  
 CO2 18 (L) 21 - 32 mmol/L Anion gap 10 5 - 15 mmol/L Glucose 175 (H) 65 - 100 mg/dL BUN 54 (H) 6 - 20 MG/DL Creatinine 3.30 (H) 0.70 - 1.30 MG/DL  
 BUN/Creatinine ratio 16 12 - 20 GFR est AA 23 (L) >60 ml/min/1.73m2 GFR est non-AA 19 (L) >60 ml/min/1.73m2 Calcium 9.0 8.5 - 10.1 MG/DL MAGNESIUM Collection Time: 08/05/20  5:07 AM  
Result Value Ref Range Magnesium 2.1 1.6 - 2.4 mg/dL GLUCOSE, POC Collection Time: 08/05/20  7:05 AM  
Result Value Ref Range Glucose (POC) 187 (H) 65 - 100 mg/dL Performed by Merari Zavala (PCT) Discussed with:   
Patient Thank you so much to allow us to participate in this patient's care. We will follow. 
: Jose Jacob MD 
8/5/2020 [facility]: 
[email or web address] [place] office: 
[address], [address] [place], [address] [facility] Phone: [phone] Fax :     [phone] [place] office: 
[address] [facility], [address] Phone - [phone] Fax - [phone]

## 2020-08-05 NOTE — PROGRESS NOTES
CM follow up Patient accepted by [facility]. Patient to follow up with PCP Dr. Serena Cespedes, she will sign home health orders once she sees the patient. Pasquale Connelly, RN, MSN/Care manager [phone]

## 2020-08-05 NOTE — PROGRESS NOTES
Bedside shift change report given to Shalom Thornton RN 
 (oncoming nurse) by Jacy Thomson RN (offgoing nurse). Report included the following information SBAR, Kardex, MAR, Accordion and Recent Results.

## 2020-08-05 NOTE — PROGRESS NOTES
8/5/2020   CARE MANAGEMENT NOTE:  Pt transferred from [facility] to the 5th floor. EMR reviewed and handoff received from previous  ([place]). Pt was admitted with ARF, HTN, DM, leg edema and is s/p right knee injection on 8/4. Reportedly, pt resides with his sister Albina Severin ([phone]) and her . RUR 15% Transition Plan of Care: 1. PT/OT evals complete; pt is max assist with sit to standing (8/4) and SNF is recommended. 2. SNF - referrals were sent to [facility] and [facility] ([facility] 3.  [facility] insurance Henrietta Juarez will be needed for SNF 4.  COVID 19 test for placement will be ordered today. CM will continue to follow pt for SNF placement. Mj

## 2020-08-05 NOTE — PROGRESS NOTES
Bedside and Verbal shift change report given to Irving Oleary RN (oncoming nurse) by Roc Myers RN (offgoing nurse). Report included the following information SBAR, MAR and Recent Results, transfer assist and d/c plan.

## 2020-08-05 NOTE — PROGRESS NOTES
Bryce Lai michael [place] 78 
[address] 
[phone] Medical Progress Note NAME: Amanda Durbin :  1950 MRM:  [id number] Date/Time: 2020  3:04 PM 
 
 
  
Subjective: Chief Complaint:  \"I'm ok\" Pt seen and examined. R knee slightly improved with slightly more ROM  
 
ROS: 
(bold if positive, if negative) R knee pain Objective:  
 
 
Vitals:  
 
 
  
Last 24hrs VS reviewed since prior progress note. Most recent are: 
 
Visit Vitals /88 (BP 1 Location: Right arm, BP Patient Position: At rest) Pulse 79 Temp 97.7 °F (36.5 °C) Resp 19 Ht 5' 7\" (1.702 m) Wt 116.3 kg (256 lb 6.3 oz) SpO2 99% BMI 40.16 kg/m² SpO2 Readings from Last 6 Encounters:  
20 99% 18 97% 18 100% 18 98% 18 99% 18 95% Intake/Output Summary (Last 24 hours) at 2020 1311 Last data filed at 2020 1037 Gross per 24 hour Intake  Output 1050 ml Net -1050 ml Exam:  
 
Physical Exam: 
 
Gen:  Well-developed, well-nourished, in no acute distress HEENT:  Pink conjunctivae, PERRL, hearing intact to voice, moist mucous membranes Neck:  Supple, without masses, thyroid non-tender Resp:  No accessory muscle use, clear breath sounds without wheezes rales or rhonchi 
Card:  No murmurs, normal S1, S2 without thrills, bruits or peripheral edema Abd:  Soft, non-tender, non-distended, normoactive bowel sounds are present Musc:  No cyanosis or clubbing Skin:  No rashes or ulcers, skin turgor is good Neuro:  Cranial nerves 3-12 are grossly intact,  strength is 5/5 bilaterally and dorsi / plantarflexion is 5/5 bilaterally, follows commands appropriately Psych: Moderate insight R knee with slightly improved range of motion on extension and flexion Medications Reviewed: (see below) Lab Data Reviewed: (see below) ______________________________________________________________________ Medications:  
 
Current Facility-Administered Medications Medication Dose Route Frequency  insulin NPH (NOVOLIN N, HUMULIN N) injection 10 Units  10 Units SubCUTAneous ACB&D  
 ferrous sulfate tablet 325 mg  1 Tab Oral BID WITH MEALS  sodium bicarbonate tablet 650 mg  650 mg Oral BID  lidocaine 4 % patch 1 Patch  1 Patch TransDERmal Q24H  
 insulin lispro (HUMALOG) injection   SubCUTAneous AC&HS  
 glucose chewable tablet 16 g  4 Tab Oral PRN  
 dextrose (D50W) injection syrg 12.5-25 g  25-50 mL IntraVENous PRN  
 glucagon (GLUCAGEN) injection 1 mg  1 mg IntraMUSCular PRN  
 sodium chloride (NS) flush 5-40 mL  5-40 mL IntraVENous Q8H  
 sodium chloride (NS) flush 5-40 mL  5-40 mL IntraVENous PRN  
 acetaminophen (TYLENOL) tablet 650 mg  650 mg Oral Q6H PRN Or  
 acetaminophen (TYLENOL) suppository 650 mg  650 mg Rectal Q6H PRN  polyethylene glycol (MIRALAX) packet 17 g  17 g Oral DAILY PRN  promethazine (PHENERGAN) tablet 12.5 mg  12.5 mg Oral Q6H PRN Or  
 ondansetron (ZOFRAN) injection 4 mg  4 mg IntraVENous Q6H PRN  
 heparin (porcine) injection 5,000 Units  5,000 Units SubCUTAneous Q8H  
 clopidogreL (PLAVIX) tablet 75 mg  75 mg Oral DAILY  hydrALAZINE (APRESOLINE) tablet 37.5 mg  37.5 mg Oral TID  isosorbide mononitrate ER (IMDUR) tablet 30 mg  30 mg Oral DAILY  levothyroxine (SYNTHROID) tablet 150 mcg  150 mcg Oral 6am  
  
 
 
 
Lab Review:  
 
Recent Labs 08/05/20 
0507 08/04/20 
3028 08/03/20 
5506 WBC 10.1 8.2 8.4 HGB 9.0* 8.6* 8.2* HCT 27.6* 26.6* 26.0*  
* 446* 380 Recent Labs 08/05/20 
0507 08/04/20 
0110 08/03/20 
4818 * 135* 135* K 3.9 3.2* 3.5  108 109* CO2 18* 18* 18* * 109* 120* BUN 54* 58* 68* CREA 3.30* 3.40* 3.86* CA 9.0 8.8 8.1*  
MG 2.1  --  2.4 PHOS  --   --  4.5 No components found for: [place] Assessment / Plan:  
ARF (acute renal failure) ([facility] [address] (8/2/2020) on CKD (chronic kidney disease) stage 3, GFR 30-59 ml/min ([facility]) (8/2/2020) -suspect now improved to baseline with IVF's  
-renal on board; started on bicarb  
-will need outpt CKD clinic f/u HTN - BP elevated on admission. Once reported home meds restarted much improved and home meds even ordered at lower doses than reported. ?compliance  
-continue Imdur, hydralazine; increase hydralazine  
  
  Severe obesity (BMI 35.0-39. 9) with comorbidity ([facility] [address] (3/19/2018) -weight loss  
  
  DM type 2 causing CKD stage 3 ([facility]) (8/2/2020) 
            - ISS  
 - BG checks AC TID and qHS  
-A1c elevated at 8.9; start weight based insulin as BG levels elevated  
  
  Leg edema, right (8/2/2020) - R knee x-ray did show an effusion and OA. ? possible pain from OA. Unable to use NSAIDS due to KELIN/CKD  
-s/p knee injection  
  
  Weakness (8/2/2020) - generalized  
-head CT without acute process -PT/OT on board -optimizing flexion/extension/pain of R knee will likely help with ambulation  
-will need SNF; COVID testing pending Code status: 
            - patient is FULL CODE Total time spent with patient: 35 Minutes Care Plan discussed with: Patient Discussed:  Code Status, Care Plan and D/C Planning Prophylaxis:  Hep SQ Disposition:  SNF  
        
___________________________________________________ Attending Physician: Ricardo Ledezma MD

## 2020-08-06 NOTE — PROGRESS NOTES
Comprehensive Nutrition Assessment Type and Reason for Visit: Initial, RD nutrition re-screen/LOS Nutrition Recommendations/Plan: 1. Continue cardiac diet. Add consistent carb to diet order. 2. Will provide diet education if/when desired by pt. Nutrition Assessment:     
8/6: 77 y/o male admitted with ARF. PMH includes DM, CKD, HTN. BMI 40.2, c/w class III obesity. Pt reports good appetite. Reports eating 100% breakfast besides the eggs this AM. Recorded intakes good, % meals. Says he was eating well PTA. Denies any recent weight changes other than fluid. No c/o N/V. A1c 8.9, c/w poorly-controled DM. Will add consistent carb restriction to diet order. Pt has no nutrition-related questions at this time. Labs- Na 133, A1c 8.9, -201-227. Meds- ferrous sulfate, insulin regimen. Intakes: 
Patient Vitals for the past 168 hrs: 
 % Diet Eaten 08/06/20 0836 75 % 08/05/20 1248 90 % 08/05/20 0830 100 % 08/03/20 1022 75 % Weight hx: Wt Readings from Last 10 Encounters:  
08/03/20 116.3 kg (256 lb 6.3 oz) 04/24/18 109.9 kg (242 lb 3.2 oz)  
03/25/18 108.8 kg (239 lb 13.8 oz) 03/19/18 112.5 kg (248 lb)  
03/17/18 108.8 kg (239 lb 13.8 oz) 02/20/18 109.1 kg (240 lb 9.6 oz) 10/18/17 114.1 kg (251 lb 9.6 oz) 10/07/17 118.9 kg (262 lb 2 oz) 07/09/17 121.9 kg (268 lb 11.9 oz) 02/11/17 129.2 kg (284 lb 13.4 oz) Malnutrition Assessment: 
Malnutrition Status: none Estimated Daily Nutrient Needs: 
Energy (kcal):  0754(8427 x 1.3 AF (-500)) Protein (g):  67(0.8-1 g/kg IBW) Fluid (ml/day):  1945(1mL/kcal) Nutrition Related Findings:  LBM 8/4 (watery); 1+ non-pitting LE edema Wounds:   
None Current Nutrition Therapies: DIET CARDIAC Regular Anthropometric Measures: 
· Height:  5' 7\" (170.2 cm) · Current Body Wt:  116.3 kg (256 lb 6.3 oz) · Admission Body Wt:      
· Usual Body Wt:       
· Ideal Body Wt:  148 lbs:  173.2 % · Adjusted Body Weight:   ; Weight Adjustment for: · Adjusted BMI:      
· BMI Category:  Obese class 3 (BMI 40.0 or greater) Nutrition Diagnosis:  
· Overweight/obesity related to excessive energy intake as evidenced by BMI Nutrition Interventions:  
Food and/or Nutrient Delivery: Continue current diet Nutrition Education and Counseling: Education needed Coordination of Nutrition Care: Continued inpatient monitoring Goals: 
PO intake >75% meals with 1#/week weight loss next 5-7 days Nutrition Monitoring and Evaluation:  
Behavioral-Environmental Outcomes:   
Food/Nutrient Intake Outcomes: Food and nutrient intake Physical Signs/Symptoms Outcomes: Weight Discharge Planning:   
Continue current diet Electronically signed by Shelbi Suárez RDN on 8/6/2020 at 11:37 AM 
 
Contact: [phone]

## 2020-08-06 NOTE — PROGRESS NOTES
Renal function improving/stable. Appreciate PT eval, fall risk. Recommending physical therapy at least 2 days/week in the home with 24/7 assistance vs SNF. Will d/w CM and family to ensure safe discharge plan

## 2020-08-06 NOTE — PROGRESS NOTES
8/6/2020   CARE MANAGEMENT NOTE:  EMR reviewed for clinical updates. Pt was admitted with ARF, HTN, DM, leg edema and is s/p right knee injection on 8/4. Reportedly, pt resides with his sister Anam Alvarado ([phone]) and her . 
  
RUR 14% 
  
Transition Plan of Care: 1. SNF referrals were sent to the following:  
[facility] (not in network) [facility] - denied [facility] - denied [facility] - decision pending [facility] - decision pending [facility] - decision pending [facility] - decision pending 2. [employer] insurance Jeannette Estrada will be needed for SNF 3.  COVID 19 test for placement ordered and results pending. 
  
CM will continue to follow pt for SNF placement. Mj

## 2020-08-06 NOTE — PROGRESS NOTES
Bryce Lai Kimberly [place] Melvi Jackson YOB: 1950 Assessment & Plan: 1. KELIN · Cr better at 3.26 mg 
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 Acidosis · KELIN, CL containing fluids ·  po bicarb 3. DM 
· Insulin 4. Anemia · Iron def in 2018: needs eval to be repeated · PO Fesoi4 Subjective: CHIEF COMPLAIN:arf HPI: KELIN is slowly improvingbp creeping up Review of Systems A comprehensive review of systems was negative except for that written in the HPI. Past Medical History:  
Diagnosis Date  Anemia  Bronchitis  Chronic kidney disease UTI  Diabetes ([address]  Gastrointestinal disorder   
 anemia  Hypertension  Kidney disease, chronic, stage II (GFR 60-89 ml/min)  Neurological disorder Metabolic Brain Disorder Past Surgical History:  
Procedure Laterality Date  HX OTHER SURGICAL    
 never Social History Socioeconomic History  Marital status:  Spouse name: Not on file  Number of children: Not on file  Years of education: Not on file  Highest education level: Not on file Occupational History  Not on file Social Needs  Financial resource strain: Not on file  Food insecurity Worry: Not on file Inability: Not on file  Transportation needs Medical: Not on file Non-medical: Not on file Tobacco Use  Smoking status: Former Smoker Packs/day: 0.50 Years: 30.00 Pack years: 15.00 Types: Cigarettes Last attempt to quit: 1994 Years since quittin.6  Smokeless tobacco: Never Used Substance and Sexual Activity  Alcohol use: No  
 Drug use: No  
 Sexual activity: Yes  
 Partners: Female Lifestyle  Physical activity Days per week: Not on file Minutes per session: Not on file  Stress: Not on file Relationships  Social connections Talks on phone: Not on file Gets together: Not on file Attends Uatsdin service: Not on file Active member of club or organization: Not on file Attends meetings of clubs or organizations: Not on file Relationship status: Not on file  Intimate partner violence Fear of current or ex partner: Not on file Emotionally abused: Not on file Physically abused: Not on file Forced sexual activity: Not on file Other Topics Concern  Not on file Social History Narrative  Not on file Family History Problem Relation Age of Onset  Diabetes Mother  Cancer Sister Prior to Admission medications Medication Sig Start Date End Date Taking? Authorizing Provider  
sodium bicarbonate 650 mg tablet Take 1 Tab by mouth two (2) times a day. 20  Yes Valeria Holm MD  
isosorbide mononitrate ER (IMDUR) 30 mg tablet Take 30 mg by mouth two (2) times a day. Yes Provider, Historical  
levothyroxine (SYNTHROID) 150 mcg tablet TAKE 1 TABLET BY MOUTH ONCE DAILY BEFORE BREAKFAST 19  Yes Kasie Godoy MD  
hydrALAZINE (APRESOLINE) 25 mg tablet Take 1.5 Tabs by mouth three (3) times daily. 3/17/18  Yes Kermit Turcios MD  
clopidogrel (PLAVIX) 75 mg tab Take 1 Tab by mouth daily. 3/18/18  Yes Kermit Turcios MD  
 
No Known Allergies Objective:  
 
Vitals: 
Blood pressure 171/78, pulse 84, temperature 97.8 °F (36.6 °C), resp. rate 17, height 5' 7\" (1.702 m), weight 116.3 kg (256 lb 6.3 oz), SpO2 97 %. Temp (24hrs), Av.6 °F (36.4 °C), Min:97.4 °F (36.3 °C), Max:97.8 °F (36.6 °C) Intake and Output: 
701 - 1900 In: 480 [P.O.:480] Out: 200 [Urine:200] 1901 -  07 In: -  
Out: 151 [facility] [[id number]] Physical Exam:              
 Patient is intubated:  no 
 
Physical Examination:  
GENERAL ASSESSMENT: well developed and well nourished SKIN: normal color, no lesions HEAD: normocephalic EYES: normal eyes NECK: normal 
CHEST: normal air exchange, no rales, no rhonchi, no wheezes, respiratory effort normal with no retractions HEART: regular rate and rhythm, normal S1/S2, no murmurs ABDOMEN: soft, non-distended, no masses, no hepatosplenomegaly :Silva: no 
EXTREMITY: normal and symmetric movement, normal range of motion, no joint swelling NEURO: gross motor exam normal by observation ECG/rhythm[de-identified] Rev:yes Xray/CT/US/MRI REV:yes Data Review Recent Results (from the past 72 hour(s)) GLUCOSE, POC Collection Time: 08/03/20 11:00 AM  
Result Value Ref Range Glucose (POC) 191 (H) 65 - 100 mg/dL Performed by [facility] ([facility]) GLUCOSE, POC Collection Time: 08/03/20  4:20 PM  
Result Value Ref Range Glucose (POC) 186 (H) 65 - 100 mg/dL Performed by [facility] ([facility]) GLUCOSE, POC Collection Time: 08/03/20  9:23 PM  
Result Value Ref Range Glucose (POC) 120 (H) 65 - 100 mg/dL Performed by Perez Fournier (PCT) CBC WITH AUTOMATED DIFF Collection Time: 08/04/20  6:19 AM  
Result Value Ref Range WBC 8.2 4.1 - 11.1 K/uL  
 RBC 3.30 (L) 4.10 - 5.70 M/uL HGB 8.6 (L) 12.1 - 17.0 g/dL HCT 26.6 (L) 36.6 - 50.3 % MCV 80.6 80.0 - 99.0 FL  
 MCH 26.1 26.0 - 34.0 PG  
 MCHC 32.3 30.0 - 36.5 g/dL  
 RDW 14.5 11.5 - 14.5 % PLATELET 519 (H) 689 - 400 K/uL MPV 10.1 8.9 - 12.9 FL  
 NRBC 0.0 0  WBC ABSOLUTE NRBC 0.00 0.00 - 0.01 K/uL NEUTROPHILS 78 (H) 32 - 75 % LYMPHOCYTES 16 12 - 49 % MONOCYTES 5 5 - 13 % EOSINOPHILS 1 0 - 7 % BASOPHILS 0 0 - 1 % IMMATURE GRANULOCYTES 0 0.0 - 0.5 % ABS. NEUTROPHILS 6.3 1.8 - 8.0 K/UL  
 ABS. LYMPHOCYTES 1.3 0.8 - 3.5 K/UL  
 ABS. MONOCYTES 0.4 0.0 - 1.0 K/UL  
 ABS. EOSINOPHILS 0.1 0.0 - 0.4 K/UL ABS. BASOPHILS 0.0 0.0 - 0.1 K/UL  
 ABS. IMM. GRANS. 0.0 0.00 - 0.04 K/UL  
 DF AUTOMATED METABOLIC PANEL, BASIC Collection Time: 08/04/20  6:19 AM  
Result Value Ref Range Sodium 135 (L) 136 - 145 mmol/L Potassium 3.2 (L) 3.5 - 5.1 mmol/L Chloride 108 97 - 108 mmol/L  
 CO2 18 (L) 21 - 32 mmol/L Anion gap 9 5 - 15 mmol/L Glucose 109 (H) 65 - 100 mg/dL BUN 58 (H) 6 - 20 MG/DL Creatinine 3.40 (H) 0.70 - 1.30 MG/DL  
 BUN/Creatinine ratio 17 12 - 20 GFR est AA 22 (L) >60 ml/min/1.73m2 GFR est non-AA 18 (L) >60 ml/min/1.73m2 Calcium 8.8 8.5 - 10.1 MG/DL  
IMMUNOELECTROPHORESIS (IMMUNOFIX.) Collection Time: 08/04/20  6:19 AM  
Result Value Ref Range Immunofixation, serum PENDING Immunoglobulin G, Qt. 1,849 (H) 603 - 1,613 mg/dL Immunoglobulin A, Qt. 569 (H) 61 - 437 mg/dL Immunoglobulin M, Qt. 65 20 - 172 mg/dL FREE LIGHT CHAINS, KAPPA/LAMBDA, QT Collection Time: 08/04/20  6:19 AM  
Result Value Ref Range Free Kappa Lt Chains, serum 194.9 (H) 3.3 - 19.4 mg/L Free Lambda Lt Chains, serum 140.0 (H) 5.7 - 26.3 mg/L Kappa/Lambda ratio, serum 1.39 0.26 - 1.65 COMPLEMENT, C4 Collection Time: 08/04/20  6:19 AM  
Result Value Ref Range Complement C4 38 14 - 44 mg/dL HEPATITIS C AB Collection Time: 08/04/20  6:19 AM  
Result Value Ref Range Hep C  virus Ab Interp. NONREACTIVE NR Hep C  virus Ab comment Method used is [facility] COMPLEMENT, C3 Collection Time: 08/04/20  6:19 AM  
Result Value Ref Range Complement C3 178 (H) 82 - 167 mg/dL HEP B SURFACE AG Collection Time: 08/04/20  6:19 AM  
Result Value Ref Range Hepatitis B surface Ag <0.10 Index Hep B surface Ag Interp. Negative NEG    
HEP B SURFACE AB Collection Time: 08/04/20  6:19 AM  
Result Value Ref Range Hepatitis B surface Ab <3.10 mIU/mL Hep B surface Ab Interp.  NONREACTIVE NR    
SAMPLES BEING HELD  
 Collection Time: 08/04/20  6:19 AM  
Result Value Ref Range SAMPLES BEING HELD 1PST COMMENT Add-on orders for these samples will be processed based on acceptable specimen integrity and analyte stability, which may vary by analyte. GLUCOSE, POC Collection Time: 08/04/20  7:54 AM  
Result Value Ref Range Glucose (POC) 112 (H) 65 - 100 mg/dL Performed by Carissa Salazar ([facility]) GLUCOSE, POC Collection Time: 08/04/20 11:19 AM  
Result Value Ref Range Glucose (POC) 155 (H) 65 - 100 mg/dL Performed by Carissa Salazar (PCT) SARS-COV-2 Collection Time: 08/04/20  4:00 PM  
Result Value Ref Range COVID-19 PENDING   
GLUCOSE, POC Collection Time: 08/04/20  4:29 PM  
Result Value Ref Range Glucose (POC) 288 (H) 65 - 100 mg/dL Performed by Carissa Salazar ([facility]) GLUCOSE, POC Collection Time: 08/04/20  9:04 PM  
Result Value Ref Range Glucose (POC) 253 (H) 65 - 100 mg/dL Performed by Navin Zacarias ([facility]) IRON PROFILE Collection Time: 08/05/20  5:07 AM  
Result Value Ref Range Iron 24 (L) 35 - 150 ug/dL TIBC 201 (L) 250 - 450 ug/dL Iron % saturation 12 (L) 20 - 50 % CBC WITH AUTOMATED DIFF Collection Time: 08/05/20  5:07 AM  
Result Value Ref Range WBC 10.1 4.1 - 11.1 K/uL  
 RBC 3.43 (L) 4.10 - 5.70 M/uL HGB 9.0 (L) 12.1 - 17.0 g/dL HCT 27.6 (L) 36.6 - 50.3 % MCV 80.5 80.0 - 99.0 FL  
 MCH 26.2 26.0 - 34.0 PG  
 MCHC 32.6 30.0 - 36.5 g/dL  
 RDW 14.6 (H) 11.5 - 14.5 % PLATELET 089 (H) 638 - 400 K/uL MPV 10.0 8.9 - 12.9 FL  
 NRBC 0.0 0  WBC ABSOLUTE NRBC 0.00 0.00 - 0.01 K/uL NEUTROPHILS 89 (H) 32 - 75 % LYMPHOCYTES 6 (L) 12 - 49 % MONOCYTES 4 (L) 5 - 13 % EOSINOPHILS 0 0 - 7 % BASOPHILS 0 0 - 1 % IMMATURE GRANULOCYTES 1 (H) 0.0 - 0.5 % ABS. NEUTROPHILS 9.0 (H) 1.8 - 8.0 K/UL  
 ABS. LYMPHOCYTES 0.6 (L) 0.8 - 3.5 K/UL  
 ABS. MONOCYTES 0.4 0.0 - 1.0 K/UL  
 ABS. EOSINOPHILS 0.0 0.0 - 0.4 K/UL ABS. BASOPHILS 0.0 0.0 - 0.1 K/UL  
 ABS. IMM. GRANS. 0.1 (H) 0.00 - 0.04 K/UL  
 DF SMEAR SCANNED    
 RBC COMMENTS ROULEAUX PRESENT 
    
METABOLIC PANEL, BASIC Collection Time: 08/05/20  5:07 AM  
Result Value Ref Range Sodium 133 (L) 136 - 145 mmol/L Potassium 3.9 3.5 - 5.1 mmol/L Chloride 105 97 - 108 mmol/L  
 CO2 18 (L) 21 - 32 mmol/L Anion gap 10 5 - 15 mmol/L Glucose 175 (H) 65 - 100 mg/dL BUN 54 (H) 6 - 20 MG/DL Creatinine 3.30 (H) 0.70 - 1.30 MG/DL  
 BUN/Creatinine ratio 16 12 - 20 GFR est AA 23 (L) >60 ml/min/1.73m2 GFR est non-AA 19 (L) >60 ml/min/1.73m2 Calcium 9.0 8.5 - 10.1 MG/DL MAGNESIUM Collection Time: 08/05/20  5:07 AM  
Result Value Ref Range Magnesium 2.1 1.6 - 2.4 mg/dL GLUCOSE, POC Collection Time: 08/05/20  7:05 AM  
Result Value Ref Range Glucose (POC) 187 (H) 65 - 100 mg/dL Performed by [employer] (PCT) GLUCOSE, POC Collection Time: 08/05/20 11:40 AM  
Result Value Ref Range Glucose (POC) 227 (H) 65 - 100 mg/dL Performed by [employer] (PCT) SARS-COV-2 Collection Time: 08/05/20 12:09 PM  
Result Value Ref Range Specimen source Nasopharyngeal    
 SARS-CoV-2 Not detected NOTD Specimen source Nasopharyngeal    
 Specimen type NP Swab Health status Symptomatic Testing GLUCOSE, POC Collection Time: 08/05/20  3:59 PM  
Result Value Ref Range Glucose (POC) 227 (H) 65 - 100 mg/dL Performed by [employer] (PCT) GLUCOSE, POC Collection Time: 08/05/20  9:18 PM  
Result Value Ref Range Glucose (POC) 201 (H) 65 - 100 mg/dL Performed by Jesus Alberto [facility] (PCT) GLUCOSE, POC Collection Time: 08/06/20  7:05 AM  
Result Value Ref Range Glucose (POC) 147 (H) 65 - 100 mg/dL Performed by [employer] (PCT) SARS-COV-2 Collection Time: 08/06/20  7:40 AM  
Result Value Ref Range  Specimen source Nasopharyngeal    
 SARS-CoV-2 PENDING   
 Specimen source Nasopharyngeal    
 Specimen type NP Swab Health status Symptomatic Testing METABOLIC PANEL, BASIC Collection Time: 08/06/20  9:24 AM  
Result Value Ref Range Sodium 134 (L) 136 - 145 mmol/L Potassium 3.5 3.5 - 5.1 mmol/L Chloride 107 97 - 108 mmol/L  
 CO2 20 (L) 21 - 32 mmol/L Anion gap 7 5 - 15 mmol/L Glucose 202 (H) 65 - 100 mg/dL BUN 60 (H) 6 - 20 MG/DL Creatinine 3.26 (H) 0.70 - 1.30 MG/DL  
 BUN/Creatinine ratio 18 12 - 20 GFR est AA 23 (L) >60 ml/min/1.73m2 GFR est non-AA 19 (L) >60 ml/min/1.73m2 Calcium 8.9 8.5 - 10.1 MG/DL Discussed with:   
Patient Thank you so much to allow us to participate in this patient's care. We will follow. 
: Melissa Jaramillo MD 
8/6/2020 [facility]: 
[email or web address] office: 
[address], [address] [place], [address] [facility] Phone: [phone] Fax :     [phone] [place] office: 
[address] [facility], [address] Phone - [phone] Fax - [phone]

## 2020-08-06 NOTE — PROGRESS NOTES
Bedside and Verbal shift change report given to [address] [place] [address] (oncoming nurse) by Noe Bosworth (offgoing nurse). Report included the following information SBAR, Kardex, Procedure Summary, Intake/Output, MAR, Recent Results and Med Rec Status.

## 2020-08-06 NOTE — PROGRESS NOTES
Bedside shift change report given to [facility], RN (oncoming nurse) by Alba Turk RN (offgoing nurse). Report included the following information SBAR, Kardex, MAR, Accordion and Recent Results.

## 2020-08-06 NOTE — PROGRESS NOTES
Problem: Mobility Impaired (Adult and Pediatric) Goal: *Acute Goals and Plan of Care (Insert Text) Description:  FUNCTIONAL STATUS PRIOR TO ADMISSION: Patient reports that he was independent living UNTIL past few weeks. Noted increased weakness, inability to to ambulate or perform self care. HOME SUPPORT PRIOR TO ADMISSION: The patient lived with sister and brother in law. Physical Therapy Goals Initiated 8/2/2020 1. Patient will move from supine to sit and sit to supine  in bed with minimal assistance/contact guard assist within 7 day(s). 2.  Patient will transfer from bed to chair and chair to bed with moderate assistance  using the least restrictive device within 7 day(s). 3.  Patient will perform sit to stand with minimal assistance/contact guard assist within 7 day(s). 4.  Patient will ambulate with moderate assistance  for 50 feet with the least restrictive device within 7 day(s). 5.  Patient will ascend/descend 3 stairs with no handrail(s) with moderate assistance  within 7 day(s). Outcome: Progressing Towards Goal 
 PHYSICAL THERAPY TREATMENT Patient: Sara Chang (87 y.o. male) Date: 8/6/2020 Diagnosis: ARF (acute renal failure) ([facility] [address] [N17.9] ARF (acute renal failure) ([facility] [address] Precautions: Fall Chart, physical therapy assessment, plan of care and goals were reviewed. ASSESSMENT Patient continues with skilled PT services and is progressing towards goals. Patient agreeable to session with minor coaxing and able to increase gait distance today with further positive reinforcement and coaxing. Requiring less assistance today, transferred to standing with MIN to MOD A x 1 and completed gait to and from the door x 20' total with antalgic limp but increased weight bearing on RLE compared to prior sessions. Patient educated regarding transition to sitting after gait with lack of control of descent.   Discussed case with rehab team, patient likely will not jimmy to d/c to SNF but has family members at home that may be able to stay with patient upon discharge. With assistance 24/7 of family, patient should be safe to d/c home with HHPT, pending continued progress and ability to clear 3 steps. Current Level of Function Impacting Discharge (mobility/balance): MIN to MOD A x 1 for transfers and gait with RW x 20' Other factors to consider for discharge: family support PLAN : 
Patient continues to benefit from skilled intervention to address the above impairments. Continue treatment per established plan of care. to address goals. Recommendation for discharge: (in order for the patient to meet his/her long term goals) Physical therapy at least 2 days/week in the home with 24/7 assistance vs SNF This discharge recommendation: 
Has been made in collaboration with the attending provider and/or case management IF patient discharges home will need the following DME: to be determined (TBD) SUBJECTIVE:  
Patient stated I'll sit right in this chair and rest. OBJECTIVE DATA SUMMARY:  
Critical Behavior: 
Neurologic State: Alert Orientation Level: Appropriate for age, Oriented X4 Cognition: Follows commands Safety/Judgement: Decreased insight into deficits Functional Mobility Training: 
Bed Mobility: 
  
Supine to Sit: Supervision Transfers: 
Sit to Stand: Assist x1; Moderate assistance Stand to Sit: Moderate assistance;Assist x1 Bed to Chair: Moderate assistance;Assist x1 Balance: 
Sitting: Intact Standing: Impaired; With support Standing - Static: Constant support;Good Standing - Dynamic : Constant support; Junnie Screen Ambulation/Gait Training: 
Distance (ft): 20 Feet (ft) Assistive Device: Gait belt;Walker, rolling Ambulation - Level of Assistance: Minimal assistance;Assist x1 Gait Abnormalities: Antalgic; Step to gait Base of Support: Widened Speed/Mayda: Slow Pain Rating: 
None reported Activity Tolerance:  
Fair and requires rest breaks Please refer to the flowsheet for vital signs taken during this treatment. After treatment patient left in no apparent distress:  
Sitting in chair, Call bell within reach, and Bed / chair alarm activated COMMUNICATION/COLLABORATION:  
The patients plan of care was discussed with: Registered nurse and Case management. Rossana Kam, PT Time Calculation: 24 mins

## 2020-08-07 NOTE — PROGRESS NOTES
8/7/2020   CARE MANAGEMENT NOTE:  EMR reviewed for clinical updates. Pt was admitted with ARF, HTN, DM, leg edema and is s/p right knee injection on 8/4. Reportedly, pt resides with his sister Dava Spatz ([phone]) and her . 
  
RUR 16%; LOS 5 days 
  
Transition Plan of Care: 1. [facility] [place] (skilled nursing, PT/OT) has been arranged. Pt must have f/u PCP before [facility] visit. 2.  Per PT, pt failed trial with steps this afternoon so pt's discharge was canceled. PT will re-eval pt on Saturday. Also a w/c order was received and sent to [employer] for processing. If eligible for approval thru insurance the w/c will NOT be delivered until Monday and it will go to pt's house. 3.  CM supervisor Edie Werner) has approved hospital payment for a w/c Adonis Donna transport to pt's home if PT deems pt unsafe to be transported via car. He has 3 steps to enter his home. 
  
CM will continue to follow pt until discharged. Mj

## 2020-08-07 NOTE — PROGRESS NOTES
Bedside and Verbal shift change report given to [place] (oncoming nurse) by Korin Vincent (offgoing nurse). Report included the following information SBAR, Kardex, Intake/Output, MAR and Recent Results.

## 2020-08-07 NOTE — PROGRESS NOTES
[address] [facility] Jelena Little YOB: 1950 Assessment & Plan: 1. KELIN · Cr better at 3.17 mg · KELIN thought to be due to IVVD,Entresto · No HD needed but poor insight in his care will cause worsening GFR faster · CKD: Cr 2 mg in 2018:serologies sent,c3,c4,hep b ok · HE WILL NEED CKD Visit on dc · CKD ,Proteinurian and HTN:bp is worsneing · Increase  Losartan 2. Acidosis · KELIN, CL containing fluids ·  po bicarb 3. DM 
· Insulin 4. Anemia · Iron def in 2018: needs eval to be repeated · PO FeSO4 We will see PRN over the weekend. Please feel free to call with any ?s. Subjective: CHIEF COMPLAIN:arf HPI: KELIN cr better HTN not the best 
Anemia+ Review of Systems A comprehensive review of systems was negative except for that written in the HPI. Past Medical History:  
Diagnosis Date  Anemia  Bronchitis  Chronic kidney disease UTI  Diabetes ([facility] [address]  Gastrointestinal disorder   
 anemia  Hypertension  Kidney disease, chronic, stage II (GFR 60-89 ml/min)  Neurological disorder Metabolic Brain Disorder Past Surgical History:  
Procedure Laterality Date  HX OTHER SURGICAL    
 never Social History Socioeconomic History  Marital status:  Spouse name: Not on file  Number of children: Not on file  Years of education: Not on file  Highest education level: Not on file Occupational History  Not on file Social Needs  Financial resource strain: Not on file  Food insecurity Worry: Not on file Inability: Not on file  Transportation needs Medical: Not on file Non-medical: Not on file Tobacco Use  Smoking status: Former Smoker Packs/day: 0.50 Years: 30.00 Pack years: 15.00 Types: Cigarettes Last attempt to quit: 1994 Years since quittin.6  Smokeless tobacco: Never Used Substance and Sexual Activity  Alcohol use: No  
 Drug use: No  
 Sexual activity: Yes  
  Partners: Female Lifestyle  Physical activity Days per week: Not on file Minutes per session: Not on file  Stress: Not on file Relationships  Social connections Talks on phone: Not on file Gets together: Not on file Attends Taoism service: Not on file Active member of club or organization: Not on file Attends meetings of clubs or organizations: Not on file Relationship status: Not on file  Intimate partner violence Fear of current or ex partner: Not on file Emotionally abused: Not on file Physically abused: Not on file Forced sexual activity: Not on file Other Topics Concern  Not on file Social History Narrative  Not on file Family History Problem Relation Age of Onset  Diabetes Mother  Cancer Sister Prior to Admission medications Medication Sig Start Date End Date Taking? Authorizing Provider  
ferrous sulfate 325 mg (65 mg iron) tablet Take 1 Tab by mouth two (2) times daily (with meals). 8/7/20  Yes Rufus Skinner MD  
hydrALAZINE (APRESOLINE) 50 mg tablet Take 1 Tab by mouth three (3) times daily. 8/7/20  Yes Rufus Skinner MD  
losartan (COZAAR) 25 mg tablet Take 1 Tab by mouth daily. 8/8/20  Yes Rufus Skinner MD  
Blood Glucose Strip-Disp Meter kit Check blood glucose twice per day 8/7/20  Yes Rufus Skinner MD  
glimepiride (AMARYL) 2 mg tablet Take 1 Tab by mouth every morning. IMPORTANT: Do not take if blood sugar is less than 120. Always check your blood sugar. 8/7/20  Yes Rufus Skinner MD  
alogliptin (NESINA) 6.25 mg tablet Take 1 Tab by mouth daily. 8/7/20  Yes Rufus Skinner MD  
sodium bicarbonate 650 mg tablet Take 1 Tab by mouth two (2) times a day. 8/4/20  Yes Matt Linton MD  
isosorbide mononitrate ER (IMDUR) 30 mg tablet Take 30 mg by mouth two (2) times a day.    Yes Provider, Historical  
 levothyroxine (SYNTHROID) 150 mcg tablet TAKE 1 TABLET BY MOUTH ONCE DAILY BEFORE BREAKFAST 19  Yes Estela Maya MD  
clopidogrel (PLAVIX) 75 mg tab Take 1 Tab by mouth daily. 3/18/18  Yes Maame Turcios MD  
 
No Known Allergies Objective:  
 
Vitals: 
Blood pressure 164/79, pulse 79, temperature 98.1 °F (36.7 °C), resp. rate 18, height 5' 7\" (1.702 m), weight 116.3 kg (256 lb 6.3 oz), SpO2 100 %. Temp (24hrs), Av.8 °F (36.6 °C), Min:97.4 °F (36.3 °C), Max:98.1 °F (36.7 °C) Intake and Output: 
701 - 1900 In: -  
Out: 200 [Urine:200] 1901 - 700 In: 480 [P.O.:480] Out: 1625 [[id number]] Physical Exam:              
 Patient is intubated:  no 
 
Physical Examination:  
GENERAL ASSESSMENT: well developed and well nourished SKIN: normal color, no lesions HEAD: normocephalic EYES: normal eyes NECK: normal 
CHEST: normal air exchange, no rales, no rhonchi, no wheezes, respiratory effort normal with no retractions HEART: regular rate and rhythm, normal S1/S2, no murmurs ABDOMEN: soft, non-distended, no masses, no hepatosplenomegaly :Silva: no 
EXTREMITY: normal and symmetric movement, normal range of motion, no joint swelling NEURO: gross motor exam normal by observation ECG/rhythm[de-identified] Rev:yes Xray/CT/US/MRI REV:yes Data Review Recent Results (from the past 72 hour(s)) GLUCOSE, POC Collection Time: 20 11:19 AM  
Result Value Ref Range Glucose (POC) 155 (H) 65 - 100 mg/dL Performed by Shanae Lr (PCT) SARS-COV-2 Collection Time: 20  4:00 PM  
Result Value Ref Range COVID-19 PENDING   
GLUCOSE, POC Collection Time: 20  4:29 PM  
Result Value Ref Range Glucose (POC) 288 (H) 65 - 100 mg/dL Performed by Shanae Lr (PCT) GLUCOSE, POC Collection Time: 20  9:04 PM  
Result Value Ref Range Glucose (POC) 253 (H) 65 - 100 mg/dL Performed by Soraya Delacruz (PCT) IRON PROFILE  
 Collection Time: 08/05/20  5:07 AM  
Result Value Ref Range Iron 24 (L) 35 - 150 ug/dL TIBC 201 (L) 250 - 450 ug/dL Iron % saturation 12 (L) 20 - 50 % CBC WITH AUTOMATED DIFF Collection Time: 08/05/20  5:07 AM  
Result Value Ref Range WBC 10.1 4.1 - 11.1 K/uL  
 RBC 3.43 (L) 4.10 - 5.70 M/uL HGB 9.0 (L) 12.1 - 17.0 g/dL HCT 27.6 (L) 36.6 - 50.3 % MCV 80.5 80.0 - 99.0 FL  
 MCH 26.2 26.0 - 34.0 PG  
 MCHC 32.6 30.0 - 36.5 g/dL  
 RDW 14.6 (H) 11.5 - 14.5 % PLATELET 973 (H) 686 - 400 K/uL MPV 10.0 8.9 - 12.9 FL  
 NRBC 0.0 0  WBC ABSOLUTE NRBC 0.00 0.00 - 0.01 K/uL NEUTROPHILS 89 (H) 32 - 75 % LYMPHOCYTES 6 (L) 12 - 49 % MONOCYTES 4 (L) 5 - 13 % EOSINOPHILS 0 0 - 7 % BASOPHILS 0 0 - 1 % IMMATURE GRANULOCYTES 1 (H) 0.0 - 0.5 % ABS. NEUTROPHILS 9.0 (H) 1.8 - 8.0 K/UL  
 ABS. LYMPHOCYTES 0.6 (L) 0.8 - 3.5 K/UL  
 ABS. MONOCYTES 0.4 0.0 - 1.0 K/UL  
 ABS. EOSINOPHILS 0.0 0.0 - 0.4 K/UL  
 ABS. BASOPHILS 0.0 0.0 - 0.1 K/UL  
 ABS. IMM. GRANS. 0.1 (H) 0.00 - 0.04 K/UL  
 DF SMEAR SCANNED    
 RBC COMMENTS ROULEAUX PRESENT 
    
METABOLIC PANEL, BASIC Collection Time: 08/05/20  5:07 AM  
Result Value Ref Range Sodium 133 (L) 136 - 145 mmol/L Potassium 3.9 3.5 - 5.1 mmol/L Chloride 105 97 - 108 mmol/L  
 CO2 18 (L) 21 - 32 mmol/L Anion gap 10 5 - 15 mmol/L Glucose 175 (H) 65 - 100 mg/dL BUN 54 (H) 6 - 20 MG/DL Creatinine 3.30 (H) 0.70 - 1.30 MG/DL  
 BUN/Creatinine ratio 16 12 - 20 GFR est AA 23 (L) >60 ml/min/1.73m2 GFR est non-AA 19 (L) >60 ml/min/1.73m2 Calcium 9.0 8.5 - 10.1 MG/DL MAGNESIUM Collection Time: 08/05/20  5:07 AM  
Result Value Ref Range Magnesium 2.1 1.6 - 2.4 mg/dL GLUCOSE, POC Collection Time: 08/05/20  7:05 AM  
Result Value Ref Range Glucose (POC) 187 (H) 65 - 100 mg/dL Performed by Guillermo Alejandro (PCT) GLUCOSE, POC Collection Time: 08/05/20 11:40 AM  
Result Value Ref Range Glucose (POC) 227 (H) 65 - 100 mg/dL Performed by [employer] (PCT) SARS-COV-2 Collection Time: 08/05/20 12:09 PM  
Result Value Ref Range Specimen source Nasopharyngeal    
 SARS-CoV-2 Not detected NOTD Specimen source Nasopharyngeal    
 Specimen type NP Swab Health status Symptomatic Testing GLUCOSE, POC Collection Time: 08/05/20  3:59 PM  
Result Value Ref Range Glucose (POC) 227 (H) 65 - 100 mg/dL Performed by [employer] ([facility]) GLUCOSE, POC Collection Time: 08/05/20  9:18 PM  
Result Value Ref Range Glucose (POC) 201 (H) 65 - 100 mg/dL Performed by Amena Lisa ([facility]) GLUCOSE, POC Collection Time: 08/06/20  7:05 AM  
Result Value Ref Range Glucose (POC) 147 (H) 65 - 100 mg/dL Performed by Carmina Angeles (PCT) SARS-COV-2 Collection Time: 08/06/20  7:40 AM  
Result Value Ref Range Specimen source Nasopharyngeal    
 SARS-CoV-2 Not detected NOTD Specimen source Nasopharyngeal    
 Specimen type NP Swab Health status Symptomatic Testing METABOLIC PANEL, BASIC Collection Time: 08/06/20  9:24 AM  
Result Value Ref Range Sodium 134 (L) 136 - 145 mmol/L Potassium 3.5 3.5 - 5.1 mmol/L Chloride 107 97 - 108 mmol/L  
 CO2 20 (L) 21 - 32 mmol/L Anion gap 7 5 - 15 mmol/L Glucose 202 (H) 65 - 100 mg/dL BUN 60 (H) 6 - 20 MG/DL Creatinine 3.26 (H) 0.70 - 1.30 MG/DL  
 BUN/Creatinine ratio 18 12 - 20 GFR est AA 23 (L) >60 ml/min/1.73m2 GFR est non-AA 19 (L) >60 ml/min/1.73m2 Calcium 8.9 8.5 - 10.1 MG/DL  
GLUCOSE, POC Collection Time: 08/06/20 11:49 AM  
Result Value Ref Range Glucose (POC) 154 (H) 65 - 100 mg/dL Performed by Shannan Akhtar ([facility]) GLUCOSE, POC Collection Time: 08/06/20  4:16 PM  
Result Value Ref Range Glucose (POC) 127 (H) 65 - 100 mg/dL Performed by Shannan Akhtar ([facility]) GLUCOSE, POC Collection Time: 08/06/20  9:13 PM  
Result Value Ref Range Glucose (POC) 149 (H) 65 - 100 mg/dL Performed by Evonne Stone (PCT) FERRITIN Collection Time: 08/07/20  1:42 AM  
Result Value Ref Range Ferritin 191 26 - 388 NG/ML  
FOLATE Collection Time: 08/07/20  1:42 AM  
Result Value Ref Range Folate 4.4 (L) 5.0 - 21.0 ng/mL VITAMIN B12 Collection Time: 08/07/20  1:42 AM  
Result Value Ref Range Vitamin B12 447 193 - 986 pg/mL IRON PROFILE Collection Time: 08/07/20  1:42 AM  
Result Value Ref Range Iron 55 35 - 150 ug/dL TIBC 194 (L) 250 - 450 ug/dL Iron % saturation 28 20 - 50 % CBC W/O DIFF Collection Time: 08/07/20  1:42 AM  
Result Value Ref Range WBC 10.2 4.1 - 11.1 K/uL  
 RBC 3.31 (L) 4.10 - 5.70 M/uL HGB 8.6 (L) 12.1 - 17.0 g/dL HCT 26.7 (L) 36.6 - 50.3 % MCV 80.7 80.0 - 99.0 FL  
 MCH 26.0 26.0 - 34.0 PG  
 MCHC 32.2 30.0 - 36.5 g/dL  
 RDW 14.8 (H) 11.5 - 14.5 % PLATELET 699 (H) 438 - 400 K/uL MPV 9.6 8.9 - 12.9 FL  
 NRBC 0.0 0  WBC ABSOLUTE NRBC 0.00 0.00 - 0.01 K/uL METABOLIC PANEL, COMPREHENSIVE Collection Time: 08/07/20  1:42 AM  
Result Value Ref Range Sodium 134 (L) 136 - 145 mmol/L Potassium 3.3 (L) 3.5 - 5.1 mmol/L Chloride 107 97 - 108 mmol/L  
 CO2 19 (L) 21 - 32 mmol/L Anion gap 8 5 - 15 mmol/L Glucose 122 (H) 65 - 100 mg/dL BUN 59 (H) 6 - 20 MG/DL Creatinine 3.17 (H) 0.70 - 1.30 MG/DL  
 BUN/Creatinine ratio 19 12 - 20 GFR est AA 24 (L) >60 ml/min/1.73m2 GFR est non-AA 20 (L) >60 ml/min/1.73m2 Calcium 8.7 8.5 - 10.1 MG/DL Bilirubin, total 0.3 0.2 - 1.0 MG/DL  
 ALT (SGPT) 18 12 - 78 U/L  
 AST (SGOT) 10 (L) 15 - 37 U/L Alk. phosphatase 87 45 - 117 U/L Protein, total 8.2 6.4 - 8.2 g/dL Albumin 2.3 (L) 3.5 - 5.0 g/dL Globulin 5.9 (H) 2.0 - 4.0 g/dL A-G Ratio 0.4 (L) 1.1 - 2.2 MAGNESIUM Collection Time: 08/07/20  1:42 AM  
Result Value Ref Range Magnesium 1.9 1.6 - 2.4 mg/dL PHOSPHORUS  
 Collection Time: 08/07/20  1:42 AM  
Result Value Ref Range Phosphorus 3.9 2.6 - 4.7 MG/DL  
SAMPLES BEING HELD Collection Time: 08/07/20  1:42 AM  
Result Value Ref Range SAMPLES BEING HELD 1PST COMMENT Add-on orders for these samples will be processed based on acceptable specimen integrity and analyte stability, which may vary by analyte. GLUCOSE, POC Collection Time: 08/07/20  6:44 AM  
Result Value Ref Range Glucose (POC) 119 (H) 65 - 100 mg/dL Performed by Nina Hoffman Discussed with:   
Patient Thank you so much to allow us to participate in this patient's care. We will follow. 
: Demetrius Hyde MD 
8/7/2020 [facility]: 
[email or web address] Shoshana Mckeon office: 
[address], [address] Phone: [phone] Fax :     [phone] [place] office: 
[address] [facility], [facility] [address] [facility] Phone - [phone] Fax - [phone]

## 2020-08-07 NOTE — PROGRESS NOTES
[facility] [place] [address] 
[address] [facility], [address] [place] [address] N[place] 
[phone] Medical Progress Note NAME: Katy Oates :  1950 MRM:  [id number] Date/Time: 2020 Assessment / Plan:  
 
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 Likely at baseline now. Cont bicarb for NAGMA. Follow BMP. Outpatient follow up with nephrology. Advised compliance. HTN: uncontrolled due to noncompliance. Family at bedside noted pt does not take meds as he should. Continue hydralazine, ISMN. Losartan added by nephrology DM type 2 causing CKD stage 3: cont NPH, SSI. A1c showed poor control at 8.9%. It is unlikely pt will be compliant with insulin so likely will need to DC on oral regimen Weakness: generalized. CT head showed no acute process. Evaluated by PT, recommending physical therapy at least 2 days/week in the [facility]  assistance vs SNF. Will d/w CM and family to ensure safe discharge plan Leg edema, right: no DVT. R knee x-ray did show an effusion and OA. s/p knee injection by ortho. Monitor  
  
Severe obesity: Body mass index is 40.16 kg/m². Would benefit from weight loss and dietary / lifestyle modifications Total time spent: 35 minutes Time spent in the care of this patient including reviewing records, discussing with nursing and/or other providers on the treatment team, obtaining history and examining the patient, and discussing treatment plans. Care Plan discussed with: Patient, Nursing Staff and >50% of time spent in counseling and coordination of care Discussed:  Care Plan and D/C Planning Prophylaxis:  Hep SQ Disposition:  Home w/Family and HH PT, OT, RN Subjective: Chief Complaint:  Follow up weakness Chart/notes/labs/studies reviewed, patient examined. Feels ok. No fever. Denies CP, SOB Objective:  
 
 
Vitals: Last 24hrs VS reviewed since prior progress note. Most recent are: 
 
Visit Vitals BP (!) 167/93 (BP 1 Location: Right arm, BP Patient Position: At rest) Pulse 71 Temp 97.4 °F (36.3 °C) Resp 18 Ht 5' 7\" (1.702 m) Wt 116.3 kg (256 lb 6.3 oz) SpO2 100% BMI 40.16 kg/m² SpO2 Readings from Last 6 Encounters:  
08/06/20 100% 05/22/18 97% 04/24/18 100% 04/16/18 98% 03/30/18 99% 03/25/18 95% Intake/Output Summary (Last 24 hours) at 8/6/2020 2156 Last data filed at 8/6/2020 2040 Gross per 24 hour Intake 480 ml Output 1425 ml Net -945 ml Exam:  
 
Physical Exam: 
 
Gen: obese, elderly, chronically ill-appearing. HEENT:  Sclerae nonicteric, hearing intact to voice, mucous membranes moist 
Neck:  Supple, without masses. Resp:  No accessory muscle use, CTAB without wheezes, rales, or rhonchi 
Card: RRR, without m/r/g. LE edema. Abd:  +bowel sounds, soft, NTTP, obese but nondistended. No HSM. Neuro: Face symmetric, tongue midline, speech fluent, follows commands appropriately Psych:  Alert, oriented x 3. Fair insight Medications Reviewed: (see below) Lab Data Reviewed: (see below) 
 
______________________________________________________________________ Medications:  
 
Current Facility-Administered Medications Medication Dose Route Frequency  losartan (COZAAR) tablet 25 mg  25 mg Oral DAILY  insulin NPH (NOVOLIN N, HUMULIN N) injection 10 Units  10 Units SubCUTAneous ACB&D  
 ferrous sulfate tablet 325 mg  1 Tab Oral BID WITH MEALS  
 hydrALAZINE (APRESOLINE) tablet 50 mg  50 mg Oral TID  hydrALAZINE (APRESOLINE) 20 mg/mL injection 10 mg  10 mg IntraVENous Q6H PRN  
 sodium bicarbonate tablet 650 mg  650 mg Oral BID  lidocaine 4 % patch 1 Patch  1 Patch TransDERmal Q24H  
 insulin lispro (HUMALOG) injection   SubCUTAneous AC&HS  
 glucose chewable tablet 16 g  4 Tab Oral PRN  
  dextrose (D50W) injection syrg 12.5-25 g  25-50 mL IntraVENous PRN  
 glucagon (GLUCAGEN) injection 1 mg  1 mg IntraMUSCular PRN  
 sodium chloride (NS) flush 5-40 mL  5-40 mL IntraVENous Q8H  
 sodium chloride (NS) flush 5-40 mL  5-40 mL IntraVENous PRN  
 acetaminophen (TYLENOL) tablet 650 mg  650 mg Oral Q6H PRN Or  
 acetaminophen (TYLENOL) suppository 650 mg  650 mg Rectal Q6H PRN  polyethylene glycol (MIRALAX) packet 17 g  17 g Oral DAILY PRN  promethazine (PHENERGAN) tablet 12.5 mg  12.5 mg Oral Q6H PRN Or  
 ondansetron (ZOFRAN) injection 4 mg  4 mg IntraVENous Q6H PRN  
 heparin (porcine) injection 5,000 Units  5,000 Units SubCUTAneous Q8H  
 clopidogreL (PLAVIX) tablet 75 mg  75 mg Oral DAILY  isosorbide mononitrate ER (IMDUR) tablet 30 mg  30 mg Oral DAILY  levothyroxine (SYNTHROID) tablet 150 mcg  150 mcg Oral 6am  
  
 
 
 
Lab Review:  
 
Recent Labs 08/05/20 
0507 08/04/20 
3366 WBC 10.1 8.2 HGB 9.0* 8.6* HCT 27.6* 26.6*  
* 446* Recent Labs 08/06/20 
2395 08/05/20 
0507 08/04/20 
9467 * 133* 135* K 3.5 3.9 3.2*  
 105 108 CO2 20* 18* 18* * 175* 109* BUN 60* 54* 58* CREA 3.26* 3.30* 3.40* CA 8.9 9.0 8.8 MG  --  2.1  -- No components found for: [place] No results for input(s): PH, PCO2, PO2, HCO3, FIO2 in the last 72 hours. No results for input(s): INR, INREXT in the last 72 hours. No results found for: SDES Lab Results Component Value Date/Time Culture result: NO GROWTH 6 DAYS 03/12/2018 07:12 PM  
 Culture result: NO GROWTH 6 DAYS 07/20/2014 11:55 AM  
 Culture result: ESCHERICHIA COLI 06/23/2014 02:25 PM  
 
        
___________________________________________________ Attending Physician: Rowan Siddiqui MD

## 2020-08-07 NOTE — PROGRESS NOTES
Problem: Mobility Impaired (Adult and Pediatric) Goal: *Acute Goals and Plan of Care (Insert Text) Description:  FUNCTIONAL STATUS PRIOR TO ADMISSION: Patient reports that he was independent living UNTIL past few weeks. Noted increased weakness, inability to to ambulate or perform self care. HOME SUPPORT PRIOR TO ADMISSION: The patient lived with sister and brother in law. Physical Therapy Goals Initiated 8/2/2020 1. Patient will move from supine to sit and sit to supine  in bed with minimal assistance/contact guard assist within 7 day(s). 2.  Patient will transfer from bed to chair and chair to bed with moderate assistance  using the least restrictive device within 7 day(s). 3.  Patient will perform sit to stand with minimal assistance/contact guard assist within 7 day(s). 4.  Patient will ambulate with moderate assistance  for 50 feet with the least restrictive device within 7 day(s). 5.  Patient will ascend/descend 3 stairs with no handrail(s) with moderate assistance  within 7 day(s). Outcome: Progressing Towards Goal 
 
PHYSICAL THERAPY TREATMENT Patient: Ada Zamorano (67 y.o. male) Date: 8/7/2020 Diagnosis: ARF (acute renal failure) ([facility] [address] [N17.9] ARF (acute renal failure) ([facility] [address] Precautions: Fall Chart, physical therapy assessment, plan of care and goals were reviewed. ASSESSMENT Patient continues with skilled PT services and is progressing towards goals very slowly. He demonstrates very unsafe transfer and gait techniques requiring maximum, repeated cueing for appropriate performance. Stair negotiation attempted on 1 stair without rails. Pt requiring cues for techniques, occasionally attempts performance improperly despite cues, and requires maximum assistance x 2 for safe completion of 1 step without rails. He is at very high risk of falls.  If discharged to private residence pt will require a wheelchair to negotiate household distances and to negotiate stairs with assistance x 2 people. Recommend 24-hour assistance and HHPT follow up. Discussed recommendations with RN, care manager, rehab manager, Dr. Sheela Cardona and pt's caregiver. Current Level of Function Impacting Discharge (mobility/balance): up to maximum assistance for stair negotiation Other factors to consider for discharge: none additional 
    
 
PLAN : 
Patient continues to benefit from skilled intervention to address the above impairments. Continue treatment per established plan of care. to address goals. Recommendation for discharge: (in order for the patient to meet his/her long term goals) Therapy up to 5 days/week in SNF setting, though SNF placement not feasible per care management. Next best recommendation is home with HHPT and 24-hour assistance. This discharge recommendation: 
Has been made in collaboration with the attending provider and/or case management IF patient discharges home will need the following DME: wheelchair. Already owns RW per chart. SUBJECTIVE:  
Patient stated My leg hurts.  Pt received seated edge of bed, agreeable to PT and cleared by RN. OBJECTIVE DATA SUMMARY:  
Critical Behavior: 
Neurologic State: Alert, Eyes open spontaneously Orientation Level: Oriented X4 Cognition: Follows commands Safety/Judgement: Decreased insight into deficits Functional Mobility Training: 
 
  
  
  
  
  
Transfers: 
Sit to Stand: (min to mod x 1-2; cues for safety, weight bearing, technique); requires 4 attempts for successful stand first trial. 
Stand to Sit: Moderate assistance;Maximum assistance;Assist x2; uncontrolled descent to chair surface. Balance: 
Sitting: Without support Sitting - Static: Good (unsupported) Sitting - Dynamic: Good (unsupported) Standing: With support Standing - Static: (good to fair) Standing - Dynamic : Fair Ambulation/Gait Training: 
Distance (ft): 10 Feet (ft) Assistive Device: Walker, rolling;Gait belt Ambulation - Level of Assistance: Minimal assistance; Additional time;Assist x2 Gait Abnormalities: Antalgic;Decreased step clearance; Step to gait Base of Support: Widened Speed/Mayda: Pace decreased (<100 feet/min) Stairs: 
Number of Stairs Trained: 1 Stairs - Level of Assistance: Maximum assistance;Assist X2;Total assistance(max cueing for safety, techniques) Pt repeatedly attempting improper sequencing, insufficient strength to rise up step without maximum assistance x 2, demonstrates increased lateral sway and requires max cueing to obtain static posture once up step. Pain Rating: 
States severe pain with weight bearing, improved with rest. 
 
Activity Tolerance:  
requires rest breaks Please refer to the flowsheet for vital signs taken during this treatment. After treatment patient left in no apparent distress:  
Sitting in chair, Call bell within reach, and Bed / chair alarm activated; INIGUEZ present COMMUNICATION/COLLABORATION:  
The patients plan of care was discussed with: Registered nurse, Case management, Rehabilitation technician, and MD, rehab manager . Arielle Prakash, PT, DPT Time Calculation: 29 mins

## 2020-08-07 NOTE — PROGRESS NOTES
Due to this patient's medical diagnosis of generalized weakness and R knee effusinon the patient requires a manual wheelchair for home and community mobility. The patient is unable to ambulate safely and into home without considerable assistance using with a standard walker, rolling walker, crutches, or cane. The patient will require assistance in propelling a standard wheelchair thus a lightweight wheelchair is required. The patient requires a foam cushion in order to maintain skin integrity and avoid wounds.  
 
Dao Cervantes, PT, DPT, Xiang Marcelino

## 2020-08-07 NOTE — PROGRESS NOTES
Problem: Self Care Deficits Care Plan (Adult) Goal: *Acute Goals and Plan of Care (Insert Text) Description:  FUNCTIONAL STATUS PRIOR TO ADMISSION: Patient reports that he was independent with self care and mobility until the past few weeks. Noted increased weakness, inability to to ambulate or perform self care. HOME SUPPORT PRIOR TO ADMISSION: The patient lived with sister and brother in law. Occupational Therapy Goals Initiated 8/3/2020 1. Patient will perform grooming with modified independence within 7 day(s). 2.  Patient will perform upper body dressing and bathing with modified independence within 7 day(s). 3.  Patient will perform lower body dressing and bathing with moderate assistance  within 7 day(s). 4.  Patient will perform toilet transfers to [facility] with moderate assistance  within 7 day(s). 5.  Patient will perform all aspects of toileting with moderate assistance  within 7 day(s). 6.  Patient will participate in upper extremity therapeutic exercise/activities with supervision/set-up for 10 minutes within 7 day(s). 7.  Patient will utilize energy conservation techniques during functional activities with verbal cues within 7 day(s). Outcome: Progressing Towards Goal 
 OCCUPATIONAL THERAPY TREATMENT Patient: Ada Zamorano (80 y.o. male) Date: 8/7/2020 Diagnosis: ARF (acute renal failure) ([facility] [address] [N17.9] ARF (acute renal failure) ([facility] [address] Precautions: Fall Chart, occupational therapy assessment, plan of care, and goals were reviewed. ASSESSMENT Patient continues with skilled OT services and is progressing towards goals. Pt set-up for grooming seated in chair. He needs moderate assist x 2 for functional mobility and to [facility]. Min assist UB bathe and dress seated in chair. Current Level of Function Impacting Discharge (ADLs): Moderate assist x 2 transfer to [facility], min assist UB bathe and dress Other factors to consider for discharge: PLAN : 
 Patient continues to benefit from skilled intervention to address the above impairments. Continue treatment per established plan of care. to address goals. Recommend with staff: Out of bed for meals/activity Recommend next OT session: Cont towards goals Recommendation for discharge: (in order for the patient to meet his/her long term goals) Occupational therapy at least 2 days/week in the home AND ensure assist and/or supervision for safety with ADL's This discharge recommendation: 
Has not yet been discussed the attending provider and/or case management IF patient discharges home will need the following DME:   
 
 
SUBJECTIVE:  
Patient stated Benji Mazariegos OBJECTIVE DATA SUMMARY:  
Cognitive/Behavioral Status: 
  
Orientation Level: Oriented X4 Functional Mobility and Transfers for ADLs: 
Bed Mobility: Not tested Transfers: Moderate assist x2 Balance:Intact sitting balance ADL Intervention: 
  
 
Grooming Brushing Teeth: Set-up UB bathe and dress min assist 
 
  
 
  
Activity Tolerance:  
Fair Please refer to the flowsheet for vital signs taken during this treatment. After treatment patient left in no apparent distress:  
Sitting in chair COMMUNICATION/COLLABORATION:  
The patients plan of care was discussed with: Occupational therapist.  
 
ANGELO Brooks Time Calculation: 10 mins

## 2020-08-07 NOTE — PROGRESS NOTES
Bedside and Verbal shift change report given to Janie Tan RN (oncoming nurse) by Marko Gomez RN (offgoing nurse). Report included the following information SBAR, Kardex and ED Summary.

## 2020-08-07 NOTE — PROGRESS NOTES
Per MD, pt's discharge is cancelled d/t inability to climb steps without max assist. MD stated that pt does not need a new IV, not does he have to be back on tele. Will continue to monitor.

## 2020-08-07 NOTE — DIABETES MGMT
Diabetes CNS provided instruction to patient on how to use glucose monitor at Dr. Seymour Shaw request.  Patient provided instruction on finger stick and using meter to check BG. Patient provided return demonstration. MARCOS Oropeza [facility] Access via [employer]

## 2020-08-07 NOTE — DIABETES MGMT
[address] INITIAL NOTE Presentation Manuel Hammonds is a 78 y.o. male admitted for generalized weakness. Patient is unreliable historian given metabolic brain disorder history. Past Medical History:  
Diagnosis Date  Anemia  Bronchitis  Chronic kidney disease UTI  Diabetes ([address]  Gastrointestinal disorder   
 anemia  Hypertension  Kidney disease, chronic, stage II (GFR 60-89 ml/min)  Neurological disorder Metabolic Brain Disorder Current clinical course has been uncomplicated. Diabetes: Patient has known Type 2 diabetes. Patient doesn't recall when he was diagnosed. Patient unsure of whether he has had diabetes medications at home, per chart review no diabetes medications listed in PTA medications. A1c 8/2/20: 8.9% Consulted by Provider for advanced diabetes nursing assessment and care, specifically related to  
[x] Medication recommendations for oral medications for discharge Diabetes-related medical history Acute complications No documentation of acute complications, patient unreliable with history Neurological complications No documentation of acute complications, patient  unreliable with history Microvascular disease Nephropathy Macrovascular disease No documentation of acute complications, patient  unreliable with history Other associated conditions No documentation of acute complications, patient  unreliable with history Diabetes medication history No diabetes medications listed in chart, patient unreliable with providing medication/medical history. Subjective I am okay.  Patient reports the following home diabetes self-care practices: 
Eating pattern 
[x] Breakfast cornbeef hash and toast 
[x] Lunch  [employer] \"couple double cheeseburgers, fries and soda\" [x] Dinner  Meat and potatoes [x] Snacks chips [x] Beverages Orange soda, juice Physical activity pattern Patient reports that he tries to go on walks daily. Monitoring pattern Patient reports \"my cousin checks it whenever she is available\" Patient is not consistently monitoring BG's at home. Taking medications pattern Per chart review, patient is non-adherent with medical treatment at home. No documentation of diabetes medications in chart, patient unable to tell this CNS if he takes diabetes medications at home. Social determinants of health impacting diabetes self-management practices Patient doesn't express any issues or concerns. This CNS has concerns about patient's ability to manage his diabetes at home regarding diet, BG checks and taking medications as prescribed. Objective Physical exam 
General Alert, oriented and in no acute distress. Conversant and cooperative. Vital Signs Visit Vitals /79 (BP 1 Location: Right arm, BP Patient Position: At rest) Pulse 79 Temp 98.1 °F (36.7 °C) Resp 18 Ht 5' 7\" (1.702 m) Wt 116.3 kg (256 lb 6.3 oz) SpO2 100% BMI 40.16 kg/m² Skin  Warm and dry. No Acanthosis noted along neckline. No lipohypertrophy or lipoatrophy noted at injection sites Heart   Regular rate and rhythm. No murmurs, rubs or gallops Lungs  Clear to auscultation without rales or rhonchi Extremities No foot wounds, skin dry and flaking on both feet, no open wounds. Diabetic foot exam: 
  Left Foot Visual Exam: normal  
 Pulse DP: 1+ (weak) Filament test: reduced sensation Right Foot Visual Exam: normal  
 Pulse DP: 1+ (weak) Filament test: reduced sensation Laboratory Lab Results Component Value Date/Time Hemoglobin A1c 8.9 (H) 08/02/2020 03:30 AM  
 Hemoglobin A1c (POC) 5.5 10/06/2014 09:45 AM  
 
Lab Results Component Value Date/Time LDL, calculated 157 (H) 02/20/2018 11:17 AM  
 
Lab Results Component Value Date/Time  Creatinine (POC) 1.5 (H) 02/11/2010 07:13 PM  
 Creatinine 3.17 (H) 08/07/2020 01:42 AM  
 
Lab Results Component Value Date/Time Sodium 134 (L) 08/07/2020 01:42 AM  
 Potassium 3.3 (L) 08/07/2020 01:42 AM  
 Chloride 107 08/07/2020 01:42 AM  
 CO2 19 (L) 08/07/2020 01:42 AM  
 Anion gap 8 08/07/2020 01:42 AM  
 Glucose 122 (H) 08/07/2020 01:42 AM  
 BUN 59 (H) 08/07/2020 01:42 AM  
 Creatinine 3.17 (H) 08/07/2020 01:42 AM  
 BUN/Creatinine ratio 19 08/07/2020 01:42 AM  
 GFR est AA 24 (L) 08/07/2020 01:42 AM  
 GFR est non-AA 20 (L) 08/07/2020 01:42 AM  
 Calcium 8.7 08/07/2020 01:42 AM  
 Bilirubin, total 0.3 08/07/2020 01:42 AM  
 Alk. phosphatase 87 08/07/2020 01:42 AM  
 Protein, total 8.2 08/07/2020 01:42 AM  
 Albumin 2.3 (L) 08/07/2020 01:42 AM  
 Globulin 5.9 (H) 08/07/2020 01:42 AM  
 A-G Ratio 0.4 (L) 08/07/2020 01:42 AM  
 ALT (SGPT) 18 08/07/2020 01:42 AM  
 
Lab Results Component Value Date/Time ALT (SGPT) 18 08/07/2020 01:42 AM  
 
 
Factors affecting BG pattern Factor Dose Comments Nutrition: 
Carb-controlled meals 60 grams/meal   
Drugs: 
Steroids HIV Other: n/a  
n/a 
n/a Pain 0/10 Infection n/a Other: n/a n/a Blood glucose pattern Evaluation This 78year old male with Type 2 diabetes has achieved inpatient blood glucose target of 100-180mg/dl. BG's have ranged 119-154 over the past 24 hours. Basal insulin is in use. NPH 10 units BID ordered. Bolus insulin isn't in use. Patient is eating meals. Corrective insulin is in use. Patient has not required any corrective insulin in the past 24 hours. Discussed patient with attending Dr. Talita Jameson regarding discharge medications. Dr. Talita Jameson would like to discharge patient on an oral medication, the concern is potential hypoglycemia. Informed patient of importance of checking BG at home at least twice a day, once in the morning before breakfast and once before bed. Patient lives with his brother in law and sister. Inpatient blood glucose management has been impacted by 
[x] Kidney dysfunction Impression: It is likely that discharging patient on Alogliptin Rozelle Ready) will control BG's at home and decrease risk of hypoglycemia. Given patient's renal function with a GFR of 24, patient would need the renal dose of 6.25 mg. Assessment and Plan Nursing Diagnosis Risk for unstable blood glucose pattern Nursing Intervention Domain 7584 Decision-making Support Nursing Interventions Examined current inpatient diabetes control Explored factors facilitating and impeding inpatient management Identified self-management practices impeding diabetes control Explored corrective strategies with patient and responsible inpatient provider Informed patient of rational for insulin strategy while hospitalized Recommendations For Discharge: 
 
Start Alogliptin (Nesina) 6.25mg per day. Referral  
 
[x] Diabetes Self-Management Training through [facility] (Phone [phone] to schedule appointment) Time Spent Total time spent with patient: 30 Minutes   I personally reviewed chart, notes, data and current medications in the medical record. I have personally examined and treated the patient at bedside during this period. MARCOS Magallon [facility] Access via [facility]

## 2020-08-07 NOTE — PROGRESS NOTES
Medically stable however very debilitated. PT evaluation noted unsafe to return home as of today SNF placement NOT feasible per CM Cont PT/OT eval and treatment

## 2020-08-08 NOTE — PROGRESS NOTES
Spiritual Care Assessment/Progress Note [address] 
 
 
NAME: Bryce Chapin      MRN: [id number] AGE: 78 y.o. SEX: male Buddhist Affiliation: Musement  
Language: [place] 8/8/2020     Total Time (in minutes): 10 Spiritual Assessment begun in [facility]  MED SURG 2 through conversation with: 
  
    [x]Patient        [] Family    [] Friend(s) Reason for Consult: Initial/Spiritual assessment, patient floor Spiritual beliefs: (Please include comment if needed) [x] Identifies with a michelle tradition:    Lillian Curiel  
   [] Supported by a michelle community:        
   [] Claims no spiritual orientation:       
   [] Seeking spiritual identity:            
   [] Adheres to an individual form of spirituality:       
   [] Not able to assess:                   
 
    
Identified resources for coping:  
   [] Prayer                           
   [] Music                  [] Guided Imagery 
   [] Family/friends                 [] Pet visits [] Devotional reading                         [x] Unknown 
   [] Other:                                      
 
 
Interventions offered during this visit: (See comments for more details) Patient Interventions: Affirmation of michelle, Prayer (assurance of), Iconic (affirming the presence of God/Higher Power) Plan of Care: 
 
 [] Support spiritual and/or cultural needs  
 [] Support AMD and/or advance care planning process    
 [] Support grieving process 
 [] Coordinate Rites and/or Rituals  
 [] Coordination with community clergy 
 [x] No spiritual needs identified at this time 
 [] Detailed Plan of Care below (See Comments)  [] Make referral to Music Therapy 
[] Make referral to Pet Therapy    
[] Make referral to Addiction services 
[] Make referral to [facility] 
[] Make referral to Spiritual Care Partner 
[] No future visits requested       
[x] Follow up visits as needed Comments: Initial spiritual assessment in 5 Med SUrg. Mr. Tani Padilla had just receive his lunch. He shared he has strong michelle in God and is blessed every day. He has good family support as well. He has no needs at this time. Provided spiritual presence and advised of  Availability. Visited by: Magdalena Cagle., MS., [address] ([id number])

## 2020-08-08 NOTE — PROGRESS NOTES
Bryce Lai [facility] [address] 
[address] [facility], [address] [place] [address] Nw 
[phone] Medical Progress Note NAME: Megan Gilliam :  1950 MRM:  [id number] Date/Time: 2020 Assessment / Plan:  
 
Debility: appreciate PT/OT eval, discussed with PT again today. Deemed not safe to ambulate safely and into his home w/o considerable assistance. NOT safe to return home, yet. Continue PT/OT. DC planning SNF is not a feasible option per CM. 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 Baseline ~2. Cont bicarb for NAGMA. Follow BMP. Outpatient follow up with nephrology. Advised compliance. Renal function stable HTN: uncontrolled due to noncompliance. Continue hydralazine, ISMN. BP improved after increased dose of losartan DM type 2 causing CKD stage 3: cont NPH, SSI. A1c showed poor control at 8.9%. It is unlikely pt will be compliant with insulin so planning to DC on oral regiment. Change insulin to glimepiride and alogliptin Leg edema, right: no DVT. R knee x-ray did show an effusion and OA. s/p knee injection by ortho. Monitor  
  
Severe obesity: Body mass index is 40.16 kg/m². Would benefit from weight loss and dietary / lifestyle modifications Total time spent: 35 minutes, d/w PT Time spent in the care of this patient including reviewing records, discussing with nursing and/or other providers on the treatment team, obtaining history and examining the patient, and discussing treatment plans. Care Plan discussed with: Patient, Nursing Staff and >50% of time spent in counseling and coordination of care Discussed:  Care Plan and D/C Planning Prophylaxis:  Hep SQ Disposition:  Home w/Family and HH PT, OT, RN Subjective: Chief Complaint:  Follow up weakness Chart/notes/labs/studies reviewed, patient examined. Generalized weakness. No CP or SOB. No fever Objective:  
 
 
Vitals:  
 
  
Last 24hrs VS reviewed since prior progress note. Most recent are: 
 
Visit Vitals /72 (BP 1 Location: Right arm, BP Patient Position: At rest) Pulse 79 Temp 98.6 °F (37 °C) Resp 18 Ht 5' 7\" (1.702 m) Wt 116.3 kg (256 lb 6.3 oz) SpO2 99% BMI 40.16 kg/m² SpO2 Readings from Last 6 Encounters:  
08/08/20 99% 05/22/18 97% 04/24/18 100% 04/16/18 98% 03/30/18 99% 03/25/18 95% Intake/Output Summary (Last 24 hours) at 8/8/2020 1547 Last data filed at 8/8/2020 1526 Gross per 24 hour Intake  Output 700 ml Net -700 ml Exam:  
 
Physical Exam: 
 
Gen: Obese, elderly, chronically ill-appearing. HEENT:  Sclerae nonicteric, hearing intact to voice, mucous membranes moist 
Neck:  Supple, without masses. Resp:  No accessory muscle use, CTAB without wheezes, rales, or rhonchi 
Card: RRR, without m/r/g. BLE edema. Abd:  +bowel sounds, soft, NTTP, obese but nondistended. No HSM. Neuro: Face symmetric, tongue midline, speech fluent, follows commands appropriately Psych:  Alert, oriented x 3. Limited insight Medications Reviewed: (see below) Lab Data Reviewed: (see below) 
 
______________________________________________________________________ Medications:  
 
Current Facility-Administered Medications Medication Dose Route Frequency  losartan (COZAAR) tablet 50 mg  50 mg Oral DAILY  glimepiride (AMARYL) tablet 2 mg  2 mg Oral ACB  alogliptin (NESINA) tablet 6.25 mg  6.25 mg Oral DAILY  ferrous sulfate tablet 325 mg  1 Tab Oral BID WITH MEALS  
 hydrALAZINE (APRESOLINE) tablet 50 mg  50 mg Oral TID  hydrALAZINE (APRESOLINE) 20 mg/mL injection 10 mg  10 mg IntraVENous Q6H PRN  
 sodium bicarbonate tablet 650 mg  650 mg Oral BID  lidocaine 4 % patch 1 Patch  1 Patch TransDERmal Q24H  
 insulin lispro (HUMALOG) injection   SubCUTAneous AC&HS  
  glucose chewable tablet 16 g  4 Tab Oral PRN  
 dextrose (D50W) injection syrg 12.5-25 g  25-50 mL IntraVENous PRN  
 glucagon (GLUCAGEN) injection 1 mg  1 mg IntraMUSCular PRN  
 sodium chloride (NS) flush 5-40 mL  5-40 mL IntraVENous Q8H  
 sodium chloride (NS) flush 5-40 mL  5-40 mL IntraVENous PRN  
 acetaminophen (TYLENOL) tablet 650 mg  650 mg Oral Q6H PRN Or  
 acetaminophen (TYLENOL) suppository 650 mg  650 mg Rectal Q6H PRN  polyethylene glycol (MIRALAX) packet 17 g  17 g Oral DAILY PRN  promethazine (PHENERGAN) tablet 12.5 mg  12.5 mg Oral Q6H PRN Or  
 ondansetron (ZOFRAN) injection 4 mg  4 mg IntraVENous Q6H PRN  
 heparin (porcine) injection 5,000 Units  5,000 Units SubCUTAneous Q8H  
 clopidogreL (PLAVIX) tablet 75 mg  75 mg Oral DAILY  isosorbide mononitrate ER (IMDUR) tablet 30 mg  30 mg Oral DAILY  levothyroxine (SYNTHROID) tablet 150 mcg  150 mcg Oral 6am  
  
 
 
 
Lab Review:  
 
Recent Labs 08/08/20 0110 08/07/20 
0142 WBC 12.7* 10.2 HGB 9.4* 8.6* HCT 29.4* 26.7*  
* 509* Recent Labs 08/08/20 0110 08/07/20 
0142 08/06/20 
3716  134* 134* K 3.3* 3.3* 3.5  107 107 CO2 19* 19* 20* * 122* 202* BUN 60* 59* 60* CREA 3.19* 3.17* 3.26* CA 8.9 8.7 8.9 MG 1.9 1.9  --   
PHOS 4.0 3.9  --   
ALB 2.5* 2.3*  --   
ALT 19 18  -- No components found for: [place] No results for input(s): PH, PCO2, PO2, HCO3, FIO2 in the last 72 hours. No results for input(s): INR, INREXT, INREXT in the last 72 hours. No results found for: SDES Lab Results Component Value Date/Time Culture result: NO GROWTH 6 DAYS 03/12/2018 07:12 PM  
 Culture result: NO GROWTH 6 DAYS 07/20/2014 11:55 AM  
 Culture result: ESCHERICHIA COLI 06/23/2014 02:25 PM  
 
        
___________________________________________________ Attending Physician: Anup Torres MD

## 2020-08-08 NOTE — PROGRESS NOTES
Bedside, Verbal and Written shift change report given to [place] (oncoming nurse) by Lisa Reynolds (offgoing nurse). Report included the following information SBAR, Kardex, ED Summary, Procedure Summary, Intake/Output, MAR, Recent Results and Med Rec Status.

## 2020-08-08 NOTE — PROGRESS NOTES
Bedside and Verbal shift change report given to Simone Urbina RN (oncoming nurse) by Marko Gomez RN (offgoing nurse). Report included the following information SBAR, Kardex and ED Summary.

## 2020-08-08 NOTE — PROGRESS NOTES
Problem: Mobility Impaired (Adult and Pediatric) Goal: *Acute Goals and Plan of Care (Insert Text) Description:  FUNCTIONAL STATUS PRIOR TO ADMISSION: Patient reports that he was independent living UNTIL past few weeks. Noted increased weakness, inability to to ambulate or perform self care. HOME SUPPORT PRIOR TO ADMISSION: The patient lived with sister and brother in law. Physical Therapy Goals Initiated 8/2/2020 1. Patient will move from supine to sit and sit to supine  in bed with minimal assistance/contact guard assist within 7 day(s). 2.  Patient will transfer from bed to chair and chair to bed with moderate assistance  using the least restrictive device within 7 day(s). 3.  Patient will perform sit to stand with minimal assistance/contact guard assist within 7 day(s). 4.  Patient will ambulate with moderate assistance  for 50 feet with the least restrictive device within 7 day(s). 5.  Patient will ascend/descend 3 stairs with no handrail(s) with moderate assistance  within 7 day(s). Outcome: Progressing Towards Goal 
Note: PHYSICAL THERAPY TREATMENT Patient: Sara Chang (54 y.o. male) Date: 8/8/2020 Diagnosis: ARF (acute renal failure) ([facility] [address] [N17.9] ARF (acute renal failure) ([facility] [address] Precautions: Fall Chart, physical therapy assessment, plan of care and goals were reviewed. ASSESSMENT Patient continues with skilled PT services and progressing towards goals. Requires up to Mod A x1 for bed mobility, increased time and verbal cues. Poor carry over with safe technique for safe trasnfers and gait training on level surfaces using RW. Inconsistent WB tolerance on R LE. Attempt stair training x 1 step using RW, requires Max cues, was able to step LLE onto step but unable to Push thru BUE to bring RLE onto step with Max A x2.   
 
Currently pt is high fall risk using RW on level surfaces, requires A x1 for all gait and tranfers and will require W/c transport to enter home safety Current Level of Function Impacting Discharge (mobility/balance): up to Mod A on level surfaces, max A x2 for stair training Other factors to consider for discharge: PLAN : 
Patient continues to benefit from skilled intervention to address the above impairments. Continue treatment per established plan of care. to address goals. Recommendation for discharge: (in order for the patient to meet his/her long term goals) Physical therapy at least 2 days/week in the home AND ensure assist and/or supervision for safety with mobility This discharge recommendation: 
Has been made in collaboration with the attending provider and/or case management IF patient discharges home will need the following DME: rolling walker and wheelchair SUBJECTIVE:  
Patient stated my leg isn't sstrong.  OBJECTIVE DATA SUMMARY:  
Critical Behavior: 
Neurologic State: Alert Orientation Level: Oriented X4 Cognition: Follows commands, Appropriate decision making Safety/Judgement: Decreased insight into deficits Functional Mobility Training: 
Bed Mobility: 
Rolling: Bed Modified;Minimum assistance; Additional time Supine to Sit: Minimum assistance; Additional time;Bed Modified Scooting: Additional time;Contact guard assistance Transfers: 
Sit to Stand: Moderate assistance;Assist x2; Additional time Stand to Sit: Minimum assistance;Assist x2; Additional time Balance: 
Sitting: Intact; Without support Standing: With support Standing - Static: Constant support; Fair 
Standing - Dynamic : Fair;Constant support Ambulation/Gait Training: 
Distance (ft): 10 Feet (ft) Assistive Device: Walker, rolling;Gait belt Ambulation - Level of Assistance: Minimal assistance; Additional time Gait Abnormalities: Antalgic; Step to gait Speed/Mayda: Pace decreased (<100 feet/min) Stairs: 
Number of Stairs Trained: 1 Stairs - Level of Assistance: Maximum assistance;Assist X2;Additional time Therapeutic Exercises:  
 
Pain Ratin-5/10 R knee Activity Tolerance:  
Fair Please refer to the flowsheet for vital signs taken during this treatment. After treatment patient left in no apparent distress:  
Supine in bed, Call bell within reach, and Bed / chair alarm activated COMMUNICATION/COLLABORATION:  
The patients plan of care was discussed with: Registered nurse. Kacey Garay Time Calculation: 43 mins

## 2020-08-08 NOTE — PROGRESS NOTES
Bryce Lai michael [place] 78 
[address] 
[phone] Medical Progress Note NAME: Kristel Wall :  1950 MRM:  [id number] Date/Time: 2020 Assessment / Plan:  
 
Debility: appreciate PT/OT kevin, deemed not safe to ambulate safely and into his home w/o considerable assistance. Continue PT/OT. DC planning, lengthy discussion with CM. SNF is not a feasibility per CM. 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 Baseline ~2. Cont bicarb for NAGMA. Follow BMP. Outpatient follow up with nephrology. Advised compliance. HTN: uncontrolled due to noncompliance. Family at bedside reported poor compliance to medications. Continue hydralazine, ISMN. BP remains high; increase losartan DM type 2 causing CKD stage 3: cont NPH, SSI. A1c showed poor control at 8.9%. It is unlikely pt will be compliant with insulin so likely will need to DC on oral regimen. Discussed with diabetes educator. Will use glimepiride and alogliptin Leg edema, right: no DVT. R knee x-ray did show an effusion and OA. s/p knee injection by ortho. Monitor  
  
Severe obesity: Body mass index is 40.16 kg/m². Would benefit from weight loss and dietary / lifestyle modifications Total time spent: 45 minutes Time spent in the care of this patient including reviewing records, discussing with nursing and/or other providers on the treatment team, obtaining history and examining the patient, and discussing treatment plans. Care Plan discussed with: Patient, Nursing Staff and >50% of time spent in counseling and coordination of care Discussed:  Care Plan and D/C Planning Prophylaxis:  Hep SQ Disposition:  Home w/Family and HH PT, OT, RN Subjective: Chief Complaint:  Follow up weakness Chart/notes/labs/studies reviewed, patient examined. Feels fine. No CP, SOB, F/C Objective:  
 
 
Vitals:  
 
  
Last 24hrs VS reviewed since prior progress note. Most recent are: 
 
Visit Vitals /73 (BP 1 Location: Right arm, BP Patient Position: At rest) Pulse 79 Temp 97.7 °F (36.5 °C) Resp 19 Ht 5' 7\" (1.702 m) Wt 116.3 kg (256 lb 6.3 oz) SpO2 99% BMI 40.16 kg/m² SpO2 Readings from Last 6 Encounters:  
08/07/20 99% 05/22/18 97% 04/24/18 100% 04/16/18 98% 03/30/18 99% 03/25/18 95% Intake/Output Summary (Last 24 hours) at 8/7/2020 2231 Last data filed at 8/7/2020 2000 Gross per 24 hour Intake  Output 1000 ml Net -1000 ml Exam:  
 
Physical Exam: 
 
Gen: obese, elderly, chronically ill-appearing. HEENT:  Sclerae nonicteric, hearing intact to voice, mucous membranes moist 
Neck:  Supple, without masses. Resp:  No accessory muscle use, CTAB without wheezes, rales, or rhonchi 
Card: RRR, without m/r/g. LE edema. Abd:  +bowel sounds, soft, NTTP, obese but nondistended. No HSM. Neuro: Face symmetric, tongue midline, speech fluent, follows commands appropriately Psych:  Alert, oriented x 3. Limited insight Medications Reviewed: (see below) Lab Data Reviewed: (see below) 
 
______________________________________________________________________ Medications:  
 
Current Facility-Administered Medications Medication Dose Route Frequency  [START ON 8/8/2020] losartan (COZAAR) tablet 50 mg  50 mg Oral DAILY  insulin NPH (NOVOLIN N, HUMULIN N) injection 10 Units  10 Units SubCUTAneous ACB&D  
 ferrous sulfate tablet 325 mg  1 Tab Oral BID WITH MEALS  
 hydrALAZINE (APRESOLINE) tablet 50 mg  50 mg Oral TID  hydrALAZINE (APRESOLINE) 20 mg/mL injection 10 mg  10 mg IntraVENous Q6H PRN  
 sodium bicarbonate tablet 650 mg  650 mg Oral BID  lidocaine 4 % patch 1 Patch  1 Patch TransDERmal Q24H  
 insulin lispro (HUMALOG) injection   SubCUTAneous AC&HS  
  glucose chewable tablet 16 g  4 Tab Oral PRN  
 dextrose (D50W) injection syrg 12.5-25 g  25-50 mL IntraVENous PRN  
 glucagon (GLUCAGEN) injection 1 mg  1 mg IntraMUSCular PRN  
 sodium chloride (NS) flush 5-40 mL  5-40 mL IntraVENous Q8H  
 sodium chloride (NS) flush 5-40 mL  5-40 mL IntraVENous PRN  
 acetaminophen (TYLENOL) tablet 650 mg  650 mg Oral Q6H PRN Or  
 acetaminophen (TYLENOL) suppository 650 mg  650 mg Rectal Q6H PRN  polyethylene glycol (MIRALAX) packet 17 g  17 g Oral DAILY PRN  promethazine (PHENERGAN) tablet 12.5 mg  12.5 mg Oral Q6H PRN Or  
 ondansetron (ZOFRAN) injection 4 mg  4 mg IntraVENous Q6H PRN  
 heparin (porcine) injection 5,000 Units  5,000 Units SubCUTAneous Q8H  
 clopidogreL (PLAVIX) tablet 75 mg  75 mg Oral DAILY  isosorbide mononitrate ER (IMDUR) tablet 30 mg  30 mg Oral DAILY  levothyroxine (SYNTHROID) tablet 150 mcg  150 mcg Oral 6am  
  
 
 
 
Lab Review:  
 
Recent Labs 08/07/20 
0142 08/05/20 
0507 WBC 10.2 10.1 HGB 8.6* 9.0*  
HCT 26.7* 27.6*  
* 474* Recent Labs 08/07/20 
0142 08/06/20 
5654 08/05/20 
0507 * 134* 133* K 3.3* 3.5 3.9  107 105 CO2 19* 20* 18* * 202* 175* BUN 59* 60* 54* CREA 3.17* 3.26* 3.30* CA 8.7 8.9 9.0 MG 1.9  --  2.1 PHOS 3.9  --   --   
ALB 2.3*  --   --   
ALT 18  --   -- No components found for: [place] No results for input(s): PH, PCO2, PO2, HCO3, FIO2 in the last 72 hours. No results for input(s): INR, INREXT, INREXT in the last 72 hours. No results found for: SDES Lab Results Component Value Date/Time Culture result: NO GROWTH 6 DAYS 03/12/2018 07:12 PM  
 Culture result: NO GROWTH 6 DAYS 07/20/2014 11:55 AM  
 Culture result: ESCHERICHIA COLI 06/23/2014 02:25 PM  
 
        
___________________________________________________ Attending Physician: Pura Arceo MD

## 2020-08-09 NOTE — ROUTINE PROCESS
Bedside shift change report given to [place] (oncoming nurse) by Cris Champion (offgoing nurse). Report included the following information SBAR, Kardex, Procedure Summary, Intake/Output, MAR, Accordion, Recent Results and Med Rec Status.

## 2020-08-09 NOTE — PROGRESS NOTES
Bryce Lai michael [place] 78 
[address] 
[phone] Medical Progress Note NAME: Marianne Naik :  1950 MRM:  [id number] Date/Time: 2020 Assessment / Plan:  
 
Debility: PT/OT evaluation deemed pt NOT safe to return home, discussed with team. High fall risk using RW even on level surfaces, and pt has steps going into his house. Will require W/c transport to enter home safety at a minimum. CM to assist. SNF is not a feasible option, per CM 
 
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 Baseline ~2. Cont bicarb for NAGMA. Follow BMP. Outpatient follow up with nephrology. Advised compliance. Renal function stable HTN: uncontrolled due to noncompliance. Continue hydralazine, ISMN. BP improved after increased dose of losartan DM type 2 causing CKD stage 3: cont NPH, SSI. A1c showed poor control at 8.9%. It is unlikely pt will be compliant with insulin so planning to DC on oral regiment. Now on glimepiride and alogliptin. Glimepiride was given despite holding parameters; discussed with nursing. Decrease glimpiride. Further titration of meds as needed Leg edema, right: no DVT. R knee x-ray did show an effusion and OA. s/p knee injection by ortho. Monitor  
  
Severe obesity: Body mass index is 40.16 kg/m². Would benefit from weight loss and dietary / lifestyle modifications Total time spent: 25 minutes Time spent in the care of this patient including reviewing records, discussing with nursing and/or other providers on the treatment team, obtaining history and examining the patient, and discussing treatment plans. Care Plan discussed with: Patient, Nursing Staff and >50% of time spent in counseling and coordination of care Discussed:  Care Plan and D/C Planning Prophylaxis:  Hep SQ Disposition:  Home w/Family and HH PT, OT, RN 
 
 
  
 Subjective: Chief Complaint:  Follow up weakness Chart/notes/labs/studies reviewed, patient examined. No CP, SOB, F/C Objective:  
 
 
Vitals:  
 
  
Last 24hrs VS reviewed since prior progress note. Most recent are: 
 
Visit Vitals /67 Pulse 80 Temp 98.1 °F (36.7 °C) Resp 18 Ht 5' 7\" (1.702 m) Wt 116.3 kg (256 lb 6.3 oz) SpO2 99% BMI 40.16 kg/m² SpO2 Readings from Last 6 Encounters:  
08/09/20 99% 05/22/18 97% 04/24/18 100% 04/16/18 98% 03/30/18 99% 03/25/18 95% Intake/Output Summary (Last 24 hours) at 8/9/2020 1722 Last data filed at 8/9/2020 1702 Gross per 24 hour Intake 860 ml Output 1451 ml Net -591 ml Exam:  
 
Physical Exam: 
 
Gen: Obese, elderly, chronically ill-appearing. NAD HEENT:  Sclerae nonicteric, hearing intact to voice, mucous membranes moist. Poor dentition Neck:  Supple, without masses. Resp:  No accessory muscle use, CTAB without wheezes, rales, or rhonchi 
Card: RRR, without m/r/g. BLE edema. Abd:  +bowel sounds, soft, NTTP, obese but nondistended. No HSM. Neuro: Face symmetric, tongue midline, speech fluent, follows commands appropriately Psych:  Alert, oriented x 3. Limited insight Medications Reviewed: (see below) Lab Data Reviewed: (see below) 
 
______________________________________________________________________ Medications:  
 
Current Facility-Administered Medications Medication Dose Route Frequency  [START ON 8/10/2020] glimepiride (AMARYL) tablet 1 mg  1 mg Oral ACB  losartan (COZAAR) tablet 50 mg  50 mg Oral DAILY  alogliptin (NESINA) tablet 6.25 mg  6.25 mg Oral DAILY  ferrous sulfate tablet 325 mg  1 Tab Oral BID WITH MEALS  
 hydrALAZINE (APRESOLINE) tablet 50 mg  50 mg Oral TID  hydrALAZINE (APRESOLINE) 20 mg/mL injection 10 mg  10 mg IntraVENous Q6H PRN  
 sodium bicarbonate tablet 650 mg  650 mg Oral BID  
  lidocaine 4 % patch 1 Patch  1 Patch TransDERmal Q24H  
 insulin lispro (HUMALOG) injection   SubCUTAneous AC&HS  
 glucose chewable tablet 16 g  4 Tab Oral PRN  
 dextrose (D50W) injection syrg 12.5-25 g  25-50 mL IntraVENous PRN  
 glucagon (GLUCAGEN) injection 1 mg  1 mg IntraMUSCular PRN  
 sodium chloride (NS) flush 5-40 mL  5-40 mL IntraVENous Q8H  
 sodium chloride (NS) flush 5-40 mL  5-40 mL IntraVENous PRN  
 acetaminophen (TYLENOL) tablet 650 mg  650 mg Oral Q6H PRN Or  
 acetaminophen (TYLENOL) suppository 650 mg  650 mg Rectal Q6H PRN  polyethylene glycol (MIRALAX) packet 17 g  17 g Oral DAILY PRN  promethazine (PHENERGAN) tablet 12.5 mg  12.5 mg Oral Q6H PRN Or  
 ondansetron (ZOFRAN) injection 4 mg  4 mg IntraVENous Q6H PRN  
 heparin (porcine) injection 5,000 Units  5,000 Units SubCUTAneous Q8H  
 clopidogreL (PLAVIX) tablet 75 mg  75 mg Oral DAILY  isosorbide mononitrate ER (IMDUR) tablet 30 mg  30 mg Oral DAILY  levothyroxine (SYNTHROID) tablet 150 mcg  150 mcg Oral 6am  
  
 
 
 
Lab Review:  
 
Recent Labs 08/09/20 
0231 08/08/20 
0110 08/07/20 
0142 WBC 13.9* 12.7* 10.2 HGB 8.7* 9.4* 8.6* HCT 27.9* 29.4* 26.7*  
* 500* 509* Recent Labs 08/09/20 
1024 08/08/20 
0110 08/07/20 
0142  137 134* K 3.4* 3.3* 3.3*  
* 108 107 CO2 18* 19* 19* GLU 79 125* 122* BUN 54* 60* 59* CREA 2.94* 3.19* 3.17* CA 8.7 8.9 8.7 MG 2.0 1.9 1.9 PHOS 4.0 4.0 3.9 ALB 2.4* 2.5* 2.3* ALT  --  19 18 No components found for: [place] No results for input(s): PH, PCO2, PO2, HCO3, FIO2 in the last 72 hours. No results for input(s): INR, INREXT, INREXT in the last 72 hours. No results found for: SDES Lab Results Component Value Date/Time  Culture result: NO GROWTH 6 DAYS 03/12/2018 07:12 PM  
 Culture result: NO GROWTH 6 DAYS 07/20/2014 11:55 AM  
 Culture result: ESCHERICHIA COLI 06/23/2014 02:25 PM  
 
        
 ___________________________________________________ Attending Physician: Josh Massey MD

## 2020-08-09 NOTE — PROGRESS NOTES
Bedside, Verbal and Written shift change report given to [address] [place] [address] (oncoming nurse) by Ayad Goldberg (offgoing nurse). Report included the following information SBAR, Kardex, Procedure Summary, Intake/Output, MAR, Recent Results and Med Rec Status.

## 2020-08-09 NOTE — PROGRESS NOTES
Only was able to get part of patient morning labs had another nurse try but patient refused to be stuck again

## 2020-08-10 NOTE — PROGRESS NOTES
Bryce Lai Kimberly Argos 78 [address] YOB: 1950 Assessment & Plan: 1. KELIN · Cr stabilizing around 3 mg · KELIN thought to be due to IVVD,Entresto · No HD needed but poor insight in his care will cause worsening GFR faster · CKD: Cr 2 mg in 2018:serologies sent,c3,c4,hep b ok · HE WILL NEED CKD Visit on dc · CKD ,Proteinurian and HTN:bp is worsneing · Losartan 2. Acidosis · KELIN, CL containing fluids · bicarb 3. DM 
· Insulin 4. Anemia · Iron def in 2018: needs eval to be repeated · PO FeSO4 Subjective: CHIEF COMPLAIN:arf HPI: KELIN resolved, CKD underlying, HTN better Review of Systems A comprehensive review of systems was negative except for that written in the HPI. Past Medical History:  
Diagnosis Date  Anemia  Bronchitis  Chronic kidney disease UTI  Diabetes ([facility] [address]  Gastrointestinal disorder   
 anemia  Hypertension  Kidney disease, chronic, stage II (GFR 60-89 ml/min)  Neurological disorder Metabolic Brain Disorder Past Surgical History:  
Procedure Laterality Date  HX OTHER SURGICAL    
 never Social History Socioeconomic History  Marital status:  Spouse name: Not on file  Number of children: Not on file  Years of education: Not on file  Highest education level: Not on file Occupational History  Not on file Social Needs  Financial resource strain: Not on file  Food insecurity Worry: Not on file Inability: Not on file  Transportation needs Medical: Not on file Non-medical: Not on file Tobacco Use  Smoking status: Former Smoker Packs/day: 0.50 Years: 30.00 Pack years: 15.00 Types: Cigarettes Last attempt to quit: 1994 Years since quittin.6  Smokeless tobacco: Never Used Substance and Sexual Activity  Alcohol use: No  
 Drug use: No  
 Sexual activity: Yes  
 Partners: Female Lifestyle  Physical activity Days per week: Not on file Minutes per session: Not on file  Stress: Not on file Relationships  Social connections Talks on phone: Not on file Gets together: Not on file Attends Sikh service: Not on file Active member of club or organization: Not on file Attends meetings of clubs or organizations: Not on file Relationship status: Not on file  Intimate partner violence Fear of current or ex partner: Not on file Emotionally abused: Not on file Physically abused: Not on file Forced sexual activity: Not on file Other Topics Concern  Not on file Social History Narrative  Not on file Family History Problem Relation Age of Onset  Diabetes Mother  Cancer Sister Prior to Admission medications Medication Sig Start Date End Date Taking? Authorizing Provider  
ferrous sulfate 325 mg (65 mg iron) tablet Take 1 Tab by mouth two (2) times daily (with meals). 8/7/20  Yes Kota Heart MD  
hydrALAZINE (APRESOLINE) 50 mg tablet Take 1 Tab by mouth three (3) times daily. 8/7/20  Yes Kota Heatr MD  
glimepiride (AMARYL) 2 mg tablet Take 1 Tab by mouth every morning. IMPORTANT: Do not take if blood sugar is less than 120. Always check your blood sugar. 8/7/20  Yes Kota Heart MD  
alogliptin (NESINA) 6.25 mg tablet Take 1 Tab by mouth daily. 8/7/20  Yes Kota Heart MD  
losartan (COZAAR) 50 mg tablet Take 1 Tab by mouth daily. 8/8/20  Yes Kota Heart MD  
folic acid (FOLVITE) 1 mg tablet Take 1 Tab by mouth daily. 8/7/20  Yes Kota Heart MD  
glucose blood VI test strips (Contour Next Test Strips) strip Check blood sugar twice daily before meals 8/7/20  Yes Kota Heart MD  
lancets [facility] Lancet) misc Check blood sugar twice daily 8/7/20  Yes Kota Heart MD  
sodium bicarbonate 650 mg tablet Take 1 Tab by mouth two (2) times a day.  8/4/20  Yes Evelyn Swift MD  
 isosorbide mononitrate ER (IMDUR) 30 mg tablet Take 30 mg by mouth two (2) times a day. Yes Provider, Historical  
levothyroxine (SYNTHROID) 150 mcg tablet TAKE 1 TABLET BY MOUTH ONCE DAILY BEFORE BREAKFAST 19  Yes Catrachita Queen MD  
clopidogrel (PLAVIX) 75 mg tab Take 1 Tab by mouth daily. 3/18/18  Yes Glenn Turcios MD  
 
No Known Allergies Objective:  
 
Vitals: 
Blood pressure 133/77, pulse 85, temperature 98.8 °F (37.1 °C), resp. rate 18, height 5' 7\" (1.702 m), weight 116.3 kg (256 lb 6.3 oz), SpO2 99 %. Temp (24hrs), Av.2 °F (36.8 °C), Min:97.9 °F (36.6 °C), Max:98.8 °F (37.1 °C) Intake and Output: 
08/10 0701 - 08/10 1900 In: -  
Out: 250 [Urine:250] 1901 - 08/10 0700 In: 860 [P.O.:860] Out: 1451 [VSPHD:3675] Physical Exam:              
 Patient is intubated:  no 
 
Physical Examination:  
GENERAL ASSESSMENT: well developed and well nourished SKIN: normal color, no lesions HEAD: normocephalic EYES: normal eyes NECK: normal 
CHEST: normal air exchange, no rales, no rhonchi, no wheezes, respiratory effort normal with no retractions HEART: regular rate and rhythm, normal S1/S2, no murmurs ABDOMEN: soft, non-distended, no masses, no hepatosplenomegaly :Silva: no 
EXTREMITY: normal and symmetric movement, normal range of motion, no joint swelling NEURO: gross motor exam normal by observation ECG/rhythm[de-identified] Rev:yes Xray/CT/US/MRI REV:yes Data Review Recent Results (from the past 72 hour(s)) GLUCOSE, POC Collection Time: 20 11:30 AM  
Result Value Ref Range Glucose (POC) 133 (H) 65 - 100 mg/dL Performed by [facility] GLUCOSE, POC Collection Time: 20  4:28 PM  
Result Value Ref Range Glucose (POC) 166 (H) 65 - 100 mg/dL Performed by Héctor Murphy (CON) GLUCOSE, POC Collection Time: 20  9:15 PM  
Result Value Ref Range Glucose (POC) 152 (H) 65 - 100 mg/dL Performed by Brooks Castañeda (PCT) CBC WITH AUTOMATED DIFF Collection Time: 08/08/20  1:10 AM  
Result Value Ref Range WBC 12.7 (H) 4.1 - 11.1 K/uL  
 RBC 3.63 (L) 4.10 - 5.70 M/uL HGB 9.4 (L) 12.1 - 17.0 g/dL HCT 29.4 (L) 36.6 - 50.3 % MCV 81.0 80.0 - 99.0 FL  
 MCH 25.9 (L) 26.0 - 34.0 PG  
 MCHC 32.0 30.0 - 36.5 g/dL  
 RDW 14.9 (H) 11.5 - 14.5 % PLATELET 798 (H) 015 - 400 K/uL MPV 9.5 8.9 - 12.9 FL  
 NRBC 0.0 0  WBC ABSOLUTE NRBC 0.00 0.00 - 0.01 K/uL NEUTROPHILS 78 (H) 32 - 75 % LYMPHOCYTES 16 12 - 49 % MONOCYTES 5 5 - 13 % EOSINOPHILS 0 0 - 7 % BASOPHILS 0 0 - 1 % IMMATURE GRANULOCYTES 1 (H) 0.0 - 0.5 % ABS. NEUTROPHILS 9.9 (H) 1.8 - 8.0 K/UL  
 ABS. LYMPHOCYTES 2.1 0.8 - 3.5 K/UL  
 ABS. MONOCYTES 0.6 0.0 - 1.0 K/UL  
 ABS. EOSINOPHILS 0.0 0.0 - 0.4 K/UL  
 ABS. BASOPHILS 0.0 0.0 - 0.1 K/UL  
 ABS. IMM. GRANS. 0.1 (H) 0.00 - 0.04 K/UL  
 DF AUTOMATED METABOLIC PANEL, COMPREHENSIVE Collection Time: 08/08/20  1:10 AM  
Result Value Ref Range Sodium 137 136 - 145 mmol/L Potassium 3.3 (L) 3.5 - 5.1 mmol/L Chloride 108 97 - 108 mmol/L  
 CO2 19 (L) 21 - 32 mmol/L Anion gap 10 5 - 15 mmol/L Glucose 125 (H) 65 - 100 mg/dL BUN 60 (H) 6 - 20 MG/DL Creatinine 3.19 (H) 0.70 - 1.30 MG/DL  
 BUN/Creatinine ratio 19 12 - 20 GFR est AA 23 (L) >60 ml/min/1.73m2 GFR est non-AA 19 (L) >60 ml/min/1.73m2 Calcium 8.9 8.5 - 10.1 MG/DL Bilirubin, total 0.3 0.2 - 1.0 MG/DL  
 ALT (SGPT) 19 12 - 78 U/L  
 AST (SGOT) 14 (L) 15 - 37 U/L Alk. phosphatase 91 45 - 117 U/L Protein, total 8.6 (H) 6.4 - 8.2 g/dL Albumin 2.5 (L) 3.5 - 5.0 g/dL Globulin 6.1 (H) 2.0 - 4.0 g/dL A-G Ratio 0.4 (L) 1.1 - 2.2 MAGNESIUM Collection Time: 08/08/20  1:10 AM  
Result Value Ref Range Magnesium 1.9 1.6 - 2.4 mg/dL PHOSPHORUS Collection Time: 08/08/20  1:10 AM  
Result Value Ref Range  Phosphorus 4.0 2.6 - 4.7 MG/DL  
GLUCOSE, POC  
 Collection Time: 08/08/20  7:24 AM  
Result Value Ref Range Glucose (POC) 148 (H) 65 - 100 mg/dL Performed by Heaven Pérez GLUCOSE, POC Collection Time: 08/08/20 11:34 AM  
Result Value Ref Range Glucose (POC) 106 (H) 65 - 100 mg/dL Performed by Essence Lagos  (PCT) GLUCOSE, POC Collection Time: 08/08/20  4:24 PM  
Result Value Ref Range Glucose (POC) 95 65 - 100 mg/dL Performed by Essence Lagos  (PCT) GLUCOSE, POC Collection Time: 08/08/20  8:58 PM  
Result Value Ref Range Glucose (POC) 75 65 - 100 mg/dL Performed by Elaine Mckeon (PCT) CBC WITH AUTOMATED DIFF Collection Time: 08/09/20  2:31 AM  
Result Value Ref Range WBC 13.9 (H) 4.1 - 11.1 K/uL  
 RBC 3.38 (L) 4.10 - 5.70 M/uL HGB 8.7 (L) 12.1 - 17.0 g/dL HCT 27.9 (L) 36.6 - 50.3 % MCV 82.5 80.0 - 99.0 FL  
 MCH 25.7 (L) 26.0 - 34.0 PG  
 MCHC 31.2 30.0 - 36.5 g/dL  
 RDW 15.3 (H) 11.5 - 14.5 % PLATELET 615 (H) 310 - 400 K/uL MPV 9.4 8.9 - 12.9 FL  
 NRBC 0.0 0  WBC ABSOLUTE NRBC 0.00 0.00 - 0.01 K/uL NEUTROPHILS 83 (H) 32 - 75 % LYMPHOCYTES 12 12 - 49 % MONOCYTES 5 5 - 13 % EOSINOPHILS 0 0 - 7 % BASOPHILS 0 0 - 1 % IMMATURE GRANULOCYTES 0 0.0 - 0.5 % ABS. NEUTROPHILS 11.4 (H) 1.8 - 8.0 K/UL  
 ABS. LYMPHOCYTES 1.7 0.8 - 3.5 K/UL  
 ABS. MONOCYTES 0.7 0.0 - 1.0 K/UL  
 ABS. EOSINOPHILS 0.0 0.0 - 0.4 K/UL  
 ABS. BASOPHILS 0.0 0.0 - 0.1 K/UL  
 ABS. IMM. GRANS. 0.1 (H) 0.00 - 0.04 K/UL  
 DF AUTOMATED    
GLUCOSE, POC Collection Time: 08/09/20  6:31 AM  
Result Value Ref Range Glucose (POC) 85 65 - 100 mg/dL Performed by Adwoa Morejon (PCT) RENAL FUNCTION PANEL Collection Time: 08/09/20 10:24 AM  
Result Value Ref Range Sodium 137 136 - 145 mmol/L Potassium 3.4 (L) 3.5 - 5.1 mmol/L Chloride 110 (H) 97 - 108 mmol/L  
 CO2 18 (L) 21 - 32 mmol/L Anion gap 9 5 - 15 mmol/L Glucose 79 65 - 100 mg/dL  BUN 54 (H) 6 - 20 MG/DL  
 Creatinine 2.94 (H) 0.70 - 1.30 MG/DL  
 BUN/Creatinine ratio 18 12 - 20 GFR est AA 26 (L) >60 ml/min/1.73m2 GFR est non-AA 21 (L) >60 ml/min/1.73m2 Calcium 8.7 8.5 - 10.1 MG/DL Phosphorus 4.0 2.6 - 4.7 MG/DL Albumin 2.4 (L) 3.5 - 5.0 g/dL MAGNESIUM Collection Time: 08/09/20 10:24 AM  
Result Value Ref Range Magnesium 2.0 1.6 - 2.4 mg/dL GLUCOSE, POC Collection Time: 08/09/20 12:10 PM  
Result Value Ref Range Glucose (POC) 106 (H) 65 - 100 mg/dL Performed by Lissette Zarco (PCT) GLUCOSE, POC Collection Time: 08/09/20  4:28 PM  
Result Value Ref Range Glucose (POC) 55 (L) 65 - 100 mg/dL Performed by Lissette Zarco (PCT) GLUCOSE, POC Collection Time: 08/09/20  4:45 PM  
Result Value Ref Range Glucose (POC) 122 (H) 65 - 100 mg/dL Performed by Lissette Zarco (PCT) GLUCOSE, POC Collection Time: 08/09/20  8:55 PM  
Result Value Ref Range Glucose (POC) 53 (L) 65 - 100 mg/dL Performed by Winnie Crook (PCT) GLUCOSE, POC Collection Time: 08/09/20  9:15 PM  
Result Value Ref Range Glucose (POC) 76 65 - 100 mg/dL Performed by Winnie Crook (PCT) RENAL FUNCTION PANEL Collection Time: 08/10/20  2:31 AM  
Result Value Ref Range Sodium 139 136 - 145 mmol/L Potassium 3.5 3.5 - 5.1 mmol/L Chloride 112 (H) 97 - 108 mmol/L  
 CO2 18 (L) 21 - 32 mmol/L Anion gap 9 5 - 15 mmol/L Glucose 64 (L) 65 - 100 mg/dL BUN 48 (H) 6 - 20 MG/DL Creatinine 3.01 (H) 0.70 - 1.30 MG/DL  
 BUN/Creatinine ratio 16 12 - 20 GFR est AA 25 (L) >60 ml/min/1.73m2 GFR est non-AA 21 (L) >60 ml/min/1.73m2 Calcium 8.5 8.5 - 10.1 MG/DL Phosphorus 4.0 2.6 - 4.7 MG/DL Albumin 2.4 (L) 3.5 - 5.0 g/dL MAGNESIUM Collection Time: 08/10/20  2:31 AM  
Result Value Ref Range Magnesium 1.9 1.6 - 2.4 mg/dL CBC WITH AUTOMATED DIFF Collection Time: 08/10/20  2:31 AM  
Result Value Ref Range WBC 12.8 (H) 4.1 - 11.1 K/uL RBC 3.35 (L) 4.10 - 5.70 M/uL HGB 8.6 (L) 12.1 - 17.0 g/dL HCT 27.5 (L) 36.6 - 50.3 % MCV 82.1 80.0 - 99.0 FL  
 MCH 25.7 (L) 26.0 - 34.0 PG  
 MCHC 31.3 30.0 - 36.5 g/dL  
 RDW 14.9 (H) 11.5 - 14.5 % PLATELET 290 (H) 856 - 400 K/uL MPV 9.5 8.9 - 12.9 FL  
 NRBC 0.0 0  WBC ABSOLUTE NRBC 0.00 0.00 - 0.01 K/uL NEUTROPHILS 84 (H) 32 - 75 % LYMPHOCYTES 11 (L) 12 - 49 % MONOCYTES 4 (L) 5 - 13 % EOSINOPHILS 0 0 - 7 % BASOPHILS 0 0 - 1 % IMMATURE GRANULOCYTES 1 (H) 0.0 - 0.5 % ABS. NEUTROPHILS 10.8 (H) 1.8 - 8.0 K/UL  
 ABS. LYMPHOCYTES 1.4 0.8 - 3.5 K/UL  
 ABS. MONOCYTES 0.5 0.0 - 1.0 K/UL  
 ABS. EOSINOPHILS 0.0 0.0 - 0.4 K/UL  
 ABS. BASOPHILS 0.0 0.0 - 0.1 K/UL  
 ABS. IMM. GRANS. 0.1 (H) 0.00 - 0.04 K/UL  
 DF AUTOMATED    
GLUCOSE, POC Collection Time: 08/10/20  6:45 AM  
Result Value Ref Range Glucose (POC) 70 65 - 100 mg/dL Performed by Antonio Barrios (PCT) GLUCOSE, POC Collection Time: 08/10/20 10:23 AM  
Result Value Ref Range Glucose (POC) 144 (H) 65 - 100 mg/dL Performed by Den Person (PCT) Discussed with:   
Patient Thank you so much to allow us to participate in this patient's care. We will follow. 
: Melissa Jaramillo MD 
8/10/2020 [facility]: 
[email or web address] [facility] office: 
[address], [address] [place], [address] [place] [address] Phone: [phone] Fax :     [phone] [place] office: 
[address] [place], [address] Phone - [phone] Fax - [phone]

## 2020-08-10 NOTE — PROGRESS NOTES
Bedside and Verbal shift change report given to Abrahan Chanel RN (oncoming nurse) by Marko Gomez RN (offgoing nurse). Report included the following information SBAR, Kardex and ED Summary.

## 2020-08-10 NOTE — PROGRESS NOTES
Bryce Lai [facility] [address] 
[address] [facility], [address] [place] [address] 
[phone] Medical Progress Note NAME: Anni De :  1950 MRM:  [id number] Date of service: 8/10/2020  9:07 AM 
 
  
Assessment and Plan: 1. Debility: High fall risk using RW even on level surfaces, and pt has steps going into his house. Will require W/c transport to enter home safety at a minimum. SNF is not a feasible option, per CM 
  
2. 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 Baseline ~2. Follow BMP. Outpatient follow up with nephrology. Advised compliance.   
  
3. HTN: Continue hydralazine, ISMN, losartan  
  
4. DM type 2 causing CKD stage 3: episodes of hypoglycemia. Cont SSI. A1c showed poor control at 8.9%. Hold glimepiride. On alogliptin.   
  
5.  Leg edema, right: no DVT. R knee x-ray did show an effusion and OA. s/p knee injection by ortho. Monitor  
  
6. Severe obesity: Body mass index is 40.16 kg/m². Would benefit from weight loss and dietary / lifestyle modifications Subjective: Chief Complaint[de-identified] Patient was seen and examined as a follow up for debility. Chart was reviewed. feels well. ROS: 
(bold if positive, if negative) Tolerating PT  Tolerating Diet Objective:  
 
Last 24hrs VS reviewed since prior progress note. Most recent are: 
 
Visit Vitals /77 (BP 1 Location: Left arm, BP Patient Position: At rest) Pulse 85 Temp 98.8 °F (37.1 °C) Resp 18 Ht 5' 7\" (1.702 m) Wt 116.3 kg (256 lb 6.3 oz) SpO2 99% BMI 40.16 kg/m² SpO2 Readings from Last 6 Encounters:  
08/10/20 99% 18 97% 18 100% 18 98% 18 99% 18 95% Intake/Output Summary (Last 24 hours) at 8/10/2020 1202 Last data filed at 2020 1702 Gross per 24 hour Intake 340 ml Output 500 ml Net -160 ml Physical Exam: Gen:  Well-developed, well-nourished, in no acute distress HEENT:  Pink conjunctivae, PERRL, hearing intact to voice, moist mucous membranes Neck:  Supple, without masses, thyroid non-tender Resp:  No accessory muscle use, clear breath sounds without wheezes rales or rhonchi 
Card:  No murmurs, normal S1, S2 without thrills, bruits or peripheral edema Abd:  Soft, non-tender, non-distended, normoactive bowel sounds are present, no palpable organomegaly and no detectable hernias Lymph:  No cervical or inguinal adenopathy Musc:  No cyanosis or clubbing Skin:  No rashes or ulcers, skin turgor is good Neuro:  Cranial nerves are grossly intact, no focal motor weakness, follows commands appropriately Psych:  Good insight, oriented to person, place and time, alert 
__________________________________________________________________ Medications Reviewed: (see below) Medications:  
 
Current Facility-Administered Medications Medication Dose Route Frequency  glimepiride (AMARYL) tablet 1 mg  1 mg Oral ACB  losartan (COZAAR) tablet 50 mg  50 mg Oral DAILY  alogliptin (NESINA) tablet 6.25 mg  6.25 mg Oral DAILY  ferrous sulfate tablet 325 mg  1 Tab Oral BID WITH MEALS  
 hydrALAZINE (APRESOLINE) tablet 50 mg  50 mg Oral TID  hydrALAZINE (APRESOLINE) 20 mg/mL injection 10 mg  10 mg IntraVENous Q6H PRN  
 sodium bicarbonate tablet 650 mg  650 mg Oral BID  lidocaine 4 % patch 1 Patch  1 Patch TransDERmal Q24H  
 insulin lispro (HUMALOG) injection   SubCUTAneous AC&HS  
 glucose chewable tablet 16 g  4 Tab Oral PRN  
 dextrose (D50W) injection syrg 12.5-25 g  25-50 mL IntraVENous PRN  
 glucagon (GLUCAGEN) injection 1 mg  1 mg IntraMUSCular PRN  
 sodium chloride (NS) flush 5-40 mL  5-40 mL IntraVENous Q8H  
 sodium chloride (NS) flush 5-40 mL  5-40 mL IntraVENous PRN  
 acetaminophen (TYLENOL) tablet 650 mg  650 mg Oral Q6H PRN  Or  
  acetaminophen (TYLENOL) suppository 650 mg  650 mg Rectal Q6H PRN  polyethylene glycol (MIRALAX) packet 17 g  17 g Oral DAILY PRN  promethazine (PHENERGAN) tablet 12.5 mg  12.5 mg Oral Q6H PRN Or  
 ondansetron (ZOFRAN) injection 4 mg  4 mg IntraVENous Q6H PRN  
 heparin (porcine) injection 5,000 Units  5,000 Units SubCUTAneous Q8H  
 clopidogreL (PLAVIX) tablet 75 mg  75 mg Oral DAILY  isosorbide mononitrate ER (IMDUR) tablet 30 mg  30 mg Oral DAILY  levothyroxine (SYNTHROID) tablet 150 mcg  150 mcg Oral 6am  
  
 
Lab Data Reviewed: (see below) Lab Review:  
 
Recent Labs 08/10/20 
0231 08/09/20 
0231 08/08/20 
0110 WBC 12.8* 13.9* 12.7* HGB 8.6* 8.7* 9.4* HCT 27.5* 27.9* 29.4* * 477* 500* Recent Labs 08/10/20 
0231 08/09/20 
1024 08/08/20 
0110  137 137  
K 3.5 3.4* 3.3*  
* 110* 108 CO2 18* 18* 19* GLU 64* 79 125* BUN 48* 54* 60* CREA 3.01* 2.94* 3.19* CA 8.5 8.7 8.9 MG 1.9 2.0 1.9 PHOS 4.0 4.0 4.0 ALB 2.4* 2.4* 2.5* TBILI  --   --  0.3 ALT  --   --  19 Lab Results Component Value Date/Time Glucose (POC) 70 08/10/2020 06:45 AM  
 Glucose (POC) 76 08/09/2020 09:15 PM  
 Glucose (POC) 53 (L) 08/09/2020 08:55 PM  
 Glucose (POC) 122 (H) 08/09/2020 04:45 PM  
 Glucose (POC) 55 (L) 08/09/2020 04:28 PM  
 
No results for input(s): PH, PCO2, PO2, HCO3, FIO2 in the last 72 hours. No results for input(s): INR, INREXT in the last 72 hours. All Micro Results Procedure Component Value Units Date/Time URINE CULTURE HOLD SAMPLE [[id number]] Collected:  08/02/20 0207 Order Status:  Completed Specimen:  Urine from Serum Updated:  08/02/20 0210 Urine culture hold Urine on hold in Microbiology dept for 2 days. If unpreserved urine is submitted, it cannot be used for addtional testing after 24 hours, recollection will be required. I have reviewed notes of prior 24hr. Other pertinent lab: Total time spent with patient: 28 Care Plan discussed with: Patient, Nursing Staff and >50% of time spent in counseling and coordination of care Discussed:  Care Plan Prophylaxis:  Hep SQ Disposition:  HH PT, OT, RN 
        
___________________________________________________ Attending Physician: Gena Taylor MD

## 2020-08-10 NOTE — PROGRESS NOTES
I have reviewed discharge instructions with the patient. The patient verbalized understanding. Given an opportunity to ask questions.

## 2020-08-10 NOTE — DIABETES MGMT
[address] [facility] DI[facility] 
 
FOLLOW-UP NOTE Presentation Emil Abrams is a 78 y.o. male admitted for generalized weakness. Patient is unreliable historian given metabolic brain disorder history.  
  
Past Medical History (click to expand or collapse) Past Medical History:  
Diagnosis Date  Anemia    
 Bronchitis    
 Chronic kidney disease    
  UTI  Diabetes ([address]    
 Gastrointestinal disorder    
  anemia  Hypertension    
 Kidney disease, chronic, stage II (GFR 60-89 ml/min)    
 Neurological disorder    
  Metabolic Brain Disorder  
  
  
  
Current clinical course has been uncomplicated.  
  
Diabetes: Patient has known Type 2 diabetes. Patient doesn't recall when he was diagnosed. Patient unsure of whether he has had diabetes medications at home, per chart review no diabetes medications listed in PTA medications. A1c 8/2/20: 8.9% Subjective I'm good.  Objective Physical exam 
General Alert, oriented and in no acute distress. Conversant and cooperative. Vital Signs Visit Vitals /72 (BP 1 Location: Left arm, BP Patient Position: At rest) Pulse 90 Temp 97.9 °F (36.6 °C) Resp 18 Ht 5' 7\" (1.702 m) Wt 116.3 kg (256 lb 6.3 oz) SpO2 98% BMI 40.16 kg/m² Skin  Warm and dry Heart   Regular rate and rhythm. No murmurs, rubs or gallops Lungs  Clear to auscultation without rales or rhonchi Extremities No foot wounds, skin dry and flaking on both feet, no open wounds. Laboratory Lab Results Component Value Date/Time Hemoglobin A1c 8.9 (H) 08/02/2020 03:30 AM  
 Hemoglobin A1c (POC) 5.5 10/06/2014 09:45 AM  
 
Lab Results Component Value Date/Time LDL, calculated 157 (H) 02/20/2018 11:17 AM  
 
Lab Results Component Value Date/Time Creatinine (POC) 1.5 (H) 02/11/2010 07:13 PM  
 Creatinine 3.01 (H) 08/10/2020 02:31 AM  
 
Lab Results Component Value Date/Time Sodium 139 08/10/2020 02:31 AM  
 Potassium 3.5 08/10/2020 02:31 AM  
 Chloride 112 (H) 08/10/2020 02:31 AM  
 CO2 18 (L) 08/10/2020 02:31 AM  
 Anion gap 9 08/10/2020 02:31 AM  
 Glucose 64 (L) 08/10/2020 02:31 AM  
 BUN 48 (H) 08/10/2020 02:31 AM  
 Creatinine 3.01 (H) 08/10/2020 02:31 AM  
 BUN/Creatinine ratio 16 08/10/2020 02:31 AM  
 GFR est AA 25 (L) 08/10/2020 02:31 AM  
 GFR est non-AA 21 (L) 08/10/2020 02:31 AM  
 Calcium 8.5 08/10/2020 02:31 AM  
 Bilirubin, total 0.3 08/08/2020 01:10 AM  
 Alk. phosphatase 91 08/08/2020 01:10 AM  
 Protein, total 8.6 (H) 08/08/2020 01:10 AM  
 Albumin 2.4 (L) 08/10/2020 02:31 AM  
 Globulin 6.1 (H) 08/08/2020 01:10 AM  
 A-G Ratio 0.4 (L) 08/08/2020 01:10 AM  
 ALT (SGPT) 19 08/08/2020 01:10 AM  
 
Lab Results Component Value Date/Time ALT (SGPT) 19 08/08/2020 01:10 AM  
 
 
Factors affecting BG pattern Factor Dose Comments Nutrition: 
Carb-controlled meals 60 grams/meal   
Drugs: 
Steroids HIV Other: n/a  
n/a 
n/a Pain 0/10 Infection n/a Other: n/a n/a Blood glucose pattern Evaluation This 78year old male with Type 2 diabetes has achieved inpatient blood glucose target of 100-180mg/dl. BG's have ranged  over the past 24 hours.   
  
Basal insulin isn't in use.   
  
Bolus insulin isn't in use. Patient is eating meals. 
  
Corrective insulin is in use. Patient has not required any corrective insulin in the past 24 hours.   
  
Glimepiride 2mg PO daily (last dose given 8/9/20)- held per MD order Alogliptin 6.25mg PO daily (last dose given 8/9/20)- held today per order indicates hold for BG < 150.   
  
Patient lives with his brother in law and sister.  
  
Inpatient blood glucose management has been impacted by 
[x]? Kidney dysfunction 
  
  
Impression:  
It is suspected that the low BG's (53 and 55) over weekend (8/8 and 8/9) were the result of patient getting Glimepiride dose (a sulfonylurea with which hypoglycemia is highly likely), not a result of the alogliptin. It is suspected that patient will NOT experience hypoglycemia on alogliptin alone, as this medication is a DPP-4 and does not cause hypoglycemia. Assessment and Plan Nursing Diagnosis Risk for unstable blood glucose pattern Nursing Intervention Domain 4120 Decision-making Support Nursing Interventions Examined current inpatient diabetes control Explored factors facilitating and impeding inpatient management Recommendations Discontinue Glimepiride 2mg daily. Continue Alogliptin 6.25mg daily. This medication does not need to be held as it does not cause hypoglycemia. For Discharge: 
 
Alogliptin 6.25mg daily Referral  
  
[x]? Diabetes Self-Management Training through [facility] (Phone [phone] to schedule appointment) Time Spent Total time spent with patient: 15 Minutes   I personally reviewed chart, notes, data and current medications in the medical record. I have personally examined and treated the patient at bedside during this period. MARCOS Nguyen Access via [address] [place] [address]

## 2020-08-10 NOTE — PROGRESS NOTES
Problem: Self Care Deficits Care Plan (Adult) Goal: *Acute Goals and Plan of Care (Insert Text) Description:  FUNCTIONAL STATUS PRIOR TO ADMISSION: Patient reports that he was independent with self care and mobility until the past few weeks. Noted increased weakness, inability to to ambulate or perform self care. HOME SUPPORT PRIOR TO ADMISSION: The patient lived with sister and brother in law. Occupational Therapy Goals Weekly re assessment 8/10/2020- continue current goals Initiated 8/3/2020 1. Patient will perform grooming with modified independence within 7 day(s). 2.  Patient will perform upper body dressing and bathing with modified independence within 7 day(s). 3.  Patient will perform lower body dressing and bathing with moderate assistance  within 7 day(s). 4.  Patient will perform toilet transfers to [facility] with moderate assistance  within 7 day(s). 5.  Patient will perform all aspects of toileting with moderate assistance  within 7 day(s). 6.  Patient will participate in upper extremity therapeutic exercise/activities with supervision/set-up for 10 minutes within 7 day(s). 7.  Patient will utilize energy conservation techniques during functional activities with verbal cues within 7 day(s). Outcome: Progressing Towards Goal 
 OCCUPATIONAL THERAPY TREATMENT/WEEKLY RE-EVALUATION Patient: David Amor (53 y.o. male) Date: 8/10/2020 Diagnosis: ARF (acute renal failure) ([facility] [N17.9] ARF (acute renal failure) ([facility] Precautions: Fall Chart, occupational therapy assessment, plan of care, and goals were reviewed. ASSESSMENT Based on the objective data described below, patient received in chair, agreeable to activity. He is very tangential with conversation but able to rbe redirected with verbal cues. Patient requires verbal cues for hand position prior to standing. Mod assist x 2 for sit to stand.   Once standing he requires mod assist x 2 for very short distance ambulation. Patient only placing RLE toes on floor, not full foot. Patient requires cues for safety, and attention to task. Will continue to follow . Current Level of Function Impacting Discharge (ADLs): mod assist x 2 for short distance transfer Other factors to consider for discharge: patient with decreased functional mobility PLAN : 
Goals have been updated based on progression since last assessment. Patient continues to benefit from skilled intervention to address the above impairments. Continue to follow patient 5 times a week to address goals. Recommend with staff:  
 
Recommend next OT session: continue goals Recommendation for discharge: (in order for the patient to meet his/her long term goals) To be determined: Patient unable to discharge to SNF, and plans to discharge home with family support This discharge recommendation: 
Has been made in collaboration with the attending provider and/or case management Equipment recommendations for successful discharge (if) home: TBD SUBJECTIVE:  
Patient stated I used to walk to the bathroom on my own.  OBJECTIVE DATA SUMMARY:  
Cognitive/Behavioral Status: 
Neurologic State: Alert Cognition: Follows commands Perception: Appears intact Perseveration: No perseveration noted Safety/Judgement: Insight into deficits Functional Mobility and Transfers for ADLs: 
Bed Mobility: 
  
 
Transfers: 
Sit to Stand: Assist x2; Moderate assistance; Additional time Balance: 
Sitting: Intact Standing: Impaired Standing - Static: Constant support Standing - Dynamic : Constant support ADL Intervention: 
  
 
  
 
  
 
  
 
Upper Body Dressing Assistance [facility] Gown: Minimum  assistance Cues: Physical assistance;Verbal cues provided Cognitive Retraining Safety/Judgement: Insight into deficits Pain: 
 
 
Activity Tolerance:  
Fair Please refer to the flowsheet for vital signs taken during this treatment. After treatment patient left in no apparent distress:  
Sitting in chair, Call bell within reach, and Bed / chair alarm activated COMMUNICATION/COLLABORATION:  
The patients plan of care was discussed with: Physical Therapist and Registered Nurse Navdeep Navarro OTR/L Time Calculation: 24 mins

## 2020-08-10 NOTE — DISCHARGE SUMMARY
Hospitalist Discharge Summary Patient ID: Jia San [id number] 
32 y.o. 
1950 Admit date of service: 8/1/2020 Discharge date of service: 8/10/2020 Admission Diagnoses: ARF (acute renal failure) ([facility] [N17.9] Chronic Diagnoses:   
Problem List as of 8/10/2020 Date Reviewed: 4/24/2018 Codes Class Noted - Resolved * (Principal) ARF (acute renal failure) ([facility] ICD-10-CM: N17.9 ICD-9-CM: 584.9  8/2/2020 - Present CKD (chronic kidney disease) stage 3, GFR 30-59 ml/min (HCC) (Chronic) ICD-10-CM: N18.3 ICD-9-CM: 585.3  8/2/2020 - Present DM type 2 causing CKD stage 3 (HCC) (Chronic) ICD-10-CM: E11.22, N18.3 ICD-9-CM: 250.40, 585.3  8/2/2020 - Present Leg edema, right ICD-10-CM: R60.0 ICD-9-CM: 782.3  8/2/2020 - Present Weakness ICD-10-CM: R53.1 ICD-9-CM: 780.79  8/2/2020 - Present Severe obesity (BMI 35.0-39. 9) with comorbidity ([facility] (Chronic) ICD-10-CM: E66.01 
ICD-9-CM: 278.01  3/19/2018 - Present Status post insertion of drug eluting coronary artery stent ICD-10-CM: Z95.5 ICD-9-CM: V45.82  3/16/2018 - Present Ground glass opacity present on imaging of lung (Chronic) ICD-10-CM: R91.8 ICD-9-CM: 793.19  3/14/2018 - Present Overview Signed 3/14/2018 12:00 PM by Neyda Drew Seen on CT 3/13/18. Needs follow up CT in June 2018 Noncompliance with diabetes treatment ICD-10-CM: Z91.19 ICD-9-CM: V15.81  2/20/2018 - Present Overview Signed 2/20/2018 10:45 AM by Lucas Horner MD  
  Patient is persistently non-compliant with medical regimen. Does not adhere to diabetic teaching, dose not follow up. Stops medications without doctors approval. Poor historian. Edema of right ankle ICD-10-CM: M25.471 ICD-9-CM: 719.07  10/31/2017 - Present Anemia ICD-10-CM: D64.9 ICD-9-CM: 285.9  7/17/2014 - Present  Protein calorie malnutrition ([facility] ICD-10-CM: Y28 
 ICD-9-CM: 263.9  6/23/2014 - Present HTN (hypertension) (Chronic) ICD-10-CM: I10 
ICD-9-CM: 401.9  6/23/2014 - Present RESOLVED: Respiratory failure (HCC) ICD-10-CM: J96.90 ICD-9-CM: 518.81  3/12/2018 - 3/17/2018 RESOLVED: Dyspnea ICD-10-CM: R06.00 
ICD-9-CM: 786.09  7/17/2014 - 7/26/2014 RESOLVED: Acute renal failure (HCC) ICD-10-CM: N17.9 ICD-9-CM: 584.9  6/23/2014 - 2/20/2018 RESOLVED: Generalized weakness ICD-10-CM: R53.1 ICD-9-CM: 780.79  6/23/2014 - 6/28/2014 RESOLVED: UTI (lower urinary tract infection) ICD-10-CM: N39.0 ICD-9-CM: 599.0  6/23/2014 - 6/28/2014 RESOLVED: Diabetes mellitus type 2, controlled, without complications ([facility] XSW-10-LG: E11.9 ICD-9-CM: 250.00  6/23/2014 - 2/20/2018 RESOLVED: High anion gap metabolic acidosis QMP-61-BR: E87.2 ICD-9-CM: 276.2  6/23/2014 - 6/28/2014 RESOLVED: Hyponatremia ICD-10-CM: E87.1 ICD-9-CM: 276.1  6/23/2014 - 6/28/2014 RESOLVED: Metabolic encephalopathy DQW-58-XX: G93.41 
ICD-9-CM: 348.31  6/23/2014 - 6/28/2014 Discharge Medications:  
Current Discharge Medication List  
  
START taking these medications Details  
glimepiride (AMARYL) 2 mg tablet Take 0.5 Tabs by mouth every morning for 30 days. IMPORTANT: Do not take if blood sugar is less than 120. Always check your blood sugar. Qty: 15 Tab, Refills: 0  
  
ferrous sulfate 325 mg (65 mg iron) tablet Take 1 Tab by mouth two (2) times daily (with meals). Qty: 60 Tab, Refills: 0  
  
alogliptin (NESINA) 6.25 mg tablet Take 1 Tab by mouth daily. Qty: 30 Tab, Refills: 0  
  
losartan (COZAAR) 50 mg tablet Take 1 Tab by mouth daily. Qty: 30 Tab, Refills: 0  
  
folic acid (FOLVITE) 1 mg tablet Take 1 Tab by mouth daily. Qty: 30 Tab, Refills: 0  
  
glucose blood VI test strips (Contour Next Test Strips) strip Check blood sugar twice daily before meals Qty: 100 Strip, Refills: 3 lancets (Microlet Lancet) misc Check blood sugar twice daily 
Qty: 1 Each, Refills: 11  
  
sodium bicarbonate 650 mg tablet Take 1 Tab by mouth two (2) times a day. Qty: 60 Tab, Refills: 0 CONTINUE these medications which have CHANGED Details  
hydrALAZINE (APRESOLINE) 50 mg tablet Take 1 Tab by mouth three (3) times daily. Qty: 90 Tab, Refills: 0 CONTINUE these medications which have NOT CHANGED Details  
isosorbide mononitrate ER (IMDUR) 30 mg tablet Take 30 mg by mouth two (2) times a day. 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  
  
clopidogrel (PLAVIX) 75 mg tab Take 1 Tab by mouth daily. Qty: 90 Tab, Refills: 1 Follow up Care: 1. Other, MD Nigel in 1-2 weeks 2. Diet:  Cardiac Diet and Diabetic Diet Disposition: 
Home. Advanced Directive: 
 
Discharge Exam: 
See today's note. CONSULTATIONS: None Significant Diagnostic Studies:  
Recent Labs 08/10/20 
0231 08/09/20 
0231 WBC 12.8* 13.9* HGB 8.6* 8.7* HCT 27.5* 27.9*  
* 477* Recent Labs 08/10/20 
0231 08/09/20 
1024 08/08/20 
0110  137 137  
K 3.5 3.4* 3.3*  
* 110* 108 CO2 18* 18* 19* BUN 48* 54* 60* CREA 3.01* 2.94* 3.19* GLU 64* 79 125* CA 8.5 8.7 8.9 MG 1.9 2.0 1.9 PHOS 4.0 4.0 4.0 Recent Labs 08/10/20 
0231 08/09/20 
1024 08/08/20 
0110 ALT  --   --  19  
AP  --   --  91  
TBILI  --   --  0.3 TP  --   --  8.6* ALB 2.4* 2.4* 2.5*  
GLOB  --   --  6.1* No results for input(s): INR, PTP, APTT, INREXT in the last 72 hours. No results for input(s): FE, TIBC, PSAT, FERR in the last 72 hours. No results for input(s): PH, PCO2, PO2 in the last 72 hours. No results for input(s): CPK, CKMB in the last 72 hours. No lab exists for component: TROPONINI Lab Results Component Value Date/Time Glucose (POC) 144 (H) 08/10/2020 10:23 AM  
 Glucose (POC) 70 08/10/2020 06:45 AM  
 Glucose (POC) 76 08/09/2020 09:15 PM  
 Glucose (POC) 53 (L) 08/09/2020 08:55 PM  
 Glucose (POC) 122 (H) 08/09/2020 04:45 PM  
 
 
 
 
 
HOSPITAL COURSE & TREATMENT RENDERED:  
1. Debility:  walked with PT today. Will require W/c transport to enter home safety at a minimum. SNF is not a feasible option, per CM 
  
2. 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 Baseline ~2. Follow BMP. Outpatient follow up with nephrology. Advised compliance.   
  
3. HTN: Continue hydralazine, ISMN, losartan  
  
4. DM type 2 causing CKD stage 3: episodes of hypoglycemia. Cont SSI. A1c showed poor control at 8.9%. Decreased glimepiride. On alogliptin.   
  
5.  Leg edema, right: no DVT. R knee x-ray did show an effusion and OA. s/p knee injection by ortho. Monitor  
  
6. Severe obesity: Body mass index is 40.16 kg/m². Would benefit from weight loss and dietary / lifestyle modifications Discharged in stable  condition. Spent 35 minutes Signed: 
Travis Diaz MD 
8/10/2020 
3:26 PM

## 2020-08-10 NOTE — DISCHARGE INSTRUCTIONS
ACUTE DIAGNOSES:  ARF (acute renal failure) ([facility] [N17.9]    CHRONIC MEDICAL DIAGNOSES:  Problem List as of 8/10/2020 Date Reviewed: 4/24/2018          Codes Class Noted - Resolved    * (Principal) ARF (acute renal failure) ([facility] ICD-10-CM: N17.9  ICD-9-CM: 584.9  8/2/2020 - Present        CKD (chronic kidney disease) stage 3, GFR 30-59 ml/min (HCC) (Chronic) ICD-10-CM: N18.3  ICD-9-CM: 585.3  8/2/2020 - Present        DM type 2 causing CKD stage 3 (HCC) (Chronic) ICD-10-CM: E11.22, N18.3  ICD-9-CM: 250.40, 585.3  8/2/2020 - Present        Leg edema, right ICD-10-CM: R60.0  ICD-9-CM: 782.3  8/2/2020 - Present        Weakness ICD-10-CM: R53.1  ICD-9-CM: 780.79  8/2/2020 - Present        Severe obesity (BMI 35.0-39. 9) with comorbidity ([facility] (Chronic) ICD-10-CM: E66.01  ICD-9-CM: 278.01  3/19/2018 - Present        Status post insertion of drug eluting coronary artery stent ICD-10-CM: Z95.5  ICD-9-CM: V45.82  3/16/2018 - Present        Ground glass opacity present on imaging of lung (Chronic) ICD-10-CM: R91.8  ICD-9-CM: 793.19  3/14/2018 - Present    Overview Signed 3/14/2018 12:00 PM by Himanshu Pepe     Seen on CT 3/13/18. Needs follow up CT in June 2018             Noncompliance with diabetes treatment ICD-10-CM: Z91.19  ICD-9-CM: V15.81  2/20/2018 - Present    Overview Signed 2/20/2018 10:45 AM by Stacia Rhodes MD     Patient is persistently non-compliant with medical regimen. Does not adhere to diabetic teaching, dose not follow up. Stops medications without doctors approval. Poor historian.               Edema of right ankle ICD-10-CM: M25.471  ICD-9-CM: 719.07  10/31/2017 - Present        Anemia ICD-10-CM: D64.9  ICD-9-CM: 285.9  7/17/2014 - Present        Protein calorie malnutrition ([facility] ICD-10-CM: E46  ICD-9-CM: 263.9  6/23/2014 - Present        HTN (hypertension) (Chronic) ICD-10-CM: I10  ICD-9-CM: 401.9  6/23/2014 - Present        RESOLVED: Respiratory failure ([facility] ICD-10-CM: J96.90  ICD-9-CM: 518.81  3/12/2018 - 3/17/2018        RESOLVED: Dyspnea ICD-10-CM: R06.00  ICD-9-CM: 786.09  7/17/2014 - 7/26/2014        RESOLVED: Acute renal failure (HCC) ICD-10-CM: N17.9  ICD-9-CM: 584.9  6/23/2014 - 2/20/2018        RESOLVED: Generalized weakness ICD-10-CM: R53.1  ICD-9-CM: 780.79  6/23/2014 - 6/28/2014        RESOLVED: UTI (lower urinary tract infection) ICD-10-CM: N39.0  ICD-9-CM: 599.0  6/23/2014 - 6/28/2014        RESOLVED: Diabetes mellitus type 2, controlled, without complications ([facility] PVP-34-HC: E11.9  ICD-9-CM: 250.00  6/23/2014 - 2/20/2018        RESOLVED: High anion gap metabolic acidosis MISA-61-SE: E87.2  ICD-9-CM: 276.2  6/23/2014 - 6/28/2014        RESOLVED: Hyponatremia ICD-10-CM: E87.1  ICD-9-CM: 276.1  6/23/2014 - 6/28/2014        RESOLVED: Metabolic encephalopathy [facility]: G93.41  ICD-9-CM: 348.31  6/23/2014 - 6/28/2014              DISCHARGE MEDICATIONS:          · It is important that you take the medication exactly as they are prescribed. · Keep your medication in the bottles provided by the pharmacist and keep a list of the medication names, dosages, and times to be taken in your wallet. · Do not take other medications without consulting your doctor. DIET:  Diabetic Diet    ACTIVITY: Activity as tolerated    ADDITIONAL INFORMATION: If you experience any of the following symptoms then please call your primary care physician or return to the emergency room if you cannot get hold of your doctor: Fever, chills, nausea, vomiting, diarrhea, change in mentation, falling, bleeding, shortness of breath. FOLLOW UP CARE:  Primary care physician. you are to call and set up an appointment to see them in 5 days. Follow-up with nephrology in 2 weeks      Information obtained by :  I understand that if any problems occur once I am at home I am to contact my physician. I understand and acknowledge receipt of the instructions indicated above. [de-identified] or R.N.'s Signature                                                                  Date/Time                                                                                                                                              Patient or Representative Signature                                                          Date/Time    HOSPITALIST DISCHARGE INSTRUCTIONS  NAME: Hanh Gaviria   :  1950   MRN:  [id number]     Date/Time:  2020 1:07 PM    ADMIT DATE: 2020     DISCHARGE DATE: 2020     ADMITTING DIAGNOSIS:  Acute on chronic kidney disease   diabetes    DISCHARGE DIAGNOSIS:  As above     MEDICATIONS:    · It is important that you take the medication exactly as they are prescribed. · Keep your medication in the bottles provided by the pharmacist and keep a list of the medication names, dosages, and times to be taken in your wallet. · Do not take other medications without consulting your doctor. Pain Management: per above medications    What to do at Home    Recommended diet:  Cardiac Diet    Recommended activity: Activity as tolerated    If you experience any of the following symptoms then please call your primary care physician or return to the emergency room if you cannot get hold of your doctor:  Severe weakness, confusion, fever, chills, nausea, vomiting, diarrhea, bleeding, extremely high blood sugars (>350) or extremely low blood sugars (<80), shortness of breath, chest pain     Follow Up: Follow-up Information     Follow up With Specialties Details Why Contact Info    Sincere Dotson MD Pediatric Medicine, Family Medicine On 2020 [facility] follow up appointment has been made on  at 2:00PM. Please call the office with any questions.   [address]      Maritza Christianson MD Nephrology In 2 weeks  [address] [facility] 99 [phone]      Other, MD Nigel    Patient can only remember the practice name and not the physician         [facility] ([phone]) skilled nursing, PT, OT  Please call agency directly with any questions. Program for Diabetes [facility]   [address]  [phone]        Information obtained by :  I understand that if any problems occur once I am at home I am to contact my physician. I understand and acknowledge receipt of the instructions indicated above.                                                                                                                                            Physician's or R.N.'s Signature                                                                  Date/Time                                                                                                                                              Patient or Representative Signature                                                          Date/Time

## 2020-08-10 NOTE — PROGRESS NOTES
HYPOGLYCEMIC EPISODE DOCUMENTATION Patient with hypoglycemic episode(s) at 2057(time) on 08/09/2020(date). BG value(s) pre-treatment 53 Was patient symptomatic? [] yes, [x] no Patient was treated with the following rescue medications/treatments: [] D50 [] Glucose tablets 
              [] Glucagon 
              [x] 8oz juice 
              [] 6oz reg soda 
              [] 8oz low fat milk BG value post-treatment: 76 Once BG treated and value greater than 80mg/dl, pt was provided with the following: 
[x] snack 
[] meal

## 2020-08-10 NOTE — ROUTINE PROCESS
Bedside and Verbal shift change report given to Nehemias Guerra (oncoming nurse) by Benjamin Sheldon (offgoing nurse). Report included the following information SBAR, Kardex, MAR and Recent Results.

## 2020-08-10 NOTE — PROGRESS NOTES
8/10/2020   CARE MANAGEMENT NOTE:  EMR reviewed for clinical updates. Pt was admitted with ARF, HTN, DM, leg edema and is s/p right knee injection on 8/4. Reportedly, pt resides with his sister Socorro Sales [phone]) and her . 
  
RUR 18%; LOS 8 days 
  
Transition Plan of Care: 
1. [facility] (skilled nursing, PT/OT) has been arranged. CM notified agency of pt's discharge today, and  AVS, summary was faxed to homecare agency. PCP appt has been scheduled for 8/11 at 2 p.m. SNF is NOT an option 2/2 to his recorded hx. 
2. Also a w/c order was received on Friday, and it was sent to [employer] for processing. If approved the w/c will be delivered to pt's home. 3. Pt has arranged for his cousin to transport him home after 4 p.m. No further needs indicated at this writing. Mj

## 2020-08-10 NOTE — PROGRESS NOTES
Problem: Mobility Impaired (Adult and Pediatric) Goal: *Acute Goals and Plan of Care (Insert Text) Description:  FUNCTIONAL STATUS PRIOR TO ADMISSION: Patient reports that he was independent living UNTIL past few weeks. Noted increased weakness, inability to to ambulate or perform self care. HOME SUPPORT PRIOR TO ADMISSION: The patient lived with sister and brother in law. Physical Therapy Goals Initiated 8/2/2020 Goals updated 8/10/20 1. Patient will move from supine to sit and sit to supine  in bed with supervision assist within 7 day(s). 2.  Patient will transfer from bed to chair and chair to bed with minimal assistance  using the least restrictive device within 7 day(s). 3.  Patient will perform sit to stand with minimal assistance/contact guard assist within 7 day(s). 4.  Patient will ambulate with moderate assistance  for 25 feet with the least restrictive device within 7 day(s). 5.  Patient will ascend/descend 3 stairs with no handrail(s) with moderate assistance  within 7 day(s). 8/10/2020 1138 by Popeye Calderón, PT Outcome: Progressing Towards Goal 
8/10/2020 1134 by Popeye Calderón, PT Outcome: Progressing Towards Goal 
 PHYSICAL THERAPY TREATMENT: WEEKLY REASSESSMENT Patient: Amanda Durbin (30 y.o. male) Date: 8/10/2020 Primary Diagnosis: ARF (acute renal failure) ([facility] [address] [N17.9] Precautions:  Fall ASSESSMENT Patient continues with skilled PT services and is slowly progressing towards goals. Patient continues to demonstrate antalgic gait with decreased WB to RLE. Patient reports pain with weight bearing to right knee despite injection to right knee on 8/4/20 by ortho for effusion. He did not rate pain. Patient not yet safe for discharge home. Per CM, patient is not a candidate for SNF. Patient's progression toward goals since last assessment: Slow progression toward goals. They were revised today. Current Level of Function Impacting Discharge (mobility/balance): Patient received up in chair and willing to work with PT. He stood with +2 mod assist and ambulated 12 feet with rolling walker. Patient needs cues for safety and he ambulates on toes of right foot, despite instructions to ambulate with flat foot. Patient fatigues easily and is unsafe. He has knee flexion contracture to right knee. Functional Outcome Measure: The patient scored 35/100 on the Barthel outcome measure. Other factors to consider for discharge: Lives with sister and brother-in-law. Patient currently needs +2 assist for safety with ambulation. PLAN : 
Goals have been updated based on progression since last assessment. Patient continues to benefit from skilled intervention to address the above impairments. Recommendations and Planned Interventions: bed mobility training, transfer training, gait training, and therapeutic exercises Frequency/Duration: Patient will be followed by physical therapy:  5 times a week to address goals. Recommendation for discharge: (in order for the patient to meet his/her long term goals) Physical therapy at least 2 days/week in the home AND ensure assist and/or supervision for safety with gait This discharge recommendation: 
Has been made in collaboration with the attending provider and/or case management IF patient discharges home will need the following DME: says he can use rolling walker that his family has SUBJECTIVE:  
Patient stated This takes time; you can't rush.  OBJECTIVE DATA SUMMARY:  
HISTORY:   
Past Medical History:  
Diagnosis Date  Anemia  Bronchitis  Chronic kidney disease UTI  Diabetes ([facility] [address]  Gastrointestinal disorder   
 anemia  Hypertension  Kidney disease, chronic, stage II (GFR 60-89 ml/min)  Neurological disorder Metabolic Brain Disorder Past Surgical History:  
Procedure Laterality Date  HX OTHER SURGICAL    
 never Personal factors and/or comorbidities impacting plan of care: high fall risk; not yet safe for discharge home with family; antalgic gait with decreased WB RLE and decreased safety awareness. Home Situation Home Environment: Private residence # Steps to Enter: 3 Rails to Enter: No 
Wheelchair Ramp: No 
One/Two Story Residence: One story Living Alone: No 
Support Systems: Family member(s) Patient Expects to be Discharged to[de-identified] Private residence Current DME Used/Available at Home: Walker, rolling Tub or Shower Type: Tub/Shower combination(Patient reports sponge bedside bathing due to malfunctioning of bathroom) EXAMINATION/PRESENTATION/DECISION MAKING:  
Critical Behavior: 
Neurologic State: Alert Orientation Level: Oriented X4 Cognition: Follows commands Safety/Judgement: Decreased insight into deficits Hearing: Auditory Auditory Impairment: Hard of hearing, bilateral 
 
 
Range Of Motion: 
  
  
 Generally decreased but functional; right knee flexion contracture noted Strength:   
  
  
 Generally decreased but functional 
  
  
  
  
 
  
  
  
  
  
  
  
  
  
   
 
 
  
Functional Mobility: 
Bed Mobility: 
  
 Received up in chair and left up in chair. Transfers: 
Sit to Stand: Assist x2; Moderate assistance; Additional time Stand to Sit: Assist x2;Minimum assistance Balance:  
Sitting: Intact Standing: Impaired Standing - Static: Constant support Standing - Dynamic : Constant support Ambulation/Gait Training: 
Distance (ft): 12 Feet (ft) Assistive Device: Gait belt;Walker, rolling Ambulation - Level of Assistance: Assist x2;Minimal assistance Gait Abnormalities: Antalgic; Step to gait Right Side Weight Bearing: As tolerated(patient maintains toe touch weight bearing despite encouragement to WBAT) Base of Support: Widened Stance: Right decreased Speed/Mayda: Pace decreased (<100 feet/min) Step Length: Right shortened;Left shortened Functional Measure: 
Barthel Index: 
 
Bathin Bladder: 10 Bowels: 5 Groomin Dressin Feeding: 10 Mobility: 0 Stairs: 0 Toilet Use: 0 Transfer (Bed to Chair and Back): 5 Total: 35/100 The Barthel ADL Index: Guidelines 1. The index should be used as a record of what a patient does, not as a record of what a patient could do. 2. The main aim is to establish degree of independence from any help, physical or verbal, however minor and for whatever reason. 3. The need for supervision renders the patient not independent. 4. A patient's performance should be established using the best available evidence. Asking the patient, friends/relatives and nurses are the usual sources, but direct observation and common sense are also important. However direct testing is not needed. 5. Usually the patient's performance over the preceding 24-48 hours is important, but occasionally longer periods will be relevant. 6. Middle categories imply that the patient supplies over 50 per cent of the effort. 7. Use of aids to be independent is allowed. Alex Cabrales., Barthel, D.W. (0). Functional evaluation: the Barthel Index. [address] [facility] (14)2. JOCELINE Mei, Sabino Blackman, Sheela Fortune., [place], [address] [place] [address] (). Measuring the change indisability after inpatient rehabilitation; comparison of the responsiveness of the Barthel Index and Functional [place] Measure. Journal of Neurology, Neurosurgery, and Psychiatry, 66(4), 169-289. Lendon Soulier, N.J.A, Rudolph Reen  WAsherJ.KARENA, & Eric Stoll M.A. (2004.) Assessment of post-stroke quality of life in cost-effectiveness studies: The usefulness of the Barthel Index and the EuroQoL-5D. [facility], 13, 826-98 Pain Rating: 
Right knee pain but did not rate Activity Tolerance:  
Fair and fatigues easily Please refer to the flowsheet for vital signs taken during this treatment. After treatment patient left in no apparent distress:  
Sitting in chair, Call bell within reach, and Bed / chair alarm activated COMMUNICATION/EDUCATION:  
The patients plan of care was discussed with: Occupational therapist and Registered nurse. Fall prevention education was provided and the patient/caregiver indicated understanding. and Patient/family agree to work toward stated goals and plan of care. Thank you for this referral. 
Racheal Zamorar, PT Time Calculation: 17 mins

## 2020-08-11 NOTE — PROGRESS NOTES
Patient contacted regarding recent discharge and COVID-19 risk. Discussed COVID-19 related testing which was available at this time. Test results were negative. Patient informed of results, if available?  Care Transition Nurse/ Ambulatory Care Manager/ LPN Care Coordinator contacted the patient by telephone to perform post discharge assessment. Verified name and  with brother-in-law, Idalia Bonilla as identifiers. Patient has following risk factors of: diabetes, chronic kidney disease and bronchitis, current smoker, anemia. CTN/ACM/LPN reviewed discharge instructions, medical action plan and red flags related to discharge diagnosis. Reviewed and educated them on any new and changed medications related to discharge diagnosis. Advised obtaining a 90-day supply of all daily and as-needed medications. Advance Care Planning:  
Does patient have an Advance Directive: not on file Education provided regarding infection prevention, and signs and symptoms of COVID-19 and when to seek medical attention with family who verbalized understanding. Discussed exposure protocols and quarantine from [address] you at higher risk for severe illness  and given an opportunity for questions and concerns. The family agrees to contact the COVID-19 hotline [phone] or PCP office for questions related to their healthcare. CTN/ACM/LPN provided contact information for future reference. From CDC: Are you at higher risk for severe illness?  Wash your hands often.  Avoid close contact (6 feet, which is about two arm lengths) with people who are sick.  Put distance between yourself and other people if COVID-19 is spreading in your community.  Clean and disinfect frequently touched surfaces.  Avoid all cruise travel and non-essential air travel.  Call your healthcare professional if you have concerns about COVID-19 and your underlying condition or if you are sick. For more information on steps you can take to protect yourself, see CDC's How to Protect Yourself Patient/family/caregiver given information for [employer] and agrees to enroll no Plan for follow-up call in 7-14 days based on severity of symptoms and risk factors. CTN relayed the following to Primary Care Provider who had seen in past and has VV scheduled today:  He has had provider who has been seeing him in the home that is affiliated with his [address] [place] [address] [address]. He gets his prescriptions filled through mail order. CTN reached out to [address] to relay above.

## 2020-08-12 PROBLEM — N17.9 AKI (ACUTE KIDNEY INJURY) (HCC): Status: ACTIVE | Noted: 2020-01-01

## 2020-08-12 NOTE — ED PROVIDER NOTES
The history is provided by the patient. No  was used. Abnormal Lab Results This is a new problem. The current episode started 1 to 2 hours ago. The problem occurs constantly. The problem has not changed since onset. Pertinent negatives include no chest pain, no abdominal pain, no headaches and no shortness of breath. Past Medical History:  
Diagnosis Date  Anemia  Bronchitis  Chronic kidney disease UTI  Diabetes ([address]  Gastrointestinal disorder   
 anemia  Hypertension  Kidney disease, chronic, stage II (GFR 60-89 ml/min)  Neurological disorder Metabolic Brain Disorder Past Surgical History:  
Procedure Laterality Date  HX OTHER SURGICAL    
 never Family History:  
Problem Relation Age of Onset  Diabetes Mother  Cancer Sister Social History Socioeconomic History  Marital status:  Spouse name: Not on file  Number of children: Not on file  Years of education: Not on file  Highest education level: Not on file Occupational History  Not on file Social Needs  Financial resource strain: Not on file  Food insecurity Worry: Not on file Inability: Not on file  Transportation needs Medical: Not on file Non-medical: Not on file Tobacco Use  Smoking status: Former Smoker Packs/day: 0.50 Years: 30.00 Pack years: 15.00 Types: Cigarettes Last attempt to quit: 1994 Years since quittin.6  Smokeless tobacco: Never Used Substance and Sexual Activity  Alcohol use: No  
 Drug use: No  
 Sexual activity: Yes  
  Partners: Female Lifestyle  Physical activity Days per week: Not on file Minutes per session: Not on file  Stress: Not on file Relationships  Social connections Talks on phone: Not on file Gets together: Not on file Attends Jainism service: Not on file Active member of club or organization: Not on file Attends meetings of clubs or organizations: Not on file Relationship status: Not on file  Intimate partner violence Fear of current or ex partner: Not on file Emotionally abused: Not on file Physically abused: Not on file Forced sexual activity: Not on file Other Topics Concern  Not on file Social History Narrative  Not on file ALLERGIES: Patient has no known allergies. Review of Systems Constitutional: Negative for activity change, chills and fever. HENT: Negative for nosebleeds, sore throat, trouble swallowing and voice change. Eyes: Negative for visual disturbance. Respiratory: Negative for shortness of breath. Cardiovascular: Negative for chest pain and palpitations. Gastrointestinal: Negative for abdominal pain, constipation, diarrhea and nausea. Genitourinary: Negative for difficulty urinating, dysuria, hematuria and urgency. Musculoskeletal: Negative for back pain, neck pain and neck stiffness. Skin: Negative for color change. Allergic/Immunologic: Negative for immunocompromised state. Neurological: Negative for dizziness, seizures, syncope, weakness, light-headedness, numbness and headaches. Psychiatric/Behavioral: Negative for behavioral problems, confusion, hallucinations, self-injury and suicidal ideas. Vitals:  
 08/12/20 1057 BP: 147/81 Pulse: 100 Resp: 16 Temp: 98.5 °F (36.9 °C) SpO2: 98% Weight: 116.1 kg (256 lb) Physical Exam 
Vitals signs and nursing note reviewed. Constitutional:   
   General: He is not in acute distress. Appearance: He is well-developed. He is not diaphoretic. HENT:  
   Head: Atraumatic. Neck:  
   Trachea: No tracheal deviation. Cardiovascular:  
   Comments: Warm and well perfused Pulmonary:  
   Effort: Pulmonary effort is normal. No respiratory distress. Musculoskeletal: Normal range of motion.   
Skin: 
 General: Skin is warm and dry. Neurological:  
   Mental Status: He is alert. Coordination: Coordination normal.  
Psychiatric:     
   Behavior: Behavior normal.     
   Thought Content: Thought content normal.     
   Judgment: Judgment normal.  
 
  
 
MDM This is a 65-year-old male with past medical history, review of systems, physical exam as above, discharge from the hospital 2 days ago, history of chronic kidney disease, poor compliance, diabetes for complaints of generalized weakness. Chart review indicates case management involved, as patient has difficulty caring for himself. Upon arrival patient awake and alert, he states he was told to come back to the hospital by his brother-in-law, who the patient states received a phone call from the hospital indicating \"something was wrong\". Patient states he is unclear as to why he is back at the emergency department otherwise. He denies complaints upon arrival.  Exam remarkable for chronically ill-appearing elderly male, in no acute distress, noted to be afebrile, mildly hypertensive, without tachycardia, satting well on room air. Will encourage the patient to contact his brother, as chart review indicates no communications for return to the hospital or the emergency department. Will list case management for further assistance. Procedures Patient d/w Hospitalist for admission pending placement for FTT and Generalized weakness.

## 2020-08-12 NOTE — PROGRESS NOTES
Bedside and Verbal shift change report given to Claudene Shad rn  (oncoming nurse) by Katie Barraza  (offgoing nurse). Report included the following information SBAR and Kardex.

## 2020-08-12 NOTE — PROGRESS NOTES
Problem: Mobility Impaired (Adult and Pediatric) Goal: *Acute Goals and Plan of Care (Insert Text) Description: FUNCTIONAL STATUS PRIOR TO ADMISSION: At baseline patient requires total care 24/7. Poor Historian HOME SUPPORT PRIOR TO ADMISSION: The patient lived with family - sister and brother in law. Physical Therapy Goals Initiated 8/12/2020 1. Patient will move from supine to sit and sit to supine , scoot up and down, and roll side to side in bed with minimal assistance/contact guard assist within 7 day(s). 2.  Patient will lateral transfer from bed to chair and chair to bed with moderate assistance  using the least restrictive device within 7 day(s). 3.  Patient will perform sit < > stand with moderate assistance  within 7 day(s). 4.  Patient will negotiate level surfaces with wheelchair with BLEs 100' with occasional min A within 7 days. Note: PHYSICAL THERAPY EVALUATION Patient: Felix Gibbons (72 y.o. male) Date: 8/12/2020 Primary Diagnosis: KELIN (acute kidney injury) ([facility] [address] [N17.9] Precautions:  Bed Alarm, Fall ASSESSMENT Based on the objective data described below, the patient presents with severely impaired BLE ROM, strength, coordination, generalized weakness, pain B knees R >L,  poor endurance, poor orientation and decision making and lack of providing accurate history. Complex and extensive PMH including GT,ZPW4,TZB Metabolic Brain Disorder. Patient just recently D/C on 8/10/2020 after 10 day LOS at [facility]. Patient at home < 48 hrs. Returned to ED 8/12/2020 with BLE weakness and unable to mobilize BLEs. On 8/10 patient worked with PT and required max-mod A x 2 for mobility. Previous adm recommendations were for SNF placement and 24/7 supervision and assistance, as patient is high fall risk and dependency x 2 persons for all ADLs and mobility. No family present in ED to provide reliable hx. . Patient has chronic B knee pain with flexion contracture R knee ~ 35 degrees , by visual estimation. Patient is disheveled and clothes soiled, significant BLE edema. Patient able to sit EOB but unable to stand with multiple attempts and max x 2. Patient does not initiate co contraction to even clear greater trochanters off bed. No clearance off bed whatsoever. He was able to scoot laterally to reposition up in bed. He required max assistance to remove soiled clothing and don hospital gown. He states when he was discharged Monday and arrived at home he ambulated up 3 steps (no rails) with help of two family members, and states he has been walking with RW. He reports he fell off sofa but got up from the floor. Patient requires 24/7 assist x 2 persons- total care and essentially unchanged from 2 days ago. Current Level of Function Impacting Discharge (mobility/balance): Only able to sit EOB  mod A x 2, unable to stand Functional Outcome Measure: The patient scored 5/100 on the Barthel Index outcome measure . Other factors to consider for discharge: patient's medical hx, patient's competency to make his decision? family ability to provide adequate support? Patient will benefit from skilled therapy intervention to address the above noted impairments. PLAN : 
Recommendations and Planned Interventions: bed mobility training, transfer training, gait training, therapeutic exercises, edema management/control, patient and family training/education, and therapeutic activities Frequency/Duration: Patient will be followed by physical therapy:  3 times a week to address goals. Recommendation for discharge: (in order for the patient to meet his/her long term goals) Therapy up to 5 days/week in SNF setting This discharge recommendation: 
Has not yet been discussed the attending provider and/or case management IF patient discharges home will need the following DME: Wheelchair ramp and W/C SUBJECTIVE:  
Patient stated Gita Lea got that shot .  OBJECTIVE DATA SUMMARY:  
HISTORY:   
Past Medical History:  
Diagnosis Date  Anemia  Bronchitis  Chronic kidney disease UTI  Diabetes ([facility] [address]  Gastrointestinal disorder   
 anemia  Hypertension  Kidney disease, chronic, stage II (GFR 60-89 ml/min)  Neurological disorder Metabolic Brain Disorder Past Surgical History:  
Procedure Laterality Date  HX OTHER SURGICAL    
 never Personal factors and/or comorbidities impacting plan of care:  
 
Home Situation Home Environment: Private residence # Steps to Enter: 3 Rails to Enter: No 
Wheelchair Ramp: No 
One/Two Story Residence: One story Living Alone: No 
Support Systems: (lives with sister and brother in law and cousin assists) Patient Expects to be Discharged to[de-identified] Unknown Current DME Used/Available at Home: Walker, rolling EXAMINATION/PRESENTATION/DECISION MAKING:  
Critical Behavior: 
  
Orientation Level: Disoriented to situation, Oriented to person, Oriented to place, Oriented to time Cognition: Impaired decision making, Memory loss, Poor safety awareness Safety/Judgement: Decreased awareness of environment, Decreased awareness of need for assistance, Decreased awareness of need for safety, Decreased insight into deficits Hearing: 
  
Skin:   
Edema:  
Range Of Motion: 
AROM: Grossly decreased, non-functional 
  
  
  
PROM: Grossly decreased, non-functional 
  
  
  
Strength:   
Strength: Grossly decreased, non-functional 
  
  
  
  
  
  
Tone & Sensation:  
Tone: Abnormal 
  
  
  
  
Sensation: (NT) Coordination: 
Coordination: Grossly decreased, non-functional 
Vision:  
  
Functional Mobility: 
Bed Mobility: 
Rolling: Moderate assistance; Additional time;Assist x2 Supine to Sit: Moderate assistance; Additional time;Assist x2 Sit to Supine: Moderate assistance; Additional time;Assist x2 Scooting: Minimum assistance; Additional time;Assist x2 Transfers: Sit to Stand: (attempted and unable to WB BLE and stand) Balance:  
Sitting: Impaired; Without support Sitting - Static: Fair (occasional) Sitting - Dynamic: Fair (occasional) Standing: (Unable to test) Ambulation/Gait Training: 
  
  
  
  
Gait Description (WDL): (NT) Right Side Weight Bearing: (unable) Left Side Weight Bearing: (unable) Stairs: Therapeutic Exercises:  
 
 
Functional Measure: 
Barthel Index: 
 
Bathin Bladder: 0 Bowels: 0 Groomin Dressin Feedin Mobility: 0 Stairs: 0 Toilet Use: 0 Transfer (Bed to Chair and Back): 0 Total: 5/100 The Barthel ADL Index: Guidelines 1. The index should be used as a record of what a patient does, not as a record of what a patient could do. 2. The main aim is to establish degree of independence from any help, physical or verbal, however minor and for whatever reason. 3. The need for supervision renders the patient not independent. 4. A patient's performance should be established using the best available evidence. Asking the patient, friends/relatives and nurses are the usual sources, but direct observation and common sense are also important. However direct testing is not needed. 5. Usually the patient's performance over the preceding 24-48 hours is important, but occasionally longer periods will be relevant. 6. Middle categories imply that the patient supplies over 50 per cent of the effort. 7. Use of aids to be independent is allowed. Ashley Carpio., Barthel, DAsherW. ([id number]). Functional evaluation: the Barthel Index. [address] [facility] (14)2. Auther Hero yadira JOCELINE Oreilly, Arya Tate, Bennett Jha, [address] [place] [address] (). Measuring the change indisability after inpatient rehabilitation; comparison of the responsiveness of the Barthel Index and Functional [place] Measure. Journal of Neurology, Neurosurgery, and Psychiatry, 66(4), 635-219. SONY Morrell, MODESTA Galarza, & Lisa Lui M.A. (2004.) Assessment of post-stroke quality of life in cost-effectiveness studies: The usefulness of the Barthel Index and the EuroQoL-5D. [facility], 13, 324-75 Physical Therapy Evaluation Charge Determination History Examination Presentation Decision-Making HIGH Complexity :3+ comorbidities / personal factors will impact the outcome/ POC  HIGH Complexity : 4+ Standardized tests and measures addressing body structure, function, activity limitation and / or participation in recreation  HIGH Complexity : Unstable and unpredictable characteristics  Other outcome measures Barthel Index  HIGH Based on the above components, the patient evaluation is determined to be of the following complexity level: HIGH Pain Rating: 
Pain R > L knee with attempted heel slides in supine Activity Tolerance:  
Poor-  
Please refer to the flowsheet for vital signs taken during this treatment. After treatment patient left in no apparent distress:  
Supine in bed and Call bell within reach COMMUNICATION/EDUCATION:  
The patients plan of care was discussed with: Registered nurse and Case management. Patient is unable to participate in goal setting and plan of care. Thank you for this referral. 
Maicol Estrada, PT, DPT Time Calculation: 25 mins

## 2020-08-12 NOTE — PROGRESS NOTES
Problem: Self Care Deficits Care Plan (Adult) Goal: *Acute Goals and Plan of Care (Insert Text) Description: FUNCTIONAL STATUS PRIOR TO ADMISSION: At baseline patient requires total care 24/7. He is a poor historian and unable to answer specific questions regarding level of assist required for self care or transfers. HOME SUPPORT PRIOR TO ADMISSION: The patient lived with family - sister and brother in law. Occupational Therapy Goals Initiated 8/12/2020 1. Patient will perform grooming with modified independence within 7 day(s). 2.  Patient will perform upper body dressing and bathing with supervision/set-up within 7 day(s). 3.  Patient will perform lower body dressing and bathing with moderate assistance  within 7 day(s). 4.  Patient will perform toilet transfers to [facility] with maximal assistance within 7 day(s). 5.  Patient will perform all aspects of toileting with moderate assistance  within 7 day(s). 6.  Patient will participate in upper extremity therapeutic exercise/activities with supervision/set-up for 10 minutes within 7 day(s). 7.  Patient will utilize energy conservation techniques during functional activities with verbal cues within 7 day(s). Outcome: Progressing Towards Goal 
 OCCUPATIONAL THERAPY EVALUATION Patient: Olga Lidia Mendez (62 y.o. male) Date: 8/12/2020 Primary Diagnosis: KELIN (acute kidney injury) ([facility] [N17.9] Precautions:  Bed Alarm, Fall ASSESSMENT Based on the objective data described below, the patient presents with significantly decreased activity tolerance, significant weakness, impaired coordination and balance, c/o pain to B LEs, and poor cognition following admission on 8/12 for KELIN with c/o B LE weakness. Patient was recently admitted and discharged home on 8/10 at a max A x2 level where rehab was recommended.   Patient today was aware he was at the hospital but did not recall any aspect of his recent admission or even being at this this hospital.  Patient reports multiple falls since returning home and is insistent he got himself off the floor however does not display the ability to do so today. Patient requires mod A x2 for bed mobility. Attempted standing 2x however initiation is very minimal towards extension of B LEs despite max effort and verbal cues for technique. Patient with poor ADL tolerance; He needs significant assistance for LB ADLs and unsafe to attempt standing ADLs. Assisted with removal of soiled street clothes and patient donned gown per request.  Patient would benefit from continued skilled OT to progress towards goals and improve overall independence. Current Level of Function Impacting Discharge (ADLs/self-care): Patient required mod A x2 for bed mobility and total A x2 to attempt standing but unsuccessful. Patient requires setup to min A for UB ADLs and max to total A for LB ADLs. Functional Outcome Measure: The patient scored Total: 5/100 on the Barthel Index outcome measure. Other factors to consider for discharge: Will need significant x2 person assist with all upright activity- not available at home as confirmed by patient today. Patient will benefit from skilled therapy intervention to address the above noted impairments. PLAN : 
Recommendations and Planned Interventions: self care training, functional mobility training, therapeutic exercise, balance training, therapeutic activities, endurance activities, patient education, home safety training, and family training/education Frequency/Duration: Patient will be followed by occupational therapy 3 times a week to address goals. Recommendation for discharge: (in order for the patient to meet his/her long term goals) Therapy up to 5 days/week in SNF setting This discharge recommendation: 
Has been made in collaboration with the attending provider and/or case management IF patient discharges home will need the following DME: none SUBJECTIVE:  
Patient stated I was here a long time ago.  Patient not aware of his recent admission (discharged two days prior). OBJECTIVE DATA SUMMARY:  
HISTORY:  
Past Medical History:  
Diagnosis Date Anemia Bronchitis Chronic kidney disease UTI Diabetes ([address] Gastrointestinal disorder   
 anemia Hypertension Kidney disease, chronic, stage II (GFR 60-89 ml/min) Neurological disorder Metabolic Brain Disorder Past Surgical History:  
Procedure Laterality Date HX OTHER SURGICAL    
 never Expanded or extensive additional review of patient history:  
 
Home Situation Home Environment: Private residence # Steps to Enter: 3 Rails to Enter: No 
Wheelchair Ramp: No 
One/Two Story Residence: One story Living Alone: No 
Support Systems: (lives with sister and brother in law and cousin assists) Patient Expects to be Discharged to[de-identified] Unknown Current DME Used/Available at Home: Walker, rolling Hand dominance: Right EXAMINATION OF PERFORMANCE DEFICITS: 
Cognitive/Behavioral Status: 
Neurologic State: Alert Orientation Level: Oriented to person;Disoriented to time;Disoriented to situation;Oriented to place(knows he's at the hospital but unable to recall recent admit) Cognition: Poor safety awareness; Impaired decision making;Decreased command following;Decreased attention/concentration Perception: Appears intact Perseveration: Perseverates during conversation Safety/Judgement: Decreased awareness of environment;Decreased awareness of need for assistance;Decreased awareness of need for safety; Lack of insight into deficits Skin: Intact in the uppers Edema: None noted in the uppers Vision/Perceptual:   
Tracking: Able to track stimulus in all quadrants w/o difficulty Diplopia: No   
Acuity: Within Defined Limits Range of Motion: WDL in the uppers Strength: 
Decreased but functional in the uppers Coordination: Fine Motor Skills-Upper: Left Intact; Right Intact(slowed movement however intact) Gross Motor Skills-Upper: Left Impaired;Right Impaired(mild impairement) Tone & Sensation: 
Tone: Abnormal 
Sensation: (NT) 
 
 
Balance: 
Sitting: Impaired Sitting - Static: Fair (occasional) Sitting - Dynamic: Fair (occasional) Standing: (unable to assess) Functional Mobility and Transfers for ADLs: 
Bed Mobility: 
Rolling: Moderate assistance; Additional time;Assist x2 Supine to Sit: Moderate assistance; Additional time;Assist x2 Sit to Supine: Moderate assistance; Additional time;Assist x2 Scooting: Minimum assistance; Additional time;Assist x2 Transfers: 
Sit to Stand: Total assistance;Assist x2; Additional time(very minimal initiation and unable to attempted LE extension) ADL Assessment: 
Feeding: Setup Oral Facial Hygiene/Grooming: Minimum assistance Bathing: Maximum assistance Upper Body Dressing: Minimum assistance Lower Body Dressing: Total assistance Toileting: Total assistance Cognitive Retraining Safety/Judgement: Decreased awareness of environment;Decreased awareness of need for assistance;Decreased awareness of need for safety; Lack of insight into deficits Functional Measure: 
Barthel Index: 
 
Bathin Bladder: 0 Bowels: 0 Groomin Dressin Feedin Mobility: 0 Stairs: 0 Toilet Use: 0 Transfer (Bed to Chair and Back): 0 Total: 5/100 The Barthel ADL Index: Guidelines 1. The index should be used as a record of what a patient does, not as a record of what a patient could do. 2. The main aim is to establish degree of independence from any help, physical or verbal, however minor and for whatever reason. 3. The need for supervision renders the patient not independent. 4. A patient's performance should be established using the best available evidence.  Asking the patient, friends/relatives and nurses are the usual sources, but direct observation and common sense are also important. However direct testing is not needed. 5. Usually the patient's performance over the preceding 24-48 hours is important, but occasionally longer periods will be relevant. 6. Middle categories imply that the patient supplies over 50 per cent of the effort. 7. Use of aids to be independent is allowed. Mitchell Mcdonald., Barthel, D.W. ([id number]). Functional evaluation: the Barthel Index. [address] [facility] (14)2. Lida Borrego yadira JOCELINE Oreilly, Clement Dove., Edelmira Ron., Bennett, [address] [facility] (1999). Measuring the change indisability after inpatient rehabilitation; comparison of the responsiveness of the Barthel Index and Functional [place] Measure. Journal of Neurology, Neurosurgery, and Psychiatry, 66(4), 229-970. SONY Kaplan, MODESTA Galarza, & Michelle Seymour M.A. (2004.) Assessment of post-stroke quality of life in cost-effectiveness studies: The usefulness of the Barthel Index and the EuroQoL-5D. [facility], 35, 049-97 Occupational Therapy Evaluation Charge Determination History Examination Decision-Making LOW Complexity : Brief history review  LOW Complexity : 1-3 performance deficits relating to physical, cognitive , or psychosocial skils that result in activity limitations and / or participation restrictions  LOW Complexity : No comorbidities that affect functional and no verbal or physical assistance needed to complete eval tasks Based on the above components, the patient evaluation is determined to be of the following complexity level: LOW Activity Tolerance:  
Fair Please refer to the flowsheet for vital signs taken during this treatment. After treatment patient left in no apparent distress:   
Supine in bed and Call bell within reach COMMUNICATION/EDUCATION:  
The patients plan of care was discussed with: Physical therapist, Registered nurse, Case management, and patient . Home safety education was provided and the patient/caregiver indicated understanding., Patient/family have participated as able in goal setting and plan of care. , and Patient/family agree to work toward stated goals and plan of care. This patients plan of care is appropriate for delegation to [facility]. Thank you for this referral. 
Kennedy Almaraz, OTR/L Time Calculation: 21 mins

## 2020-08-12 NOTE — ED NOTES
TRANSFER - OUT REPORT: 
 
Verbal report given to Cm Keenan RN(name) on [facility] Иван  being transferred to [facility](unit) for routine progression of care Report consisted of patients Situation, Background, Assessment and  
Recommendations(SBAR). Information from the following report(s) SBAR, Kardex, ED Summary, MAR, Recent Results and Med Rec Status was reviewed with the receiving nurse. Lines:  
Peripheral IV 08/12/20 Left Antecubital (Active) Site Assessment Clean, dry, & intact 08/12/20 1152 Phlebitis Assessment 0 08/12/20 1152 Infiltration Assessment 0 08/12/20 1152 Dressing Status Clean, dry, & intact 08/12/20 1152 Dressing Type Transparent 08/12/20 1152 Hub Color/Line Status Pink 08/12/20 1152 Action Taken Blood drawn 08/12/20 1152 Opportunity for questions and clarification was provided. Patient transported with: 
 [place]

## 2020-08-12 NOTE — PROGRESS NOTES
[facility]: MED RECONCILIATION Comments/Recommendations:  
Patient is a poor historian regarding home medications, and has a hx of documented poor compliance. Patient presented to [facility] 20 with Rx bottles for clopidogrel, hydralazine, isosorbide, and levothyroxine however these were old Rx from 2019. He was subsequently discharged 8/10/20 with #6 new Rx medications. Pharmacist contacted Renee Collazo  ([phone]) and identified that the only Rx patient filled after his hospital admission was glimepiride 1 mg daily. The pharmacy did not have any record of other prescribed medications including alogliptin, ferrous sulfate, folic acid, losartan, or sodium bicarbonate. I confirmed with patient and emergency contact Eloy Lauren ([phone]) that no other Rx medications from hospital discharge were filled. Based on Rx fill history, it is unlikely that patient has any non  Rx medications at home with the exception of glimepiride. Recommend that all medications on patient's active list are refilled/sent to his pharmacy on hospital discharge if his disposition is home. In setting of CKD recommend alternative therapy to glimepiride. Reviewed with case management and attending MD. 
 
Information obtained from: [facility] discharge summary and admission med rec (-8/10/20); 71Casie [address] Collazo ; Patient and emergency contact Allergies: Patient has no known allergies. Prior to Admission Medications:  
 
Medication Documentation Review Audit Reviewed by Jl Quiñones PHARMD (Pharmacist) on 20 at [phone] Medication Sig Documenting Provider Last Dose Status Taking?  
alogliptin (NESINA) 6.25 mg tablet Take 1 Tab by mouth daily. Jacques Reagan MD  Active Yes  
clopidogrel (PLAVIX) 75 mg tab Take 1 Tab by mouth daily. Stacy Guy MD  Active Yes  
ferrous sulfate 325 mg (65 mg iron) tablet Take 1 Tab by mouth two (2) times daily (with meals). Jacques Reagan MD  Active Yes folic acid (FOLVITE) 1 mg tablet Take 1 Tab by mouth daily. Rufino Fraser MD  Active Yes  
glimepiride (AMARYL) 2 mg tablet Take 0.5 Tabs by mouth every morning for 30 days. IMPORTANT: Do not take if blood sugar is less than 120. Always check your blood sugar. Nickolas Duke MD  Active Yes  
hydrALAZINE (APRESOLINE) 50 mg tablet Take 1 Tab by mouth three (3) times daily. Rufino Fraser MD  Active Yes  
isosorbide mononitrate ER (IMDUR) 30 mg tablet Take 30 mg by mouth two (2) times a day. Provider, Historical  Active Yes  
levothyroxine (SYNTHROID) 150 mcg tablet TAKE 1 TABLET BY MOUTH ONCE DAILY BEFORE BREAKFAST Mikayla Sommer MD  Active Yes  
losartan (COZAAR) 50 mg tablet Take 1 Tab by mouth daily. Rufino Fraser MD  Active Yes  
sodium bicarbonate 650 mg tablet Take 1 Tab by mouth two (2) times a day. Gomez Hill MD  Active Yes Wilmar Burns, PHARMD   Contact: [phone]

## 2020-08-12 NOTE — ED NOTES
Verbal shift change report given to Magdy Bhatia RN (oncoming nurse) by Mercedes Valera RN (offgoing nurse). Report included the following information SBAR, ED Summary, MAR and Recent Results.

## 2020-08-12 NOTE — H&P
Bryce Lai [facility] HISTORY AND PHYSICAL Name:  Mac Cabello 
MR#:  [id number] :  1950 ACCOUNT #:  [de-identified] ADMIT DATE:  2020 PRIMARY CARE PHYSICIAN:  Unknown. PRESENTING COMPLAINT:  Sent to the emergency room by a nurse. HISTORY OF PRESENTING ILLNESS:  The patient is a 79-year-old [facility] [employer] gentleman who has previous history significant for chronic kidney disease, diabetes, chronic diarrhea, hypertension, was recently admitted to this hospital from  and was discharged on 08/10. His last hospital admission was due to acute kidney injury. Due to his knee pain, he was given a steroid injection in his knee and was finally discharged to stay with his brother-in-law as he did not qualify for SNF placement. The patient tells me that somebody called him today (a nurse) and told him to come to the emergency room as something is wrong. Unfortunately, the patient is a very poor historian. No other history is available, however, he denies having any chest pain, fever, chills, shortness of breath, any problem with urination. In the emergency room, his workup was essentially unremarkable. ER doctor tried place him, however, he had history of being a sex offender so it was hard to place him. At that time, it was decided to admit him for observation and then get placement. Again, the patient is unable to give me any other meaningful history but he denies having any acute symptom at this time. It seems he never filled his prescriptions either after discharge. PAST MEDICAL HISTORY:  Significant for; 1. Chronic kidney disease stage III. 2.  Type 2 diabetes. 3.  Obesity. 4.  History of coronary artery disease. 5.  History of anemia. 6.  Hypertension. SOCIOECONOMIC HISTORY:   
The patient tells me he does not smoke or drink. He is a former smoker. He is living with his brother-in-law. Code status is full code. CURRENT MEDICATIONS:  Prior to admission include Amaryl 2 mg half tablet daily, ferrous sulfate 325 b.i.d., Nesina one tablet daily 6.25 mg, Cozaar 50 mg daily, folic acid 1 mg daily, sodium bicarbonate 650 mg b.i.d., hydralazine 50 mg three times daily, isosorbide 30 mg b.i.d., levothyroxine 50 mcg daily, and Plavix 75 mg daily. FAMILY HISTORY:  Significant for diabetes and cancer. Code status is full code. REVIEW OF SYSTEMS:  Negative except as mentioned in history of presenting illness. All systems were reviewed. No other positive finding was noted. PHYSICAL EXAMINATION: 
 
GENERAL:  On examination, the patient is a 79-year-old [facility] American male not in any acute distress. VITAL SIGNS:  Temperature 98.5, blood pressure 147/68, respiratory rate is 16, saturation is 99%. HEENT:  Examination reveal pupils equally reacting to light and accommodation. NECK:  Supple. There is no lymphadenopathy or JVD. CHEST:  Examination reveal some wheezing. CARDIOVASCULAR SYSTEM:  S1 and S2 regular. No murmur. No S3. 
ABDOMEN:  Examination reveals no significant tenderness. It is slightly distended. No guarding, no rigidity. Bowel sounds are active. EXTREMITIES:  Mild pedal edema. Decreased peripheral pulse. NEUROLOGIC:  CNS examination is unremarkable. The patient is alert and oriented, has normal strength, normal reflexes. Plantars are downgoing. Cranial nerves are normal. 
 
LABORATORY DATA:  Labs in ER reveal a white count of 14.2, hemoglobin of 8.6, hematocrit 26.9, MCV is 82.5, platelet count is 058,786. His last hemoglobin and hematocrit was 8.6 and 27.5, so essentially it has not changed. Chemistries reveal sodium of 137, potassium 3.4, chloride 110, bicarb is 20, gap of 7, glucose 126, BUN is 49, creatinine is 3.43, calcium is 8.9, bilirubin 0.4, protein 8.3, albumin is 2.5, globulin is 5.8. AST, ALT and alk phos are all within normal limits.   No imaging has been done. No urinalysis has been done. ASSESSMENT AND PLAN:   
 
The patient is a 78-year-old gentleman who has previous history significant for chronic kidney disease, not on hemodialysis, has a poor insight, is being admitted due to; 1. Failure to thrive. It looks like the patient is unable to take care of himself and will need placement. It looks like his brother-in-law cannot take care of him either. We will consult case management. He is noncompliant with meds as he never filled up his prescriptions. 2.  Acute-on-chronic kidney disease. The patient is seen by [facility]. We will ask them to follow up. He looks euvolemic. We will continue on p.o. bicarb. 3.  Anemia which seems to be chronic, probably due to end-stage renal disease. 4.  Slightly elevated white count, however, the patient did get steroid injection during his last hospital admission. We will get urinalysis. At this time, he has no fever and no localizing symptoms sugestive of infection. If his white count stays elevated, can consider antibiotics. 5.  History of hypertension. We will continue current regimen. 6.  History of diabetes. We will continue current regimen. 7.  Obesity. 8.  Deep vein thrombosis prophylaxis. MD ANA M [id number] 
D:  08/12/2020 14:14 
T:  08/12/2020 18:28 
JOB #:  [id number]

## 2020-08-12 NOTE — ED TRIAGE NOTES
Bilateral leg and knee weakness. D/c on Monday with the same. Denies n/v/fevers. Chronic weakness and in need of home PT per patient. Patient able to lift and move both legs.

## 2020-08-12 NOTE — PROGRESS NOTES
08/12/20 12:10 PM 
Of note, referrals for SNF were sent during last admission to the following facilties: 
[facility]--initially accepted, then recinded and denied due to criminal history [facility]--denied due to being out of network with insurance [facility]--denied [facility]--denied [facility]--denied [facility]

## 2020-08-12 NOTE — ED NOTES
Bedside and Verbal shift change report given to [employer] (oncoming nurse) by Connie Gutierrez (offgoing nurse). Report included the following information SBAR, ED Summary, MAR and Recent Results.
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
## 2020-08-12 NOTE — PROGRESS NOTES
Care Transitions Nurse Note: 
Received phone call from Jeaneth Tena, Janice Rios- he was at [facility]- [facility]. She used to work at [employer].  
He was there to help read medication bottles for CTN to locate prescribing doctor. Pascual Sotoar is relaying the following: Mr. Chasidy Wu has been in same spot since he discharged home- he is not able to walk. He is soiling himself. His brother-in-law and sister - the Jaime are not able to physically care for him. CTN instructed family to call EMS if needed to bring him back to the hospital.  
Family states that he needs to live somewhere where there are staff to work with him and care for him. Initially this could SNF vs. LTC if not improved. CTN alerted in-pt CM at [facility] and provider with whom he had previously seen in past and who had follow up VV scheduled for yesterday- which did not happen- provider's office could not reach patient on phone per provider.

## 2020-08-13 NOTE — PROGRESS NOTES
Accessed chart to review MAR while assigned RN on break. (Pt had called out and we thought he asked for tylenol. However, pt requested to be pulled up in bed. Pulled up pt in bed. Pt satisfied.)

## 2020-08-13 NOTE — PROGRESS NOTES
0700: Bedside shift change report given to Sherice Shrestha RN (oncoming nurse) by Vidal Thomas RN (offgoing nurse). Report included the following information SBAR, Kardex and Intake/Output.

## 2020-08-13 NOTE — PROGRESS NOTES
Problem: Self Care Deficits Care Plan (Adult) Goal: *Acute Goals and Plan of Care (Insert Text) Description: FUNCTIONAL STATUS PRIOR TO ADMISSION: At baseline patient requires total care 24/7. He is a poor historian and unable to answer specific questions regarding level of assist required for self care or transfers. HOME SUPPORT PRIOR TO ADMISSION: The patient lived with family - sister and brother in law. Occupational Therapy Goals Initiated 8/12/2020 1. Patient will perform grooming with modified independence within 7 day(s). 2.  Patient will perform upper body dressing and bathing with supervision/set-up within 7 day(s). 3.  Patient will perform lower body dressing and bathing with moderate assistance  within 7 day(s). 4.  Patient will perform toilet transfers to [facility] with maximal assistance within 7 day(s). 5.  Patient will perform all aspects of toileting with moderate assistance  within 7 day(s). 6.  Patient will participate in upper extremity therapeutic exercise/activities with supervision/set-up for 10 minutes within 7 day(s). 7.  Patient will utilize energy conservation techniques during functional activities with verbal cues within 7 day(s). Outcome: Progressing Towards Goal 
 OCCUPATIONAL THERAPY TREATMENT Patient: Chaya Homans (60 y.o. male) Date: 8/13/2020 Diagnosis: KELIN (acute kidney injury) [facility] [N17.9] <principal problem not specified> Precautions: Bed Alarm, Fall Chart, occupational therapy assessment, plan of care, and goals were reviewed. ASSESSMENT Patient continues with skilled OT services and is progressing towards goals. Patient received supine in bed with HOB elevated. Patient agreeable to activity and reports pain to L knee today. Patient moves to EOB and once seated he requires total assist to don sock to bilateral feet.    Patient with attempt to stand from bed with elevated height, RW and max/total assistance x 2. Patient unable to stand fully at EOB and returned to sitting. Patient demonstrates ability to perform scooting to [facility] and then to chair set up next to bed, armrest removed. Patient left in chair, set up for meal and able to eat on his own. Current Level of Function Impacting Discharge (ADLs): max/total assist for ADLs, Other factors to consider for discharge: family unable to assist with care at home PLAN : 
Patient continues to benefit from skilled intervention to address the above impairments. Continue treatment per established plan of care. to address goals. Recommend with staff: UE ADLs Recommend next OT session: continue OT goals Recommendation for discharge: (in order for the patient to meet his/her long term goals) Therapy up to 5 days/week in SNF setting This discharge recommendation: 
Has been made in collaboration with the attending provider and/or case management IF patient discharges home will need the following DME: TBD SUBJECTIVE:  
Patient stated It moved from one leg to the other.  OBJECTIVE DATA SUMMARY:  
Cognitive/Behavioral Status: 
Neurologic State: Alert Orientation Level: Oriented X4 Cognition: Decreased attention/concentration Perception: Appears intact Perseveration: No perseveration noted Safety/Judgement: Decreased awareness of need for assistance;Decreased awareness of need for safety;Decreased insight into deficits Functional Mobility and Transfers for ADLs: 
Bed Mobility: 
Scooting: Minimum assistance;Assist x2 Transfers: 
Sit to Stand: Assist x2; Additional time;Maximum assistance(initiated movement today, unable to come to full stand) Balance: 
Sitting: High guard Sitting - Static: Good (unsupported) Sitting - Dynamic: Fair (occasional) Standing: Impaired(unable to come to full stand) ADL Intervention: 
Feeding Feeding Assistance: Supervision Container Management: Supervision Utensil Management: Supervision Food to Mouth: Supervision Drink to Mouth: Supervision Lower Body Dressing Assistance Socks: Total assistance (dependent) Leg Crossed Method Used: No 
Position Performed: Seated edge of bed Cues: Physical assistance;Verbal cues provided Cognitive Retraining Safety/Judgement: Decreased awareness of need for assistance;Decreased awareness of need for safety;Decreased insight into deficits Therapeutic Exercises:  
 
 
Pain: 
Patient does not rate but reports pain to L knee today, reports moving from R knee to left Activity Tolerance:  
Fair Please refer to the flowsheet for vital signs taken during this treatment. After treatment patient left in no apparent distress:  
Sitting in chair, Call bell within reach, and bed alarm pad under patient, no box available for connection. Patient RN aware, door left open COMMUNICATION/COLLABORATION:  
The patients plan of care was discussed with: Physical therapy assistant and Registered nurse. Khalida Reagan OTR/NICOLE Time Calculation: 26 mins

## 2020-08-13 NOTE — PROGRESS NOTES
Bedside and Verbal shift change report given to Iqra Smith  (oncoming nurse) by Jeff Elder  (offgoing nurse). Report included the following information SBAR and Kardex.

## 2020-08-13 NOTE — PROGRESS NOTES
CMS Note 8/13/2020 Patient received a copy of the Observation letter. Patient was given a copy for their record. Sreedhar Cardenas CMS

## 2020-08-13 NOTE — PROGRESS NOTES
Bryce Lai [facility] 
[address], [address] [facility] 
[phone] Medical Progress Note NAME: Katy Oates :  1950 MRM:  [id number] Date of service: 2020  7:30 AM 
 
  
Assessment and Plan: 1.  Debility:  readmitted as pt unable to care for self and It looks like his brother-in-law cannot take care of him either. On previos admission, pt ambulated with PT. Consult CM for possible placement. He is noncompliant with meds as he never filled up his prescriptions. 2.  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 Follow BMP. Nephrology consulted.    
  
3.  HTN: Continue hydralazine, ISMN, losartan  
  
4.  DM type 2 causing CKD stage 3: A1c showed poor control at 8.9%. on glimepiride and alogliptin.   
  
5.  Leg edema, right: no DVT. R knee x-ray did show an effusion and OA. s/p knee injection by ortho on previous admission. Monitor  
  
6.  Severe obesity: Body mass index is 40.16 kg/m². Would benefit from weight loss and dietary / lifestyle modifications Subjective: Chief Complaint[de-identified] Patient was seen and examined as a follow up for debility. Chart was reviewed. c/o weakness ROS: 
(bold if positive, if negative) Tolerating PT  Tolerating Diet Objective:  
 
Last 24hrs VS reviewed since prior progress note. Most recent are: 
 
Visit Vitals /72 (BP 1 Location: Left arm, BP Patient Position: At rest) Pulse 80 Temp 97.9 °F (36.6 °C) Resp 18 Wt 116.1 kg (256 lb) SpO2 100% BMI 40.10 kg/m² SpO2 Readings from Last 6 Encounters:  
20 100% 08/10/20 98% 18 97% 18 100% 18 98% 18 99% Intake/Output Summary (Last 24 hours) at 2020 0730 Last data filed at 2020 9800 Gross per 24 hour Intake 480 ml Output 1100 ml Net -620 ml Physical Exam: 
 
Gen:  obese, in no acute distress HEENT:  Pink conjunctivae, PERRL, hearing intact to voice, moist mucous membranes Neck:  Supple, without masses, thyroid non-tender Resp:  No accessory muscle use, clear breath sounds without wheezes rales or rhonchi 
Card:  No murmurs, normal S1, S2 without thrills, bruits or peripheral edema Abd:  Soft, non-tender, non-distended, normoactive bowel sounds are present, no palpable organomegaly and no detectable hernias Lymph:  No cervical or inguinal adenopathy Musc:  No cyanosis or clubbing Skin:  No rashes or ulcers, skin turgor is good Neuro:  Cranial nerves are grossly intact, no focal motor weakness, follows commands appropriately Psych:  Good insight, oriented to person, place and time, alert 
__________________________________________________________________ Medications Reviewed: (see below) Medications:  
 
Current Facility-Administered Medications Medication Dose Route Frequency  alogliptin (NESINA) tablet 6.25 mg  6.25 mg Oral DAILY  clopidogreL (PLAVIX) tablet 75 mg  75 mg Oral DAILY  ferrous sulfate tablet 325 mg  325 mg Oral BID WITH MEALS  folic acid (FOLVITE) tablet 1 mg  1 mg Oral DAILY  hydrALAZINE (APRESOLINE) tablet 50 mg  50 mg Oral TID  isosorbide mononitrate ER (IMDUR) tablet 30 mg  30 mg Oral BID  levothyroxine (SYNTHROID) tablet 150 mcg  150 mcg Oral 6am  
 losartan (COZAAR) tablet 50 mg  50 mg Oral DAILY  sodium bicarbonate tablet 650 mg  650 mg Oral BID  heparin (porcine) injection 5,000 Units  5,000 Units SubCUTAneous Q12H  
 insulin lispro (HUMALOG) injection   SubCUTAneous AC&HS  
 glucose chewable tablet 16 g  4 Tab Oral PRN  
 dextrose (D50W) injection syrg 12.5-25 g  12.5-25 g IntraVENous PRN  
 glucagon (GLUCAGEN) injection 1 mg  1 mg IntraMUSCular PRN  
 isosorbide mononitrate ER (IMDUR) 30 mg tablet  sodium bicarbonate 650 mg tablet  heparin (porcine) 5,000 unit/mL injection Lab Data Reviewed: (see below) Lab Review:  
 
Recent Labs 08/13/20 
5853 08/12/20 
1151 WBC 12.8* 14.2* HGB 8.6* 8.6* HCT 27.6* 26.9*  
 397 Recent Labs 08/13/20 
3430 08/12/20 
1151  137  
K 3.6 3.4*  
* 110* CO2 18* 20* * 126* BUN 46* 49* CREA 3.08* 3.43* CA 9.0 8.9 ALB  --  2.5* TBILI  --  0.4 ALT  --  20 Lab Results Component Value Date/Time Glucose (POC) 137 (H) 08/13/2020 06:31 AM  
 Glucose (POC) 135 (H) 08/12/2020 09:11 PM  
 Glucose (POC) 135 (H) 08/12/2020 05:40 PM  
 Glucose (POC) 142 (H) 08/12/2020 11:46 AM  
 Glucose (POC) 147 (H) 08/10/2020 04:00 PM  
 
No results for input(s): PH, PCO2, PO2, HCO3, FIO2 in the last 72 hours. No results for input(s): INR, INREXT in the last 72 hours. All Micro Results None I have reviewed notes of prior 24hr. Other pertinent lab: Total time spent with patient: 15 Minutes Care Plan discussed with: Patient, Care Manager, Nursing Staff and >50% of time spent in counseling and coordination of care Discussed:  Care Plan Prophylaxis:  Hep SQ Disposition:  SNF/LTC 
        
___________________________________________________ Attending Physician: Travis Diaz MD

## 2020-08-13 NOTE — PROGRESS NOTES
B[facility] [place] [address] Velinda Sicard YOB: 1950 Assessment & Plan:  
CKD 4, stable HTN 
DM2 Acidosis on bcb Anemia Rec: 
Continue iron Add ASH Continue current meds Renal function stable Will follow Subjective:  
CC: f/u CKD HPI: Patient admitted for being unable to care for self at home. Renal function is stable. He was felt to have KELIN during last admission but Creat has been stable around 3 and I think this could be all chronic. He has persistent anemia and iron was ok last week. He is on bicarb for acidosis. ROS: no n/v/sob Current Facility-Administered Medications Medication Dose Route Frequency  alogliptin (NESINA) tablet 6.25 mg  6.25 mg Oral DAILY  clopidogreL (PLAVIX) tablet 75 mg  75 mg Oral DAILY  ferrous sulfate tablet 325 mg  325 mg Oral BID WITH MEALS  folic acid (FOLVITE) tablet 1 mg  1 mg Oral DAILY  hydrALAZINE (APRESOLINE) tablet 50 mg  50 mg Oral TID  isosorbide mononitrate ER (IMDUR) tablet 30 mg  30 mg Oral BID  levothyroxine (SYNTHROID) tablet 150 mcg  150 mcg Oral 6am  
 losartan (COZAAR) tablet 50 mg  50 mg Oral DAILY  sodium bicarbonate tablet 650 mg  650 mg Oral BID  heparin (porcine) injection 5,000 Units  5,000 Units SubCUTAneous Q12H  
 insulin lispro (HUMALOG) injection   SubCUTAneous AC&HS  
 glucose chewable tablet 16 g  4 Tab Oral PRN  
 dextrose (D50W) injection syrg 12.5-25 g  12.5-25 g IntraVENous PRN  
 glucagon (GLUCAGEN) injection 1 mg  1 mg IntraMUSCular PRN Objective:  
 
Vitals: 
Blood pressure 154/74, pulse 83, temperature 97.8 °F (36.6 °C), resp. rate 17, weight 116.1 kg (256 lb), SpO2 100 %. Temp (24hrs), Av.9 °F (36.6 °C), Min:97.5 °F (36.4 °C), Max:98.3 °F (36.8 °C) Intake and Output: 
 07 -  1900 In: -  
Out: 200 [Urine:200] 1901 -  0700 In: 480 [P.O.:480] Out: 1100 [Urine:1100] Physical Exam:              
GENERAL ASSESSMENT: chronically ill, NAD 
CHEST: CTA HEART: S1S2 ABDOMEN: Soft,NT 
EXTREMITY: trace EDEMA 
 
 
   
ECG/rhythm: 
 
Data Review No results for input(s): TNIPOC in the last 72 hours. No lab exists for component: ITNL No results for input(s): CPK, CKMB, TROIQ in the last 72 hours. Recent Labs 08/13/20 
7902 08/12/20 
1151  137  
K 3.6 3.4*  
* 110* CO2 18* 20* BUN 46* 49* CREA 3.08* 3.43* * 126* CA 9.0 8.9 ALB  --  2.5* WBC 12.8* 14.2* HGB 8.6* 8.6* HCT 27.6* 26.9*  
 397 No results for input(s): INR, PTP, APTT, INREXT in the last 72 hours. Needs: urine analysis, urine sodium, protein and creatinine Lab Results Component Value Date/Time Sodium,urine random 112 07/17/2014 06:30 PM  
 Creatinine, urine 97.49 03/15/2018 04:03 PM  
 
 
 
: Lavenia Bernheim, MD 
8/13/2020 [facility]: 
[email or web address] Neena Job office: 
[address] [place] [address], [address] [place] [address] Phone: [phone] Fax :     [phone] [place] office: 
[address] [address] Dionna Bello Phone - [phone] Fax - [phone]

## 2020-08-13 NOTE — PROGRESS NOTES
Problem: Mobility Impaired (Adult and Pediatric) Goal: *Acute Goals and Plan of Care (Insert Text) Description: FUNCTIONAL STATUS PRIOR TO ADMISSION: At baseline patient requires total care 24/7. Poor Historian HOME SUPPORT PRIOR TO ADMISSION: The patient lived with family - sister and brother in law. Physical Therapy Goals Initiated 8/12/2020 1. Patient will move from supine to sit and sit to supine , scoot up and down, and roll side to side in bed with minimal assistance/contact guard assist within 7 day(s). 2.  Patient will lateral transfer from bed to chair and chair to bed with moderate assistance  using the least restrictive device within 7 day(s). 3.  Patient will perform sit < > stand with moderate assistance  within 7 day(s). 4.  Patient will negotiate level surfaces with wheelchair with BLEs 100' with occasional min A within 7 days. Note: PHYSICAL THERAPY TREATMENT Patient: Kristel Wall (56 y.o. male) Date: 8/13/2020 Diagnosis: [facility]ELIN (acute kidney [facility]) ([facility] [N17.9] <principal problem not specified> Precautions: Bed Alarm, Fall Chart, physical therapy assessment, plan of care and goals were reviewed. ASSESSMENT Patient continues with skilled PT services. Pt supine to sit with min assist.Pt comes to sit with min assist.Pt attempted sit to stand but could not come to full stand with max assist of 2. Pt then performed lateral transfer bed to chair with removable armrest min to mod of 2. Pt limited by weak LE. Progress slow. Continue goals. Current Level of Function Impacting Discharge (mobility/balance): Pt min to mod of 2 for tranfers. PLAN : 
Patient continues to benefit from skilled intervention to address the above impairments. Continue treatment per established plan of care. to address goals. Recommendation for discharge: (in order for the patient to meet his/her long term goals) Therapy up to 5 days/week in SNF setting This discharge recommendation: 
Has been made in collaboration with the attending provider and/or case management IF patient discharges home will need the following DME: rolling walker SUBJECTIVE:  
 
 
OBJECTIVE DATA SUMMARY:  
Critical Behavior: 
Neurologic State: Alert Orientation Level: Oriented X4 Cognition: Decreased attention/concentration Safety/Judgement: Decreased awareness of need for assistance, Decreased awareness of need for safety, Decreased insight into deficits Functional Mobility Training: 
Bed Mobility: 
 Min assist supine to sit Scooting: Minimum  to mod of 2 Transfers: 
Sit to Stand: Assist x2; Additional time;Maximum assistance(initiated movement today, unable to come to full stand) Stand to Sit: Maximum assistance;Assist x2 Balance: 
Sitting: High guard Sitting - Static: Good (unsupported) Sitting - Dynamic: Fair (occasional) Standing: Impaired(unable to come to full stand) Activity Tolerance:  
Fair Please refer to the flowsheet for vital signs taken during this treatment. After treatment patient left in no apparent distress:  
Sitting in chair COMMUNICATION/COLLABORATION:  
The patients plan of care was discussed with: Physical therapist.  
 
Leyla Pro PTA Time Calculation: 23 mins

## 2020-08-13 NOTE — PROGRESS NOTES
Problem: Diabetes Self-Management Goal: *Disease process and treatment process Description: Define diabetes and identify own type of diabetes; list 3 options for treating diabetes. Outcome: Progressing Towards Goal 
Goal: *Incorporating nutritional management into lifestyle Description: Describe effect of type, amount and timing of food on blood glucose; list 3 methods for planning meals. Outcome: Progressing Towards Goal 
Goal: *Incorporating physical activity into lifestyle Description: State effect of exercise on blood glucose levels. Outcome: Progressing Towards Goal 
Goal: *Developing strategies to promote health/change behavior Description: Define the ABC's of diabetes; identify appropriate screenings, schedule and personal plan for screenings. Outcome: Progressing Towards Goal 
Goal: *Using medications safely Description: State effect of diabetes medications on diabetes; name diabetes medication taking, action and side effects. Outcome: Progressing Towards Goal 
Goal: *Monitoring blood glucose, interpreting and using results Description: Identify recommended blood glucose targets  and personal targets. Outcome: Progressing Towards Goal 
Goal: *Prevention, detection, treatment of acute complications Description: List symptoms of hyper- and hypoglycemia; describe how to treat low blood sugar and actions for lowering  high blood glucose level. Outcome: Progressing Towards Goal 
Goal: *Prevention, detection and treatment of chronic complications Description: Define the natural course of diabetes and describe the relationship of blood glucose levels to long term complications of diabetes. Outcome: Progressing Towards Goal 
Goal: *Developing strategies to address psychosocial issues Description: Describe feelings about living with diabetes; identify support needed and support network Outcome: Progressing Towards Goal 
Goal: *Sick day guidelines Outcome: Progressing Towards Goal 
  
 Problem: Patient Education: Go to Patient Education Activity Goal: Patient/Family Education Outcome: Progressing Towards Goal 
  
Problem: Falls - Risk of 
Goal: *Absence of Falls Description: Document Lelia Adame Fall Risk and appropriate interventions in the flowsheet. Outcome: Progressing Towards Goal 
Note: Fall Risk Interventions: 
Mobility Interventions: Bed/chair exit alarm, Communicate number of staff needed for ambulation/transfer, OT consult for ADLs, Patient to call before getting OOB, PT Consult for mobility concerns, PT Consult for assist device competence Medication Interventions: Bed/chair exit alarm, Evaluate medications/consider consulting pharmacy, Patient to call before getting OOB Elimination Interventions: Call light in reach, Patient to call for help with toileting needs Problem: Patient Education: Go to Patient Education Activity Goal: Patient/Family Education Outcome: Progressing Towards Goal 
  
Problem: Pressure Injury - Risk of 
Goal: *Prevention of pressure injury Description: Document Juan Scale and appropriate interventions in the flowsheet. Outcome: Progressing Towards Goal 
Note: Pressure Injury Interventions: 
  
 
Moisture Interventions: Absorbent underpads Activity Interventions: Increase time out of bed, PT/OT evaluation Mobility Interventions: Assess need for specialty bed, PT/OT evaluation Nutrition Interventions: Document food/fluid/supplement intake Friction and Shear Interventions: HOB 30 degrees or less, Lift team/patient mobility team 
 
  
 
 
 
  
Problem: Patient Education: Go to Patient Education Activity Goal: Patient/Family Education Outcome: Progressing Towards Goal

## 2020-08-14 PROBLEM — I25.10 CORONARY ARTERY DISEASE INVOLVING NATIVE CORONARY ARTERY OF NATIVE HEART: Status: ACTIVE | Noted: 2020-01-01

## 2020-08-14 PROBLEM — R53.81 PHYSICAL DEBILITY: Status: ACTIVE | Noted: 2020-01-01

## 2020-08-14 PROBLEM — N18.4 CKD (CHRONIC KIDNEY DISEASE) STAGE 4, GFR 15-29 ML/MIN (HCC): Status: ACTIVE | Noted: 2020-01-01

## 2020-08-14 NOTE — PROGRESS NOTES
Bedside and Verbal shift change report given to Rio RN (oncoming nurse) by Teddy Castañeda RN (offgoing nurse). Report included the following information SBAR, Kardex, MAR, Accordion and Recent Results.

## 2020-08-14 NOTE — PROGRESS NOTES
Problem: Mobility Impaired (Adult and Pediatric) Goal: *Acute Goals and Plan of Care (Insert Text) Description: FUNCTIONAL STATUS PRIOR TO ADMISSION: At baseline patient requires total care 24/7. Poor Historian HOME SUPPORT PRIOR TO ADMISSION: The patient lived with family - sister and brother in law. Physical Therapy Goals Initiated 8/12/2020 1. Patient will move from supine to sit and sit to supine , scoot up and down, and roll side to side in bed with minimal assistance/contact guard assist within 7 day(s). 2.  Patient will lateral transfer from bed to chair and chair to bed with moderate assistance  using the least restrictive device within 7 day(s). 3.  Patient will perform sit < > stand with moderate assistance  within 7 day(s). 4.  Patient will negotiate level surfaces with wheelchair with BLEs 100' with occasional min A within 7 days. Note: PHYSICAL THERAPY TREATMENT Patient: Jia San (40 y.o. male) Date: 8/14/2020 Diagnosis: KELIN (acute kidney injury) ([facility] [N17.9] <principal problem not specified> Precautions: Bed Alarm, Fall Chart, physical therapy assessment, plan of care and goals were reviewed. ASSESSMENT Patient continues with skilled PT services. Pt sitting in chair on arrival of PT. Pt performed LE exercise with cues. Pt able to perform hip flexion to both hips in sitting x 10. Pt is unable to fully extend knees for long arc quads secondary to knee knee pain and weakness. Pt attempted sit to needing max assist of 2. Pt unable to come to full stand. Pt performed lateral transfer chair to bed with mod assist.Pt sit to supine mod assist.Pt progress slow. Continue goals. Current Level of Function Impacting Discharge (mobility/balance): Pt unable to come to stand with max assist of 2. PLAN : 
Patient continues to benefit from skilled intervention to address the above impairments. Continue treatment per established plan of care. to address goals. Recommendation for discharge: (in order for the patient to meet his/her long term goals) Therapy up to 5 days/week in SNF setting This discharge recommendation: 
Has been made in collaboration with the attending provider and/or case management IF patient discharges home will need the following DME: to be determined (TBD) SUBJECTIVE:  
 
 
OBJECTIVE DATA SUMMARY:  
Critical Behavior: 
Neurologic State: Alert Orientation Level: Oriented X4 Cognition: Decreased attention/concentration Safety/Judgement: Decreased awareness of need for assistance, Decreased awareness of need for safety, Decreased insight into deficits Functional Mobility Training: 
Bed Mobility: 
 Pt sit to supine with mod assist. 
 
  
  
Transfers: 
Sit to Stand: Maximum assistance;Assist x2 Stand to Sit: Maximum assistance; Moderate assistance;Assist x2 Balance: 
Sitting - Static: Good (unsupported) Therapeutic Exercises: Pt performed LE exercise with cues. Activity Tolerance:  
Good and Fair Please refer to the flowsheet for vital signs taken during this treatment. After treatment patient left in no apparent distress:  
Sitting in chair COMMUNICATION/COLLABORATION:  
The patients plan of care was discussed with: Physical therapist.  
 
Lawrence Mccabe PTA Time Calculation: 23 mins

## 2020-08-14 NOTE — PROGRESS NOTES
Transition of Care Plan: 
 
RUR:  OBV status currently, but is High Risk Received referral to Complex Care Management due to barriers with disposition planning. Admissions: Patient with 1 admission during past year, 08/01/2020 - 08/10/2020. Diagnosis:  Debility, CKD (stage 3); HTN; DM type 2; leg edema; obesity. PMH:  Coronary artery disease, anemia Patient discharged to home at that time where he resides with sister Fifi Celestin ([phone]) and brother in law. Barriers contributing to Readmission: * Patient was to attend PCP appointment with Dr. Makayal Polanco on 08/11, but did not keep appointment. * PCP appointment was required prior to [facility] initiating services; therefore did not receive HH at discharge. * Medications not filled at discharge on 08/10. * Unable to care for self/ambulate at home. WOODY Plan: * Return to relatives home vs. Adult Home * Confirm [employer] application for Medicaid * Recommend OOB for meals * Review possible pain with PT/OT - noted yesterday, but patient does not have any pain meds ordered. * Possible HBPC * Home Health at Discharge Contacted CM Manager and reviewed above. Will continue to follow for next steps toward appropriate disposition plan. [facility], BSW, CCM, ACM-SW Complex CM Coordinator/[employer] [phone]

## 2020-08-14 NOTE — PROGRESS NOTES
[facility] 
[address] [facility], [address] [place] [address] 
[phone] Medical Progress Note NAME: Remigio Hardy :  1950 MRM:  [id number] Date of service: 2020  7:30 AM 
 
  
Assessment and Plan: 1.  Debility:  readmitted as pt unable to care for self and It looks like his brother-in-law cannot take care of him either. On previos admission, pt ambulated with PT. Consult CM for possible placement. He is noncompliant with meds as he never filled up his prescriptions. 2.  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 Follow BMP. Nephrology consulted.    
  
3.  HTN: Continue hydralazine, ISMN, losartan  
  
4.  DM type 2 causing CKD stage 3: A1c showed poor control at 8.9%. on glimepiride and alogliptin.   
  
5.  Leg edema, right: no DVT. R knee x-ray did show an effusion and OA. s/p knee injection by ortho on previous admission. Monitor  
  
6.  Severe obesity: Body mass index is 40.16 kg/m². Would benefit from weight loss and dietary / lifestyle modifications Subjective: Chief Complaint[de-identified] Patient was seen and examined as a follow up for debility. Chart was reviewed. c/o feet pain, weakness ROS: 
(bold if positive, if negative) Tolerating PT  Tolerating Diet Objective:  
 
Last 24hrs VS reviewed since prior progress note. Most recent are: 
 
Visit Vitals /83 (BP 1 Location: Left arm, BP Patient Position: At rest) Pulse 82 Temp 98.1 °F (36.7 °C) Resp 20 Wt 116.1 kg (256 lb) SpO2 100% BMI 40.10 kg/m² SpO2 Readings from Last 6 Encounters:  
20 100% 08/10/20 98% 18 97% 18 100% 18 98% 18 99% Intake/Output Summary (Last 24 hours) at 2020 1104 Last data filed at 2020 2475 Gross per 24 hour Intake 476 ml Output 725 ml Net -249 ml Physical Exam: 
 
Gen:  obese, in no acute distress HEENT:  Pink conjunctivae, PERRL, hearing intact to voice, moist mucous membranes Neck:  Supple, without masses, thyroid non-tender Resp:  No accessory muscle use, clear breath sounds without wheezes rales or rhonchi 
Card:  No murmurs, normal S1, S2 without thrills, bruits or peripheral edema Abd:  Soft, non-tender, non-distended, normoactive bowel sounds are present, no palpable organomegaly and no detectable hernias Lymph:  No cervical or inguinal adenopathy Musc:  No cyanosis or clubbing Skin:  No rashes or ulcers, skin turgor is good Neuro:  Cranial nerves are grossly intact, no focal motor weakness, follows commands appropriately Psych:  Good insight, oriented to person, place and time, alert 
__________________________________________________________________ Medications Reviewed: (see below) Medications:  
 
Current Facility-Administered Medications Medication Dose Route Frequency  epoetin aquilino-epbx (RETACRIT) injection 20,000 Units  20,000 Units SubCUTAneous Q7D  
 alogliptin (NESINA) tablet 6.25 mg  6.25 mg Oral DAILY  clopidogreL (PLAVIX) tablet 75 mg  75 mg Oral DAILY  ferrous sulfate tablet 325 mg  325 mg Oral BID WITH MEALS  folic acid (FOLVITE) tablet 1 mg  1 mg Oral DAILY  hydrALAZINE (APRESOLINE) tablet 50 mg  50 mg Oral TID  isosorbide mononitrate ER (IMDUR) tablet 30 mg  30 mg Oral BID  levothyroxine (SYNTHROID) tablet 150 mcg  150 mcg Oral 6am  
 losartan (COZAAR) tablet 50 mg  50 mg Oral DAILY  sodium bicarbonate tablet 650 mg  650 mg Oral BID  heparin (porcine) injection 5,000 Units  5,000 Units SubCUTAneous Q12H  
 insulin lispro (HUMALOG) injection   SubCUTAneous AC&HS  
 glucose chewable tablet 16 g  4 Tab Oral PRN  
 dextrose (D50W) injection syrg 12.5-25 g  12.5-25 g IntraVENous PRN  
 glucagon (GLUCAGEN) injection 1 mg  1 mg IntraMUSCular PRN Lab Data Reviewed: (see below) Lab Review:  
 
Recent Labs 08/14/20 
0602 08/13/20 3372 08/12/20 
1151 WBC 13.4* 12.8* 14.2* HGB 8.3* 8.6* 8.6* HCT 26.7* 27.6* 26.9*  
 386 397 Recent Labs 08/14/20 
0602 08/13/20 
2564 08/12/20 
1151 * 138 137  
K 3.3* 3.6 3.4*  
* 111* 110* CO2 18* 18* 20* * 108* 126* BUN 49* 46* 49* CREA 3.22* 3.08* 3.43* CA 9.0 9.0 8.9 ALB  --   --  2.5* TBILI  --   --  0.4 ALT  --   --  20 Lab Results Component Value Date/Time Glucose (POC) 146 (H) 08/14/2020 06:54 AM  
 Glucose (POC) 126 (H) 08/13/2020 09:42 PM  
 Glucose (POC) 154 (H) 08/13/2020 04:29 PM  
 Glucose (POC) 115 (H) 08/13/2020 11:46 AM  
 Glucose (POC) 137 (H) 08/13/2020 06:31 AM  
 
No results for input(s): PH, PCO2, PO2, HCO3, FIO2 in the last 72 hours. No results for input(s): INR, INREXT, INREXT in the last 72 hours. All Micro Results None I have reviewed notes of prior 24hr. Other pertinent lab: Total time spent with patient: 15 Minutes Care Plan discussed with: Patient, Care Manager, Nursing Staff and >50% of time spent in counseling and coordination of care Discussed:  Care Plan Prophylaxis:  Hep SQ Disposition:  SNF/LTC 
        
___________________________________________________ Attending Physician: Garrison Stoddard MD

## 2020-08-14 NOTE — PROGRESS NOTES
[facility] [place] [address] Sara Chang YOB: 1950 Assessment & Plan:  
CKD 4, stable AT 3.2 MG 
HTN 
- Near goal 
DM2 Acidosis on bcb Anemia Rec: 
Continue iron ASH Check ortho BP Subjective:  
CC: f/u CKD HPI: CKD stable, HTN not the best, on epo for anemia ROS: no n/v/sob Current Facility-Administered Medications Medication Dose Route Frequency  epoetin aquilino-epbx (RETACRIT) injection 20,000 Units  20,000 Units SubCUTAneous Q7D  
 alogliptin (NESINA) tablet 6.25 mg  6.25 mg Oral DAILY  clopidogreL (PLAVIX) tablet 75 mg  75 mg Oral DAILY  ferrous sulfate tablet 325 mg  325 mg Oral BID WITH MEALS  folic acid (FOLVITE) tablet 1 mg  1 mg Oral DAILY  hydrALAZINE (APRESOLINE) tablet 50 mg  50 mg Oral TID  isosorbide mononitrate ER (IMDUR) tablet 30 mg  30 mg Oral BID  levothyroxine (SYNTHROID) tablet 150 mcg  150 mcg Oral 6am  
 losartan (COZAAR) tablet 50 mg  50 mg Oral DAILY  sodium bicarbonate tablet 650 mg  650 mg Oral BID  heparin (porcine) injection 5,000 Units  5,000 Units SubCUTAneous Q12H  
 insulin lispro (HUMALOG) injection   SubCUTAneous AC&HS  
 glucose chewable tablet 16 g  4 Tab Oral PRN  
 dextrose (D50W) injection syrg 12.5-25 g  12.5-25 g IntraVENous PRN  
 glucagon (GLUCAGEN) injection 1 mg  1 mg IntraMUSCular PRN Objective:  
 
Vitals: 
Blood pressure 160/83, pulse 82, temperature 98.1 °F (36.7 °C), resp. rate 20, weight 116.1 kg (256 lb), SpO2 100 %. Temp (24hrs), Av.6 °F (36.4 °C), Min:97.1 °F (36.2 °C), Max:98.1 °F (36.7 °C) Intake and Output: 
 0701 -  1900 In: 476 [P.O.:476] Out: 225 [Urine:225]  190 -  0700 In: 480 [P.O.:480] Out: 1600 [Urine:1600] Physical Exam:              
GENERAL ASSESSMENT: chronically ill, NAD 
CHEST: CTA HEART: S1S2 ABDOMEN: Soft,NT 
EXTREMITY: trace EDEMA 
 
 
   
ECG/rhythm: 
 
Data Review No results for input(s): TNIPOC in the last 72 hours. No lab exists for component: ITNL No results for input(s): CPK, CKMB, TROIQ in the last 72 hours. Recent Labs 08/14/20 
0602 08/13/20 
6233 08/12/20 
1151 * 138 137  
K 3.3* 3.6 3.4*  
* 111* 110* CO2 18* 18* 20* BUN 49* 46* 49* CREA 3.22* 3.08* 3.43* * 108* 126* CA 9.0 9.0 8.9 ALB  --   --  2.5* WBC 13.4* 12.8* 14.2* HGB 8.3* 8.6* 8.6* HCT 26.7* 27.6* 26.9*  
 386 397 No results for input(s): INR, PTP, APTT, INREXT, INREXT in the last 72 hours. Needs: urine analysis, urine sodium, protein and creatinine Lab Results Component Value Date/Time Sodium,urine random 112 07/17/2014 06:30 PM  
 Creatinine, urine 97.49 03/15/2018 04:03 PM  
 
 
 
: Mario Greene MD 
8/14/2020 [facility]: 
[email or web address] [place]nn Chau office: 
[place] [address] [place] [address], [address] Phone: [phone] Fax :     [phone] [place] office: 
[address] [place] [address], [address] [place] [address] Phone - [phone] Fax - [phone]

## 2020-08-15 NOTE — PROGRESS NOTES
0700: Bedside shift change report given to Michelle Ambriz RN (oncoming nurse) by Lowell Macario RN (offgoing nurse). Report included the following information SBAR, Kardex and Intake/Output.

## 2020-08-15 NOTE — PROGRESS NOTES
Problem: Falls - Risk of 
Goal: *Absence of Falls Description: Document Brittani Mackenzie Fall Risk and appropriate interventions in the flowsheet. Outcome: Progressing Towards Goal 
Note: Fall Risk Interventions: 
Mobility Interventions: Bed/chair exit alarm, Communicate number of staff needed for ambulation/transfer, Patient to call before getting OOB Medication Interventions: Bed/chair exit alarm, Patient to call before getting OOB Elimination Interventions: Bed/chair exit alarm, Call light in reach, Urinal in reach Problem: Pressure Injury - Risk of 
Goal: *Prevention of pressure injury Description: Document Juan Scale and appropriate interventions in the flowsheet. Outcome: Progressing Towards Goal 
Note: Pressure Injury Interventions: 
  
 
Moisture Interventions: Absorbent underpads Activity Interventions: Increase time out of bed Mobility Interventions: HOB 30 degrees or less, Pressure redistribution bed/mattress (bed type) Nutrition Interventions: Document food/fluid/supplement intake Friction and Shear Interventions: Apply protective barrier, creams and emollients, HOB 30 degrees or less, Lift sheet, Minimize layers

## 2020-08-15 NOTE — ROUTINE PROCESS
Bedside shift change report given to Michelle Ambriz RN (oncoming nurse) by Charles Milton RN (offgoing nurse). Report included the following information SBAR, Kardex and MAR.

## 2020-08-15 NOTE — PROGRESS NOTES
[facility] [place] [facility] 
[address] [facility], [address] [place] [address] 
[phone] Medical Progress Note NAME:         Ada Zamorano :        1950 MRM:        [id number] Date of service: 8/15/2020 Subjective: Patient has been seen and examined as a follow up for multiple medical issues. Chart, labs, diagnostics reviewed. He denies any new symptoms. Weak Objective: 
 
Vital Signs: 
 
Visit Vitals /78 (BP 1 Location: Left leg, BP Patient Position: At rest) Pulse 91 Temp 98 °F (36.7 °C) Resp 18 Wt 116.1 kg (256 lb) SpO2 100% BMI 40.10 kg/m² Intake/Output Summary (Last 24 hours) at 8/15/2020 1208 Last data filed at 8/15/2020 1056 Gross per 24 hour Intake 240 ml Output 900 ml Net -660 ml Physical Examination: 
 
General:   Well looking patient in no acute distress Eyes:   pink conjunctivae, PERRLA with no discharge. ENT:   no ottorrhea or rhinorrhea with dry mucous membranes Pulm:  no accessory muscle use, no crackles or wheezes Card:   has regular and normal S1, S2 without thrills, bruits or peripheral edema Abd:  Soft, non-tender, + distended, normoactive bowel sounds Musc:  No cyanosis, clubbing, atrophy or deformities. Skin:  No rashes, bruising or ulcers. Neuro: Awake and alert. Generally a non focal exam. Follows commands appropriately Psych:  Has a good insight and is oriented x 3 Current Facility-Administered Medications Medication Dose Route Frequency  acetaminophen (TYLENOL) tablet 650 mg  650 mg Oral Q4H PRN  
 epoetin aquilino-epbx (RETACRIT) injection 20,000 Units  20,000 Units SubCUTAneous Q7D  
 alogliptin (NESINA) tablet 6.25 mg  6.25 mg Oral DAILY  clopidogreL (PLAVIX) tablet 75 mg  75 mg Oral DAILY  ferrous sulfate tablet 325 mg  325 mg Oral BID WITH MEALS  folic acid (FOLVITE) tablet 1 mg  1 mg Oral DAILY  hydrALAZINE (APRESOLINE) tablet 50 mg  50 mg Oral TID  isosorbide mononitrate ER (IMDUR) tablet 30 mg  30 mg Oral BID  levothyroxine (SYNTHROID) tablet 150 mcg  150 mcg Oral 6am  
 losartan (COZAAR) tablet 50 mg  50 mg Oral DAILY  sodium bicarbonate tablet 650 mg  650 mg Oral BID  heparin (porcine) injection 5,000 Units  5,000 Units SubCUTAneous Q12H  
 insulin lispro (HUMALOG) injection   SubCUTAneous AC&HS  
 glucose chewable tablet 16 g  4 Tab Oral PRN  
 dextrose (D50W) injection syrg 12.5-25 g  12.5-25 g IntraVENous PRN  
 glucagon (GLUCAGEN) injection 1 mg  1 mg IntraMUSCular PRN Laboratory data and review: 
 
Recent Labs 08/15/20 
0247 08/14/20 
0602 08/13/20 
4749 WBC 12.6* 13.4* 12.8* HGB 7.9* 8.3* 8.6* HCT 25.1* 26.7* 27.6*  
 360 386 Recent Labs 08/15/20 
4697 08/14/20 
0602 08/13/20 
1237  135* 138  
K 3.5 3.3* 3.6 * 109* 111* CO2 17* 18* 18* * 139* 108* BUN 51* 49* 46* CREA 3.17* 3.22* 3.08* CA 9.0 9.0 9.0 No components found for: [facility]: 
 
Assessment and Plan: 
 
Physical debility (8/2/2020) / Leg edema, right (8/2/2020) POA: neg DVT on venous doppler. Unable to care for self. Continue PT, OT as tolerated. CM working on disposition KELIN (acute kidney injury) ([facility] (8/12/2020) / CKD (chronic kidney disease) stage 4, GFR 15-29 ml/min ([facility]) (8/14/2020) POA: SCr stable. Coronary artery disease involving native coronary artery of native heart (8/14/2020) POA: denies any symptoms. Continue Imdur, Plavix HTN (hypertension), benign (6/23/2014) POA: BP variable. Continue Hydralazine, Imdur and Cozaar Anemia due to chronic illness (7/17/2014) POA: Hgb stable. Monitor and transfuse as needed. Continue Epoetin Severe obesity (BMI 35.0-39. 9) with comorbidity ([facility] (3/19/2018) POA: already been counseled on weight loss DM type 2 causing CKD stage 3 ([facility] (8/2/2020) POA: last A1c 9.8 %.  Blood glucose stable. Continue Nesina, SSi per protocol Hypothyroidism POA: Continue Levothyroxine Total time spent for the patient's care: 35 Minutes Care Plan discussed with: Patient, Care Manager and Nursing Staff Discussed:  Care Plan and D/C Planning Prophylaxis:  Hep SQ Anticipated Disposition:  SNF/LTC 
        
___________________________________________________ Attending Physician:   Tisha Florence MD

## 2020-08-15 NOTE — PROGRESS NOTES
Problem: Diabetes Self-Management Goal: *Disease process and treatment process Description: Define diabetes and identify own type of diabetes; list 3 options for treating diabetes. Outcome: Progressing Towards Goal 
Goal: *Incorporating nutritional management into lifestyle Description: Describe effect of type, amount and timing of food on blood glucose; list 3 methods for planning meals. Outcome: Progressing Towards Goal 
Goal: *Incorporating physical activity into lifestyle Description: State effect of exercise on blood glucose levels. Outcome: Progressing Towards Goal 
Goal: *Developing strategies to promote health/change behavior Description: Define the ABC's of diabetes; identify appropriate screenings, schedule and personal plan for screenings. Outcome: Progressing Towards Goal 
Goal: *Using medications safely Description: State effect of diabetes medications on diabetes; name diabetes medication taking, action and side effects. Outcome: Progressing Towards Goal 
Goal: *Monitoring blood glucose, interpreting and using results Description: Identify recommended blood glucose targets  and personal targets. Outcome: Progressing Towards Goal 
Goal: *Prevention, detection, treatment of acute complications Description: List symptoms of hyper- and hypoglycemia; describe how to treat low blood sugar and actions for lowering  high blood glucose level. Outcome: Progressing Towards Goal 
Goal: *Prevention, detection and treatment of chronic complications Description: Define the natural course of diabetes and describe the relationship of blood glucose levels to long term complications of diabetes. Outcome: Progressing Towards Goal 
Goal: *Developing strategies to address psychosocial issues Description: Describe feelings about living with diabetes; identify support needed and support network Outcome: Progressing Towards Goal 
Goal: *Sick day guidelines Outcome: Progressing Towards Goal 
  
 Problem: Patient Education: Go to Patient Education Activity Goal: Patient/Family Education Outcome: Progressing Towards Goal 
  
Problem: Falls - Risk of 
Goal: *Absence of Falls Description: Document Lacy Pintogarrett Fall Risk and appropriate interventions in the flowsheet. Outcome: Progressing Towards Goal 
Note: Fall Risk Interventions: 
Mobility Interventions: Bed/chair exit alarm, Communicate number of staff needed for ambulation/transfer, OT consult for ADLs, Patient to call before getting OOB, PT Consult for mobility concerns, PT Consult for assist device competence Medication Interventions: Bed/chair exit alarm, Evaluate medications/consider consulting pharmacy, Patient to call before getting OOB Elimination Interventions: Bed/chair exit alarm, Call light in reach, Patient to call for help with toileting needs Problem: Patient Education: Go to Patient Education Activity Goal: Patient/Family Education Outcome: Progressing Towards Goal 
  
Problem: Pressure Injury - Risk of 
Goal: *Prevention of pressure injury Description: Document Juan Scale and appropriate interventions in the flowsheet. Outcome: Progressing Towards Goal 
Note: Pressure Injury Interventions: 
  
 
Moisture Interventions: Absorbent underpads Activity Interventions: PT/OT evaluation, Increase time out of bed, Pressure redistribution bed/mattress(bed type) Mobility Interventions: Pressure redistribution bed/mattress (bed type), PT/OT evaluation Nutrition Interventions: Document food/fluid/supplement intake Friction and Shear Interventions: Apply protective barrier, creams and emollients, Foam dressings/transparent film/skin sealants Problem: Patient Education: Go to Patient Education Activity Goal: Patient/Family Education Outcome: Progressing Towards Goal

## 2020-08-16 NOTE — PROGRESS NOTES
Problem: Falls - Risk of 
Goal: *Absence of Falls Description: Document Hubert Dumont Fall Risk and appropriate interventions in the flowsheet. Outcome: Progressing Towards Goal 
Note: Fall Risk Interventions: 
Mobility Interventions: Bed/chair exit alarm, Communicate number of staff needed for ambulation/transfer, Patient to call before getting OOB Medication Interventions: Utilize gait belt for transfers/ambulation, Teach patient to arise slowly, Patient to call before getting OOB, Evaluate medications/consider consulting pharmacy, Bed/chair exit alarm Elimination Interventions: Bed/chair exit alarm, Call light in reach, Stay With Me (per policy), Toilet paper/wipes in reach, Urinal in reach, Toileting schedule/hourly rounds, Patient to call for help with toileting needs Problem: Patient Education: Go to Patient Education Activity Goal: Patient/Family Education Outcome: Progressing Towards Goal

## 2020-08-16 NOTE — PROGRESS NOTES
Bryce Ming [facility] [place] [address] 
380 [facility], [address] [place] [address] 
[phone] Medical Progress Note NAME:         Torrey Bryant :        1950 MRM:        [id number] Date of service: 2020 Subjective: Patient has been seen and examined as a follow up for multiple medical issues. Chart, labs, diagnostics reviewed. He denies any new symptoms. Weak but says he is improving Objective: 
 
Vital Signs: 
 
Visit Vitals /73 (BP 1 Location: Left arm, BP Patient Position: At rest) Pulse 80 Temp 98.1 °F (36.7 °C) Resp 16 Wt 116.1 kg (256 lb) SpO2 100% BMI 40.10 kg/m² Intake/Output Summary (Last 24 hours) at 2020 0479 Last data filed at 2020 6922 Gross per 24 hour Intake 600 ml Output 950 ml Net -350 ml Physical Examination: 
 
General:   Well looking patient in no acute distress Eyes:   pink conjunctivae, PERRLA with no discharge. ENT:   no ottorrhea or rhinorrhea with dry mucous membranes Pulm:  no accessory muscle use, no crackles or wheezes Card:   has regular and normal S1, S2 without thrills, bruits or peripheral edema Abd:  Soft, non-tender, + distended, normoactive bowel sounds Musc:  No cyanosis, clubbing, atrophy or deformities. Skin:  No rashes, bruising or ulcers. Neuro: Awake and alert. Generally a non focal exam.  
Psych:  Has a fair insight and is oriented x 3 Current Facility-Administered Medications Medication Dose Route Frequency  acetaminophen (TYLENOL) tablet 650 mg  650 mg Oral Q4H PRN  
 epoetin aquilino-epbx (RETACRIT) injection 20,000 Units  20,000 Units SubCUTAneous Q7D  
 alogliptin (NESINA) tablet 6.25 mg  6.25 mg Oral DAILY  clopidogreL (PLAVIX) tablet 75 mg  75 mg Oral DAILY  ferrous sulfate tablet 325 mg  325 mg Oral BID WITH MEALS  folic acid (FOLVITE) tablet 1 mg  1 mg Oral DAILY  hydrALAZINE (APRESOLINE) tablet 50 mg  50 mg Oral TID  isosorbide mononitrate ER (IMDUR) tablet 30 mg  30 mg Oral BID  levothyroxine (SYNTHROID) tablet 150 mcg  150 mcg Oral 6am  
 losartan (COZAAR) tablet 50 mg  50 mg Oral DAILY  sodium bicarbonate tablet 650 mg  650 mg Oral BID  heparin (porcine) injection 5,000 Units  5,000 Units SubCUTAneous Q12H  
 insulin lispro (HUMALOG) injection   SubCUTAneous AC&HS  
 glucose chewable tablet 16 g  4 Tab Oral PRN  
 dextrose (D50W) injection syrg 12.5-25 g  12.5-25 g IntraVENous PRN  
 glucagon (GLUCAGEN) injection 1 mg  1 mg IntraMUSCular PRN Laboratory data and review: 
 
Recent Labs 08/15/20 
0449 08/14/20 
0602 WBC 12.6* 13.4* HGB 7.9* 8.3* HCT 25.1* 26.7*  
 360 Recent Labs 08/15/20 
0449 08/14/20 
0602  135* K 3.5 3.3*  
* 109* CO2 17* 18* * 139* BUN 51* 49* CREA 3.17* 3.22* CA 9.0 9.0 No components found for: [facility]: 
 
Assessment and Plan: 
 
Physical debility (8/2/2020) / Leg edema, right (8/2/2020) POA: neg DVT on venous doppler. Unable to care for self. Continue PT, OT as tolerated. CM still working on disposition [facility] (8/12[facility]) / CKD (chronic kidney disease) stage 4, GFR 15-29 ml/min ([facility]) (8/14/2020) POA: SCr stable. DM type 2 causing CKD stage 3 ([facility] (8/2/2020) POA: last A1c 9.8 %. Blood glucose stable. Continue Nesina, SSi per protocol Coronary artery disease involving native coronary artery of native heart (8/14/2020) POA: No symptoms. Continue Imdur, Plavix HTN (hypertension), benign (6/23/2014) POA: BP variable but overall stable. Continue Hydralazine, Imdur and Cozaar Anemia due to chronic illness (7/17/2014) POA: Hgb stable. Monitor and transfuse as needed. Continue Epoetin Severe obesity (BMI 35.0-39. 9) with comorbidity ([address] (3/19/2018) POA: already been counseled on weight loss Hypothyroidism POA: Continue Levothyroxine Total time spent for the patient's care: 30  Minutes Care Plan discussed with: Patient, Care Manager and Nursing Staff Discussed:  Care Plan and D/C Planning Prophylaxis:  Hep SQ Anticipated Disposition:  SNF/LTC vs Home w/ family 
        
___________________________________________________ Attending Physician:   Amanda Coto MD

## 2020-08-16 NOTE — ROUTINE PROCESS
Bedside shift change report given to Tin Diaz (oncoming nurse) by Mimi Ribeiro (offgoing nurse). Report included the following information SBAR, Procedure Summary, Intake/Output, MAR and Recent Results.

## 2020-08-16 NOTE — PROGRESS NOTES
Bedside shift change report given to Carie Bella RN (oncoming nurse) by Noe Casillas RN (offgoing nurse). Report included the following information SBAR, Kardex and MAR.

## 2020-08-16 NOTE — PROGRESS NOTES
Bedside and Verbal shift change report given to Marques Montalvo RN (oncoming nurse) by Shayla Davis RN (offgoing nurse). Report included the following information SBAR and Kardex.

## 2020-08-17 NOTE — PROGRESS NOTES
CM Note: 
Transition of Care Plan: RUR-N/A 
1. Pt to be evaluated by PT to determine if he is able to return home with PT 
2. Family to transport him at d/c 
3. Will need home health services 4. F/u with providers as scheduled 5. Followed by Complex Case Manager NICOLAS Amezquita

## 2020-08-17 NOTE — PROGRESS NOTES
Problem: Mobility Impaired (Adult and Pediatric) Goal: *Acute Goals and Plan of Care (Insert Text) Description: FUNCTIONAL STATUS PRIOR TO ADMISSION: At baseline patient requires total care 24/7. Poor Historian HOME SUPPORT PRIOR TO ADMISSION: The patient lived with family - sister and brother in law. Physical Therapy Goals Initiated 8/12/2020 1. Patient will move from supine to sit and sit to supine , scoot up and down, and roll side to side in bed with minimal assistance/contact guard assist within 7 day(s). 2.  Patient will lateral transfer from bed to chair and chair to bed with moderate assistance  using the least restrictive device within 7 day(s). 3.  Patient will perform sit < > stand with moderate assistance  within 7 day(s). 4.  Patient will negotiate level surfaces with wheelchair with BLEs 100' with occasional min A within 7 days. Outcome: Progressing Towards Goal 
Note: PHYSICAL THERAPY TREATMENT Patient: Felecia Fernandez (28 y.o. male) Date: 8/17/2020 Diagnosis[facility] [N17.9[facility] <principal problem not specified> Precautions: Bed Alarm, Fall Chart, physical therapy assessment, plan of care and goals were reviewed. ASSESSMENT Patient continues with skilled PT services and slowly progressing towards goals. Pt received in chair, reviewed seated HEP,unable to achieve full extension in R knee, pt denies pain in LLE. Max A x2 for sit<>stand using RW, heavy use of BUE to stand, requiring assistance to bring hand to walker, unable to achieve upright posture,  Pt with BLE weakness taking small shuffling steps using RW with Mod/ Max A x2 and chair follow for safety x 4' . No eccentric muscle control for controlled descend to chair, requiring Max A x 2. Pt is not safe to use RW for transfers or ambulation. Current Level of Function Impacting Discharge (mobility/balance):  Max A x2 
 
 Other factors to consider for discharge: poor insight into deficits PLAN : 
Patient continues to benefit from skilled intervention to address the above impairments. Continue treatment per established plan of care. to address goals. Recommendation for discharge: (in order for the patient to meet his/her long term goals) Therapy up to 5 days/week in SNF setting This discharge recommendation: 
Has been made in collaboration with the attending provider and/or case management IF patient discharges home will need the following DME: wheelchair with ramp entry into home SUBJECTIVE:  
Patient stated i need some Italia Casper.  OBJECTIVE DATA SUMMARY:  
Critical Behavior: 
Neurologic State: Alert, Appropriate for age Orientation Level: Oriented X4 Cognition: Follows commands Safety/Judgement: Decreased awareness of need for assistance, Decreased awareness of need for safety, Decreased insight into deficits Functional Mobility Training: 
Bed Mobility: 
  
  
  
  
  
  
Transfers: 
Sit to Stand: Maximum assistance;Assist x2 Stand to Sit: Maximum assistance;Assist x2(control descent) Balance: 
Sitting - Static: Good (unsupported) Standing: Impaired; With support Standing - Static: Constant support;Poor Standing - Dynamic : Poor Ambulation/Gait Training: 
Distance (ft): 4 Feet (ft) Assistive Device: Walker, rolling;Gait belt Ambulation - Level of Assistance: Moderate assistance;Assist x2 Gait Abnormalities: Decreased step clearance Base of Support: Widened Speed/Mayda: Slow Step Length: Right shortened;Left shortened Stairs: Therapeutic Exercises:  
 
Pain Rating: 
 
 
Activity Tolerance:  
fair Please refer to the flowsheet for vital signs taken during this treatment. After treatment patient left in no apparent distress:  
Sitting in chair, Call bell within reach, and Bed / chair alarm activated COMMUNICATION/COLLABORATION:  
The patients plan of care was discussed with: Registered nurse. Isabela Cota Time Calculation: 28 mins

## 2020-08-17 NOTE — PROGRESS NOTES
Transition of Care Plan: 
 
RUR:  OBV status currently; High Risk LOS:  5 days * Referral to [facility] for continued Rehab services (pending review and acceptance). * Referred to Home Based Primary Care (pending) * Home Health at discharge from [facility] * Wheelchair/DME TBD * [facility] CM to check on Medicaid application with [employer] 
* Complete LTSS Discussed with Attending, CM Manager, Unit CM this morning to help establish plan for discharge. Spoke with patient and his Brother in [place]; patient can return home at discharge when ambulation/mobility has improved. Patient does not have the ability to complete Virtual Physician appointments at present which is a barrier to follow-up. Referral to [facility] to see if patient can possibly be accepted short term at discharge. Continue to follow for possible transfer to [facility]. If not an option, patient would need to remain hospitalized with Rehab services a priority to increase self-care. [address] [facility] [place] MORRISSEYA, BSW, CCM, ACM-SW Complex CM Coordinator/[employer] [phone]

## 2020-08-17 NOTE — PROGRESS NOTES
Shift Change: 
 
Bedside and Verbal shift change report given to Mulu Vera RN (oncoming nurse) by Mariah Magdaleno RN (offgoing nurse). Report included the following information SBAR, Kardex, MAR and Recent Results. No acute changes Shift Change: 
 
Bedside and Verbal shift change report given to Koko Magdaleno RN (oncoming nurse) by Mulu Vera RN (offgoing nurse). Report included the following information SBAR, Kardex, MAR and Recent Results.

## 2020-08-17 NOTE — PROGRESS NOTES
[facility] [place] [facility] 
[address] [facility], [address] [place] [address] 
[phone] Medical Progress Note NAME:         Kady Van :        1950 MRM:        [id number] Date of service: 2020 Subjective: Patient has been seen and examined as a follow up for multiple medical issues. Chart, labs, diagnostics reviewed. He denies any new symptoms. Weak. Afebrile. Objective: 
 
Vital Signs: 
 
Visit Vitals /78 (BP 1 Location: Left arm, BP Patient Position: Sitting) Pulse 91 Temp 97.8 °F (36.6 °C) Resp 18 Wt 116.1 kg (256 lb) SpO2 100% BMI 40.10 kg/m² Intake/Output Summary (Last 24 hours) at 2020 1725 Last data filed at 2020 1224 Gross per 24 hour Intake 480 ml Output 550 ml Net -70 ml Physical Examination: 
 
General:   Well looking patient in no acute distress Eyes:   pink conjunctivae, PERRLA with no discharge. Pulm:  no accessory muscle use, no crackles or wheezes Card:   has regular and normal S1, S2 without thrills, bruits Abd:  Soft, non-tender, + distended, normoactive bowel sounds Musc:  No cyanosis, clubbing, atrophy or deformities. Skin:  No rashes, bruising or ulcers. Neuro: Awake and alert. Generally a non focal exam.  
Psych:  Has a fair insight and is oriented x 3 Current Facility-Administered Medications Medication Dose Route Frequency  acetaminophen (TYLENOL) tablet 650 mg  650 mg Oral Q4H PRN  
 epoetin aquilino-epbx (RETACRIT) injection 20,000 Units  20,000 Units SubCUTAneous Q7D  
 alogliptin (NESINA) tablet 6.25 mg  6.25 mg Oral DAILY  clopidogreL (PLAVIX) tablet 75 mg  75 mg Oral DAILY  ferrous sulfate tablet 325 mg  325 mg Oral BID WITH MEALS  folic acid (FOLVITE) tablet 1 mg  1 mg Oral DAILY  hydrALAZINE (APRESOLINE) tablet 50 mg  50 mg Oral TID  isosorbide mononitrate ER (IMDUR) tablet 30 mg  30 mg Oral BID  levothyroxine (SYNTHROID) tablet 150 mcg  150 mcg Oral 6am  
 losartan (COZAAR) tablet 50 mg  50 mg Oral DAILY  sodium bicarbonate tablet 650 mg  650 mg Oral BID  heparin (porcine) injection 5,000 Units  5,000 Units SubCUTAneous Q12H  
 insulin lispro (HUMALOG) injection   SubCUTAneous AC&HS  
 glucose chewable tablet 16 g  4 Tab Oral PRN  
 dextrose (D50W) injection syrg 12.5-25 g  12.5-25 g IntraVENous PRN  
 glucagon (GLUCAGEN) injection 1 mg  1 mg IntraMUSCular PRN Laboratory data and review: 
 
Recent Labs 08/17/20 
4693 08/15/20 
9246 WBC 12.6* 12.6* HGB 7.9* 7.9*  
HCT 24.9* 25.1*  
 350 Recent Labs 08/17/20 
5375 08/15/20 
5054  137  
K 3.4* 3.5 * 111* CO2 17* 17* * 126* BUN 48* 51* CREA 2.95* 3.17* CA 9.2 9.0 No components found for: [facility]: 
 
Assessment and Plan: 
 
Physical debility (8/2/2020) / Leg edema, right (8/2/2020) POA: neg DVT on venous doppler. Unable to care for self. Continue PT, OT as tolerated. Complex CM working on discharge options which remains a challenge. KELIN (acute kidney injury) ([facility] (8/12/2020) / CKD (chronic kidney disease) stage 4, GFR 15-29 ml/min ([facility]) (8/14/2020) POA: SCr stable. DM type 2 causing CKD stage 3 ([facility] (8/2/2020) POA: last A1c 9.8 %. Blood glucose stable. Continue Nesina, SSi per protocol Coronary artery disease involving native coronary artery of native heart (8/14/2020) POA: No symptoms. Continue Imdur, Plavix HTN (hypertension), benign (6/23/2014) POA: BP variable but overall stable. Continue Hydralazine, Imdur and Cozaar Anemia due to chronic illness (7/17/2014) POA: Hgb stable. Monitor and transfuse as needed. Continue Epoetin Severe obesity (BMI 35.0-39. 9) with comorbidity ([address] (3/19/2018) POA: already been counseled on weight loss Hypothyroidism POA: Continue Levothyroxine Total time spent for the patient's care: 25  Minutes Care Plan discussed with: Patient, Care Manager and Nursing Staff Discussed:  Care Plan and D/C Planning Prophylaxis:  Hep SQ Anticipated Disposition:  SNF/LTC vs Home w/ family 
        
___________________________________________________ Attending Physician:   Love Claude, MD

## 2020-08-17 NOTE — PROGRESS NOTES
Bryce Lai Kevinmichael [place] Melvi Jackson YOB: 1950 Assessment & Plan:  
CKD 4, stable HTN 
DM2 Acidosis on bcb Anemia Rec: 
Continue iron Continue ASH Replete K No change in antihypertensives Subjective:  
CC: f/u CKD HPI: Renal function stable ROS: no n/v/sob Current Facility-Administered Medications Medication Dose Route Frequency  acetaminophen (TYLENOL) tablet 650 mg  650 mg Oral Q4H PRN  
 epoetin aquilino-epbx (RETACRIT) injection 20,000 Units  20,000 Units SubCUTAneous Q7D  
 alogliptin (NESINA) tablet 6.25 mg  6.25 mg Oral DAILY  clopidogreL (PLAVIX) tablet 75 mg  75 mg Oral DAILY  ferrous sulfate tablet 325 mg  325 mg Oral BID WITH MEALS  folic acid (FOLVITE) tablet 1 mg  1 mg Oral DAILY  hydrALAZINE (APRESOLINE) tablet 50 mg  50 mg Oral TID  isosorbide mononitrate ER (IMDUR) tablet 30 mg  30 mg Oral BID  levothyroxine (SYNTHROID) tablet 150 mcg  150 mcg Oral 6am  
 losartan (COZAAR) tablet 50 mg  50 mg Oral DAILY  sodium bicarbonate tablet 650 mg  650 mg Oral BID  heparin (porcine) injection 5,000 Units  5,000 Units SubCUTAneous Q12H  
 insulin lispro (HUMALOG) injection   SubCUTAneous AC&HS  
 glucose chewable tablet 16 g  4 Tab Oral PRN  
 dextrose (D50W) injection syrg 12.5-25 g  12.5-25 g IntraVENous PRN  
 glucagon (GLUCAGEN) injection 1 mg  1 mg IntraMUSCular PRN Objective:  
 
Vitals: 
Blood pressure 165/88, pulse 79, temperature 97.4 °F (36.3 °C), resp. rate 18, weight 116.1 kg (256 lb), SpO2 100 %. Temp (24hrs), Av.6 °F (36.4 °C), Min:97.4 °F (36.3 °C), Max:97.8 °F (36.6 °C) Intake and Output: 
 07 -  1900 In: 240 [P.O.:240] Out: 550 [Urine:550] 08/15 1901 -  0700 In: 600 [P.O.:600] Out: 600 [Urine:600] Physical Exam:              
GENERAL ASSESSMENT: chronically ill, NAD 
CHEST: CTA HEART: S1S2 ABDOMEN: Soft,NT 
 EXTREMITY: trace EDEMA 
 
 
   
ECG/rhythm: 
 
Data Review No results for input(s): TNIPOC in the last 72 hours. No lab exists for component: ITNL No results for input(s): CPK, CKMB, TROIQ in the last 72 hours. Recent Labs 08/17/20 
0250 08/15/20 
8440  137  
K 3.4* 3.5 * 111* CO2 17* 17* BUN 48* 51* CREA 2.95* 3.17* * 126* CA 9.2 9.0 WBC 12.6* 12.6* HGB 7.9* 7.9*  
HCT 24.9* 25.1*  
 350 No results for input(s): INR, PTP, APTT, INREXT, INREXT in the last 72 hours. Needs: urine analysis, urine sodium, protein and creatinine Lab Results Component Value Date/Time Sodium,urine random 112 07/17/2014 06:30 PM  
 Creatinine, urine 97.49 03/15/2018 04:03 PM  
 
 
 
: Rita Smith MD 
8/17/2020 [facility]: 
[email or web address] Nelda office: 
[address] [place] [address], [address] [place], [address] [facility] Phone: [phone] Fax :     [phone] [place] office: 
200 [facility], [address] Phone - [phone] Fax - [phone]

## 2020-08-17 NOTE — PROGRESS NOTES
Bedside and Verbal shift change report given to Keith Fletcher RN (oncoming nurse) by Marla Peace RN (offgoing nurse). Report included the following information SBAR, Kardex, MAR and Accordion.

## 2020-08-18 NOTE — PROGRESS NOTES
Transition of Care Plan: 
 
RUR:  OBV status currently; high risk for readmission LOS:  6 Days [facility] - on waiting list for review/acceptance * Continue PT/OT to gain maximum independence with self-care * [facility] - on waiting list for possible future admission * DME prior to discharge - to be determined. * LTSS completed by [facility] Coordinator * Requested [facility] have patient sign LEANDRO for Medicaid application. Patient continues to require assistance with ADL's and is making slow progress with PT services. Is not a candidate for admission to SNF due to past criminal record. Goals is to continue PT services to achieve maximum independence with self-care/ambulation - transfer skills. Patient will then return home with sister and brother in law. Contacted [facility] this AM to review referral when bed is available. Anticipate possible admission later this week if approved for transfer. LTSS has been completed - pending approval for possible additional services in the home if available. [address] [employer], BSW, CCM, ACM-SW Complex CM Coordinator/[employer] [phone]

## 2020-08-18 NOTE — PROGRESS NOTES
Spiritual Care Assessment/Progress Note [address] 
 
 
NAME: Kelsy Rodriguez      MRN: [id number] AGE: 78 y.o. SEX: male Mormonism Affiliation: [employer]  
Language: [place] 8/18/2020     Total Time (in minutes): 5 Spiritual Assessment begun in [facility] 5M1 MED SURG 1 through conversation with: 
  
    [x]Patient        [] Family    [] Friend(s) Reason for Consult: Initial/Spiritual assessment, patient floor Spiritual beliefs: (Please include comment if needed) 
   [] Identifies with a michelle tradition:     
   [] Supported by a michelle community:        
   [] Claims no spiritual orientation:       
   [] Seeking spiritual identity:            
   [] Adheres to an individual form of spirituality:       
   [x] Not able to assess:                   
 
    
Identified resources for coping:  
   [] Prayer                           
   [] Music                  [] Guided Imagery 
   [] Family/friends                 [] Pet visits [] Devotional reading                         [x] Unknown 
   [] Other:                                          
 
 
Interventions offered during this visit: (See comments for more details) Plan of Care: 
 
 [x] Support spiritual and/or cultural needs  
 [] Support AMD and/or advance care planning process    
 [] Support grieving process 
 [] Coordinate Rites and/or Rituals  
 [] Coordination with community clergy [] No spiritual needs identified at this time 
 [] Detailed Plan of Care below (See Comments)  [] Make referral to Music Therapy 
[] Make referral to Pet Therapy    
[] Make referral to Addiction services 
[] Make referral to [facility] 
[] Make referral to Spiritual Care Partner 
[] No future visits requested       
[x] Follow up visits as needed Comments:  for initial visit. When  knocked it seemed the pt welcomed.  Pt opened his eyes and moaned a little and seemed to go back to sleep. Please contact [phone] [facility] for further support.  follow up as needed. [email or web address] Juan Antonio Mays, MACE 
 [phone])

## 2020-08-18 NOTE — PROGRESS NOTES
Bryce Tuckerelsen michael [place] [address] 
380 [facility], [address] [place] [address] 
[phone] Medical Progress Note NAME:         Carey Barreto :        1950 MRM:        [id number] Date of service: 2020 Subjective: Patient has been seen and examined as a follow up for multiple medical issues. Chart, labs, diagnostics reviewed. He denies any new symptoms. Tolerating diet. No fever or chills. Objective: 
 
Vital Signs: 
 
Visit Vitals /68 (BP 1 Location: Left arm, BP Patient Position: Sitting) Pulse 93 Temp 97.5 °F (36.4 °C) Resp 18 Wt 116.1 kg (256 lb) SpO2 99% BMI 40.10 kg/m² Intake/Output Summary (Last 24 hours) at 2020 1142 Last data filed at 2020 1024 Gross per 24 hour Intake 720 ml Output 500 ml Net 220 ml Physical Examination: 
 
General:   Well looking patient in no acute distress Pulm:  no accessory muscle use, no crackles or wheezes Card:   has regular and normal S1, S2 without thrills, bruits Abd:  Soft, non-tender, + distended, normoactive bowel sounds Musc:  No cyanosis, clubbing, atrophy or deformities. Neuro: Awake and alert. Generally a non focal exam.  
Psych:  Has a fair insight and is oriented x 3 Current Facility-Administered Medications Medication Dose Route Frequency  acetaminophen (TYLENOL) tablet 650 mg  650 mg Oral Q4H PRN  
 epoetin aquilino-epbx (RETACRIT) injection 20,000 Units  20,000 Units SubCUTAneous Q7D  
 alogliptin (NESINA) tablet 6.25 mg  6.25 mg Oral DAILY  clopidogreL (PLAVIX) tablet 75 mg  75 mg Oral DAILY  ferrous sulfate tablet 325 mg  325 mg Oral BID WITH MEALS  folic acid (FOLVITE) tablet 1 mg  1 mg Oral DAILY  hydrALAZINE (APRESOLINE) tablet 50 mg  50 mg Oral TID  isosorbide mononitrate ER (IMDUR) tablet 30 mg  30 mg Oral BID  
  levothyroxine (SYNTHROID) tablet 150 mcg  150 mcg Oral 6am  
 losartan (COZAAR) tablet 50 mg  50 mg Oral DAILY  sodium bicarbonate tablet 650 mg  650 mg Oral BID  heparin (porcine) injection 5,000 Units  5,000 Units SubCUTAneous Q12H  
 insulin lispro (HUMALOG) injection   SubCUTAneous AC&HS  
 glucose chewable tablet 16 g  4 Tab Oral PRN  
 dextrose (D50W) injection syrg 12.5-25 g  12.5-25 g IntraVENous PRN  
 glucagon (GLUCAGEN) injection 1 mg  1 mg IntraMUSCular PRN Laboratory data and review: 
 
Recent Labs 08/17/20 
6929 WBC 12.6* HGB 7.9*  
HCT 24.9*  
 Recent Labs 08/17/20 
3339   
K 3.4*  
* CO2 17* * BUN 48* CREA 2.95* CA 9.2 No components found for: [facility]: 
 
Assessment and Plan: 
 
Physical debility (8/2/2020) / Leg edema, right (8/2/2020) POA: neg DVT on venous doppler. Unable to care for self. Continue PT, OT as tolerated. Complex CM working on discharge options. Has been accepted at [facility] [place]. Transfer once bed available. DM type 2 causing CKD stage 3 ([facility] (8/2/2020) POA: last A1c 9.8 %. Blood glucose stable. Continue Nesina, SSi per protocol KELIN (acute kidney injury) ([facility] (8/12/2020) / CKD (chronic kidney disease) stage 4, GFR 15-29 ml/min ([facility]) (8/14/2020) POA: SCr stable. Coronary artery disease involving native coronary artery of native heart (8/14/2020) POA: No symptoms. Continue Imdur, Plavix HTN (hypertension), benign (6/23/2014) POA: BP variable but overall stable. Continue Hydralazine, Imdur and Cozaar Anemia due to chronic illness (7/17/2014) POA: Hgb stable. Monitor and transfuse as needed. Continue Epoetin Severe obesity (BMI 35.0-39. 9) with comorbidity ([facility] (3/19/2018) POA: already been counseled on weight loss Hypothyroidism POA: Continue Levothyroxine Total time spent for the patient's care: 25  Minutes Care Plan discussed with: Patient, Care Manager and Nursing Staff Discussed:  Care Plan and D/C Planning Prophylaxis:  Hep SQ Anticipated Disposition:  [facility] 
        
___________________________________________________ Attending Physician:   Gee Momin MD

## 2020-08-18 NOTE — PROGRESS NOTES
7550 
 
Bedside and Verbal shift change report given to Brad Rose RN (oncoming nurse) by Sue Ang RN (offgoing nurse). Report included the following information SBAR, Kardex, MAR and Recent Results.

## 2020-08-18 NOTE — PROGRESS NOTES
Bryce Lai Kevinmichael [place] [address] Emil Abrams YOB: 1950 Assessment & Plan:  
CKD 4, stable HTN 
DM2 Acidosis on bcb Anemia Hypokalemia Rec: 
Continue iron Continue ASH Continue NaHCO3 Check labs in am 
No change in antihypertensives Subjective:  
CC: f/u CKD HPI: No new labs. Making urine. ROS: no n/v/sob Current Facility-Administered Medications Medication Dose Route Frequency  acetaminophen (TYLENOL) tablet 650 mg  650 mg Oral Q4H PRN  
 epoetin aquilino-epbx (RETACRIT) injection 20,000 Units  20,000 Units SubCUTAneous Q7D  
 alogliptin (NESINA) tablet 6.25 mg  6.25 mg Oral DAILY  clopidogreL (PLAVIX) tablet 75 mg  75 mg Oral DAILY  ferrous sulfate tablet 325 mg  325 mg Oral BID WITH MEALS  folic acid (FOLVITE) tablet 1 mg  1 mg Oral DAILY  hydrALAZINE (APRESOLINE) tablet 50 mg  50 mg Oral TID  isosorbide mononitrate ER (IMDUR) tablet 30 mg  30 mg Oral BID  levothyroxine (SYNTHROID) tablet 150 mcg  150 mcg Oral 6am  
 losartan (COZAAR) tablet 50 mg  50 mg Oral DAILY  sodium bicarbonate tablet 650 mg  650 mg Oral BID  heparin (porcine) injection 5,000 Units  5,000 Units SubCUTAneous Q12H  
 insulin lispro (HUMALOG) injection   SubCUTAneous AC&HS  
 glucose chewable tablet 16 g  4 Tab Oral PRN  
 dextrose (D50W) injection syrg 12.5-25 g  12.5-25 g IntraVENous PRN  
 glucagon (GLUCAGEN) injection 1 mg  1 mg IntraMUSCular PRN Objective:  
 
Vitals: 
Blood pressure 142/68, pulse 93, temperature 97.5 °F (36.4 °C), resp. rate 18, weight 116.1 kg (256 lb), SpO2 99 %. Temp (24hrs), Av.7 °F (36.5 °C), Min:97.5 °F (36.4 °C), Max:97.9 °F (36.6 °C) Intake and Output: 
 07 - 1900 In: -  
Out: 150 [Urine:150]  1901 -  0700 In: 960 [P.O.:960] Out: 900 [Urine:900] Physical Exam:              
GENERAL ASSESSMENT: chronically ill, NAD 
CHEST: CTA HEART: S1S2 
 ABDOMEN: Soft,NT 
EXTREMITY: trace EDEMA 
 
 
   
ECG/rhythm: 
 
Data Review No results for input(s): TNIPOC in the last 72 hours. No lab exists for component: ITNL No results for input(s): CPK, CKMB, TROIQ in the last 72 hours. Recent Labs 08/17/20 
8226   
K 3.4*  
* CO2 17* BUN 48* CREA 2.95* * CA 9.2 WBC 12.6* HGB 7.9*  
HCT 24.9*  
 No results for input(s): INR, PTP, APTT, INREXT, INREXT in the last 72 hours. Needs: urine analysis, urine sodium, protein and creatinine Lab Results Component Value Date/Time Sodium,urine random 112 07/17/2014 06:30 PM  
 Creatinine, urine 97.49 03/15/2018 04:03 PM  
 
 
 
: Matt Donahue MD 
8/18/2020 [facility]: 
[email or web address] Alvia Krabbe office: 
[address], [address] [place], [address] [facility] Phone: [phone] Fax :     [phone] [place] office: 
200 [facility], [address] Phone - [phone] Fax - [phone]

## 2020-08-18 NOTE — PROGRESS NOTES
Bedside and Verbal shift change report given to [address] [place] [address] Way (oncoming nurse) by Lauren Trivedi RN (offgoing nurse). Report included the following information SBAR, Kardex, Intake/Output, MAR and Accordion.

## 2020-08-18 NOTE — PROGRESS NOTES
Problem: Mobility Impaired (Adult and Pediatric) Goal: *Acute Goals and Plan of Care (Insert Text) Description: FUNCTIONAL STATUS PRIOR TO ADMISSION: At baseline patient requires total care 24/7. Poor Historian HOME SUPPORT PRIOR TO ADMISSION: The patient lived with family - sister and brother in law. Physical Therapy Goals Initiated 8/12/2020 1. Patient will move from supine to sit and sit to supine , scoot up and down, and roll side to side in bed with minimal assistance/contact guard assist within 7 day(s). 2.  Patient will lateral transfer from bed to chair and chair to bed with moderate assistance  using the least restrictive device within 7 day(s). 3.  Patient will perform sit < > stand with moderate assistance  within 7 day(s). 4.  Patient will negotiate level surfaces with wheelchair with BLEs 100' with occasional min A within 7 days. Note: PHYSICAL THERAPY TREATMENT Patient: Gerald Robbins (5 y.o. male) Date: 8/18/2020 Diagnosis: KELIN (acute kidney injury) ([facility] [N17.9] <principal problem not specified> Precautions: Bed Alarm, Fall Chart, physical therapy assessment, plan of care and goals were reviewed. ASSESSMENT Patient continues with skilled PT services. Pt reports back to bed after sitting in chair this AM.Pts right knee is contracted and painful with movement. Pt agreed to gentle PROM and AAROM to right knee. Pt with increased ROM post exercise but was not able to fully extend knee. Pt also performed AROM exercise to left LE with no complaints. Pts weak painful LEies  continue to be a barrier to rehab. Progress slow. Continue goals. PLAN : 
Patient continues to benefit from skilled intervention to address the above impairments. Continue treatment per established plan of care. to address goals. Recommendation for discharge: (in order for the patient to meet his/her long term goals) Therapy up to 5 days/week in SNF setting This discharge recommendation: 
Has been made in collaboration with the attending provider and/or case management IF patient discharges home will need the following DME: to be determined (TBD) SUBJECTIVE:  
 
 
OBJECTIVE DATA SUMMARY:  
Critical Behavior: 
Neurologic State: Alert, Appropriate for age Orientation Level: Oriented X4 Cognition: Follows commands Safety/Judgement: Decreased awareness of need for assistance, Decreased awareness of need for safety, Decreased insight into deficits Therapeutic Exercises:  
AAROM PROM AROM to [employer] Activity Tolerance:  
Fair and Poor Please refer to the flowsheet for vital signs taken during this treatment. After treatment patient left in no apparent distress:  
Supine in bed COMMUNICATION/COLLABORATION:  
The patients plan of care was discussed with: Physical therapist.  
 
Trace Briscoe PTA Time Calculation: 11 mins

## 2020-08-19 NOTE — H&P
Hospitalist Admission Note NAME: Jia San :  1950 MRN:  [id number] Room Number: 651/48  @ [facility]  
 
Date/Time:  2020 4:00 PM 
 
Patient PCP: Lucas Horner MD 
______________________________________________________________________ Given the patient's current clinical presentation, I have a high level of concern for decompensation if discharged from the emergency department. Complex decision making was performed, which includes reviewing the patient's available past medical records, laboratory results, and x-ray films. My assessment of this patient's clinical condition and my plan of care is as follows. Assessment / Plan: Active Problems: 
  Physical debility (2020) Physical debility Leg edema, right POA Patient presented with inability to care for self at home. Likely due to long standing physical deconditioning due to chronic medical problems.  
 
- PT/OT consult - Plan for discharge to home with family, HH once functionality and ambulation improve. Type 2 diabetes with diabetic nephropathy on long term insulin A1c 8.9 % on 2020  
 
- FSG AC HS, consistent carb diet, hypoglycemia protocol - ELAINE, Alogliptin CKD stage 4 POA Due to long standing hypertension, T2DM. stable. Baseline creatinine 3.1-3.3.  
- Continue iron, erythropoeitin. - Continue losartan 100 mg daily, Isosorbide mononitrate 30 mg BID, hydralazine - Nephrology consulted- spoke with Dr. Mackenzie Knowles, [employer] [facility]. Sodium bicarbonate 1950 mg TID. - Repeat Chem 10 tomorrow. Coronary artery disease involving native coronary artery of native heart POA:  
Continue Imdur, Plavix HTN (hypertension), benign POA, uncontrolled:  
- Continue Hydralazine, Imdur and Cozaar Anemia due to chronic illness POA:  
deficient in folic acid. Folic acid 4.4.  
- Continue Epoetin, ferrous sulfate. Severe obesity (BMI 35.0-39. 9) with comorbidity POA:  
Counseled on Lifestyle modifications, AHA Diet ,weight loss strategies. Hypothyroidism POA:  
-check TSH  
-Continue Levothyroxine BMI 40.10 Morbid obesity Nutrition consult Code Status: full Surrogate Decision Maker: 
sister Mary Bryson ([phone]) DVT Prophylaxis: Hep SQ 
GI Prophylaxis: not indicated Lines : none Baseline: ambulatory prior to admission Subjective: CHIEF COMPLAINT: debility HISTORY OF PRESENT ILLNESS:    
 
50-year-old male who has previous history significant for chronic kidney disease, diabetes, CAD, hypertension being transferred to [facility] for physical rehabilitation. He presented to [address] [place] [address] on 8/12 with failure to thrive. He had a prior hospital admission at [address] [place] [address] from 8/1-8/10 for acute kidney injury. He has chronic medical conditions and noted by home health nurse to be unable to take care of himself and sent to the ER. He was unable to be sent to SNF from ER due to registered sex offender status. During this hospital course, he was treated for metabolic acidosis due to CKD, blood pressure medications were titrated, PT/OT consulted. After 7 day hospital course, he was unable to return to baseline level of functionality and requires more PT/OT. Hence he was transferred to [facility] for continuation of medical care. Patient  is a poor historian. States that he forgets to take his medications, and was unable to ambulate for quite some time, unable to tell me exactly when decline began. He denies any complains at this time, reports his feet hurt from time to time. We were asked to admit for work up and evaluation of the above problems. Past Medical History:  
Diagnosis Date  Anemia  Bronchitis  Chronic kidney disease UTI  Diabetes ([address]  Gastrointestinal disorder   
 anemia  Hypertension  Kidney disease, chronic, stage II (GFR 60-89 ml/min)  Neurological disorder Metabolic Brain Disorder Past Surgical History:  
Procedure Laterality Date  HX OTHER SURGICAL    
 never Social History Tobacco Use  Smoking status: Former Smoker Packs/day: 0.50 Years: 30.00 Pack years: 15.00 Types: Cigarettes Last attempt to quit: 1994 Years since quittin.6  Smokeless tobacco: Never Used Substance Use Topics  Alcohol use: No  
  
 
Family History Problem Relation Age of Onset  Diabetes Mother  Cancer Sister No Known Allergies Prior to Admission medications Medication Sig Start Date End Date Taking? Authorizing Provider  
epoetin aquilino-epbx (RETACRIT) 10,000 unit/mL injection 2 mL by SubCUTAneous route every seven (7) days for 3 doses. Every   Indications: anemia due to kidney failure 20  Susanna Dominguez MD  
ferrous sulfate 325 mg (65 mg iron) tablet Take 1 Tab by mouth two (2) times daily (with meals). 20   Rony Nick MD  
hydrALAZINE (APRESOLINE) 50 mg tablet Take 1 Tab by mouth three (3) times daily. 20   Rony Nick MD  
alogliptin (NESINA) 6.25 mg tablet Take 1 Tab by mouth daily. 20   Rony Nick MD  
losartan (COZAAR) 50 mg tablet Take 1 Tab by mouth daily. 20   Rony Nick MD  
folic acid (FOLVITE) 1 mg tablet Take 1 Tab by mouth daily. 20   Rony Nick MD  
sodium bicarbonate 650 mg tablet Take 1 Tab by mouth two (2) times a day. 20   Yennifer Boucher MD  
isosorbide mononitrate ER (IMDUR) 30 mg tablet Take 30 mg by mouth two (2) times a day. Provider, Historical  
levothyroxine (SYNTHROID) 150 mcg tablet TAKE 1 TABLET BY MOUTH ONCE DAILY BEFORE BREAKFAST 19   Adrian Denson MD  
clopidogrel (PLAVIX) 75 mg tab Take 1 Tab by mouth daily.  3/18/18   Eldon Mena MD  
 
 
REVIEW OF SYSTEMS:    
 I am not able to complete the review of systems because: The patient is intubated and sedated The patient has altered mental status due to his acute medical problems The patient has baseline aphasia from prior stroke(s) The patient has baseline dementia and is not reliable historian The patient is in acute medical distress and unable to provide information Total of 12 systems reviewed as follows:   
   POSITIVE= underlined text  Negative = text not underlined General:  fever, chills, sweats, generalized weakness, weight loss/gain,  
   loss of appetite Eyes:    blurred vision, eye pain, loss of vision, double vision ENT:    rhinorrhea, pharyngitis Respiratory:   cough, sputum production, SOB, MCDERMOTT, wheezing, pleuritic pain  
Cardiology:   chest pain, palpitations, orthopnea, PND, edema, syncope Gastrointestinal:  abdominal pain , N/V, diarrhea, dysphagia, constipation, bleeding Genitourinary:  frequency, urgency, dysuria, hematuria, incontinence Muskuloskeletal :  arthralgia, myalgia, back pain Hematology:  easy bruising, nose or gum bleeding, lymphadenopathy Dermatological: rash, ulceration, pruritis, color change / jaundice Endocrine:   hot flashes or polydipsia Neurological:  headache, dizziness, confusion, focal weakness, paresthesia, Speech difficulties, memory loss, gait difficulty Psychological: Feelings of anxiety, depression, agitation Objective: VITALS:   
There were no vitals taken for this visit. PHYSICAL EXAM: 
 
 
______________________________________________________________________ Care Plan discussed with:  Patient/Family, Nurse and  Expected  Disposition:   PT, OT, RN 
________________________________________________________________________ TOTAL TIME:  40 Minutes Critical Care Provided     Minutes non procedure based Comments Reviewed previous records  
>50% of visit spent in counseling and coordination of care  Discussion with patient and/or family and questions answered 
  
 
________________________________________________________________________ Signed: Tim Maier MD 
 
Procedures: see electronic medical records for all procedures/Xrays and details which were not copied into this note but were reviewed prior to creation of Plan. LAB DATA REVIEWED:   
Recent Results (from the past 24 hour(s)) GLUCOSE, POC Collection Time: 08/18/20  4:23 PM  
Result Value Ref Range Glucose (POC) 186 (H) 65 - 100 mg/dL Performed by Claudell Coke (PCT) GLUCOSE, POC Collection Time: 08/18/20  9:21 PM  
Result Value Ref Range Glucose (POC) 168 (H) 65 - 100 mg/dL Performed by Kori Thrasher (PCT) RENAL FUNCTION PANEL Collection Time: 08/19/20  5:18 AM  
Result Value Ref Range Sodium 138 136 - 145 mmol/L Potassium 3.5 3.5 - 5.1 mmol/L Chloride 110 (H) 97 - 108 mmol/L  
 CO2 17 (L) 21 - 32 mmol/L Anion gap 11 5 - 15 mmol/L Glucose 123 (H) 65 - 100 mg/dL BUN 47 (H) 6 - 20 MG/DL  Creatinine 3.15 (H) 0.70 - 1.30 MG/DL  
 BUN/Creatinine ratio 15 12 - 20 GFR est AA 24 (L) >60 ml/min/1.73m2 GFR est non-AA 20 (L) >60 ml/min/1.73m2 Calcium 9.3 8.5 - 10.1 MG/DL Phosphorus 4.6 2.6 - 4.7 MG/DL Albumin 2.2 (L) 3.5 - 5.0 g/dL CBC WITH AUTOMATED DIFF Collection Time: 08/19/20  5:18 AM  
Result Value Ref Range WBC 9.5 4.1 - 11.1 K/uL  
 RBC 3.07 (L) 4.10 - 5.70 M/uL HGB 7.9 (L) 12.1 - 17.0 g/dL HCT 25.6 (L) 36.6 - 50.3 % MCV 83.4 80.0 - 99.0 FL  
 MCH 25.7 (L) 26.0 - 34.0 PG  
 MCHC 30.9 30.0 - 36.5 g/dL  
 RDW 15.4 (H) 11.5 - 14.5 % PLATELET 602 886 - 825 K/uL MPV 9.7 8.9 - 12.9 FL  
 NRBC 0.0 0  WBC ABSOLUTE NRBC 0.00 0.00 - 0.01 K/uL NEUTROPHILS 81 (H) 32 - 75 % LYMPHOCYTES 13 12 - 49 % MONOCYTES 5 5 - 13 % EOSINOPHILS 1 0 - 7 % BASOPHILS 0 0 - 1 % IMMATURE GRANULOCYTES 0 0.0 - 0.5 % ABS. NEUTROPHILS 7.7 1.8 - 8.0 K/UL  
 ABS. LYMPHOCYTES 1.2 0.8 - 3.5 K/UL  
 ABS. MONOCYTES 0.4 0.0 - 1.0 K/UL  
 ABS. EOSINOPHILS 0.1 0.0 - 0.4 K/UL  
 ABS. BASOPHILS 0.0 0.0 - 0.1 K/UL  
 ABS. IMM. GRANS. 0.0 0.00 - 0.04 K/UL  
 DF AUTOMATED    
SAMPLES BEING HELD Collection Time: 08/19/20  5:18 AM  
Result Value Ref Range SAMPLES BEING HELD 1PST COMMENT Add-on orders for these samples will be processed based on acceptable specimen integrity and analyte stability, which may vary by analyte. GLUCOSE, POC Collection Time: 08/19/20  7:19 AM  
Result Value Ref Range Glucose (POC) 129 (H) 65 - 100 mg/dL Performed by Lamar Brittle (PCT) GLUCOSE, POC Collection Time: 08/19/20 10:36 AM  
Result Value Ref Range Glucose (POC) 147 (H) 65 - 100 mg/dL Performed by [employer] (PCT)

## 2020-08-19 NOTE — PROGRESS NOTES
Bedside and Verbal shift change report given to Lili Macedo RN (oncoming nurse) by Jaden López RN (offgoing nurse). Report included the following information SBAR, Kardex and MAR.

## 2020-08-19 NOTE — ACP (ADVANCE CARE PLANNING)
Advance Care Planning (ACP) Provider Note - Comprehensive Date of ACP Conversation: 08/19/20 Persons included in Conversation:  patient Length of ACP Conversation in minutes:  16 minutes Authorized Decision Maker (if patient is incapable of making informed decisions): This person is: 
Next of Kin by law (only applies in absence of above) General ACP for ALL Patients with Decision Making Capacity: 
 Importance of advance care planning, including choosing a healthcare agent to communicate patient's healthcare decisions if patient lost the ability to make decisions, such as after a sudden illness or accident Understanding of the healthcare agent role was assessed and information provided Exploration of values, goals, and preferences if recovery is not expected, even with continued medical treatment in the event of: Imminent death Severe, permanent brain injury \"In these circumstances, what matters most to you? \"  Care focused more on comfort or quality of life. Opportunity offered to explore how cultural, Church, spiritual, or personal beliefs would affect decisions for future care Review of Existing Advance Directive: 
None present For Serious or Chronic Illness: 
Understanding of medical condition Understanding of CPR, goals and expected outcomes, benefits and burdens discussed. Information on CPR success rates provided (e.g. for CPR in hospital, survival to d/c at two weeks is 22%, for chronically ill or elderly/frail survival is less than 3%); Individual asked to communicate understanding of information in his/her own words. Explored fears and concerns regarding CPR or possible outcomes Interventions Provided: 
Recommended completion of Advance Directive form after review of ACP materials and conversation with prospective healthcare agent

## 2020-08-19 NOTE — PROGRESS NOTES
[address] [place] [address] Coordinator for Care Management Review of the chart due to request for transfer to [address] [place] [address] for Continue Care. Dr. Zaida Reilly and the Admission team  accepted the patient. Patient can come today. [facility] Nursing report [phone] and a bed will be assigned at that time. CM report to me [phone] Please give family the # to the nursing station [phone] Arrange transportation for patient to arrive before 4PM.  If problems with transportation please notify me ASAP. I have notified CM of the Above. [address] [facility] [address] MSW RN  
[phone]

## 2020-08-19 NOTE — PROGRESS NOTES
Transition of Care Plan: 
 
* Transition to [facility] today for continued medical care. * Accepting Physician:  Dr. Carmen Barahona * EMTALA:  Reason for transport \"Continued Medical Care\" * Nursing to call report to [phone] * [facility] transport arranged with 2:30PM  time. * CM has reviewed with [facility] CM Discussed with patient who is in agreement to transfer to [facility] for continued rehab services. Long Term Plans are for patient to return home with sister and [facility] where he was residing PTA. This writer will continue to follow at [facility] for final disposition plans. CM Manager notified at [facility] and requested assistance with PCS for [facility]; EMTALA and 2nd IM for discharge. [address] [facility] [place] MHA, BSW, CCM, ACM-SW Complex CM Coordinator/[employer] [phone]

## 2020-08-19 NOTE — CONSULTS
U Nephrology Consult - DOCTORS [facility], [facility] 
Requesting physician: Dr. Pina Boggs MD 
Date of consult: 20 CC: CKD4 This is an \"e-consult\" with data obtained by chart review and by discussion with the requesting physician. I did not directly examine or interview the patient. HPI: 
Mr. Azael Peace is a 68yo M, transferred from OSH after admission with failure to thrive. He was recently admitted to OSH with KELIN on CKD and was followed by [facility] at that hospital. During the current OSH admission, he was noted to have stable CKD4. PMH: 
Past Medical History:  
Diagnosis Date  Anemia  Bronchitis  Chronic kidney disease UTI  Diabetes ([address]  Gastrointestinal disorder   
 anemia  Hypertension  Kidney disease, chronic, stage II (GFR 60-89 ml/min)  Neurological disorder Metabolic Brain Disorder PSH: 
Past Surgical History:  
Procedure Laterality Date  HX OTHER SURGICAL    
 never FH: 
Family History Problem Relation Age of Onset  Diabetes Mother  Cancer Sister SH: Social History Socioeconomic History  Marital status:  Spouse name: Not on file  Number of children: Not on file  Years of education: Not on file  Highest education level: Not on file Occupational History  Not on file Social Needs  Financial resource strain: Not on file  Food insecurity Worry: Not on file Inability: Not on file  Transportation needs Medical: Not on file Non-medical: Not on file Tobacco Use  Smoking status: Former Smoker Packs/day: 0.50 Years: 30.00 Pack years: 15.00 Types: Cigarettes Last attempt to quit: 1994 Years since quittin.6  Smokeless tobacco: Never Used Substance and Sexual Activity  Alcohol use: No  
 Drug use: No  
 Sexual activity: Yes  
  Partners: Female Lifestyle  Physical activity Days per week: Not on file Minutes per session: Not on file  Stress: Not on file Relationships  Social connections Talks on phone: Not on file Gets together: Not on file Attends Mandaen service: Not on file Active member of club or organization: Not on file Attends meetings of clubs or organizations: Not on file Relationship status: Not on file  Intimate partner violence Fear of current or ex partner: Not on file Emotionally abused: Not on file Physically abused: Not on file Forced sexual activity: Not on file Other Topics Concern  Not on file Social History Narrative  Not on file Home meds: 
Prior to Admission Medications Prescriptions Last Dose Informant Patient Reported? Taking?  
alogliptin (NESINA) 6.25 mg tablet  Transfer Papers No No  
Sig: Take 1 Tab by mouth daily. clopidogrel (PLAVIX) 75 mg tab  Transfer Papers No No  
Sig: Take 1 Tab by mouth daily. epoetin aquilino-epbx (RETACRIT) 10,000 unit/mL injection   No No  
Si mL by SubCUTAneous route every seven (7) days for 3 doses. Every urds  Indications: anemia due to kidney failure  
ferrous sulfate 325 mg (65 mg iron) tablet  Transfer Papers No No  
Sig: Take 1 Tab by mouth two (2) times daily (with meals). folic acid (FOLVITE) 1 mg tablet  Transfer Papers No No  
Sig: Take 1 Tab by mouth daily. hydrALAZINE (APRESOLINE) 50 mg tablet  Transfer Papers No No  
Sig: Take 1 Tab by mouth three (3) times daily. isosorbide mononitrate ER (IMDUR) 30 mg tablet  Transfer Papers Yes No  
Sig: Take 30 mg by mouth two (2) times a day. levothyroxine (SYNTHROID) 150 mcg tablet  Transfer Papers No No  
Sig: TAKE 1 TABLET BY MOUTH ONCE DAILY BEFORE BREAKFAST  
losartan (COZAAR) 50 mg tablet  Transfer Papers No No  
Sig: Take 1 Tab by mouth daily. sodium bicarbonate 650 mg tablet  Transfer Papers No No  
Sig: Take 1 Tab by mouth two (2) times a day. Facility-Administered Medications: None Current inpatient medications: 
 
Current Facility-Administered Medications:  
  [START ON 8/20/2020] alogliptin (NESINA) tablet 6.25 mg, 6.25 mg, Oral, DAILY, Ashley Saavedra MD 
  [START ON 8/20/2020] clopidogreL (PLAVIX) tablet 75 mg, 75 mg, Oral, DAILY, Ashley Saavedra MD 
  ferrous sulfate tablet 325 mg, 325 mg, Oral, BID WITH MEALS, Ashley Saavedra MD 
[facility] ON 9/19/4568] folic acid (FOLVITE) tablet 1 mg, 1 mg, Oral, DAILY, Ashley Saavedra MD 
  hydrALAZINE (APRESOLINE) tablet 50 mg, 50 mg, Oral, TID, Ashley Saavedra MD 
  isosorbide mononitrate ER (IMDUR) tablet 30 mg, 30 mg, Oral, BID, Ashley Saavedra MD 
[facility] ON 8/20/2020] levothyroxine (SYNTHROID) tablet 150 mcg, 150 mcg, Oral, 6am, Ashley Saavedra MD 
  [START ON 8/20/2020] losartan (COZAAR) tablet 50 mg, 50 mg, Oral, DAILY, Ashley Saavedra MD 
  heparin (porcine) injection 5,000 Units, 5,000 Units, SubCUTAneous, Q12H, Ashley Saavedra MD 
  insulin lispro (HUMALOG) injection, , SubCUTAneous, AC&HS, Ashley Saavedra MD 
  glucose chewable tablet 16 g, 4 Tab, Oral, PRN, Ashley Saavedra MD 
  dextrose 10% infusion 0-250 mL, 0-250 mL, IntraVENous, PRN, Ashley Saavedra MD 
  glucagon (GLUCAGEN) injection 1 mg, 1 mg, IntraMUSCular, PRN, Ashley Saavedra MD 
[facility]  [START ON 8/20/2020] epoetin aquilino-epbx (RETACRIT) injection 20,000 Units, 20,000 Units, SubCUTAneous, Q7D, Ashley Saavedra MD 
  sodium chloride (NS) flush 5-40 mL, 5-40 mL, IntraVENous, Q8H, Ashley Saavedra MD 
  sodium chloride (NS) flush 5-40 mL, 5-40 mL, IntraVENous, PRN, Ashley Saavedra MD 
  acetaminophen (TYLENOL) tablet 650 mg, 650 mg, Oral, Q6H PRN **OR** acetaminophen (TYLENOL) suppository 650 mg, 650 mg, Rectal, Q6H PRN, Ashley Saavedra MD 
  polyethylene glycol (MIRALAX) packet 17 g, 17 g, Oral, DAILY PRN, Ashley Saavedra MD 
  promethazine (PHENERGAN) tablet 12.5 mg, 12.5 mg, Oral, Q6H PRN **OR** ondansetron (ZOFRAN) injection 4 mg, 4 mg, IntraVENous, Q6H PRN, Vicenta Kay MD 
  sodium bicarbonate tablet 1,950 mg, 1,950 mg, Oral, TID, Vicenta Kay MD 
 
Allergies: 
No Known Allergies ROS:  
Not performed Vitals: 
No data found. I/O: 
No intake or output data in the 24 hours ending 08/19/20 1615 Physical exam: Not performed Labs/imaging: 
Recent Results (from the past 24 hour(s)) GLUCOSE, POC Collection Time: 08/18/20  4:23 PM  
Result Value Ref Range Glucose (POC) 186 (H) 65 - 100 mg/dL Performed by Brandan Cervantes (PCT) GLUCOSE, POC Collection Time: 08/18/20  9:21 PM  
Result Value Ref Range Glucose (POC) 168 (H) 65 - 100 mg/dL Performed by Luis Thomas (PCT) RENAL FUNCTION PANEL Collection Time: 08/19/20  5:18 AM  
Result Value Ref Range Sodium 138 136 - 145 mmol/L Potassium 3.5 3.5 - 5.1 mmol/L Chloride 110 (H) 97 - 108 mmol/L  
 CO2 17 (L) 21 - 32 mmol/L Anion gap 11 5 - 15 mmol/L Glucose 123 (H) 65 - 100 mg/dL BUN 47 (H) 6 - 20 MG/DL Creatinine 3.15 (H) 0.70 - 1.30 MG/DL  
 BUN/Creatinine ratio 15 12 - 20 GFR est AA 24 (L) >60 ml/min/1.73m2 GFR est non-AA 20 (L) >60 ml/min/1.73m2 Calcium 9.3 8.5 - 10.1 MG/DL Phosphorus 4.6 2.6 - 4.7 MG/DL Albumin 2.2 (L) 3.5 - 5.0 g/dL CBC WITH AUTOMATED DIFF Collection Time: 08/19/20  5:18 AM  
Result Value Ref Range WBC 9.5 4.1 - 11.1 K/uL  
 RBC 3.07 (L) 4.10 - 5.70 M/uL HGB 7.9 (L) 12.1 - 17.0 g/dL HCT 25.6 (L) 36.6 - 50.3 % MCV 83.4 80.0 - 99.0 FL  
 MCH 25.7 (L) 26.0 - 34.0 PG  
 MCHC 30.9 30.0 - 36.5 g/dL  
 RDW 15.4 (H) 11.5 - 14.5 % PLATELET 366 617 - 522 K/uL MPV 9.7 8.9 - 12.9 FL  
 NRBC 0.0 0  WBC ABSOLUTE NRBC 0.00 0.00 - 0.01 K/uL NEUTROPHILS 81 (H) 32 - 75 % LYMPHOCYTES 13 12 - 49 % MONOCYTES 5 5 - 13 % EOSINOPHILS 1 0 - 7 % BASOPHILS 0 0 - 1 % IMMATURE GRANULOCYTES 0 0.0 - 0.5 % ABS. NEUTROPHILS 7.7 1.8 - 8.0 K/UL  
 ABS. LYMPHOCYTES 1.2 0.8 - 3.5 K/UL  
 ABS. MONOCYTES 0.4 0.0 - 1.0 K/UL  
 ABS. EOSINOPHILS 0.1 0.0 - 0.4 K/UL  
 ABS. BASOPHILS 0.0 0.0 - 0.1 K/UL  
 ABS. IMM. GRANS. 0.0 0.00 - 0.04 K/UL  
 DF AUTOMATED    
SAMPLES BEING HELD Collection Time: 08/19/20  5:18 AM  
Result Value Ref Range SAMPLES BEING HELD 1PST COMMENT Add-on orders for these samples will be processed based on acceptable specimen integrity and analyte stability, which may vary by analyte. GLUCOSE, POC Collection Time: 08/19/20  7:19 AM  
Result Value Ref Range Glucose (POC) 129 (H) 65 - 100 mg/dL Performed by Caitlin Jaimes (PCT) GLUCOSE, POC Collection Time: 08/19/20 10:36 AM  
Result Value Ref Range Glucose (POC) 147 (H) 65 - 100 mg/dL Performed by [employer] (PCT) A/P: 
Chronic kidney disease stage 4, likely secondary to hypertension/diabetes. Anion gap metabolic acidosis Anemia of chronic kidney disease Failure to thrive 
-with reported history of nonadherence and poor historian needs very close followup with an outpatient nephrologist once discharged--he may require dialysis however he requires evaluation of his candidacy--if severe dementia present dialysis is unlikely to alter his overall clinical trajectory. 
-his overall kidney function appears to be at baseline. 
-increase sodium bicarbonate to 1950mg BID; overall bicarbonate deficit is ~500mEq -he is iron replete based on 8/7/2020 labs, agree with epoetin 51981U/wk and PO ferrous sulfate 
-no indication for dialysis at this time 
-monitor I/Os 
-renally dose all medications Thank you for this consult. I will continue to follow the patient with you. Please feel free to call with questions. Zechariah Tellez M.D. 
[facility]

## 2020-08-19 NOTE — DISCHARGE SUMMARY
Bryce Tuckerelsen [facility] 79 
[address] Tel: [phone] Physician Discharge Summary Patient ID:    Hanh Gaviria Age:              78 y.o.    : 1950 MRN:             [id number] PCP: Sincere Dotson MD  
 
Date of Admission: 2020 Date of Discharge:  2020 Principal admission Diagnosis: 
 KELIN (acute kidney injury) ([address] [N17.9] Discharge Diagnoses: Active Problems: 
   Physical debility (2020) KELIN (acute kidney injury) ([address] (2020) CKD (chronic kidney disease) stage 4, GFR 15-29 ml/min ([facility]) (2020) Coronary artery disease involving native coronary artery of native heart (2020) HTN (hypertension), benign (2014) Anemia due to chronic illness (2014) Severe obesity (BMI 35.0-39. 9) with comorbidity ([address] (3/19/2018) DM type 2 causing CKD stage 3 ([address] (2020) Leg edema, right (2020) Consults: Nephrology Hospital Course:  
 
Mr. Christen Mckeon is a 78 y.o. admitted to [facility] and treated for the following: 
 
Physical debility (2020) / Leg edema, right (2020) POA: neg DVT on venous doppler. Unable to care for self at home. Continue PT, OT as tolerated. Now being discharged [facility] for continued care.    
  
DM type 2 causing CKD stage 3 ([address] (2020) POA: last A1c 9.8 %. Blood glucose stable. Continue Nesina, SSi per protocol as needed, DM diet 
  
KELIN (acute kidney injury) ([address] (2020) / CKD (chronic kidney disease) stage 4, GFR 15-29 ml/min ([address] (2020) POA: SCr overall stable. Seen by nephrology. Monitor renal function    
  
Coronary artery disease involving native coronary artery of native heart (2020) POA: No symptoms. Continue Imdur, Plavix 
  
HTN (hypertension), benign (2014) POA: BP variable but overall stable.  Continue Hydralazine, Imdur and Cozaar 
  
 Anemia due to chronic illness (7/17/2014) POA: Hgb stable. Monitor and transfuse as needed. Continue Epoetin 
  
Severe obesity (BMI 35.0-39. 9) with comorbidity ([address] (3/19/2018) POA: already been counseled on weight loss 
  
Hypothyroidism POA: Continue Levothyroxine Discharge Exam:   
Visit Vitals /80 (BP 1 Location: Left arm, BP Patient Position: Sitting) Pulse 96 Temp 97.5 °F (36.4 °C) Resp 19 Wt 116.1 kg (256 lb) SpO2 97% BMI 40.10 kg/m² Patient has been seen and examined. General: well looking and stable patient in no acute distress Pulm: clear breath sounds without wheezes Card: no murmurs, normal S1, S2 without thrills, bruits Abd:    soft, non-tender, normoactive bowel sounds Skin: no rashes or ulcers, skin turgor is good Neuro: awake, alert and has a non focal  
 
Activity: Activity as tolerated Diet: Cardiac Diet and Diabetic Diet Current Discharge Medication List  
  
START taking these medications Details  
epoetin aquilino-epbx (RETACRIT) 10,000 unit/mL injection 2 mL by SubCUTAneous route every seven (7) days for 3 doses. Every thurdsay  Indications: anemia due to kidney failure Qty: 3 mL, Refills: 0 CONTINUE these medications which have NOT CHANGED Details  
ferrous sulfate 325 mg (65 mg iron) tablet Take 1 Tab by mouth two (2) times daily (with meals). Qty: 60 Tab, Refills: 0  
  
hydrALAZINE (APRESOLINE) 50 mg tablet Take 1 Tab by mouth three (3) times daily. Qty: 90 Tab, Refills: 0  
  
alogliptin (NESINA) 6.25 mg tablet Take 1 Tab by mouth daily. Qty: 30 Tab, Refills: 0  
  
losartan (COZAAR) 50 mg tablet Take 1 Tab by mouth daily. Qty: 30 Tab, Refills: 0  
  
folic acid (FOLVITE) 1 mg tablet Take 1 Tab by mouth daily. Qty: 30 Tab, Refills: 0  
  
sodium bicarbonate 650 mg tablet Take 1 Tab by mouth two (2) times a day. Qty: 60 Tab, Refills: 0  
  
isosorbide mononitrate ER (IMDUR) 30 mg tablet Take 30 mg by mouth two (2) times a day. 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  
  
clopidogrel (PLAVIX) 75 mg tab Take 1 Tab by mouth daily. Qty: 90 Tab, Refills: 1 STOP taking these medications  
  
 glimepiride (AMARYL) 2 mg tablet Comments:  
Reason for Stopping: Follow-up Information Follow up With Specialties Details Why Contact Tobi Ahumada MD Pediatric Medicine, Family Medicine Call for the regular follow up Carlos Mor[address] [place] [address] 
[phone] Follow-up tests or labs: None Discharge Condition: Stable Disposition: [facility] 
 
Time taken to arrange discharge:  35 minutes. Signed: 
Elena Timmons MD     [facility] 8/19/2020   12:55 PM

## 2020-08-19 NOTE — ROUTINE PROCESS
Hospital follow-up PCP transitional care appointment has been scheduled with Dr. Ban Juarez for Tuesday, August 25 @ 2:00PM.  Pending patient discharge.   Deshaun Lucero, Care Management Specialist.

## 2020-08-19 NOTE — PROGRESS NOTES
TRANSFER - IN REPORT: 
 
Verbal report received from [facility] on Megan Gilliam  being received from  for routine progression of care Report consisted of patients Situation, Background, Assessment and  
Recommendations(SBAR). Information from the following report(s) SBAR and general status and pt needs were reviewed with the receiving nurse. Opportunity for questions and clarification was provided. Assessment completed upon patients arrival to unit and care assumed.

## 2020-08-19 NOTE — PROGRESS NOTES
Bryce Lai [facility] [place] [facility] 
15[address] [facility], [address] [place] [address] 
[phone] Medical Progress Note NAME:         Chaya Homans :        1950 MRM:        [id number] Date of service: 2020 Subjective: Patient has been seen and examined as a follow up for multiple medical issues. Chart, labs, diagnostics reviewed. He denies any new symptoms. Tolerating diet. Objective: 
 
Vital Signs: 
 
Visit Vitals /76 (BP 1 Location: Left arm, BP Patient Position: At rest) Pulse 83 Temp 97.5 °F (36.4 °C) Resp 19 Wt 116.1 kg (256 lb) SpO2 99% BMI 40.10 kg/m² Intake/Output Summary (Last 24 hours) at 2020 1042 Last data filed at 2020 0630 Gross per 24 hour Intake 220 ml Output 375 ml Net -155 ml Physical Examination: 
 
General:   Well looking patient in no acute distress Pulm:  no accessory muscle use, no crackles or wheezes Card:   has regular and normal S1, S2 without thrills, bruits Abd:  Soft, non-tender, + distended, normoactive bowel sounds Musc:  No cyanosis, clubbing, atrophy or deformities. Neuro: Awake and alert. Generally a non focal exam.  
 
Current Facility-Administered Medications Medication Dose Route Frequency  acetaminophen (TYLENOL) tablet 650 mg  650 mg Oral Q4H PRN  
 epoetin aquilino-epbx (RETACRIT) injection 20,000 Units  20,000 Units SubCUTAneous Q7D  
 alogliptin (NESINA) tablet 6.25 mg  6.25 mg Oral DAILY  clopidogreL (PLAVIX) tablet 75 mg  75 mg Oral DAILY  ferrous sulfate tablet 325 mg  325 mg Oral BID WITH MEALS  folic acid (FOLVITE) tablet 1 mg  1 mg Oral DAILY  hydrALAZINE (APRESOLINE) tablet 50 mg  50 mg Oral TID  isosorbide mononitrate ER (IMDUR) tablet 30 mg  30 mg Oral BID  levothyroxine (SYNTHROID) tablet 150 mcg  150 mcg Oral 6am  
 losartan (COZAAR) tablet 50 mg  50 mg Oral DAILY  sodium bicarbonate tablet 650 mg  650 mg Oral BID  heparin (porcine) injection 5,000 Units  5,000 Units SubCUTAneous Q12H  
 insulin lispro (HUMALOG) injection   SubCUTAneous AC&HS  
 glucose chewable tablet 16 g  4 Tab Oral PRN  
 dextrose (D50W) injection syrg 12.5-25 g  12.5-25 g IntraVENous PRN  
 glucagon (GLUCAGEN) injection 1 mg  1 mg IntraMUSCular PRN Laboratory data and review: 
 
Recent Labs 08/19/20 0518 08/17/20 
4629 WBC 9.5 12.6* HGB 7.9* 7.9*  
HCT 25.6* 24.9*  
 322 Recent Labs 08/19/20 0518 08/17/20 
0796  137  
K 3.5 3.4*  
* 111* CO2 17* 17* * 116* BUN 47* 48* CREA 3.15* 2.95* CA 9.3 9.2 PHOS 4.6  --   
ALB 2.2*  -- No components found for: [facility]: 
 
Assessment and Plan: 
 
Physical debility (8/2/2020) / Leg edema, right (8/2/2020) POA: neg DVT on venous doppler. Unable to care for self at home. Continue PT, OT as tolerated. Complex CM working on discharge options. Has been accepted at [facility] [place]. Transfer once bed available later this week or early next week. DM type 2 causing CKD stage 3 ([facility] (8/2/2020) POA: last A1c 9.8 %. Blood glucose stable. Continue Nesina, SSi per protocol, DM diet KELIN (acute kidney injury) ([facility] (8/12/2020) / CKD (chronic kidney disease) stage 4, GFR 15-29 ml/min ([facility]) (8/14/2020) POA: SCr had a slight bump. Nephrology following. Coronary artery disease involving native coronary artery of native heart (8/14/2020) POA: No symptoms. Continue Imdur, Plavix HTN (hypertension), benign (6/23/2014) POA: BP variable but overall stable. Continue Hydralazine, Imdur and Cozaar Anemia due to chronic illness (7/17/2014) POA: Hgb stable. Monitor and transfuse as needed. Continue Epoetin Severe obesity (BMI 35.0-39. 9) with comorbidity ([address] (3/19/2018) POA: already been counseled on weight loss Hypothyroidism POA: Continue Levothyroxine Total time spent for the patient's care: 25  Minutes Care Plan discussed with: Patient, Care Manager and Nursing Staff Discussed:  Care Plan and D/C Planning Prophylaxis:  Hep SQ Anticipated Disposition:  [facility] 
        
___________________________________________________ Attending Physician:   Indu Luis MD

## 2020-08-19 NOTE — PROGRESS NOTES
Shift Change: 
 
Bedside and Verbal shift change report given to Eyal Stern, RN (oncoming nurse) by Stephenie Armstrong RN (offgoing nurse). Report included the following information SBAR, Kardex, MAR and Recent Results. 1350: Attempted to give report to receiving nurse at [facility] x2. Will continue to try. Supervisor RN aware. 1444: No acute changes. VSS at time of transport. EMTALA and [facility]R completed. Report given to Jadon James RN Discharge: 
 
Discharge medications reviewed with patient and appropriate educational materials and side effects teaching were provided. I have reviewed discharge instructions with the patient. The patient verbalized understanding. Current Discharge Medication List  
  
START taking these medications Details  
epoetin aquilino-epbx (RETACRIT) 10,000 unit/mL injection 2 mL by SubCUTAneous route every seven (7) days for 3 doses. Every thurdsay  Indications: anemia due to kidney failure Qty: 3 mL, Refills: 0 CONTINUE these medications which have NOT CHANGED Details  
ferrous sulfate 325 mg (65 mg iron) tablet Take 1 Tab by mouth two (2) times daily (with meals). Qty: 60 Tab, Refills: 0  
  
hydrALAZINE (APRESOLINE) 50 mg tablet Take 1 Tab by mouth three (3) times daily. Qty: 90 Tab, Refills: 0  
  
alogliptin (NESINA) 6.25 mg tablet Take 1 Tab by mouth daily. Qty: 30 Tab, Refills: 0  
  
losartan (COZAAR) 50 mg tablet Take 1 Tab by mouth daily. Qty: 30 Tab, Refills: 0  
  
folic acid (FOLVITE) 1 mg tablet Take 1 Tab by mouth daily. Qty: 30 Tab, Refills: 0  
  
sodium bicarbonate 650 mg tablet Take 1 Tab by mouth two (2) times a day. Qty: 60 Tab, Refills: 0  
  
isosorbide mononitrate ER (IMDUR) 30 mg tablet Take 30 mg by mouth two (2) times a day. 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  
  
clopidogrel (PLAVIX) 75 mg tab Take 1 Tab by mouth daily. Qty: 90 Tab, Refills: 1 STOP taking these medications  
  
 glimepiride (AMARYL) 2 mg tablet Comments:  
Reason for Stopping:

## 2020-08-19 NOTE — PROGRESS NOTES
12:42 PM 
Pt discharging today to [facility]. Delivered and explained 2nd IMM letter, signed and placed in chart. Blanchie Lesch

## 2020-08-19 NOTE — DISCHARGE INSTRUCTIONS
TRANSFER  INSTRUCTIONS    NAME: Amanda Durbin   :  1950   MRN:  [id number]     Date/Time:  2020 12:49 PM    ADMIT DATE: 2020     TRANSFER DATE: 2020     DISPOSITION: [facility]    DISCHARGE DIAGNOSIS:  Active Problems:     Physical debility (2020)     KELIN (acute kidney injury) ([facility] (2020)     CKD (chronic kidney disease) stage 4, GFR 15-29 ml/min ([facility]) (2020)     HTN (hypertension), benign (2014)     Anemia due to chronic illness (2014)     Severe obesity (BMI 35.0-39. 9) with comorbidity ([facility] (3/19/2018)     DM type 2 causing CKD stage 3 ([facility] (2020)     Leg edema, right (2020)     Physical debility (2020)     Coronary artery disease involving native coronary artery of native heart (2020)    MEDICATIONS: See medication list on the AVS    Recommended diet:  Cardiac Diet and Diabetic Diet    Recommended activity: Activity as tolerated    Follow Up: Follow-up Information     Follow up With Specialties Details Why Contact Info    Estela Maya MD Pediatric Medicine, Family Medicine Call for the regular follow up [address] [place] [address],[address]  [phone]            Information obtained by :  I understand that if any problems occur once I am at home I am to contact my physician. I understand and acknowledge receipt of the instructions indicated above.                                                                                                                                            Physician's or R.N.'s Signature                                                                  Date/Time                                                                                                                                              Patient or Representative Signature                                                          Date/Time

## 2020-08-19 NOTE — PROGRESS NOTES
Pharmacist Discharge Medication Reconciliation Discharge Provider:  Dr. Durham Mail Discharge Medications: My Medications START taking these medications Instructions Each Dose to Equal Morning Noon Evening Bedtime  
epoetin aquilino-epbx 10,000 unit/mL injection Commonly known as:  RETACRIT Start taking on:  August 20, 2020 Your last dose was: Your next dose is:   
 
  
 2 mL by SubCUTAneous route every seven (7) days for 3 doses. Every thurdsay  Indications: anemia due to kidney failure 20,000 Units CONTINUE taking these medications Instructions Each Dose to Equal Morning Noon Evening Bedtime  
alogliptin 6.25 mg tablet Commonly known as:  Nellie Duck Your last dose was: Your next dose is: Take 1 Tab by mouth daily. 6.25 mg 
  
  
  
  
  
clopidogreL 75 mg Tab Commonly known as:  PLAVIX Your last dose was: Your next dose is: Take 1 Tab by mouth daily. 75 mg 
  
  
  
  
  
ferrous sulfate 325 mg (65 mg iron) tablet Your last dose was: Your next dose is: Take 1 Tab by mouth two (2) times daily (with meals). 325 mg 
  
  
  
  
  
folic acid 1 mg tablet Commonly known as:  [employer] Your last dose was: Your next dose is: Take 1 Tab by mouth daily. 1 mg 
  
  
  
  
  
hydrALAZINE 50 mg tablet Commonly known as:  APRESOLINE Your last dose was: Your next dose is: Take 1 Tab by mouth three (3) times daily. 50 mg 
  
  
  
  
  
isosorbide mononitrate ER 30 mg tablet Commonly known as:  IMDUR Your last dose was: Your next dose is: Take 30 mg by mouth two (2) times a day. 30 mg 
  
  
  
  
  
levothyroxine 150 mcg tablet Commonly known as:  SYNTHROID Your last dose was: Your next dose is: TAKE 1 TABLET BY MOUTH ONCE DAILY BEFORE BREAKFAST 
   
  
  
  
  
losartan 50 mg tablet Commonly known as:  COZAAR Your last dose was: Your next dose is: Take 1 Tab by mouth daily. 50 mg 
  
  
  
  
  
sodium bicarbonate 650 mg tablet Your last dose was: Your next dose is: Take 1 Tab by mouth two (2) times a day. 650 mg 
  
  
  
  
  
 
  
 
STOP taking these medications   
 
glimepiride 2 mg tablet Commonly known as:  AMARYL Where to Get Your Medications Information on where to get these meds will be given to you by the nurse or doctor. Ask your nurse or doctor about these medications 
epoetin aquilino-epbx 10,000 unit/mL injection The patient's chart, MAR, and AVS were reviewed by Jackeline Schmitz, PharmD, BCPS Contact: [phone]

## 2020-08-19 NOTE — PROGRESS NOTES
08/19/20 1:40 PM 
Sue Alfonzo with [employer] notified of patient's transfer to [facility] today. Per Sue, patient and his brother in law are working to obtain bank statement to complete Medicaid application. Sue will provide handoff to [facility] rep, Jil Delvalle, to follow patient's Medicaid application through. [employer] remains in processing status.  
ROZ Ly

## 2020-08-19 NOTE — PROGRESS NOTES
Problem: Falls - Risk of 
Goal: *Absence of Falls Description: Document José Miguel Martinez Fall Risk and appropriate interventions in the flowsheet. Outcome: Progressing Towards Goal 
Note: Fall Risk Interventions: 
Mobility Interventions: Bed/chair exit alarm, Patient to call before getting OOB, PT Consult for mobility concerns, Utilize walker, cane, or other assistive device, Utilize gait belt for transfers/ambulation, Communicate number of staff needed for ambulation/transfer Medication Interventions: Bed/chair exit alarm, Patient to call before getting OOB, Teach patient to arise slowly, Utilize gait belt for transfers/ambulation Elimination Interventions: Bed/chair exit alarm, Call light in reach 
 
0803 Bedside and Verbal shift change report given to Monica Herrera RN (oncoming nurse) by Oralia Garner RN (offgoing nurse). Report included the following information SBAR, Kardex and MAR.

## 2020-08-19 NOTE — PROGRESS NOTES
[facility] Hanh Gaviria YOB: 1950 Assessment & Plan:  
CKD 4, stable HTN 
DM2 Acidosis on bcb Anemia Hypokalemia Rec: 
Continue iron Continue ASH Increase NaHCO3 No change in antihypertensives Subjective:  
CC: f/u CKD HPI: Renal function stable. Bcb not at goal. 
ROS: no n/v/sob Current Facility-Administered Medications Medication Dose Route Frequency  acetaminophen (TYLENOL) tablet 650 mg  650 mg Oral Q4H PRN  
 epoetin aquilino-epbx (RETACRIT) injection 20,000 Units  20,000 Units SubCUTAneous Q7D  
 alogliptin (NESINA) tablet 6.25 mg  6.25 mg Oral DAILY  clopidogreL (PLAVIX) tablet 75 mg  75 mg Oral DAILY  ferrous sulfate tablet 325 mg  325 mg Oral BID WITH MEALS  folic acid (FOLVITE) tablet 1 mg  1 mg Oral DAILY  hydrALAZINE (APRESOLINE) tablet 50 mg  50 mg Oral TID  isosorbide mononitrate ER (IMDUR) tablet 30 mg  30 mg Oral BID  levothyroxine (SYNTHROID) tablet 150 mcg  150 mcg Oral 6am  
 losartan (COZAAR) tablet 50 mg  50 mg Oral DAILY  sodium bicarbonate tablet 650 mg  650 mg Oral BID  heparin (porcine) injection 5,000 Units  5,000 Units SubCUTAneous Q12H  
 insulin lispro (HUMALOG) injection   SubCUTAneous AC&HS  
 glucose chewable tablet 16 g  4 Tab Oral PRN  
 dextrose (D50W) injection syrg 12.5-25 g  12.5-25 g IntraVENous PRN  
 glucagon (GLUCAGEN) injection 1 mg  1 mg IntraMUSCular PRN Objective:  
 
Vitals: 
Blood pressure 159/76, pulse 83, temperature 97.5 °F (36.4 °C), resp. rate 19, weight 116.1 kg (256 lb), SpO2 99 %. Temp (24hrs), Av.8 °F (36.6 °C), Min:97.5 °F (36.4 °C), Max:98.2 °F (36.8 °C) Intake and Output: 
 07 - 1900 In: -  
Out: 100 [Urine:100] 1901 -  0700 In: 26 [P.O.:460] Out: [address] [place] Physical Exam:              
GENERAL ASSESSMENT: chronically ill, NAD 
CHEST: CTA HEART: S1S2 
 ABDOMEN: Soft,NT 
EXTREMITY: trace EDEMA 
 
 
   
ECG/rhythm: 
 
Data Review No results for input(s): TNIPOC in the last 72 hours. No lab exists for component: ITNL No results for input(s): CPK, CKMB, TROIQ in the last 72 hours. Recent Labs 08/19/20 
0518 08/17/20 
6900  137  
K 3.5 3.4*  
* 111* CO2 17* 17* BUN 47* 48* CREA 3.15* 2.95* * 116* PHOS 4.6  --   
CA 9.3 9.2 ALB 2.2*  --   
WBC 9.5 12.6* HGB 7.9* 7.9*  
HCT 25.6* 24.9*  
 322 No results for input(s): INR, PTP, APTT, INREXT, INREXT in the last 72 hours. Needs: urine analysis, urine sodium, protein and creatinine Lab Results Component Value Date/Time Sodium,urine random 112 07/17/2014 06:30 PM  
 Creatinine, urine 97.49 03/15/2018 04:03 PM  
 
 
 
: Lavenia Bernheim, MD 
8/19/2020 [facility]: 
[email or web address] [place] office: 
[address] [place] [address], [address] Phone: [phone] Fax :     [phone] [place] office: 
[address] [facility], [address] [place] [address] Phone - [phone] Fax - [phone]

## 2020-08-20 NOTE — PROGRESS NOTES
Problem: Mobility Impaired (Adult and Pediatric) Goal: *Acute Goals and Plan of Care (Insert Text) Description: FUNCTIONAL STATUS PRIOR TO ADMISSION: The patient required assist at baseline -poor historian. HOME SUPPORT PRIOR TO ADMISSION: The patient lived with his sister and brother in law. Physical Therapy Goals Initiated 8/20/2020 1. Patient will move from supine to sit and sit to supine  in bed with independence within 7 day(s). 2.  Patient will transfer from bed to chair and chair to bed with minimal assistance/contact guard assist using the least restrictive device within 7 day(s). 3.  Patient will perform sit to stand with minimal assistance/contact guard assist within 7 day(s). 4.  Patient will ambulate with minimal assistance/contact guard assist for 50 feet with the least restrictive device within 7 day(s). Outcome: Not Met PHYSICAL THERAPY EVALUATION Patient: Shanna Lawrence (1 y.o. male) Date: 8/20/2020 Primary Diagnosis: Physical debility [R53.81] Precautions:     
 
ASSESSMENT Based on the objective data described below, the patient presents with decreased balance, activity tolerance, and overall functional mobility. Patient transferred to [facility] for continued therapy. Patient performed bed mobility with supervision and demonstrates good sitting balance. Patient stood to walker with minimal assistance of two. Cues for hand placement with transfers. Patient ambulated 6 feet with walker and minimal assistance. Current Level of Function Impacting Discharge (mobility/balance): MIN/MOD A Other factors to consider for discharge: poor historian Patient will benefit from skilled therapy intervention to address the above noted impairments.     
 
PLAN : 
Recommendations and Planned Interventions: bed mobility training, transfer training, gait training, therapeutic exercises, neuromuscular re-education, patient and family training/education, and therapeutic activities Frequency/Duration: Patient will be followed by physical therapy:  3 times a week to address goals. Recommendation for discharge: (in order for the patient to meet his/her long term goals) Physical therapy at least 2 days/week in the home AND ensure assist and/or supervision for safety with mobility 24/7 This discharge recommendation: 
Has not yet been discussed the attending provider and/or case management IF patient discharges home will need the following DME: rolling walker SUBJECTIVE:  
Patient stated I did good.  OBJECTIVE DATA SUMMARY:  
HISTORY:   
Past Medical History:  
Diagnosis Date  Anemia  Bronchitis  Chronic kidney disease UTI  Diabetes ([facility] [address]  Gastrointestinal disorder   
 anemia  Hypertension  Kidney disease, chronic, stage II (GFR 60-89 ml/min)  Neurological disorder Metabolic Brain Disorder Past Surgical History:  
Procedure Laterality Date  HX OTHER SURGICAL    
 never Home Situation Home Environment: Private residence(trailer) # Steps to Enter: 3 One/Two Story Residence: One story Living Alone: No 
Support Systems: Family member(s)(brother-in-law) Patient Expects to be Discharged to[de-identified] Private residence Current DME Used/Available at Home: Walker, rolling, Glucometer EXAMINATION/PRESENTATION/DECISION MAKING:  
Critical Behavior: 
Neurologic State: Alert Orientation Level: Oriented X4 Cognition: Appropriate decision making Hearing: Auditory Auditory Impairment: None Range Of Motion: 
AROM: Generally decreased, functional(right knee extension roughly -35 degrees) PROM: Within functional limits(except right knee extension roughly -35 degrees) Strength:   
Strength: Generally decreased, functional 
  
Functional Mobility: 
Bed Mobility: 
  
Supine to Sit: Supervision Scooting: Supervision Transfers: 
Sit to Stand: Minimum assistance;Assist x2;Adaptive equipment Stand to Sit: Minimum assistance;Assist x2 Bed to Chair: Minimum assistance;Assist x2 Balance:  
Sitting: Intact Sitting - Static: Good (unsupported) Sitting - Dynamic: Good (unsupported) Standing: Impaired; With support Standing - Static: Constant support; Fair 
Standing - Dynamic : Constant support; [address] Ambulation/Gait Training: 
Distance (ft): 6 Feet (ft) Assistive Device: Gait belt;Walker, rolling Ambulation - Level of Assistance: Minimal assistance;Assist x2 Gait Description (WDL): Exceptions to [facility] Gait Abnormalities: Decreased step clearance Base of Support: Widened Speed/Mayda: Pace decreased (<100 feet/min) Step Length: Right shortened;Left shortened Based on the above components, the patient evaluation is determined to be of the following complexity level: LOW Pain Rating: 
No pain Activity Tolerance:  
Good Please refer to the flowsheet for vital signs taken during this treatment. After treatment patient left in no apparent distress:  
Sitting in chair, Call bell within reach, and RN present COMMUNICATION/EDUCATION:  
The patients plan of care was discussed with: Occupational therapist, Registered nurse, and Certified nursing assistant/patient care technician. Fall prevention education was provided and the patient/caregiver indicated understanding., Patient/family have participated as able in goal setting and plan of care. , and Patient/family agree to work toward stated goals and plan of care. Thank you for this referral. 
Eleazar Kruger, PT Time Calculation: 20 mins

## 2020-08-20 NOTE — PROGRESS NOTES
Spiritual Care Assessment/Progress Note [facility] 
 
 
NAME: Kelsy Rdoriguez      MRN: [id number] AGE: 78 y.o. SEX: male Synagogue Affiliation: Confucianist  
Language: English  
 
8/20/2020     Total Time (in minutes): 14 Spiritual Assessment begun in [address] through conversation with: 
  
    [x]Patient        [] Family    [] Friend(s) Reason for Consult: Palliative Care, Initial/Spiritual Assessment Spiritual beliefs: (Please include comment if needed) [x] Identifies with a michelle tradition:     
   [x] Supported by a michelle community:        
   [] Claims no spiritual orientation:       
   [] Seeking spiritual identity:            
   [] Adheres to an individual form of spirituality:       
   [] Not able to assess:                   
 
    
Identified resources for coping:  
   [x] Prayer                           
   [] Music                  [] Guided Imagery [x] Family/friends                 [] Pet visits [] Devotional reading                         [] Unknown 
   [] Other:                                           
 
 
Interventions offered during this visit: (See comments for more details) Patient Interventions: Affirmation of michelle, Affirmation of emotions/emotional suffering, Iconic (affirming the presence of God/Higher Power), Prayer (actual), Prayer (assurance of) Plan of Care: 
 
 [] Support spiritual and/or cultural needs   
 [] Support AMD and/or advance care planning process    
 [] Support grieving process 
 [] Coordinate Rites and/or Rituals  
 [] Coordination with community clergy 
 [x] No spiritual needs identified at this time 
 [] Detailed Plan of Care below (See Comments)  [] Make referral to Music Therapy 
[] Make referral to Pet Therapy    
[] Make referral to Addiction services 
[] Make referral to [facility] 
[] Make referral to Spiritual Care Partner 
[] No future visits requested [] Follow up visits as needed Comments:  Patient was visited on the [facility] unit to make a spiritual assessment. The patient did not have visitors present. The patient was receptive to the visit. After introductions, the patient shared some information about his health. The patient stated that his knee was locking up on him. The patient then discussed his rationale about how and why his right knee might me a problem. The patient expressed his willingness to work with the medical staff so that he can return to [facility], where he lives. The patient suggested that his family is concerned about him being able to get around on his own. The patient stated that he would like a prayer. We prayed together and assurance was provided that he will continue to be kept in prayer. Advised of  availability. Chaplains will follow as able and/or needed. Rev. Addie Rae EdD MDiv  For [facility] Page [phone])

## 2020-08-20 NOTE — PROGRESS NOTES
[facility]: MED RECONCILIATION Comments/Recommendations:  
Patient transferred from [facility]. Medication interview was completed upon admission to [facility]. Please see pharmacist note from 8/2/20 for details. Thank you, Yobani Coy, PharmD, BCPS 
[phone]

## 2020-08-20 NOTE — PROGRESS NOTES
While assisting the pt on the bedpan, pt stated that he had been having loose stools for a while and was hoping to get something for them. Advised RN.

## 2020-08-20 NOTE — PROGRESS NOTES
1356 Addendum: 
Noted that patient's Medicaid is now in effect per face sheet. Medicaid number is [id number] Reason for Admission:   Transition from [facility] to [facility] for continued medical care RUR Score:   19%    Observation Status PCP: First and Last name:   Chandler Patel Name of Practice:      [address] Are you a current patient: Yes/No:    Unknown Approximate date of last visit:       Unknown Can you do a virtual visit with your PCP:      NO 
          
Resources/supports as identified by patient/family:    
Currently resides with sister Arianna Rose  [phone]; home [phone]. Will return home when ambulation and self-care skills improve. Top Challenges facing patient (as identified by patient/family and CM): Finances/Medication cost?    Receives SSA $940 month Transportation? Family transport to appts, etc. 
           
Support system or lack thereof? Family supportive, but cannot provide hands-on care for patient such as bathing, dressing, getting patient to toilet. Living arrangements? See above; lives with family Self-care/ADLs/Cognition? Requires assistance with Self-care/Ambulation and Transfers. Is alert and able to make own decisions. Anxious to improve and return home. Current Advanced Directive/Advance Care Plan:  Patient declined ACP. Will re-address at a later time. Plan for utilizing home health:  Unable to determine at present; pending rehab recommendations. Patient has not seen PCP recently and does not have a way to complete a virtual visit which would be required to receive [facility] at discharge. Transition of Care Plan:    Continue with current rehab services; return home at discharge; Medicaid has  per patient.   New application completed at [facility] who contacted [facility] at [facility] with update. [address] [place] [facility], BSW, CCM, ACM-SW Complex  Coordinator/[facility] [phone]

## 2020-08-20 NOTE — PROGRESS NOTES
Bedside report received by ROBERTO AGUILAR, pt laying in bed supine eyes closed, shades open, television on with the sound turned low. Pt respirations are 13. 
 
1006-Pt accepted medications crushed, pt has 90 mL output straw, clear in urinal by bedside. Pt was talking on the phone with cousin, and cousin requested address/ and pin. Pt complained of right knee pain, he states \"pain when ever he moves his knee\" prn given. 1047-Pt laying in bed, conversing with this writer, no distress noted at this time.   
 
1800-Pt laying in bed watching tv

## 2020-08-20 NOTE — ROUTINE PROCESS
Patient PCP appointment for 8/25/2020 has been canceled per Complex Care Management Coordinator request as the patient is now at [facility].

## 2020-08-20 NOTE — PROGRESS NOTES
Problem: Self Care Deficits Care Plan (Adult) Goal: *Acute Goals and Plan of Care (Insert Text) Description:  
FUNCTIONAL STATUS PRIOR TO ADMISSION: Patient with recent discharge from [facility] on 8/10/20, was recommended to go to rehab however went home at max assist of 2 level, family unable to assist and back to [facility], prior to initial admission patient likely ambulatory and lived with family. At [facility] on 2nd admission remained max assist of 2 and unable to transfer to chair. HOME SUPPORT: The patient lived with his sister and brother-in-law, per chart cousins assist also. Occupational Therapy Goals Initiated 8/20/2020 1. Patient will perform upper body bathing and dressing with modified independence  seated edge of bed within 7 day(s). 2.  Patient will perform lower body dressing with moderate assistance and best technique/adaptive equipment within 7 day(s). 3.  Patient will perform static standing with one UE support > or = 5 minutes with minimal assistance/contact guard assist within 7 day(s). 4.  Patient will perform toilet transfers with minimal assistance/contact guard assist x's 1 within 7 day(s). 5.  Patient will perform all aspects of toileting with moderate assistance  within 7 day(s). 6.  Patient will participate in upper extremity therapeutic exercise/activities with supervision/set-up for 10 minutes within 7 day(s). Outcome: Not Met OCCUPATIONAL THERAPY EVALUATION Patient: Denese Galeazzi (76 y.o. male) Date: 8/20/2020 Primary Diagnosis: Physical debility [R53.81] Precautions:   Fall ASSESSMENT Based on the objective data described below, the patient presents with increased activity tolerance, significant improvement in mobility, minimal complaint of pain, good sitting balance and good participation throughout. Noted patient max assist of 2 for sit to stand briefly prior to transfer and was not tolerating much activity due to pain.  At this time feel he will make good progress with goal to return home with family. Current Level of Function Impacting Discharge (ADLs/self-care): max to total LE ADLs, toileting, min to mod assist bathing, supervision UE ADL's, sit to stand and pivot to chair with min assist of 2, bed mobility with supervision Functional Outcome Measure: The patient scored 35/100 on the Barthel Index outcome Other factors to consider for discharge: patient unable to go to rehab and family will take home if able to ambulate and care for himself Patient will benefit from skilled therapy intervention to address the above noted impairments. PLAN : 
Recommendations and Planned Interventions: self care training, functional mobility training, therapeutic exercise, balance training, therapeutic activities, endurance activities, patient education, home safety training, and family training/education Frequency/Duration: Patient will be followed by occupational therapy 3 times a week to address goals. Recommendation for discharge: (in order for the patient to meet his/her long term goals) Occupational therapy at least 2 days/week in the home AND ensure assist and/or supervision for safety with ADL mobility This discharge recommendation: 
Has been made in collaboration with the attending provider and/or case management IF patient discharges home will need the following DME: to be determined SUBJECTIVE:  
Patient stated you all want to get me outta here.  OBJECTIVE DATA SUMMARY:  
HISTORY:  
Past Medical History:  
Diagnosis Date Anemia Bronchitis Chronic kidney disease UTI Diabetes ([address] Gastrointestinal disorder   
 anemia Hypertension Kidney disease, chronic, stage II (GFR 60-89 ml/min) Neurological disorder Metabolic Brain Disorder Past Surgical History:  
Procedure Laterality Date HX OTHER SURGICAL    
 never Expanded or extensive additional review of patient history: Home Situation Home Environment: Private residence(trailer) # Steps to Enter: 3 One/Two Story Residence: One story Living Alone: No 
Support Systems: Family member(s) Patient Expects to be Discharged to[de-identified] Private residence Current DME Used/Available at Home: Walker, rolling, Glucometer Hand dominance: Right EXAMINATION OF PERFORMANCE DEFICITS: 
Cognitive/Behavioral Status: 
Neurologic State: Alert; Appropriate for age Orientation Level: Oriented X4 Cognition: Follows commands; Appropriate for age attention/concentration; Appropriate safety awareness Perception: Appears intact Perseveration: No perseveration noted Safety/Judgement: Awareness of environment; Fall prevention;Home safety; Insight into deficits Skin: intact Edema: none noted Hearing: Auditory Auditory Impairment: None Vision/Perceptual:   
    
    
    
  
    
    
  
 
Range of Motion: 
AROM: Generally decreased, functional 
PROM: Within functional limits(except right knee extension roughly -35 degrees) Strength: 
Strength: Generally decreased, functional 
  
  
  
  
 
Coordination: 
Coordination: Generally decreased, functional 
Fine Motor Skills-Upper: Left Intact; Right Intact Gross Motor Skills-Upper: Left Intact; Right Intact Tone & Sensation: 
Tone: Normal 
  
  
  
  
  
  
  
 
Balance: 
Sitting: Intact Sitting - Static: Good (unsupported) Sitting - Dynamic: Good (unsupported) Standing: Impaired; With support Standing - Static: Constant support; Fair 
Standing - Dynamic : Constant support; Terese Dunaway Functional Mobility and Transfers for ADLs: 
Bed Mobility: 
Rolling: Supervision; Adaptive equipment Supine to Sit: Supervision Scooting: Supervision Transfers: 
Sit to Stand: Minimum assistance;Assist x2;Adaptive equipment Stand to Sit: Minimum assistance;Assist x2 Bed to Chair: Minimum assistance;Assist x2 Bathroom Mobility: Dependent/total assistance Toilet Transfer : Minimum assistance;Assist x2;Adaptive equipment; Additional time(stand pivot to bedside commode) ADL Assessment: 
Feeding: Modified independent Oral Facial Hygiene/Grooming: Setup Bathing: Minimum assistance; Moderate assistance(bed level) Upper Body Dressing: Setup Lower Body Dressing: Maximum assistance; Adaptive equipment Toileting: Maximum assistance; Total assistance; Other (comment)(incontinent bowel) ADL Intervention and task modifications: 
  
Educated on role of OT, benefit of increased activity, instructed on bridge position to assist with toileting, initially assisted to bridge on right, decreased tolerance due to right LE discomfort however able to elevate hips with left LE. Performed jerry-care in supine with set up, UE bathing with set-up and cues, rolling with use of bed rail. Educated on use of call bell and demonstrated good safety awareness and carryover Instructed on hand and foot placement for sit to stand and stand to sit transfers Cognitive Retraining Safety/Judgement: Awareness of environment; Fall prevention;Home safety; Insight into deficits Therapeutic Exercise: 
Performed LE AROM seated unsupported prior to standing Functional Measure: 
Barthel Index: 
 
Bathin Bladder: 0 Bowels: 0 Groomin Dressin Feeding: 10 Mobility: 0 Stairs: 0 Toilet Use: 5 Transfer (Bed to Chair and Back): 10 Total: 35/100 The Barthel ADL Index: Guidelines 1. The index should be used as a record of what a patient does, not as a record of what a patient could do. 2. The main aim is to establish degree of independence from any help, physical or verbal, however minor and for whatever reason. 3. The need for supervision renders the patient not independent. 4. A patient's performance should be established using the best available evidence.  Asking the patient, friends/relatives and nurses are the usual sources, but direct observation and common sense are also important. However direct testing is not needed. 5. Usually the patient's performance over the preceding 24-48 hours is important, but occasionally longer periods will be relevant. 6. Middle categories imply that the patient supplies over 50 per cent of the effort. 7. Use of aids to be independent is allowed. Yany Schafer., Barthel, D.W. (1260). Functional evaluation: the Barthel Index. [address] [facility] (14)2. Zulema Harrington yadira JOCELINE Oreilly, Dashawn Goodwin., Sarwat Piyushleandro., [facility], [address] [facility] (1999). Measuring the change indisability after inpatient rehabilitation; comparison of the responsiveness of the Barthel Index and Functional Rombauer Measure. Journal of Neurology, Neurosurgery, and Psychiatry, 66(4), 978-299. SONY Hermosillo, MODESTA Galarza, & Franki Moeller MAsherA. (2004.) Assessment of post-stroke quality of life in cost-effectiveness studies: The usefulness of the Barthel Index and the EuroQoL-5D. [facility], 13, [phone] Occupational Therapy Evaluation Charge Determination History Examination Decision-Making MEDIUM Complexity : Expanded review of history including physical, cognitive and psychosocial  history  MEDIUM Complexity : 3-5 performance deficits relating to physical, cognitive , or psychosocial skils that result in activity limitations and / or participation restrictions MEDIUM Complexity : Patient may present with comorbidities that affect occupational performnce. Miniml to moderate modification of tasks or assistance (eg, physical or verbal ) with assesment(s) is necessary to enable patient to complete evaluation Based on the above components, the patient evaluation is determined to be of the following complexity level: MEDIUM Pain Rating: Not rated, verbalized improvement, demonstrated pain in right knee Activity Tolerance:  
Good Please refer to the flowsheet for vital signs taken during this treatment. After treatment patient left in no apparent distress:   
Sitting in chair and Call bell within reach COMMUNICATION/EDUCATION:  
The patients plan of care was discussed with: Physical therapist, Registered nurse, and Certified nursing assistant/patient care technician. Home safety education was provided and the patient/caregiver indicated understanding. and Patient/family have participated as able in goal setting and plan of care. This patients plan of care is appropriate for delegation to [facility]. Thank you for this referral. 
Dian Dorman, OTR/L Time Calculation: 28 mins

## 2020-08-20 NOTE — PROGRESS NOTES
Hospitalist Progress Note NAME: Velinda Sicard :  1950 MRN:  [id number] Assessment / Plan: Active Problems: 
  Physical debility (2020) 
  Physical debility Leg edema, right POA Patient presented with inability to care for self at home. Likely due to long standing physical deconditioning due to chronic medical problems.  
  
- PT/OT consult - Plan for discharge to home with family, HH once functionality and ambulation improve. 
  
  
Type 2 diabetes with diabetic nephropathy on long term insulin A1c 8.9 % on 2020  
  
- FSG AC HS, consistent carb diet, hypoglycemia protocol - ELAINE, Alogliptin  
  
  
CKD stage 4 POA , renal function at baseline, nephrology following 
 
  
Coronary artery disease involving native coronary artery of native heart POA:  
Continue Imdur, Plavix  
  
  
HTN (hypertension), benign POA, uncontrolled:  
- Continue Hydralazine, Imdur and Cozaar  
  
  
Anemia due to chronic illness POA:  
deficient in folic acid. Folic acid 4.4.  
- Continue Epoetin, ferrous sulfate.  
  
  
Severe obesity (BMI 35.0-39. 9) with comorbidity POA:  
Counseled on Lifestyle modifications, AHA Diet ,weight loss strategies.  
  
  
Hypothyroidism POA:  
-check TSH  
-Continue Levothyroxine  
  
  
BMI 40.10 Morbid obesity Nutrition consult 
  
Code Status: full Surrogate Decision Maker: 
sister Ronnell Cuevas ([phone]) 
  
DVT Prophylaxis: Hep SQ 
GI Prophylaxis: not indicated Lines : none Baseline: ambulatory prior to admission Subjective: Chief Complaint / Reason for Physician Visit \" Feeling okay today\". Discussed with RN events overnight. Review of Systems: 
Symptom Y/N Comments  Symptom Y/N Comments Fever/Chills    Chest Pain Poor Appetite    Edema Cough    Abdominal Pain Sputum    Joint Pain SOB/MCDERMOTT    Pruritis/Rash Nausea/vomit    Tolerating PT/OT Diarrhea    Tolerating Diet Constipation    Other Could NOT obtain due to:   
 
Objective: VITALS:  
Last 24hrs VS reviewed since prior progress note. Most recent are: 
Patient Vitals for the past 24 hrs: 
 Temp Pulse Resp BP SpO2  
08/20/20 0958 97.8 °F (36.6 °C) 86 17  100 % 08/20/20 0419 97.8 °F (36.6 °C) 82 18 146/77 100 % 08/19/20 2152 97.4 °F (36.3 °C) 95 18 143/66 100 % 08/19/20 1734 97.5 °F (36.4 °C) 94 18 164/89 97 % Intake/Output Summary (Last 24 hours) at 8/20/2020 1132 Last data filed at 8/20/2020 2005 Gross per 24 hour Intake  Output 560 ml Net -560 ml PHYSICAL EXAM: 
General:          Alert, cooperative, no distress, appears stated age. HEENT:           Atraumatic, anicteric sclerae, pink conjunctivae No oral ulcers, mucosa moist, throat clear, dentition fair Neck:               Supple, symmetrical,  thyroid: non tender Lungs:             Clear to auscultation bilaterally. No Wheezing or Rhonchi. No rales. Chest wall:      No tenderness  No Accessory muscle use. Heart:              Regular  rhythm,  No  murmur   No edema Abdomen:        Soft, non-tender. Not distended. Bowel sounds normal 
Extremities:     No cyanosis. No clubbing,   
                        Skin turgor normal, Capillary refill normal, Radial dial pulse 2+ Skin:                Not pale. Not Jaundiced  No rashes Psych:             Good insight. Not depressed. Not anxious or agitated. Neurologic:      EOMs intact. No facial asymmetry. No aphasia or slurred speech. Symmetrical strength, Sensation grossly intact. Alert and oriented X Reviewed most current lab test results and cultures  YES Reviewed most current radiology test results   YES Review and summation of old records today    NO Reviewed patient's current orders and MAR    YES 
PMH/SH reviewed - no change compared to H&P 
________________________________________________________________________ Care Plan discussed with: 
  Comments Patient Family RN Care Manager Consultant Multidiciplinary team rounds were held today with , nursing, pharmacist and clinical coordinator. Patient's plan of care was discussed; medications were reviewed and discharge planning was addressed. ________________________________________________________________________ Total NON critical care TIME: 35   minutes Total CRITICAL CARE TIME Spent:   Minutes non procedure based Comments >50% of visit spent in counseling and coordination of care    
________________________________________________________________________ Rodger Gleason MD  
 
Procedures: see electronic medical records for all procedures/Xrays and details which were not copied into this note but were reviewed prior to creation of Plan. LABS: 
I reviewed today's most current labs and imaging studies. Pertinent labs include: 
Recent Labs  
  08/20/20 0356 08/19/20 
0518 WBC 10.1 9.5 HGB 8.1* 7.9*  
HCT 25.2* 25.6*  325 Recent Labs  
  08/20/20 0356 08/19/20 0518  138  
K 3.4* 3.5  110* CO2 19* 17* * 123* BUN 45* 47* CREA 3.01* 3.15* CA 8.9 9.3 PHOS  --  4.6 ALB  --  2.2* Signed: Rodger Gleason MD

## 2020-08-20 NOTE — INTERDISCIPLINARY ROUNDS
IDR Rounds today with MD and team.  
Continue care and treatment PT & OT consults Therapy worked with patient today. He was able to participate. Aware of need for rehab and increase mobility and functional level. Complex CM Ms. Ahuja following for Disposition. [place] this afternoon- Medicaid re-instated. [facility] sent to Ms. Ahuja for follow-up  Please see her note for details [address] MSW RN  
[phone]

## 2020-08-20 NOTE — PROGRESS NOTES
[facility] Dosing Services: Heparin 
 
78 y.o. male Indication: DVT prophylaxis Wt Readings from Last 1 Encounters:  
08/19/20 124.2 kg (273 lb 12.8 oz) Ht Readings from Last 1 Encounters:  
08/06/20 170.2 cm (67\") Pharmacist made changes to the heparin regimen based on: 
Patient's BMI is 42.87 kg/m2. Pharmacy automatically make dose adjustments for obesity (BMI greater than 40). Plan: Increase heparin to 7,500 units subcutaneously every 8 hours. Previous Dose Heparin 5,000 units subcutaneously every 12 hours Creatinine Clearance Estimated Creatinine Clearance: 27.6 mL/min (A) (based on SCr of 3.15 mg/dL (H)). Creatinine Lab Results Component Value Date/Time Creatinine 3.15 (H) 08/19/2020 05:18 AM  
 Creatinine (POC) 1.5 (H) 02/11/2010 07:13 PM  
   
Platelet Lab Results Component Value Date/Time PLATELET 485 92/20/9704 05:18 AM  
  
H/H Lab Results Component Value Date/Time HGB 7.9 (L) 08/19/2020 05:18 AM  
  
 
 
Pharmacy will monitor patients progress. Will communicate further recommendations regarding patients anticoagulation therapy with prescriber. Thank you, Bulmaro Cook, Pharm. D. 
[phone]

## 2020-08-20 NOTE — PROGRESS NOTES
U Nephrology Consult - DOCTORS [facility] 
Requesting physician: Dr. Elpidio Hilliard MD 
Date of note: 08/20/20 CC: CKD4 This is an \"e-consult\" with data obtained by chart review and by discussion with the requesting physician. I did not directly examine or interview the patient. Interval History: 
Cr stable, CO2 improving Current inpatient medications: 
 
Current Facility-Administered Medications:  
  alogliptin (NESINA) tablet 6.25 mg, 6.25 mg, Oral, DAILY, Christa Butcher MD 
  clopidogreL (PLAVIX) tablet 75 mg, 75 mg, Oral, DAILY, Christa Butcher MD 
  ferrous sulfate tablet 325 mg, 325 mg, Oral, BID WITH MEALS, Christa Butcher MD, 325 mg at 08/19/20 1733   folic acid (FOLVITE) tablet 1 mg, 1 mg, Oral, DAILY, Christa Butcher MD 
  hydrALAZINE (APRESOLINE) tablet 50 mg, 50 mg, Oral, TID, Christa Butcher MD, 50 mg at 08/19/20 2155   isosorbide mononitrate ER (IMDUR) tablet 30 mg, 30 mg, Oral, BID, Christa Butcher MD, 30 mg at 08/19/20 1837   levothyroxine (SYNTHROID) tablet 150 mcg, 150 mcg, Oral, Steff Blanchard MD, 150 mcg at 08/20/20 3508   losartan (COZAAR) tablet 50 mg, 50 mg, Oral, DAILY, Christa Butcher MD 
  insulin lispro (HUMALOG) injection, , SubCUTAneous, AC&HS, Christa Butcher MD, Stopped at 08/19/20 1630 
  glucose chewable tablet 16 g, 4 Tab, Oral, PRN, Christa Butcher MD 
  dextrose 10% infusion 0-250 mL, 0-250 mL, IntraVENous, PRN, Christa Butcher MD 
  glucagon (GLUCAGEN) injection 1 mg, 1 mg, IntraMUSCular, PRN, Christa Butcher MD 
  epoetin aquilino-epbx (RETACRIT) injection 20,000 Units, 20,000 Units, SubCUTAneous, Q7D, Christa Butcher MD 
  sodium chloride (NS) flush 5-40 mL, 5-40 mL, IntraVENous, Q8H, Christa Butcher MD, Stopped at 08/19/20 1600 
  sodium chloride (NS) flush 5-40 mL, 5-40 mL, IntraVENous, PRN, Christa Butcher MD 
  acetaminophen (TYLENOL) tablet 650 mg, 650 mg, Oral, Q6H PRN, 650 mg at 08/19/20 2203 **OR** acetaminophen (TYLENOL) suppository 650 mg, 650 mg, Rectal, Q6H PRN, Mckenzie Mayes MD 
  polyethylene glycol (MIRALAX) packet 17 g, 17 g, Oral, DAILY PRN, Mckenzie Mayes MD 
  sodium bicarbonate tablet 1,950 mg, 1,950 mg, Oral, TID, Mckenzie Mayes MD, 1,950 mg at 08/19/20 2155   ondansetron (ZOFRAN ODT) tablet 4 mg, 4 mg, Oral, Q8H PRN, Mckenzie Mayes MD 
  heparin (porcine) injection 7,500 Units, 7,500 Units, SubCUTAneous, Q8H, Mckenzie Mayes MD, 7,500 Units at 08/20/20 1356 Allergies: 
No Known Allergies ROS:  
Not performed Vitals: 
Patient Vitals for the past 24 hrs: 
 Temp Pulse Resp BP SpO2  
08/20/20 0419 97.8 °F (36.6 °C) 82 18 146/77 100 % 08/19/20 2152 97.4 °F (36.3 °C) 95 18 143/66 100 % 08/19/20 1734 97.5 °F (36.4 °C) 94 18 164/89 97 % I/O: 
 
Intake/Output Summary (Last 24 hours) at 8/20/2020 7441 Last data filed at 8/20/2020 0546 Gross per 24 hour Intake  Output 560 ml Net -560 ml Physical exam: Not performed Labs/imaging: 
Recent Results (from the past 24 hour(s)) GLUCOSE, POC Collection Time: 08/19/20 10:36 AM  
Result Value Ref Range Glucose (POC) 147 (H) 65 - 100 mg/dL Performed by "[employer] (PCT) GLUCOSE, POC Collection Time: 08/19/20  4:34 PM  
Result Value Ref Range Glucose (POC) 195 (H) 65 - 100 mg/dL Performed by Shabana Rojas   
EKG, 12 LEAD, INITIAL Collection Time: 08/19/20  8:48 PM  
Result Value Ref Range Ventricular Rate 97 BPM  
 Atrial Rate 97 BPM  
 P-R Interval 192 ms QRS Duration 104 ms Q-T Interval 366 ms  
 QTC Calculation (Bezet) 464 ms Calculated P Axis 38 degrees Calculated R Axis 48 degrees Calculated T Axis 85 degrees Diagnosis Normal sinus rhythm Nonspecific T wave abnormality Abnormal ECG When compared with ECG of 01-AUG-2020 21:32, 
QT has shortened GLUCOSE, POC Collection Time: 08/19/20  9:46 PM  
Result Value Ref Range Glucose (POC) 145 (H) 65 - 100 mg/dL Performed by Ryan Crespo CBC WITH AUTOMATED DIFF Collection Time: 08/20/20  3:56 AM  
Result Value Ref Range WBC 10.1 4.1 - 11.1 K/uL  
 RBC 3.09 (L) 4.10 - 5.70 M/uL HGB 8.1 (L) 12.1 - 17.0 g/dL HCT 25.2 (L) 36.6 - 50.3 % MCV 81.6 80.0 - 99.0 FL  
 MCH 26.2 26.0 - 34.0 PG  
 MCHC 32.1 30.0 - 36.5 g/dL  
 RDW 15.5 (H) 11.5 - 14.5 % PLATELET 879 398 - 019 K/uL MPV 10.0 8.9 - 12.9 FL  
 NRBC 0.0 0  WBC ABSOLUTE NRBC 0.00 0.00 - 0.01 K/uL NEUTROPHILS 80 (H) 32 - 75 % LYMPHOCYTES 13 12 - 49 % MONOCYTES 5 5 - 13 % EOSINOPHILS 1 0 - 7 % BASOPHILS 0 0 - 1 % IMMATURE GRANULOCYTES 1 (H) 0.0 - 0.5 % ABS. NEUTROPHILS 8.2 (H) 1.8 - 8.0 K/UL  
 ABS. LYMPHOCYTES 1.3 0.8 - 3.5 K/UL  
 ABS. MONOCYTES 0.5 0.0 - 1.0 K/UL  
 ABS. EOSINOPHILS 0.1 0.0 - 0.4 K/UL  
 ABS. BASOPHILS 0.0 0.0 - 0.1 K/UL  
 ABS. IMM. GRANS. 0.1 (H) 0.00 - 0.04 K/UL  
 DF AUTOMATED METABOLIC PANEL, BASIC Collection Time: 08/20/20  3:56 AM  
Result Value Ref Range Sodium 138 136 - 145 mmol/L Potassium 3.4 (L) 3.5 - 5.1 mmol/L Chloride 103 97 - 108 mmol/L  
 CO2 19 (L) 21 - 32 mmol/L Anion gap 16 (H) 5 - 15 mmol/L Glucose 121 (H) 65 - 100 mg/dL BUN 45 (H) 6 - 20 MG/DL Creatinine 3.01 (H) 0.70 - 1.30 MG/DL  
 BUN/Creatinine ratio 15 12 - 20 GFR est AA 25 (L) >60 ml/min/1.73m2 GFR est non-AA 21 (L) >60 ml/min/1.73m2 Calcium 8.9 8.5 - 10.1 MG/DL  
TSH 3RD GENERATION Collection Time: 08/20/20  3:56 AM  
Result Value Ref Range TSH 0.26 (L) 0.36 - 3.74 uIU/mL  
 
 
A/P: 
Chronic kidney disease stage 4, likely secondary to hypertension/diabetes - stable Anion gap metabolic acidosis - improving Anemia of chronic kidney disease - stable Failure to thrive 
-his overall kidney function appears to be at baseline. 
-continue sodium bicarbonate 1950mg TID; overall bicarbonate deficit is ~350mEq -he is iron replete based on 8/7/2020 labs, agree with epoetin 37078U/wk and PO ferrous sulfate 
-with reported history of nonadherence and poor historian he needs very close followup with an outpatient nephrologist once discharged--he may require dialysis however he requires evaluation of his candidacy--if severe dementia present dialysis is unlikely to alter his overall clinical trajectory. 
-no indication for dialysis at this time 
-monitor I/Os 
-renally dose all medications Thank you for this consult. I will continue to follow the patient with you. Please feel free to call with questions. Katherine King M.D. 
[facility] Nephrology

## 2020-08-21 NOTE — PROGRESS NOTES
U Nephrology Consult - DOCTORS [facility] 
Requesting physician: Dr. Criselda Cerna MD 
Date of note: 08/21/20 CC: CKD4 This is an \"e-consult\" with data obtained by chart review and by discussion with the requesting physician. I did not directly examine or interview the patient. Interval History: 
Remains stable Current inpatient medications: 
 
Current Facility-Administered Medications:  
  levothyroxine (SYNTHROID) tablet 125 mcg, 125 mcg, Oral, Burgess Arabella MD, 125 mcg at 08/21/20 8806   alogliptin (NESINA) tablet 6.25 mg, 6.25 mg, Oral, DAILY, Yves Harp MD, 6.25 mg at 08/21/20 9557   clopidogreL (PLAVIX) tablet 75 mg, 75 mg, Oral, DAILY, Yves Harp MD, 75 mg at 08/21/20 7648   ferrous sulfate tablet 325 mg, 325 mg, Oral, BID WITH MEALS, Yves Harp MD, 325 mg at 08/21/20 0713   folic acid (FOLVITE) tablet 1 mg, 1 mg, Oral, DAILY, Yves Harp MD, 1 mg at 08/21/20 7997   hydrALAZINE (APRESOLINE) tablet 50 mg, 50 mg, Oral, TID, Yves Harp MD, 50 mg at 08/21/20 0841 
  isosorbide mononitrate ER (IMDUR) tablet 30 mg, 30 mg, Oral, BID, Yves Harp MD, 30 mg at 08/21/20 0137   losartan (COZAAR) tablet 50 mg, 50 mg, Oral, DAILY, Yves Harp MD, 50 mg at 08/21/20 0841 
  insulin lispro (HUMALOG) injection, , SubCUTAneous, AC&HS, Yves Harp MD, Stopped at 08/19/20 1630 
  glucose chewable tablet 16 g, 4 Tab, Oral, PRN, Yves Harp MD 
  dextrose 10% infusion 0-250 mL, 0-250 mL, IntraVENous, PRN, Yves Harp MD 
  glucagon (GLUCAGEN) injection 1 mg, 1 mg, IntraMUSCular, PRN, Yves Harp MD 
  epoetin aquilino-epbx (RETACRIT) injection 20,000 Units, 20,000 Units, SubCUTAneous, Q7D, Yves Harp MD, 20,000 Units at 08/20/20 8088   sodium chloride (NS) flush 5-40 mL, 5-40 mL, IntraVENous, Q8H, Yves Harp MD, Stopped at 08/21/20 5914   sodium chloride (NS) flush 5-40 mL, 5-40 mL, IntraVENous, PRN, Jackie Bañuelos MD 
  acetaminophen (TYLENOL) tablet 650 mg, 650 mg, Oral, Q6H PRN, 650 mg at 08/20/20 1007 **OR** acetaminophen (TYLENOL) suppository 650 mg, 650 mg, Rectal, Q6H PRN, Jackie Bañuelos MD 
  polyethylene glycol (MIRALAX) packet 17 g, 17 g, Oral, DAILY PRN, Jackie Bañuelos MD 
  sodium bicarbonate tablet 1,950 mg, 1,950 mg, Oral, TID, Jackie Bañuelos MD, 1,950 mg at 08/21/20 0839 
  ondansetron (ZOFRAN ODT) tablet 4 mg, 4 mg, Oral, Q8H PRN, Jackie Bañuelos MD 
  heparin (porcine) injection 7,500 Units, 7,500 Units, SubCUTAneous, Q8H, Jackie Bañuelos MD, 7,500 Units at 08/21/20 1409 Allergies: 
No Known Allergies ROS:  
Not performed Vitals: 
Patient Vitals for the past 24 hrs: 
 Temp Pulse Resp BP SpO2  
08/21/20 0800 98.2 °F (36.8 °C) 87 16 149/73 98 % 08/20/20 2030 97.7 °F (36.5 °C) 91 18 155/74 99 % 08/20/20 1539 97.6 °F (36.4 °C) 85 16 114/73 100 % I/O: 
 
Intake/Output Summary (Last 24 hours) at 8/21/2020 1529 Last data filed at 8/20/2020 2054 Gross per 24 hour Intake 0 ml Output 400 ml Net -400 ml Physical exam: Not performed Labs/imaging: 
Recent Results (from the past 24 hour(s)) GLUCOSE, POC Collection Time: 08/20/20  5:13 PM  
Result Value Ref Range Glucose (POC) 171 (H) 65 - 100 mg/dL Performed by Gilford Mews GLUCOSE, POC Collection Time: 08/20/20  9:49 PM  
Result Value Ref Range Glucose (POC) 146 (H) 65 - 100 mg/dL Performed by Jeniffer Rosales LIPID PANEL Collection Time: 08/21/20  5:11 AM  
Result Value Ref Range LIPID PROFILE Cholesterol, total 156 <200 MG/DL Triglyceride 107 <150 MG/DL  
 HDL Cholesterol 44 MG/DL  
 LDL, calculated 90.6 0 - 100 MG/DL VLDL, calculated 21.4 MG/DL  
 CHOL/HDL Ratio 3.5 0.0 - 5.0 GLUCOSE, POC Collection Time: 08/21/20  7:41 AM  
Result Value Ref Range Glucose (POC) 125 (H) 65 - 100 mg/dL Performed by Chad Liang (PCT) GLUCOSE, POC Collection Time: 08/21/20 10:55 AM  
Result Value Ref Range Glucose (POC) 141 (H) 65 - 100 mg/dL Performed by Chad Liang (PCT) A/P: 
Chronic kidney disease stage 4, likely secondary to hypertension/diabetes - stable (no labs today) Anion gap metabolic acidosis Anemia of chronic kidney disease Failure to thrive 
-his overall kidney function appears to be at baseline. 
-continue sodium bicarbonate 1950mg TID -he is iron replete based on 8/7/2020 labs, agree with epoetin 61310M/wk and PO ferrous sulfate 
-with reported history of nonadherence and poor historian he needs very close followup with an outpatient nephrologist once discharged--he may require dialysis however he requires evaluation of his candidacy--if severe dementia present dialysis is unlikely to alter his overall clinical trajectory. 
-no indication for dialysis at this time 
-monitor I/Os 
-renally dose all medications Thank you for this consult. I will continue to follow the patient with you. Please feel free to call with questions. Torri Ruth M.D. 
[facility] Nephrology

## 2020-08-21 NOTE — PROGRESS NOTES
Care Plan reviewed Problem: Diabetes Self-Management Goal: *Disease process and treatment process Description: Define diabetes and identify own type of diabetes; list 3 options for treating diabetes. Outcome: Progressing Towards Goal 
Goal: *Incorporating nutritional management into lifestyle Description: Describe effect of type, amount and timing of food on blood glucose; list 3 methods for planning meals. Outcome: Progressing Towards Goal 
Goal: *Incorporating physical activity into lifestyle Description: State effect of exercise on blood glucose levels. Outcome: Progressing Towards Goal 
Goal: *Developing strategies to promote health/change behavior Description: Define the ABC's of diabetes; identify appropriate screenings, schedule and personal plan for screenings. Outcome: Progressing Towards Goal 
Goal: *Using medications safely Description: State effect of diabetes medications on diabetes; name diabetes medication taking, action and side effects. Outcome: Progressing Towards Goal 
Goal: *Monitoring blood glucose, interpreting and using results Description: Identify recommended blood glucose targets  and personal targets. Outcome: Progressing Towards Goal 
Goal: *Prevention, detection, treatment of acute complications Description: List symptoms of hyper- and hypoglycemia; describe how to treat low blood sugar and actions for lowering  high blood glucose level. Outcome: Progressing Towards Goal 
Goal: *Prevention, detection and treatment of chronic complications Description: Define the natural course of diabetes and describe the relationship of blood glucose levels to long term complications of diabetes. Outcome: Progressing Towards Goal 
Goal: *Developing strategies to address psychosocial issues Description: Describe feelings about living with diabetes; identify support needed and support network Outcome: Progressing Towards Goal 
Goal: *Insulin pump training Outcome: Progressing Towards Goal 
Goal: *Sick day guidelines Outcome: Progressing Towards Goal 
Goal: *Patient Specific Goal (EDIT GOAL, INSERT TEXT) Outcome: Progressing Towards Goal 
  
Problem: Patient Education: Go to Patient Education Activity Goal: Patient/Family Education Outcome: Progressing Towards Goal 
  
Problem: Falls - Risk of 
Goal: *Absence of Falls Description: Document Delon Swann Fall Risk and appropriate interventions in the flowsheet. Outcome: Progressing Towards Goal 
Note: Fall Risk Interventions: 
Mobility Interventions: Strengthening exercises (ROM-active/passive) Mentation Interventions: Adequate sleep, hydration, pain control Medication Interventions: Patient to call before getting OOB Elimination Interventions: Call light in reach History of Falls Interventions: Bed/chair exit alarm Problem: Patient Education: Go to Patient Education Activity Goal: Patient/Family Education Outcome: Progressing Towards Goal 
  
Problem: Pressure Injury - Risk of 
Goal: *Prevention of pressure injury Description: Document Juan Scale and appropriate interventions in the flowsheet. Outcome: Progressing Towards Goal 
Note: Pressure Injury Interventions: 
Sensory Interventions: Maintain/enhance activity level Moisture Interventions: Maintain skin hydration (lotion/cream) Activity Interventions: PT/OT evaluation Mobility Interventions: Pressure redistribution bed/mattress (bed type) Nutrition Interventions: Offer support with meals,snacks and hydration Friction and Shear Interventions: Apply protective barrier, creams and emollients Problem: Patient Education: Go to Patient Education Activity Goal: Patient/Family Education Outcome: Progressing Towards Goal 
  
Problem: Patient Education: Go to Patient Education Activity Goal: Patient/Family Education Outcome: Progressing Towards Goal 
  
Problem: Patient Education: Go to Patient Education Activity Goal: Patient/Family Education Outcome: Progressing Towards Goal

## 2020-08-21 NOTE — PROGRESS NOTES
Comprehensive Nutrition Assessment Type and Reason for Visit: (P) Initial 
 
Nutrition Recommendations/Plan: CC diet with oral supplements for wound healing and nutrition. Ocuvite twice per day for wound healing. Cont other supplements as ordered. Please do not re-consult for diet education. Thank you. Nutrition Assessment:  (P) Pt admitted with physical debility; PT, OT and diet instruction so pt can return home with [place] and family. Medicaid reinstated yesterday. Podiatry following; pt reported that they are working on his feet and wanted to show me his feet. Other hx notable for IDDM A1c 8.9, nephopathy/CKD-IV, CAD of native coronary artery, HTN, hypothyroidism, FTT. I spent some time with him yesterday morning around 9:40 AM.  PER MD consult, I came in to conduct diabetes education. I had noted in the chart multiple attempts to educate pt and gross medical and nutritional non-compliace. His eyes were closed when I came in but he was easily arousable. Very poor dentition/missing teeth noted. I introduced myself as the dietitian. Two minutes later he looked at me and said, \"Oh, you're the lady I saw at [facility]. \"  I attempted to review the pt's diet with him along with healthier alternatives. He just nodded while I was talking and later during our meeting he said, \"Oh, no; you're the lady who bought [employer] in [place], the one who makes all those delicious cakes that I eat. \"  When I asked if he could tell me anything I taught him about his diet he was unable to do so. I reviewed some things we discussed and made a second attempt to get him to tell me what we discussed. He was unable to tell me. Pt has limited understanding of his eating patterns and how they affect his blood sugar. I also noted some cognitive impairment. Teaching for this pt was unsuccessful and not helpful. At this point, education consults are fruitless.   Pt is unable to comprehend and communicate the relationship between his food choices and his poor health. Malnutrition Assessment: FTT and protein/calorie malnutrition was charted. Estimated Daily Nutrient Needs: 
Energy (kcal):  (P) 6659(6509 x 1.3 -300) Protein (g):  (P) 82(.67 g/kg CBW) Fluid (ml/day):  (P) 2200 Nutrition Related Findings:  (P) Pt is non-compliant and I believe is unable to comprehend the necessity of managing his diet to protect his health. Wounds:   
(P) Multiple Current Nutrition Therapies: DIET DIABETIC CONSISTENT CARB Regular; 70-70-70 (House) DIET NUTRITIONAL SUPPLEMENTS Lunch, Dinner; [employer] Anthropometric Measures: 
Height:  (P) 5' 7\" (170.2 cm) Current Body Wt:  (P) 124.2 kg (273 lb 12.8 oz) Admission Body Wt:      
Usual Body Wt:       
Ideal Body Wt:  (P) 148 lbs:  (P) 185 % Adjusted Body Weight:   ; Weight Adjustment for: (P) (ABW for MO is 96 kg) Adjusted BMI:      
BMI Category:  (P) Obese class 3 (BMI 40.0 or greater) Nutrition Diagnosis: (P) Inadequate protein intake, Altered nutrition-related lab values, Overweight/obesity, Not ready for diet/lifestyle change, Limited adherence to nutrition-related recommendations related to (P) cognitive or neurological impairment, biting/chewing (masticatory) difficulty, food and nutrition related knowledge deficit as evidenced by (P) wounds, BMI, poor dentition Nutrition Interventions:  
Food and/or Nutrient Delivery: (P) Continue current diet, Continue oral nutrition supplement Nutrition Education and Counseling: (P) Education needed, Education completed, Counseling needed, Counseling completed Coordination of Nutrition Care: (P) Continued inpatient monitoring Goals: (P) PO intake 75% most meals and ONS, optimize glycemic control Nutrition Monitoring and Evaluation:  
Behavioral-Environmental Outcomes: (P) Knowledge or skill, Readiness for change Food/Nutrient Intake Outcomes: (P) Diet advancement/tolerance, Food and nutrient intake Physical Signs/Symptoms Outcomes: (P) Biochemical data, Chewing or swallowing, Skin, Weight Discharge Planning: (P) Continue oral nutrition supplement, Continue current diet Electronically signed by Nieves Matute, PhD, RD, [address] [facility] on 8/21/2020 at 9:14 AM 
 
Contact: [phone]

## 2020-08-21 NOTE — PROGRESS NOTES
Transition of Care Plan: 
 
* Continue PT/OT to increase strength and endurance * Return to home (with sister & KUMAR; caregivers) * Medicare/Medicaid in place * Needs PCP appt at discharge (NO Virtual Appt) * Possible HH at D/C * Possible DME at D/C 
* LTSS successfully completed. Spoke with patient's Brother in [address] [place] [address] ([phone]) today to provide update on patient's current condition. Mr. Alexandra Contreras has spoken with patient at [facility] and is pleased with plan. Barriers to Plan: 
* Follow up appt with PCP - patient does not have phone device to make virtual visits. * Needs medication management at D/C 
* Unable to be placed in SNF/NH due to past criminal history. Patient did not obtain medications at pharmacy at previous discharge and was not able to participate in a physician appointment. Continue to work on avenues to have follow up care that is appropriate for patient to reside in home. Reji GREWAL, BSW, CCM AC- Complex  Coordinator/[facility] [phone]

## 2020-08-21 NOTE — PROGRESS NOTES
0715 Bedside shift change report given to Courtney AGUILAR and Silvia Fleming RN (oncoming nurse) by Kenrick Linder RN (offgoing nurse). Report included the following information SBAR, Kardex, MAR and Recent Results. 1000 IDR completed. Pt in bed, resting quietly. No s/s of distress. 1300 Patient in bed, resting quietly. No needs expressed at this time. 1600 Patient in bed, resting quietly. 1915 Bedside shift change report given to [address] (oncoming nurse) by Deshawn Ferreira and Silvia Fleming RN (offgoing nurse). Report included the following information SBAR, Kardex, MAR and Recent Results. Patient in bed, resting quietly.

## 2020-08-21 NOTE — PROGRESS NOTES
I reviewed pertinent labs and imaging, and discussed /agreed on the plan of care with Dr. Den Meyer. Hospitalist Progress Note NAME: Megan Gilliam :  1950 MRN:  [id number] Assessment / Plan:  
PT/OT evaluation today. Podiatry consult to evaluate toenails. Physical debility Leg edema, right POA  
- Patient presented with inability to care for self at home. Likely due to long standing physical deconditioning due to chronic medical problems.  
- PT/OT consult - Plan for discharge to home with family, HH once functionality and ambulation improve. 
  
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  
- A1c 8.9 % on 2020  
- FSG AC HS, consistent carb diet, hypoglycemia protocol - ELAINE, Alogliptin  
  
CKD stage 4 POA 
- Renal function at baseline, nephrology following Coronary artery disease involving native coronary artery of native heart POA 
- Continue Imdur, Plavix  
  
HTN (hypertension), benign POA, uncontrolled - Continue Hydralazine, Imdur and Cozaar  
  
Anemia due to chronic illness POA 
- Deficient in folic acid. Folic acid 4.4 
- Continue Epoetin, ferrous sulfate.  
  
Hypothyroidism POA:  
- TSH 0.26 
- Continue Levothyroxine at reduced dose of 125 mcg PO daily. 
- Follow up with PCP or Endocrinology as an outpatient. 
  
Severe obesity (BMI 35.0-39. 9) with comorbidity POA 
- BMI 40.10 - Nutrition consult - Counseled on Lifestyle modifications, AHA Diet, weight loss strategies. 
  
Code Status: full Surrogate Decision Maker: 
sister Boston Mack ([phone]) 
  
DVT Prophylaxis: Hep SQ 
GI Prophylaxis: not indicated Lines : none Baseline: ambulatory prior to admission Subjective: Chief Complaint / Reason for Physician Visit \" Feeling fine, I just need the dead skin removed off my feet\". Discussed with RN events overnight. Patient resting in bed. Review of Systems: 
Symptom Y/N Comments  Symptom Y/N Comments Fever/Chills N   Chest Pain N Poor Appetite N   Edema Y Cough N   Abdominal Pain N Sputum N   Joint Pain N   
SOB/MCDERMOTT Y   Pruritis/Rash N   
Nausea/vomit N   Tolerating PT/OT Diarrhea N   Tolerating Diet Y Constipation N   Other Could NOT obtain due to:   
 
Objective: VITALS:  
Last 24hrs VS reviewed since prior progress note. Most recent are: 
Patient Vitals for the past 24 hrs: 
 Temp Pulse Resp BP SpO2  
08/21/20 0800 98.2 °F (36.8 °C) 87 16 149/73 98 % 08/20/20 2030 97.7 °F (36.5 °C) 91 18 155/74 99 % 08/20/20 1539 97.6 °F (36.4 °C) 85 16 114/73 100 % Intake/Output Summary (Last 24 hours) at 8/21/2020 1521 Last data filed at 8/20/2020 2054 Gross per 24 hour Intake 0 ml Output 400 ml Net -400 ml PHYSICAL EXAM: 
General:          Alert, cooperative, no distress, appears stated age. HEENT:           Atraumatic, anicteric sclerae, pink conjunctivae No oral ulcers, mucosa moist, throat clear, dentition fair Neck:               Supple, symmetrical,  thyroid: non tender Lungs:             Clear to auscultation bilaterally. No Wheezing or Rhonchi. No rales. Chest wall:      No tenderness  No Accessory muscle use. Heart:              Regular  rhythm,  No  murmur   1+ edema Abdomen:        Soft, non-tender. Not distended. Bowel sounds normal 
Extremities:     No cyanosis. No clubbing, Skin turgor normal, Capillary refill normal, Radial dial pulse 2+ Skin:                Not pale. Not Jaundiced  No rashes Psych:             Good insight. Not depressed. Not anxious or agitated. Neurologic:      EOMs intact. No facial asymmetry. No aphasia or slurred speech. Symmetrical strength, Sensation grossly intact. Alert and oriented X Reviewed most current lab test results and cultures  YES Reviewed most current radiology test results   YES Review and summation of old records today    NO 
 Reviewed patient's current orders and MAR    YES 
PMH/SH reviewed - no change compared to H&P 
________________________________________________________________________ Care Plan discussed with: 
  Comments Patient X Family RN X   
Care Manager X Consultant X Multidiciplinary team rounds were held today with , nursing, pharmacist and clinical coordinator. Patient's plan of care was discussed; medications were reviewed and discharge planning was addressed. ________________________________________________________________________ Total NON critical care TIME: 35   minutes Total CRITICAL CARE TIME Spent:   Minutes non procedure based Comments >50% of visit spent in counseling and coordination of care    
________________________________________________________________________ Eldon Child NP Procedures: see electronic medical records for all procedures/Xrays and details which were not copied into this note but were reviewed prior to creation of Plan. LABS: 
I reviewed today's most current labs and imaging studies. Pertinent labs include: 
Recent Labs  
  08/20/20 0356 08/19/20 0518 WBC 10.1 9.5 HGB 8.1* 7.9*  
HCT 25.2* 25.6*  325 Recent Labs  
  08/20/20 0356 08/19/20 0518  138  
K 3.4* 3.5  110* CO2 19* 17* * 123* BUN 45* 47* CREA 3.01* 3.15* CA 8.9 9.3 PHOS  --  4.6 ALB  --  2.2* Signed: Eldon Child NP

## 2020-08-22 NOTE — PROGRESS NOTES
Bedside report received from [facility], Pt laying in bed resting quietly. Pt requested window shades to be open partially. 0930-Pt consumed breakfast at 30% and BM x1 slightly formed and brown. 1215-Pt has clear jelly like from rectum, wbc's in stool, physician notified. No further intervention at this time. Pt blood sugar stable, visited with cousin today. Pt did not sit in chair today.  Pt laying in bed eating sugar free candy at this time, with TV on

## 2020-08-22 NOTE — PROGRESS NOTES
U Nephrology Consult - [facility] 
Requesting physician: Dr. Guera Willett MD 
Date of note: 08/22/20 CC: CKD4 This is an \"e-consult\" with data obtained by chart review and by discussion with the requesting physician. I did not directly examine or interview the patient. Interval History: 
Disposition plan in process as pt is reportedly ineligible for SNF placement Current inpatient medications: 
 
Current Facility-Administered Medications:  
  levothyroxine (SYNTHROID) tablet 125 mcg, 125 mcg, Oral, 6am, Robert Palomo MD, 125 mcg at 08/22/20 1287   alogliptin (NESINA) tablet 6.25 mg, 6.25 mg, Oral, DAILY, Sabas Ewing MD 
  clopidogreL (PLAVIX) tablet 75 mg, 75 mg, Oral, DAILY, Sabas Ewing MD, 75 mg at 08/21/20 1344   ferrous sulfate tablet 325 mg, 325 mg, Oral, BID WITH MEALS, Sabas Ewing MD, 325 mg at 66/20/11 9069 
  folic acid (FOLVITE) tablet 1 mg, 1 mg, Oral, DAILY, Sabas Ewing MD, 1 mg at 08/22/20 3508   hydrALAZINE (APRESOLINE) tablet 50 mg, 50 mg, Oral, TID, Sabas Ewing MD, 50 mg at 08/22/20 7524   isosorbide mononitrate ER (IMDUR) tablet 30 mg, 30 mg, Oral, BID, Sabas Ewing MD, 30 mg at 08/22/20 7380   losartan (COZAAR) tablet 50 mg, 50 mg, Oral, DAILY, Sabas Ewing MD, 50 mg at 08/22/20 5094 
  insulin lispro (HUMALOG) injection, , SubCUTAneous, AC&HS, Sabas Ewing MD, Stopped at 08/22/20 0730 
  glucose chewable tablet 16 g, 4 Tab, Oral, PRN, Sabas Ewing MD 
  dextrose 10% infusion 0-250 mL, 0-250 mL, IntraVENous, PRN, Sabas Ewing MD 
  glucagon (GLUCAGEN) injection 1 mg, 1 mg, IntraMUSCular, PRN, Sabas Ewing MD 
  epoetin aquilino-epbx (RETACRIT) injection 20,000 Units, 20,000 Units, SubCUTAneous, Q7D, Sabas Ewing MD, 20,000 Units at 08/20/20 0615   sodium chloride (NS) flush 5-40 mL, 5-40 mL, IntraVENous, Q8H, Sabas Ewing MD, 10 mL at 08/22/20 9142   sodium chloride (NS) flush 5-40 mL, 5-40 mL, IntraVENous, PRN, Franki Bernard MD 
  acetaminophen (TYLENOL) tablet 650 mg, 650 mg, Oral, Q6H PRN, 650 mg at 08/20/20 1007 **OR** acetaminophen (TYLENOL) suppository 650 mg, 650 mg, Rectal, Q6H PRN, Franki Bernard MD 
  polyethylene glycol (MIRALAX) packet 17 g, 17 g, Oral, DAILY PRN, Franki Bernard MD 
  sodium bicarbonate tablet 1,950 mg, 1,950 mg, Oral, TID, Franki Bernard MD, 1,950 mg at 08/22/20 3734   ondansetron (ZOFRAN ODT) tablet 4 mg, 4 mg, Oral, Q8H PRN, Franki Bernard MD 
  heparin (porcine) injection 7,500 Units, 7,500 Units, SubCUTAneous, Q8H, Franki Bernard MD, 7,500 Units at 08/22/20 7154 Allergies: 
No Known Allergies ROS:  
Not performed Vitals: 
Patient Vitals for the past 24 hrs: 
 Temp Pulse Resp BP SpO2  
08/22/20 0745 98.4 °F (36.9 °C) 81 18 156/67 100 % 08/22/20 0336 97.2 °F (36.2 °C) 85 20  99 % 08/21/20 2001 97.4 °F (36.3 °C) 74 18 140/76 98 % 08/21/20 1637 97.9 °F (36.6 °C) 87 17 155/78 100 % I/O: 
 
Intake/Output Summary (Last 24 hours) at 8/22/2020 1037 Last data filed at 8/22/2020 3561 Gross per 24 hour Intake 240 ml Output 200 ml Net 40 ml Physical exam: Not performed Labs/imaging: 
Recent Results (from the past 24 hour(s)) GLUCOSE, POC Collection Time: 08/21/20 10:55 AM  
Result Value Ref Range Glucose (POC) 141 (H) 65 - 100 mg/dL Performed by Maribel Phipps (PCT) GLUCOSE, POC Collection Time: 08/21/20  4:17 PM  
Result Value Ref Range Glucose (POC) 234 (H) 65 - 100 mg/dL Performed by Lida DEMPSEY OCCULT BLOOD, STOOL Collection Time: 08/21/20  6:20 PM  
Result Value Ref Range Occult blood, stool Negative NEG    
GLUCOSE, POC Collection Time: 08/21/20  9:33 PM  
Result Value Ref Range Glucose (POC) 155 (H) 65 - 100 mg/dL Performed by Krista BAKER, POC Collection Time: 08/22/20  7:24 AM  
Result Value Ref Range Glucose (POC) 106 (H) 65 - 100 mg/dL Performed by Serafin Whittington PCT   
 
 
A/P: 
Chronic kidney disease stage 4, likely secondary to hypertension/diabetes - stable (no labs today) Anion gap metabolic acidosis Anemia of chronic kidney disease Failure to thrive 
-his overall kidney function appears to be at baseline. 
-continue sodium bicarbonate 1950mg TID -he is iron replete based on 8/7/2020 labs, agree with epoetin 06990B/wk and PO ferrous sulfate 
-with reported history of nonadherence and poor historian he needs very close followup with an outpatient nephrologist once discharged--he may require dialysis however he requires evaluation of his candidacy--if severe dementia present dialysis is unlikely to alter his overall clinical trajectory. 
-no indication for dialysis at this time 
-monitor I/Os 
-renally dose all medications Thank you for this consult. I will continue to follow the patient with you. Please feel free to call with questions. Jovita Rossi M.D. 
[facility] Nephrology

## 2020-08-22 NOTE — PROGRESS NOTES
I reviewed pertinent labs and imaging, and discussed /agreed on the plan of care with Dr. Daphnie Doty. Hospitalist Progress Note NAME: Saji Jackson :  1950 MRN:  [id number] Assessment / Plan:  
PT/OT evaluation and treatment. Podiatry consult to evaluate toenails. CM for discharge planning. Repeat BMP in am. Out of bed to chair. Physical debility Leg edema, right POA  
- Patient presented with inability to care for self at home. Likely due to long standing physical deconditioning due to chronic medical problems.  
- PT/OT consult - Plan for discharge to home with family, HH once functionality and ambulation improve. 
  
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  
- A1c 8.9 % on 2020  
- FSG AC HS, consistent carb diet, hypoglycemia protocol - ELAINE, Alogliptin  
  
CKD stage 4 POA 
- Renal function at baseline, nephrology following - Repeat BMP in am 
 
Coronary artery disease involving native coronary artery of native heart POA 
- Continue Imdur, Plavix  
  
HTN (hypertension), benign POA, uncontrolled - Continue Hydralazine, Imdur and Cozaar  
  
Anemia due to chronic illness POA 
- Deficient in folic acid. Folic acid 4.4 
- Continue Epoetin, ferrous sulfate.  
  
Hypothyroidism POA:  
- TSH 0.26 
- Continue Levothyroxine at reduced dose of 125 mcg PO daily. 
- Follow up with PCP or Endocrinology as an outpatient. 
  
Severe obesity (BMI 35.0-39. 9) with comorbidity POA 
- BMI 40.10 - Nutrition consult - Counseled on Lifestyle modifications, AHA Diet, weight loss strategies. 
  
Code Status: full Surrogate Decision Maker: 
sister Socorro Sales ([phone]) 
  
DVT Prophylaxis: Hep SQ 
GI Prophylaxis: not indicated Lines : none Baseline: ambulatory prior to admission Subjective: Chief Complaint / Reason for Physician Visit \"I'm okay but my right knee is a little stiff\".   Discussed with RN events overnight. Patient resting in bed with cousin visiting at bedside. Review of Systems: 
Symptom Y/N Comments  Symptom Y/N Comments Fever/Chills N   Chest Pain N Poor Appetite N   Edema Y Cough N   Abdominal Pain N Sputum N   Joint Pain N   
SOB/MCDERMOTT Y   Pruritis/Rash N   
Nausea/vomit N   Tolerating PT/OT Diarrhea N   Tolerating Diet Y Constipation N   Other Could NOT obtain due to:   
 
Objective: VITALS:  
Last 24hrs VS reviewed since prior progress note. Most recent are: 
Patient Vitals for the past 24 hrs: 
 Temp Pulse Resp BP SpO2  
08/22/20 0745 98.4 °F (36.9 °C) 81 18 156/67 100 % 08/22/20 0336 97.2 °F (36.2 °C) 85 20  99 % 08/21/20 2001 97.4 °F (36.3 °C) 74 18 140/76 98 % 08/21/20 1637 97.9 °F (36.6 °C) 87 17 155/78 100 % Intake/Output Summary (Last 24 hours) at 8/22/2020 1449 Last data filed at 8/22/2020 7286 Gross per 24 hour Intake 240 ml Output 200 ml Net 40 ml PHYSICAL EXAM: 
General:          Alert, cooperative, no distress, appears stated age. HEENT:           Atraumatic, anicteric sclerae, pink conjunctivae No oral ulcers, mucosa moist, throat clear, dentition fair Neck:               Supple, symmetrical,  thyroid: non tender Lungs:             Clear to auscultation bilaterally. No Wheezing or Rhonchi. No rales. Chest wall:      No tenderness  No Accessory muscle use. Heart:              Regular  rhythm,  No  murmur   1+ edema Abdomen:        Soft, non-tender. Not distended. Bowel sounds normal 
Extremities:     No cyanosis. No clubbing, Skin turgor normal, Capillary refill normal, Radial dial pulse 2+ Skin:                Not pale. Not Jaundiced  No rashes Psych:             Good insight. Not depressed. Not anxious or agitated. Neurologic:      EOMs intact. No facial asymmetry. No aphasia or slurred speech. Symmetrical strength, Sensation grossly intact. Alert and oriented X Reviewed most current lab test results and cultures  YES Reviewed most current radiology test results   YES Review and summation of old records today    NO Reviewed patient's current orders and MAR    YES 
PMH/SH reviewed - no change compared to H&P 
________________________________________________________________________ Care Plan discussed with: 
  Comments Patient X Family RN X   
Care Manager Consultant X Multidiciplinary team rounds were held today with , nursing, pharmacist and clinical coordinator. Patient's plan of care was discussed; medications were reviewed and discharge planning was addressed. ________________________________________________________________________ Total NON critical care TIME: 30   minutes Total CRITICAL CARE TIME Spent:   Minutes non procedure based Comments >50% of visit spent in counseling and coordination of care    
________________________________________________________________________ Patrick Garcia NP Procedures: see electronic medical records for all procedures/Xrays and details which were not copied into this note but were reviewed prior to creation of Plan. LABS: 
I reviewed today's most current labs and imaging studies. Pertinent labs include: 
Recent Labs  
  08/20/20 
0356 WBC 10.1 HGB 8.1* HCT 25.2*  
 Recent Labs  
  08/20/20 
3024   
K 3.4*  
 CO2 19* * BUN 45* CREA 3.01* CA 8.9 Signed: Patrick Garcia NP

## 2020-08-23 NOTE — PROGRESS NOTES
Bedside report received from Dot AGUILAR/Abimbola PIMENTEL, Pt laying in bed awake, and resting. Pt denies ain at this time. 1030-Pt had 1 large bowel movement, dark greenish soft stool. Pt cleaned and then transferred over to chair via [address], pt tolerated activity well. Pt was able to consume breakfast 40%. Pt unable to bare weight to use sit to stand at this time. 1500-Pt states feeling  Tired and requested to get into bed to rest.   
 
1725-Pt sleeping.light snore

## 2020-08-23 NOTE — PROGRESS NOTES
Bedside shift change report given to Bao Calzada (oncoming nurse) by Evertt Galeazzi (offgoing nurse). Report included the following information SBAR, Kardex, Intake/Output, MAR and Recent Results.

## 2020-08-23 NOTE — PROGRESS NOTES
[facility] Nephrology Consult - DOCTORS [facility] 
Requesting physician: Dr. Pina Boggs MD 
Date of note: 08/23/20 CC: CKD4 This is an \"e-consult\" with data obtained by chart review and by discussion with the requesting physician. I did not directly examine or interview the patient. Interval History: 
Working with PT/OT Current inpatient medications: 
 
Current Facility-Administered Medications:  
  lactobac ac& pc-s.therm-b.anim (RONY Q/RISAQUAD), 1 Cap, Oral, DAILY, Ismael Georges NP, 1 Cap at 08/23/20 4764   levothyroxine (SYNTHROID) tablet 125 mcg, 125 mcg, Oral, 6am, Kenny Sanchez MD, 125 mcg at 08/23/20 3175 
  alogliptin (NESINA) tablet 6.25 mg, 6.25 mg, Oral, DAILY, Koko Gudino MD, 6.25 mg at 08/23/20 6881   clopidogreL (PLAVIX) tablet 75 mg, 75 mg, Oral, DAILY, Koko Gudino MD, 75 mg at 08/23/20 1533   ferrous sulfate tablet 325 mg, 325 mg, Oral, BID WITH MEALS, Koko Gudino MD, 325 mg at 08/23/20 7597   folic acid (FOLVITE) tablet 1 mg, 1 mg, Oral, DAILY, Koko Gudino MD, 1 mg at 08/23/20 9930   hydrALAZINE (APRESOLINE) tablet 50 mg, 50 mg, Oral, TID, Koko Gudino MD, 50 mg at 08/23/20 6403   isosorbide mononitrate ER (IMDUR) tablet 30 mg, 30 mg, Oral, BID, Koko Gudino MD, 30 mg at 08/23/20 6974   losartan (COZAAR) tablet 50 mg, 50 mg, Oral, DAILY, Koko Gudino MD, 50 mg at 08/23/20 1986   insulin lispro (HUMALOG) injection, , SubCUTAneous, AC&HS, Koko Gudino MD, Stopped at 08/22/20 0730 
  glucose chewable tablet 16 g, 4 Tab, Oral, PRN, Koko Gudino MD 
  dextrose 10% infusion 0-250 mL, 0-250 mL, IntraVENous, PRN, Koko Gudino MD 
  glucagon (GLUCAGEN) injection 1 mg, 1 mg, IntraMUSCular, PRN, Koko Gudino MD 
  epoetin aquilino-epbx (RETACRIT) injection 20,000 Units, 20,000 Units, SubCUTAneous, Q7D, Koko Gudino MD, 20,000 Units at 08/20/20 0790   sodium chloride (NS) flush 5-40 mL, 5-40 mL, IntraVENous, Q8H, Solomon Garcia MD, Stopped at 08/22/20 2200 
  sodium chloride (NS) flush 5-40 mL, 5-40 mL, IntraVENous, PRN, Solomon Garcia MD 
  acetaminophen (TYLENOL) tablet 650 mg, 650 mg, Oral, Q6H PRN, 650 mg at 08/20/20 1007 **OR** acetaminophen (TYLENOL) suppository 650 mg, 650 mg, Rectal, Q6H PRN, Solomon Garcia MD 
  polyethylene glycol (MIRALAX) packet 17 g, 17 g, Oral, DAILY PRN, Solomon Garcia MD 
  sodium bicarbonate tablet 1,950 mg, 1,950 mg, Oral, TID, Solomon Garcia MD, 1,950 mg at 08/23/20 1473   ondansetron (ZOFRAN ODT) tablet 4 mg, 4 mg, Oral, Q8H PRN, Solomon Garcia MD 
  heparin (porcine) injection 7,500 Units, 7,500 Units, SubCUTAneous, Q8H, Solomon Garcia MD, 7,500 Units at 08/23/20 1408 Allergies: 
No Known Allergies ROS:  
Not performed Vitals: 
Patient Vitals for the past 24 hrs: 
 Temp Pulse Resp BP SpO2  
08/23/20 1553 97.6 °F (36.4 °C) 84 18 176/79 100 % 08/23/20 0813 98.2 °F (36.8 °C) 87 16 169/74 100 % 08/22/20 2232    151/72   
08/22/20 2120 97.5 °F (36.4 °C) 80 18 154/74 95 % I/O: 
 
Intake/Output Summary (Last 24 hours) at 8/23/2020 1607 Last data filed at 8/23/2020 0617 Gross per 24 hour Intake  Output 411 ml Net -411 ml Physical exam: Not performed Labs/imaging: 
Recent Results (from the past 24 hour(s)) GLUCOSE, POC Collection Time: 08/22/20  4:26 PM  
Result Value Ref Range Glucose (POC) 149 (H) 65 - 100 mg/dL Performed by Jamila Dailey [facility]   
GLUCOSE, POC Collection Time: 08/22/20  9:26 PM  
Result Value Ref Range Glucose (POC) 142 (H) 65 - 100 mg/dL Performed by Ty Swanson (PCT) CBC W/O DIFF Collection Time: 08/23/20  3:56 AM  
Result Value Ref Range WBC 9.1 4.1 - 11.1 K/uL  
 RBC 2.95 (L) 4.10 - 5.70 M/uL HGB 7.6 (L) 12.1 - 17.0 g/dL HCT 24.0 (L) 36.6 - 50.3 %  MCV 81.4 80.0 - 99.0 FL  
 MCH 25.8 (L) 26.0 - 34.0 PG  
 MCHC 31.7 30.0 - 36.5 g/dL  
 RDW 15.2 (H) 11.5 - 14.5 % PLATELET 474 228 - 712 K/uL MPV 10.0 8.9 - 12.9 FL  
 NRBC 0.0 0  WBC ABSOLUTE NRBC 0.00 0.00 - 0.01 K/uL METABOLIC PANEL, BASIC Collection Time: 08/23/20  3:56 AM  
Result Value Ref Range Sodium 138 136 - 145 mmol/L Potassium 3.2 (L) 3.5 - 5.1 mmol/L Chloride 102 97 - 108 mmol/L  
 CO2 24 21 - 32 mmol/L Anion gap 12 5 - 15 mmol/L Glucose 132 (H) 65 - 100 mg/dL BUN 29 (H) 6 - 20 MG/DL Creatinine 2.67 (H) 0.70 - 1.30 MG/DL  
 BUN/Creatinine ratio 11 (L) 12 - 20 GFR est AA 29 (L) >60 ml/min/1.73m2 GFR est non-AA 24 (L) >60 ml/min/1.73m2 Calcium 9.4 8.5 - 10.1 MG/DL  
GLUCOSE, POC Collection Time: 08/23/20  7:25 AM  
Result Value Ref Range Glucose (POC) 137 (H) 65 - 100 mg/dL Performed by Ahmed Molina PCT   
GLUCOSE, POC Collection Time: 08/23/20 11:22 AM  
Result Value Ref Range Glucose (POC) 194 (H) 65 - 100 mg/dL Performed by Ahmed Molina PCT   
 
 
A/P: 
Chronic kidney disease stage 4, likely secondary to hypertension/diabetes - stable Anion gap metabolic acidosis Anemia of chronic kidney disease Failure to thrive 
-his overall kidney function appears to be at baseline. 
-continue sodium bicarbonate 1950mg TID -- if CO2 continues to rise (at or above 25-26) can decrease to 1950mg BID. -he is iron replete based on 8/7/2020 labs, agree with epoetin 36292V/wk and PO ferrous sulfate 
-with reported history of nonadherence and poor historian he needs very close followup with an outpatient nephrologist once discharged--he may require dialysis however he requires evaluation of his candidacy--if severe dementia present dialysis is unlikely to alter his overall clinical trajectory. 
-no indication for dialysis at this time 
-monitor I/Os 
-renally dose all medications Thank you for this consult. I will continue to follow the patient with you. Please feel free to call with questions. Kelly Vallecillo M.D. 
[facility] Nephrology

## 2020-08-23 NOTE — PROGRESS NOTES
I reviewed pertinent labs and imaging, and discussed /agreed on the plan of care with Dr. Hernandez Sutherland. Hospitalist Progress Note NAME: Shanna Lawrence :  1950 MRN:  [id number] Assessment / Plan:  
PT/OT evaluation and treatment. Podiatry consult to evaluate toenails. CM for discharge planning. CBC & BMP in am. Out of bed to chair today. Continue to monitor stools. Physical debility Leg edema, right POA  
- Patient presented with inability to care for self at home. Likely due to long standing physical deconditioning due to chronic medical problems.  
- PT/OT consult - Plan for discharge to home with family, HH once functionality and ambulation improve. 
  
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  
- A1c 8.9 % on 2020  
- FSG AC HS, consistent carb diet, hypoglycemia protocol - ELAINE, Alogliptin  
  
CKD stage 4 POA 
- Renal function at baseline, nephrology following - Repeat BMP in am 
 
Coronary artery disease involving native coronary artery of native heart POA 
- Continue Imdur, Plavix  
  
HTN (hypertension), benign POA, uncontrolled - Continue Hydralazine, Imdur and Cozaar  
  
Anemia due to chronic illness POA 
- Deficient in folic acid. Folic acid 4.4 
- Continue Epoetin, ferrous sulfate.  
- Hbg 7.6/ Hct 24.0 today. - Continue to monitor. Repeat CBC in am.   
 
Hypokalemia - K 3.2 this am. 
- Potassium 40 mEq PO x 1 today. - Recheck BMP & Mg in am 
  
Hypothyroidism POA:  
- TSH 0.26 
- Continue Levothyroxine at reduced dose of 125 mcg PO daily. 
- Follow up with PCP or Endocrinology as an outpatient. 
  
Severe obesity (BMI 35.0-39. 9) with comorbidity POA 
- BMI 40.10 - Nutrition consult - Counseled on Lifestyle modifications, AHA Diet, weight loss strategies. 
  
Code Status: full Surrogate Decision Maker: 
sister Arianna San ([phone]) 
  
DVT Prophylaxis: Hep SQ 
GI Prophylaxis: not indicated Lines : none Baseline: ambulatory prior to admission Subjective: Chief Complaint / Reason for Physician Visit \"I'm doing alright. They got me up to the chair and my feet are touching the floor\". Discussed with RN events overnight. Patient resting in chair today. No complaints. 2 small bowel movement yesterday with mucus in stool. Will continue t monitor and add probiotic today. Review of Systems: 
Symptom Y/N Comments  Symptom Y/N Comments Fever/Chills N   Chest Pain N Poor Appetite N   Edema Y Cough N   Abdominal Pain N Sputum N   Joint Pain N   
SOB/MCDERMOTT Y   Pruritis/Rash N   
Nausea/vomit N   Tolerating PT/OT Diarrhea N   Tolerating Diet Y Constipation N   Other Could NOT obtain due to:   
 
Objective: VITALS:  
Last 24hrs VS reviewed since prior progress note. Most recent are: 
Patient Vitals for the past 24 hrs: 
 Temp Pulse Resp BP SpO2  
08/23/20 0813 98.2 °F (36.8 °C) 87 16 169/74 100 % 08/22/20 2232    151/72   
08/22/20 2120 97.5 °F (36.4 °C) 80 18 154/74 95 % 08/22/20 1541 97.9 °F (36.6 °C) 89 20 151/72 99 % Intake/Output Summary (Last 24 hours) at 8/23/2020 1249 Last data filed at 8/23/2020 1187 Gross per 24 hour Intake  Output 310 ml Net -310 ml PHYSICAL EXAM: 
General:          Alert, cooperative, no distress, appears stated age. HEENT:           Atraumatic, anicteric sclerae, pink conjunctivae No oral ulcers, mucosa moist, throat clear, dentition fair Neck:               Supple, symmetrical,  thyroid: non tender Lungs:             Clear to auscultation bilaterally. No Wheezing or Rhonchi. No rales. Chest wall:      No tenderness  No Accessory muscle use. Heart:              Regular  rhythm,  No  murmur   1+ edema RLE Abdomen:        Soft, non-tender. Not distended. Bowel sounds normal 
Extremities:     No cyanosis. No clubbing, Skin turgor normal, Capillary refill normal, Radial dial pulse 2+ Skin:                Not pale. Not Jaundiced  No rashes Psych:             Good insight. Not depressed. Not anxious or agitated. Neurologic:      EOMs intact. No facial asymmetry. No aphasia or slurred speech. Symmetrical strength, Sensation grossly intact. Alert and oriented X 3 Reviewed most current lab test results and cultures  YES Reviewed most current radiology test results   YES Review and summation of old records today    NO Reviewed patient's current orders and MAR    YES 
PMH/SH reviewed - no change compared to H&P 
________________________________________________________________________ Care Plan discussed with: 
  Comments Patient X Family RN X   
Care Manager Consultant Multidiciplinary team rounds were held today with , nursing, pharmacist and clinical coordinator. Patient's plan of care was discussed; medications were reviewed and discharge planning was addressed. ________________________________________________________________________ Total NON critical care TIME: 35  minutes Total CRITICAL CARE TIME Spent:   Minutes non procedure based Comments >50% of visit spent in counseling and coordination of care    
________________________________________________________________________ Kristen Mccabe NP Procedures: see electronic medical records for all procedures/Xrays and details which were not copied into this note but were reviewed prior to creation of Plan. LABS: 
I reviewed today's most current labs and imaging studies. Pertinent labs include: 
Recent Labs  
  08/23/20 
0356 WBC 9.1 HGB 7.6* HCT 24.0*  
 Recent Labs  
  08/23/20 
3563   
K 3.2*  
 CO2 24 * BUN 29* CREA 2.67* CA 9.4 Signed: Kristen Mccabe, NP

## 2020-08-24 NOTE — PROGRESS NOTES
Problem: Self Care Deficits Care Plan (Adult) Goal: *Acute Goals and Plan of Care (Insert Text) Description:  
FUNCTIONAL STATUS PRIOR TO ADMISSION: Patient with recent discharge from [facility] on 8/10/20, was recommended to go to rehab however went home at max assist of 2 level, family unable to assist and back to [facility], prior to initial admission patient likely ambulatory and lived with family. At [facility] on 2nd admission remained max assist of 2 and unable to transfer to chair. HOME SUPPORT: The patient lived with his sister and brother-in-law, per chart cousins assist also. Occupational Therapy Goals Initiated 8/20/2020 1. Patient will perform upper body bathing and dressing with modified independence  seated edge of bed within 7 day(s). 2.  Patient will perform lower body dressing with moderate assistance and best technique/adaptive equipment within 7 day(s). 3.  Patient will perform static standing with one UE support > or = 5 minutes with minimal assistance/contact guard assist within 7 day(s). 4.  Patient will perform toilet transfers with minimal assistance/contact guard assist x's 1 within 7 day(s). 5.  Patient will perform all aspects of toileting with moderate assistance  within 7 day(s). 6.  Patient will participate in upper extremity therapeutic exercise/activities with supervision/set-up for 10 minutes within 7 day(s). Outcome: Progressing Towards Goal 
  
OCCUPATIONAL THERAPY TREATMENT Patient: Olga Lidia Mendez (62 y.o. male) Date: 8/24/2020 Diagnosis: Physical debility [R53.81] Physical debility Precautions: Fall Chart, occupational therapy assessment, plan of care, and goals were reviewed. ASSESSMENT Patient continues with skilled OT services and is progressing towards goals. Pt is received in bed agreeable to participate. He is currently declining OOB but is agreeable to EOB activity.  He requires overall min A to come to EOB and demonstrates intact balance during brief upper body ADLs. Noted incontinence of stool and pt performs rolling to R/L in bed with min A and requires total A for bowel hygiene. He is left in semi-supine, instructed to get OOB this afternoon. Pt continues to be limited by intermittent confusion, weakness, and impaired balance. Recommend home with  vs. SNF pending family ability to provide level of assistance. Current Level of Function Impacting Discharge (ADLs): up to max A lower body ADLs and toileting; set up to min A seated upper body ADLs; min/mod A for transfers Other factors to consider for discharge: high fall risk PLAN : 
Patient continues to benefit from skilled intervention to address the above impairments. Continue treatment per established plan of care. to address goals. Recommend with staff: 2 person assist SPT to bed/chair; OOB for at least 2 meals/day; SPT to [facility] for toileting Recommend next OT session: ADL transfers; UE strengthening with thera-band Recommendation for discharge: (in order for the patient to meet his/her long term goals) Therapy up to 5 days/week in SNF setting or an intensive home health therapy program and 24/7 family assistance/supervision for all ADLs and mobility This discharge recommendation: 
Has been made in collaboration with the attending provider and/or case management IF patient discharges home will need the following DME: bedside commode and walker: rolling SUBJECTIVE:  
Patient stated I don't want to get up now because of my feet.  OBJECTIVE DATA SUMMARY:  
Cognitive/Behavioral Status: 
Neurologic State: Alert Orientation Level: Oriented to person;Oriented to place;Oriented to time Cognition: Poor safety awareness; Follows commands;Decreased attention/concentration Safety/Judgement: Decreased awareness of environment;Decreased awareness of need for assistance;Decreased awareness of need for safety;Decreased insight into deficits Functional Mobility and Transfers for ADLs: 
Bed Mobility: 
Rolling: Minimum assistance(to obtain full side lying) Supine to Sit: Contact guard assistance;Minimum assistance; Additional time;Bed Modified(with HOB elevated) Sit to Supine: Contact guard assistance; Additional time Scooting: Supervision(pt able to use bed rails to scoot up to [facility] in supine) Transfers: 
Pt declined OOB activity this session. Balance: 
Sitting: Intact Standing: (not tested this session) ADL Intervention: 
Upper Body Dressing Assistance Hospital Gown: Supervision(seated EOB; able to pull off overhead simulated as shirt) Cues: Verbal cues provided Toileting Bowel Hygiene: Total assistance (dependent)(performed rolling to R/L in bed) Clothing Management: Maximum assistance Cues: Tactile cues provided;Verbal cues provided;Visual cues provided(physical assist for hygiene) Cognitive Retraining Safety/Judgement: Decreased awareness of environment;Decreased awareness of need for assistance;Decreased awareness of need for safety;Decreased insight into deficits Pain: 
Pt reporting minimal pain this session Activity Tolerance:  
Fair Please refer to the flowsheet for vital signs taken during this treatment. After treatment patient left in no apparent distress:  
Supine in bed, Call bell within reach, Bed / chair alarm activated and Side rails x 3 
 
COMMUNICATION/COLLABORATION:  
The patients plan of care was discussed with: Physical therapist and Registered nurse. Delmer Harper OT Time Calculation: 23 mins

## 2020-08-24 NOTE — PROGRESS NOTES
Physical therapy:  
 
Patient received supine in bed, and declining any out of bed mobility this AM. Will attempt as able.  
 
Amos Alvarez, PT

## 2020-08-24 NOTE — PROGRESS NOTES
[facility] Nephrology Consult - [facility] 
Requesting physician: Dr. Imelda Dodge MD 
Date of note: 08/24/20 CC: f/u for CKD 4 This is an \"e-consult\" with data obtained by chart review and by discussion with the requesting physician. I did not directly examine or interview the patient. S:  
Full-code Tolerating PT/OT  
sbp ranged from 148-170's with hr in 70-80's Home meds: 
Prior to Admission Medications Prescriptions Last Dose Informant Patient Reported? Taking?  
alogliptin (NESINA) 6.25 mg tablet  Transfer Papers No Yes Sig: Take 1 Tab by mouth daily. clopidogrel (PLAVIX) 75 mg tab  Transfer Papers No Yes Sig: Take 1 Tab by mouth daily. ferrous sulfate 325 mg (65 mg iron) tablet  Transfer Papers No Yes Sig: Take 1 Tab by mouth two (2) times daily (with meals). folic acid (FOLVITE) 1 mg tablet  Transfer Papers No Yes Sig: Take 1 Tab by mouth daily. hydrALAZINE (APRESOLINE) 50 mg tablet  Transfer Papers No Yes Sig: Take 1 Tab by mouth three (3) times daily. isosorbide mononitrate ER (IMDUR) 30 mg tablet  Transfer Papers Yes Yes Sig: Take 30 mg by mouth two (2) times a day. levothyroxine (SYNTHROID) 150 mcg tablet  Transfer Papers No Yes Sig: TAKE 1 TABLET BY MOUTH ONCE DAILY BEFORE BREAKFAST  
losartan (COZAAR) 50 mg tablet  Transfer Papers No Yes Sig: Take 1 Tab by mouth daily. sodium bicarbonate 650 mg tablet  Transfer Papers No Yes Sig: Take 1 Tab by mouth two (2) times a day. Facility-Administered Medications: None Current inpatient medications: 
 
Current Facility-Administered Medications:  
  isosorbide mononitrate (ISMO, MONOKET) tablet 20 mg, 20 mg, Oral, BID, Stefany Rosas MD, 20 mg at 08/24/20 1518   lactobac ac& pc-s.therm-b.anim (RONY Q/RISAQUAD), 1 Cap, Oral, DAILY, Karolina Quan, NP, 1 Cap at 08/24/20 9648   levothyroxine (SYNTHROID) tablet 125 mcg, 125 mcg, Oral, 6am, Ella MD Carlos, 125 mcg at 08/24/20 0531 
  alogliptin (NESINA) tablet 6.25 mg, 6.25 mg, Oral, DAILY, Grecia Reynolds MD, 6.25 mg at 08/24/20 9458   clopidogreL (PLAVIX) tablet 75 mg, 75 mg, Oral, DAILY, Grecia Reynolds MD, 75 mg at 08/24/20 1227   ferrous sulfate tablet 325 mg, 325 mg, Oral, BID WITH MEALS, Grecia Reynolds MD, 325 mg at 08/24/20 1904   folic acid (FOLVITE) tablet 1 mg, 1 mg, Oral, DAILY, Grecia Reynolds MD, 1 mg at 08/24/20 5371   hydrALAZINE (APRESOLINE) tablet 50 mg, 50 mg, Oral, TID, Grecia Reynolds MD, 50 mg at 08/24/20 1519   losartan (COZAAR) tablet 50 mg, 50 mg, Oral, DAILY, Grecia Reynolds MD, 50 mg at 08/24/20 0839 
  insulin lispro (HUMALOG) injection, , SubCUTAneous, AC&HS, Grecia Reynolds MD, Stopped at 08/22/20 0730 
  glucose chewable tablet 16 g, 4 Tab, Oral, PRN, Grecia Reynolds MD 
  dextrose 10% infusion 0-250 mL, 0-250 mL, IntraVENous, PRN, Grecia Reynolds MD 
  glucagon (GLUCAGEN) injection 1 mg, 1 mg, IntraMUSCular, PRN, Grecia Reynolds MD 
  epoetin aquilino-epbx (RETACRIT) injection 20,000 Units, 20,000 Units, SubCUTAneous, Q7D, Grecia Reynolds MD, 20,000 Units at 08/20/20 8738   acetaminophen (TYLENOL) tablet 650 mg, 650 mg, Oral, Q6H PRN, 650 mg at 08/20/20 1007 **OR** acetaminophen (TYLENOL) suppository 650 mg, 650 mg, Rectal, Q6H PRN, Grecia Reynolds MD 
  polyethylene glycol (MIRALAX) packet 17 g, 17 g, Oral, DAILY PRN, Grecia Reynolds MD 
  sodium bicarbonate tablet 1,950 mg, 1,950 mg, Oral, TID, Grecia Reynolds MD, 1,950 mg at 08/24/20 1519 
  ondansetron (ZOFRAN ODT) tablet 4 mg, 4 mg, Oral, Q8H PRN, Grecia Reynolds MD 
  heparin (porcine) injection 7,500 Units, 7,500 Units, SubCUTAneous, Q8H, Grecia Reynolds MD, 7,500 Units at 08/24/20 1517 Allergies: 
No Known Allergies ROS:  
Not performed Vitals: 
Patient Vitals for the past 24 hrs: 
 Temp Pulse Resp BP SpO2  
08/24/20 1501 (!) 95.5 °F (35.3 °C) 86 13 165/79 98 % 08/24/20 0755 97.8 °F (36.6 °C) 77 16 157/85 100 % 08/23/20 2200 98.2 °F (36.8 °C) 90 17 148/72 97 % I/O: 
 
Intake/Output Summary (Last 24 hours) at 8/24/2020 1825 Last data filed at 8/24/2020 1218 Gross per 24 hour Intake 280 ml Output 350 ml Net -70 ml Physical exam: Not performed Labs/imaging: 
Recent Results (from the past 24 hour(s)) GLUCOSE, POC Collection Time: 08/23/20  8:32 PM  
Result Value Ref Range Glucose (POC) 139 (H) 65 - 100 mg/dL Performed by Rola Carbajal (PCT) CBC WITH AUTOMATED DIFF Collection Time: 08/24/20  4:50 AM  
Result Value Ref Range WBC 9.0 4.1 - 11.1 K/uL  
 RBC 2.95 (L) 4.10 - 5.70 M/uL HGB 7.6 (L) 12.1 - 17.0 g/dL HCT 23.8 (L) 36.6 - 50.3 % MCV 80.7 80.0 - 99.0 FL  
 MCH 25.8 (L) 26.0 - 34.0 PG  
 MCHC 31.9 30.0 - 36.5 g/dL  
 RDW 14.9 (H) 11.5 - 14.5 % PLATELET 474 652 - 690 K/uL MPV 9.2 8.9 - 12.9 FL  
 NRBC 0.0 0  WBC ABSOLUTE NRBC 0.00 0.00 - 0.01 K/uL NEUTROPHILS 79 (H) 32 - 75 % LYMPHOCYTES 15 12 - 49 % MONOCYTES 5 5 - 13 % EOSINOPHILS 1 0 - 7 % BASOPHILS 0 0 - 1 % IMMATURE GRANULOCYTES 0 0.0 - 0.5 % ABS. NEUTROPHILS 7.2 1.8 - 8.0 K/UL  
 ABS. LYMPHOCYTES 1.3 0.8 - 3.5 K/UL  
 ABS. MONOCYTES 0.4 0.0 - 1.0 K/UL  
 ABS. EOSINOPHILS 0.1 0.0 - 0.4 K/UL  
 ABS. BASOPHILS 0.0 0.0 - 0.1 K/UL  
 ABS. IMM. GRANS. 0.0 0.00 - 0.04 K/UL  
 DF AUTOMATED METABOLIC PANEL, BASIC Collection Time: 08/24/20  4:50 AM  
Result Value Ref Range Sodium 139 136 - 145 mmol/L Potassium 2.8 (L) 3.5 - 5.1 mmol/L Chloride 105 97 - 108 mmol/L  
 CO2 24 21 - 32 mmol/L Anion gap 10 5 - 15 mmol/L Glucose 126 (H) 65 - 100 mg/dL BUN 27 (H) 6 - 20 MG/DL Creatinine 2.57 (H) 0.70 - 1.30 MG/DL  
 BUN/Creatinine ratio 11 (L) 12 - 20 GFR est AA 30 (L) >60 ml/min/1.73m2 GFR est non-AA 25 (L) >60 ml/min/1.73m2 Calcium 9.3 8.5 - 10.1 MG/DL MAGNESIUM  
 Collection Time: 08/24/20  4:50 AM  
Result Value Ref Range Magnesium 1.5 (L) 1.6 - 2.4 mg/dL GLUCOSE, POC Collection Time: 08/24/20  7:15 AM  
Result Value Ref Range Glucose (POC) 123 (H) 65 - 100 mg/dL Performed by Lorna Man (PCT) GLUCOSE, POC Collection Time: 08/24/20 10:58 AM  
Result Value Ref Range Glucose (POC) 175 (H) 65 - 100 mg/dL Performed by Lorna Man (PCT) GLUCOSE, POC Collection Time: 08/24/20  5:06 PM  
Result Value Ref Range Glucose (POC) 116 (H) 65 - 100 mg/dL Performed by Britany Walsh A/P: 
 
1. KELIN stage 3 on CKD 3/4 A3: crt appears to be stabilizing. CKD likely 2/2 uncontrolled DM/HTN with ckd 3 in 2018 with likely progression  
-no acute indication for RRT 
-will need improved DM/HTN management  
-will need f/u with nephrology 1 month post discharge  
-check protein/crt ratio 
-renally dose medications 2. NAGMA: 
-stable on po bicarb, continue current dose if bicarb > 25 can decrease sodium bicarb to bid doseing 3.anemia: normocytic,  b12//iron stable, folate low 
-component of CKD likely present 
-continue on ASH 
-check stool blood if not already performed  
-on folic acid 4. Htn:  
-not well controlled  
-hydralazine not ideal choice given ? Compliance  
-can titrate hydralazine to 75mg tid and see if tolerated Thank you for this consult. I will continue to follow the patient with you. Please feel free to call with questions. Jennifer Hopkins M.D. 
[facility] Nephrology

## 2020-08-24 NOTE — PROGRESS NOTES
Hospitalist Progress Note NAME: Neyda Treadwell :  1950 MRN:  [id number] Room Number:  312/40  @ [facility] Summary: 78 y.o. male whom presented on 2020 with Assessment / Plan: 
 
Physical debility Leg edema, right POA Patient presented with inability to care for self at home. Likely due to long standing physical deconditioning due to chronic medical problems.  
  
- PT/OT consult - Plan for discharge to home with family, HH once functionality and ambulation improve. Palliative Medicine initially consulted for goals of care decisions, patient's goals are very clear - he wishes to remain full code and designate sister as POA. Will cancel consult. D/W with Dr. Powers [facility] - Concerns by nursing for choking episodes on meds. SLP consult 
  
  
Type 2 diabetes with diabetic nephropathy on long term insulin A1c 8.9 % on 2020  
  
- FSG AC HS, consistent carb diet, hypoglycemia protocol - ELAINE, Alogliptin  
  
  
CKD stage 4 POA Due to long standing hypertension, T2DM. stable. Baseline creatinine 3.1-3.3.  
- Continue iron, erythropoeitin. - Continue losartan 100 mg daily, Isosorbide mononitrate 30 mg BID, hydralazine - Nephrology consulted- spoke with Dr. Kendy Rey, [facility]. Sodium bicarbonate 1950 mg TID.  
 
  
Coronary artery disease involving native coronary artery of native heart POA:  
Continue Imdur, Plavix  
  
  
HTN (hypertension), benign POA, uncontrolled:  
- Continue Hydralazine, Imdur and Cozaar  
  
  
Anemia due to chronic illness POA:  
deficient in folic acid. Folic acid 4.4.  
- Continue Epoetin, ferrous sulfate.  
  
  
Severe obesity (BMI 35.0-39. 9) with comorbidity POA:  
Counseled on Lifestyle modifications, AHA Diet ,weight loss strategies.  
  
  
Hypothyroidism POA:  
Lab Results Component Value Date/Time TSH 0.26 (L) 2020 03:56 AM  
 
-Continue Levothyroxine  
  
  
BMI 40.10 Morbid obesity Nutrition consult 
  
Code Status: full Surrogate Decision Maker: 
sister Maylin Mastesr ([phone]) 
  
DVT Prophylaxis: Hep SQ 
GI Prophylaxis: not indicated Lines : none Baseline: ambulatory prior to admission Subjective: Chief Complaint / Reason for Physician Visit \"im doing well\". Discussed with RN events overnight. Review of Systems: No fevers, chills, appetite change, cough, sputum production, shortness of breath, dyspnea on exertion, nausea, vomitting, diarrhea, constipation, chest pain,  abdominal pain, joint pain, rash, itching. Tolerating PT/OT. Tolerating diet. Objective: VITALS:  
Last 24hrs VS reviewed since prior progress note. Most recent are: 
Patient Vitals for the past 24 hrs: 
 Temp Pulse Resp BP SpO2  
08/24/20 0755 97.8 °F (36.6 °C) 77 16 157/85 100 % 08/23/20 2200 98.2 °F (36.8 °C) 90 17 148/72 97 % 08/23/20 1553 97.6 °F (36.4 °C) 84 18 176/79 100 % Intake/Output Summary (Last 24 hours) at 8/24/2020 1358 Last data filed at 8/24/2020 1218 Gross per 24 hour Intake 280 ml Output 451 ml Net -171 ml PHYSICAL EXAM: 
General: WD, WN. Alert, cooperative, no acute distress EENT:  EOMI. Anicteric sclerae. MMM Resp:  CTA bilaterally, no wheezing or rales. No accessory muscle use CV:  Regular  rhythm,  normal S1/S2, no murmurs rubs gallops, 1+ ankle edema GI:  Soft, Non distended, Non tender. +Bowel sounds Neurologic:  Alert and oriented X 3, normal speech, Psych:   Good insight. Not anxious nor agitated Skin:  No rashes. No jaundice Reviewed most current lab test results and cultures  YES Reviewed most current radiology test results   YES Review and summation of old records today    NO Reviewed patient's current orders and MAR    YES 
PMH/SH reviewed - no change compared to H&P 
________________________________________________________________________ Care Plan discussed with: 
  Comments Patient x Family RN x   
 Care Manager x Consultant  x Multidiciplinary team rounds were held today with , nursing, pharmacist and clinical coordinator. Patient's plan of care was discussed; medications were reviewed and discharge planning was addressed. ________________________________________________________________________ Total NON critical care TIME:  15  Minutes Total CRITICAL CARE TIME Spent:   Minutes non procedure based Comments >50% of visit spent in counseling and coordination of care    
________________________________________________________________________ Luz Fields MD  
 
Procedures: see electronic medical records for all procedures/Xrays and details which were not copied into this note but were reviewed prior to creation of Plan. LABS: 
I reviewed today's most current labs and imaging studies. Pertinent labs include: 
Recent Labs  
  08/24/20 
0450 08/23/20 
0356 WBC 9.0 9.1 HGB 7.6* 7.6* HCT 23.8* 24.0*  
 356 Recent Labs  
  08/24/20 
0450 08/23/20 
0356  138  
K 2.8* 3.2*  
 102 CO2 24 24 * 132* BUN 27* 29* CREA 2.57* 2.67* CA 9.3 9.4 MG 1.5*  --   
 
 
Signed: Luz Fields MD

## 2020-08-24 NOTE — INTERDISCIPLINARY ROUNDS
IDR with Dr. Rere Kearney (MD), Kannan Juarez (pharmacist), Yuliana Ricketts (), and Magda Zheng (RN) to discuss plan of care including consider speech therapy for swallowing pills, pt imdur not crushable, pt weakness, work with OT this morning.

## 2020-08-24 NOTE — PROGRESS NOTES
SPEECH PATHOLOGY BEDSIDE SWALLOW EVALUATION/DISCHARGE Patient: Susanna Villa (54 y.o. male) Date: 8/24/2020 Primary Diagnosis: Physical debility [R53.81] Precautions:  Fall ASSESSMENT : 
Based on the objective data described below, the patient presents with functional oropharyngeal phases of swallow with no overt difficulty nor overt s/s of aspiration. Pt is at elevated risk for post-prandial aspiration given hx of obesity, diabetes which places patient at increased risk for dysmotility of the esophagus, as well as difficulty with large medications (which is typically indicative of esophageal dysfunction). Therefore, recommend pt continue baseline diet and crush particularly large medications and place them in applesauce. Otherwise, skilled acute therapy provided by a speech-language pathologist is not indicated at this time. PLAN : 
Recommendations: 
--Regular diet/thin liquids 
--Crush large medications and place them in applesauce --Upright during meals --Small sips/bites --Alternate liquids/solids Discharge Recommendations: None SUBJECTIVE:  
Patient stated, \"I know I need to keep my feet up for the diabetes. OBJECTIVE:  
 
Past Medical History:  
Diagnosis Date  Anemia  Bronchitis  Chronic kidney disease UTI  Diabetes ([address]  Gastrointestinal disorder   
 anemia  Hypertension  Kidney disease, chronic, stage II (GFR 60-89 ml/min)  Neurological disorder Metabolic Brain Disorder Past Surgical History:  
Procedure Laterality Date  HX OTHER SURGICAL    
 never Prior Level of Function/Home Situation:  
Home Situation Home Environment: Private residence(trailer) # Steps to Enter: 3 One/Two Story Residence: One story Living Alone: No 
Support Systems: Family member(s) Patient Expects to be Discharged to[de-identified] Private residence Current DME Used/Available at Home: Walker, rolling, Glucometer Diet prior to admission: Regular diet/thin liquids Current Diet:  Regular diet/thin liquids Cognitive and Communication Status: 
Neurologic State: Alert Orientation Level: Oriented X4 Cognition: Follows commands, Poor safety awareness Perception: Appears intact Perseveration: No perseveration noted Safety/Judgement: Decreased awareness of environment Oral Assessment: 
Oral Assessment Labial: No impairment Dentition: Poor;Natural 
Oral Hygiene: oral mucosa moist and clear of secretions Lingual: No impairment Velum: No impairment Mandible: No impairment P.O. Trials: 
Patient Position: upright in bed Vocal quality prior to P.O.: Hoarse Consistency Presented: Thin liquid; Solid How Presented: Self-fed/presented;Straw;Successive swallows Bolus Acceptance: No impairment Bolus Formation/Control: No impairment Propulsion: No impairment Oral Residue: None Initiation of Swallow: No impairment Laryngeal Elevation: Functional 
Aspiration Signs/Symptoms: None Pharyngeal Phase Characteristics: No impairment, issues, or problems Oral Phase Severity: No impairment Pharyngeal Phase Severity : No impairment NOMS:  
The NOMS functional outcome measure was used to quantify this patient's level of swallowing impairment. Based on the NOMS, the patient was determined to be at level 7 for swallow function NOMS Swallowing Levels: 
Level 1 (CN): NPO Level 2 (CM): NPO but takes consistency in therapy Level 3 (CL): Takes less than 50% of nutrition p.o. and continues with nonoral feedings; and/or safe with mod cues; and/or max diet restriction Level 4 (CK): Safe swallow but needs mod cues; and/or mod diet restriction; and/or still requires some nonoral feeding/supplements Level 5 (CJ): Safe swallow with min diet restriction; and/or needs min cues Level 6 (CI): Independent with p.o.; rare cues; usually self cues; may need to avoid some foods or needs extra time Level 7 (CH): Independent for all p.o. VANDANA. (2003). National Outcomes Measurement System (NOMS): Adult Speech-Language Pathology User's Guide. Pain: 
Pain Scale 1: Numeric (0 - 10) Pain Intensity 1: 0 After treatment:  
Call bell within reach and Nursing notified COMMUNICATION/EDUCATION:  
 
The patient's plan of care including recommendations, planned interventions, and recommended diet changes were discussed with: Registered nurse. Thank you for this referral. 
Silvia Doherty, SLP Time Calculation: 15 mins

## 2020-08-24 NOTE — PALLIATIVE CARE
Case reviewed with Dr Araceli Zhang , at this point goals are clear , patient wants to continue with full code status and active treatment of medical issues, as per his discussion with Dr Hannah Mendes. Dr Araceli Zhang is going to cancel the consult . Thank you .

## 2020-08-24 NOTE — PROGRESS NOTES
Bedside shift change report given to [facility] ANATOLIY (oncoming nurse) by Corin Gordon (offgoing nurse). Report included the following information SBAR, Kardex, Intake/Output, MAR and Recent Results.

## 2020-08-24 NOTE — PROGRESS NOTES
5307) Bedside shift change report given to Connie Coreas, RN and Kenny Guevara RN (oncoming nurse) by Jose Armando Mari RN and Thanh Hudson RN (offgoing nurse). Report included the following information SBAR, Kardex and MAR. Plan for physical therapy. 0725) pt pleasant and cooperative in bed, stating \"I'm getting my strength back\" Report from Ms. Adame, PCT,  this morning. Potassium was 2.8 and creatine 2.57 in overnight labs 
0900) pt take medications mixed with applesauce. 1430) Discuss with Speech therapy, recommend crush pills 1920) Bedside shift change report given to Jose Armando Mari RN (oncoming nurse) by Connie Coreas RN (offgoing nurse). Report included the following information SBAR, Kardex and MAR.

## 2020-08-25 NOTE — PROGRESS NOTES
..Bedside and Verbal shift change report given to [facility] (oncoming nurse) by Jesse Fernandez RN (offgoing nurse). Report included the following information SBAR, Intake/Output, MAR, Recent Results and Med Rec Status.

## 2020-08-25 NOTE — PROGRESS NOTES
Bedside shift change report given to JEFF Youngblood/LOREN Stoner (oncoming nurse) by Donny Padilla (offgoing nurse). Report included the following information SBAR, Kardex, Intake/Output, MAR and Recent Results.

## 2020-08-25 NOTE — PROGRESS NOTES
Hospitalist Progress Note NAME: Megan Gilliam :  1950 MRN:  [id number] Room Number:  057/37  @ [facility] Summary: 78 y.o. male whom presented on 2020 with Assessment / Plan: 
Physical debility  
Leg edema, right POA  
Patient presented with inability to care for self at home. Likely due to long standing physical deconditioning due to chronic medical problems.  
  
- PT/OT consult - Plan for discharge to home with family, HH once functionality and ambulation improve. - Concerns by nursing for choking episodes on meds. SLP consult 
  
Chronic bilateral knee pain Likely due to OA. - Start low dose Gabapentin 100 mg daily, monitor for sedation - Acetaminophen 1 g TID 
- Voltaren gel 4 x daily to knees - PT  
  
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  
A1c 8.9 % on 2020  
  
- FSG AC HS, consistent carb diet, hypoglycemia protocol - ELAINE, Alogliptin  
  
  
CKD stage 3/4 POA  
Due to long standing hypertension, T2DM. stable. Baseline creatinine 3.1-3.3.  
 
- Continue iron, erythropoeitin. - Continue losartan 100mg daily, Isosorbide mononitrate 30 mg BID, hydralazine  
- nephrology following - Sodium bicarbonate 1950 mg TID. continue current dose if bicarb > 25 can decrease sodium bicarb to bid doseing  
 - F/U nephrology within 1 month of dc.  
 
  
Coronary artery disease involving native coronary artery of native heart POA:  
Continue Imdur, Plavix  
  
  
HTN (hypertension), benign POA, uncontrolled:  
- Continue Hydralazine, Imdur and Cozaar  
  
  
Anemia due to chronic illness POA:  
deficient in folic acid. Folic acid 4.4.  
- Continue Epoetin, ferrous sulfate.  
- Outpatient follow up for screening colonoscopy.  
 
  
Severe obesity (BMI 35.0-39. 9) with comorbidity POA:  
Counseled on Lifestyle modifications, AHA Diet ,weight loss strategies.  
  
  
Hypothyroidism POA:  
     
Lab Results Component Value Date/Time  
  TSH 0.26 (L) 08/20/2020 03:56 AM  
 
-Continue Levothyroxine  
  
  
BMI 40.10 Morbid obesity Nutrition consult 
  
Code Status: full  
Surrogate Decision Maker: 
sister Arianna San ([phone]) 
  
DVT Prophylaxis: Hep SQ 
GI Prophylaxis: not indicated Lines :Jessica Sport Baseline: ambulatory prior to admission Subjective: Chief Complaint / Reason for Physician Visit \"my knees hurt. .. if I sit up my feet get swollen\". Discussed with RN events overnight. Review of Systems: No fevers, chills, appetite change, cough, sputum production, shortness of breath, dyspnea on exertion, nausea, vomitting, diarrhea, constipation, chest pain, leg edema, abdominal pain, rash, itching. Tolerating PT/OT. Tolerating diet. Objective: VITALS:  
Last 24hrs VS reviewed since prior progress note. Most recent are: 
Patient Vitals for the past 24 hrs: 
 Temp Pulse Resp BP SpO2  
08/25/20 0829 98.4 °F (36.9 °C) 85 16 153/76 100 % 08/24/20 2125 97.4 °F (36.3 °C) 80 16 164/76 100 % 08/24/20 1501 (!) 95.5 °F (35.3 °C) 86 13 165/79 98 % Intake/Output Summary (Last 24 hours) at 8/25/2020 1115 Last data filed at 8/24/2020 2355 Gross per 24 hour Intake  Output 825 ml Net -825 ml PHYSICAL EXAM: 
General: WD, WN. Alert, cooperative, no acute distress EENT:  EOMI. Anicteric sclerae. MMM Resp:  CTA bilaterally, no wheezing or rales. No accessory muscle use CV:  Regular  rhythm,  normal S1/S2, no murmurs rubs gallops, No edema GI:  Soft, Non distended, Non tender. +Bowel sounds Neurologic:  Alert and oriented X 3, normal speech, Psych:   Good insight. Not anxious nor agitated Skin:  No rashes. No jaundice Reviewed most current lab test results and cultures  YES Reviewed most current radiology test results   YES Review and summation of old records today    NO Reviewed patient's current orders and MAR    YES 
 PMH/ reviewed - no change compared to H&P 
________________________________________________________________________ Care Plan discussed with: 
  Comments Patient x Family RN x Care Manager x Consultant Multidiciplinary team rounds were held today with , nursing, pharmacist and clinical coordinator. Patient's plan of care was discussed; medications were reviewed and discharge planning was addressed. ________________________________________________________________________ Total NON critical care TIME:  25  Minutes Total CRITICAL CARE TIME Spent:   Minutes non procedure based Comments >50% of visit spent in counseling and coordination of care    
________________________________________________________________________ Ayad Harrison MD  
 
Procedures: see electronic medical records for all procedures/Xrays and details which were not copied into this note but were reviewed prior to creation of Plan. LABS: 
I reviewed today's most current labs and imaging studies. Pertinent labs include: 
Recent Labs  
  08/24/20 0450 08/23/20 
0356 WBC 9.0 9.1 HGB 7.6* 7.6* HCT 23.8* 24.0*  
 356 Recent Labs  
  08/24/20 
0450 08/23/20 
0356  138  
K 2.8* 3.2*  
 102 CO2 24 24 * 132* BUN 27* 29* CREA 2.57* 2.67* CA 9.3 9.4 MG 1.5*  --   
 
 
Signed: Ayad Harrison MD

## 2020-08-25 NOTE — PROGRESS NOTES
1949: Bedside and Verbal shift change report given to Jeff Rivers (oncoming nurse) by Karina Turk RN (offgoing nurse). Report included the following information SBAR. Patient participated in report. 2350: Careplan reviewed.

## 2020-08-25 NOTE — PROGRESS NOTES
Bed side report received by Dot AGUILAR, Pt laying in the dim room, denies pain, and no distress noted at this time. 1021-Pt complained of bilateral knee pain, heat pain and tylenol (chrushed) given, tolerated well 
 
1300-Pt laying in bed resting effectively, pt refused to sit in chair by the bed. Blood sugar stable at breakfast and lunch no Insulin coverage needed today 1720-No Insulin coverage needed. Pt consumed dinner 30%, topical applied to knees bilaterally

## 2020-08-25 NOTE — PROGRESS NOTES
Transition of Care Plan: 
 
Patient is not a candidate for SNF rehab due to past criminal history and will be returning to family home at discharge. * Continue PT/OT to increase strength and endurance * Return to home (with sister & KUMAR; caregivers) * Needs PCP appt at discharge (NO Virtual Appt) * Possible HH at D/C * Possible DME at D/C 
* LTSS successfully completed. Reviewed chart for updates on patient's self-care activities. Continues to participate with PT/OT and requires assistance for coming to stand with RW. Will need to check with family to see if RW is already in the home. Patient had previous order at [facility] for Wheelchair, but this was not delivered prior to his readmission. Barriers to Plan: 
* Follow up appointment as patient does not have phone to utilize virtual visits. * Needs medication education/management at D/C. Plan to visit patient later this week and further discuss a time frame for his transition to home. [address] [facility] MHA, BSW, CCM, ACM-SW Complex CM Coordinator/[employer] [phone]

## 2020-08-25 NOTE — PROGRESS NOTES
Problem: Mobility Impaired (Adult and Pediatric) Goal: *Acute Goals and Plan of Care (Insert Text) Description: FUNCTIONAL STATUS PRIOR TO ADMISSION: The patient required assist at baseline -poor historian. HOME SUPPORT PRIOR TO ADMISSION: The patient lived with his sister and brother in law. Physical Therapy Goals Initiated 8/20/2020 1. Patient will move from supine to sit and sit to supine  in bed with independence within 7 day(s). 2.  Patient will transfer from bed to chair and chair to bed with minimal assistance/contact guard assist using the least restrictive device within 7 day(s). 3.  Patient will perform sit to stand with minimal assistance/contact guard assist within 7 day(s). 4.  Patient will ambulate with minimal assistance/contact guard assist for 50 feet with the least restrictive device within 7 day(s). Outcome: Progressing Towards Goal 
 
PHYSICAL THERAPY TREATMENT Patient: Simran Sanchezred (89 y.o. male) Date: 8/25/2020 Diagnosis: Physical debility [R53.81] Physical debility Precautions: Fall Chart, physical therapy assessment, plan of care and goals were reviewed. ASSESSMENT Patient continues with skilled PT services and is progressing towards goals. Patient received supine in bed. Patient performed bed mobility with supervision. Patient performed seated LAQ and hip flexion while sitting at edge of bed. Patient required increased encouragement to stand due to complaints of chronic bilateral knee pain. Patient stood to walker with moderate assistance of one. Patient declined ambulation this date due to knee pain. PLAN : 
Patient continues to benefit from skilled intervention to address the above impairments. Continue treatment per established plan of care. to address goals. Recommendation for discharge: (in order for the patient to meet his/her long term goals) Physical therapy at least 2 days/week in the home AND ensure assist and/or supervision for safety with mobility  from family IF patient discharges home will need the following DME: RW if patient does not already have one SUBJECTIVE:  
Patient stated My knees hurt. I need some icy hot.  OBJECTIVE DATA SUMMARY:  
Critical Behavior: 
Neurologic State: Alert, Appropriate for age Orientation Level: Oriented X4 Cognition: Appropriate for age attention/concentration Safety/Judgement: Decreased awareness of environment Functional Mobility Training: 
Bed Mobility: 
  
Supine to Sit: Supervision Sit to Supine: Supervision Scooting: Supervision Transfers: 
Sit to Stand: Moderate assistance;Assist x1;Additional time Stand to Sit: Moderate assistance;Assist x1;Additional time Balance: 
Sitting: Intact Sitting - Static: Good (unsupported) Sitting - Dynamic: Good (unsupported) Standing: Impaired; With support Standing - Static: Constant support; Debbrah Bertie Ambulation/Gait Training: 
Patient declined ambulation secondary to knee pain Therapeutic Exercises:  
Seated LAQ and hip flexion x 10 reps B Pain Ratin/10 B knees; RN aware; PCT brought hot packs for B knees per physician Activity Tolerance:  
Fair Please refer to the flowsheet for vital signs taken during this treatment. After treatment patient left in no apparent distress:  
Supine in bed, Heels elevated for pressure relief, Call bell within reach, Bed / chair alarm activated, and Side rails x 3 
 
COMMUNICATION/COLLABORATION:  
The patients plan of care was discussed with: Registered nurse and Certified nursing assistant/patient care technician. Katy Haque PT Time Calculation: 19 mins

## 2020-08-26 NOTE — PROGRESS NOTES
Hospitalist Progress Note NAME: Simran Melgar :  1950 MRN:  [id number] Room Number:  067/60  @ [facility] Summary: 78 y.o. male whom presented on 2020 with Assessment / Plan: 
Physical debility  
Leg edema, right POA  
Patient presented with inability to care for self at home. Likely due to long standing physical deconditioning due to chronic medical problems.  
  
- PT/OT following - Plan for discharge to home with family, HH once functionality and ambulation improve. - Concerns by nursing for choking episodes on meds. SLP consult 
  
Chronic bilateral knee pain Likely due to OA. - low dose Gabapentin 100 mg daily, monitor for sedation - Acetaminophen 1 g TID 
- Voltaren gel 4 x daily to knees PRN 
 
  
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  
A1c 8.9 % on 2020  
  
- FSG AC HS, consistent carb diet, hypoglycemia protocol - ELAINE, Alogliptin  
  
  
CKD stage 3/4 POA  
Due to long standing hypertension, T2DM. stable. Baseline creatinine 3.1-3.3.  
  
- Continue iron, erythropoeitin. - Continue losartan 100mg daily, Isosorbide mononitrate 30 mg BID, hydralazine  
- nephrology following - Sodium bicarbonate 1950 mg TID. continue current dose if bicarb > 25 can decrease sodium bicarb to bid dosing  
 - F/U nephrology within 1 month of dc.  
  
  
Coronary artery disease involving native coronary artery of native heart POA:  
Continue Imdur, Plavix  
  
  
HTN (hypertension), benign POA, uncontrolled:  
- Continue Hydralazine, Imdur and Cozaar  
  
  
Anemia due to chronic illness POA:  
deficient in folic acid. Folic acid 4.4.  
- Continue Epoetin, ferrous sulfate.  
- Outpatient follow up for screening colonoscopy.  
  
  
Severe obesity (BMI 35.0-39. 9) with comorbidity POA:  
Counseled on Lifestyle modifications, AHA Diet ,weight loss strategies.  
  
  
Hypothyroidism POA:  
         
Lab Results Component Value Date/Time  
  TSH 0.26 (L) 08/20/2020 03:56 AM  
  
-Continue Levothyroxine  
  
  
BMI 40.10 Morbid obesity Nutrition consult 
  
Code Status: full  
Surrogate Decision Maker: 
sister Sneha Lindquist ([phone]) 
  
DVT Prophylaxis: Hep SQ 
GI Prophylaxis: not indicated Lines :Crystal Flores Baseline: ambulatory prior to admission Subjective: Chief Complaint / Reason for Physician Visit \"my knees dont hurt no more. .. I will try to walk again with PT, I have been moving my legs in bed. .. I need to see the foot doctor to get my nails taken care of.\". Discussed with RN events overnight. Review of Systems: No fevers, chills, appetite change, cough, sputum production, shortness of breath, dyspnea on exertion, nausea, vomitting, diarrhea, constipation, chest pain,  abdominal pain, joint pain, rash, itching. Tolerating PT/OT. Tolerating diet. Objective: VITALS:  
Last 24hrs VS reviewed since prior progress note. Most recent are: 
Patient Vitals for the past 24 hrs: 
 Temp Pulse Resp BP SpO2  
08/26/20 1050  97  142/81   
08/26/20 0817 97.7 °F (36.5 °C) 87 20 (!) 169/97 99 % 08/26/20 0358 (!) 96.5 °F (35.8 °C) 82 18 162/84 100 % 08/25/20 2052 (!) 96 °F (35.6 °C) 86 18 146/81 97 % 08/25/20 1600 97.3 °F (36.3 °C) 87 18 132/72 100 % Intake/Output Summary (Last 24 hours) at 8/26/2020 1156 Last data filed at 8/26/2020 1050 Gross per 24 hour Intake  Output 175 ml Net -175 ml PHYSICAL EXAM: 
General: WD, WN. Alert, cooperative, no acute distress EENT:  EOMI. Anicteric sclerae. MMM Resp:  CTA bilaterally, no wheezing or rales. No accessory muscle use CV:  Regular  rhythm,  normal S1/S2, no murmurs rubs gallops, No edema GI:  Soft, Non distended, Non tender. +Bowel sounds Neurologic:  Alert and oriented X 3, normal speech, Psych:   Good insight. Not anxious nor agitated Skin:  No rashes. No jaundice Reviewed most current lab test results and cultures  YES Reviewed most current radiology test results   YES Review and summation of old records today    NO Reviewed patient's current orders and MAR    YES 
PMH/SH reviewed - no change compared to H&P 
________________________________________________________________________ Care Plan discussed with: 
  Comments Patient x Family RN x Care Manager x Consultant Multidiciplinary team rounds were held today with , nursing, pharmacist and clinical coordinator. Patient's plan of care was discussed; medications were reviewed and discharge planning was addressed. ________________________________________________________________________ Total NON critical care TIME: 15  Minutes Total CRITICAL CARE TIME Spent:   Minutes non procedure based Comments >50% of visit spent in counseling and coordination of care    
________________________________________________________________________ Paco Multani MD  
 
Procedures: see electronic medical records for all procedures/Xrays and details which were not copied into this note but were reviewed prior to creation of Plan. LABS: 
I reviewed today's most current labs and imaging studies. Pertinent labs include: 
Recent Labs  
  08/26/20 
1101 08/24/20 
0450 WBC 7.3 9.0 HGB 8.2* 7.6* HCT 26.5* 23.8*  
* 344 Recent Labs  
  08/26/20 
1101 08/24/20 
0450  139  
K 3.4* 2.8*  
 105 CO2 29 24 * 126* BUN 26* 27* CREA 2.54* 2.57* CA 9.6 9.3 MG 1.7 1.5* PHOS 3.9  --   
 
 
Signed: Paco Multani MD

## 2020-08-26 NOTE — PROGRESS NOTES
Problem: Mobility Impaired (Adult and Pediatric) Goal: *Acute Goals and Plan of Care (Insert Text) Description: FUNCTIONAL STATUS PRIOR TO ADMISSION: The patient required assist at baseline -poor historian. HOME SUPPORT PRIOR TO ADMISSION: The patient lived with his sister and brother in law. Physical Therapy Goals Initiated 8/20/2020 1. Patient will move from supine to sit and sit to supine  in bed with independence within 7 day(s). 2.  Patient will transfer from bed to chair and chair to bed with minimal assistance/contact guard assist using the least restrictive device within 7 day(s). 3.  Patient will perform sit to stand with minimal assistance/contact guard assist within 7 day(s). 4.  Patient will ambulate with minimal assistance/contact guard assist for 50 feet with the least restrictive device within 7 day(s). Outcome: Progressing Towards Goal 
 
PHYSICAL THERAPY TREATMENT Patient: David Amor (23 y.o. male) Date: 8/26/2020 Diagnosis: Physical debility [R53.81] Physical debility Precautions: Fall Chart, physical therapy assessment, plan of care and goals were reviewed. ASSESSMENT Patient continues with skilled PT services and is progressing towards goals. Patient received sitting in chair. Patient stood with minimal assistance to walker with increased time. Patient ambulated 15 feet with walker, min A, and chair follow. One seated rest break provided before patient ambulated an additional 15 feet back to bed. Current Level of Function Impacting Discharge (mobility/balance): Min A Other factors to consider for discharge: Chronic B knee pain PLAN : 
Patient continues to benefit from skilled intervention to address the above impairments. Continue treatment per established plan of care. to address goals. Recommendation for discharge: (in order for the patient to meet his/her long term goals) Physical therapy at least 2 days/week in the home AND ensure assist and/or supervision for safety with mobility This discharge recommendation: 
Has been made in collaboration with the attending provider and/or case management IF patient discharges home will need the following DME: rolling walker SUBJECTIVE:  
Patient stated I gotta get these legs moving OBJECTIVE DATA SUMMARY:  
Critical Behavior: 
Neurologic State: Alert Orientation Level: Oriented X4 Cognition: Follows commands Safety/Judgement: Decreased awareness of environment, Decreased insight into deficits, Decreased awareness of need for safety, Fall prevention Functional Mobility Training: 
Bed Mobility: 
  
Supine to Sit: Supervision Sit to Supine: Supervision Scooting: Stand-by assistance Transfers: 
Sit to Stand: Minimum assistance;Assist x1 Stand to Sit: Minimum assistance Bed to Chair: Minimum assistance Balance: 
Sitting: Intact Sitting - Static: Good (unsupported) Sitting - Dynamic: Good (unsupported) Standing: Impaired; With support Standing - Static: Good;Fair;Constant support Standing - Dynamic : Fair;Constant support Ambulation/Gait Training: 
Distance (ft): 15 Feet (ft)(x2) 
Assistive Device: Gait belt;Walker, rolling Ambulation - Level of Assistance: Minimal assistance Gait Abnormalities: Decreased step clearance Base of Support: Widened Speed/Mayda: Pace decreased (<100 feet/min) Step Length: Right shortened;Left shortened Therapeutic Exercises:  
Seated hip flexion and LAQ x 10 reps B Pain Rating: 
3/10 B knees; RN aware Activity Tolerance:  
Good and Fair Please refer to the flowsheet for vital signs taken during this treatment. After treatment patient left in no apparent distress:  
Supine in bed and Call bell within reach COMMUNICATION/COLLABORATION:  
The patients plan of care was discussed with: Registered nurse. Arpit Alvarez, PT Time Calculation: 15 mins

## 2020-08-26 NOTE — PROGRESS NOTES
Bedside shift change report given to Orion Miranda (oncoming nurse) by [facility] (offgoing nurse). Report included the following information SBAR and MAR.

## 2020-08-26 NOTE — PROGRESS NOTES
[facility] Nephrology Consult - [facility] 
Requesting physician: Dr. Felipe Jordan MD 
Date of note: 08/24/20 
  
CC: f/u for CKD 4 
  This is an \"e-consult\" with data obtained by chart review and by discussion with the requesting physician. I did not directly examine or interview the patient.  
  
S:  
Reports pain is improving Continues PT/OT  
sbp 130-160's  
  
 
ROS:  
Not performed Allergies: 
No Known Allergies Current Facility-Administered Medications:  
  diclofenac (VOLTAREN) 1 % topical gel 4 g, 4 g, Topical, QID PRN, Anu Harrison MD, 4 g at 08/26/20 1339   acetaminophen (TYLENOL) tablet 1,000 mg, 1,000 mg, Oral, TID, Anu Harrison MD, 1,000 mg at 08/26/20 0843 
  gabapentin (NEURONTIN) capsule 100 mg, 100 mg, Oral, DAILY, Anu Harrison MD, 100 mg at 08/26/20 8249   isosorbide mononitrate (ISMO, MONOKET) tablet 20 mg, 20 mg, Oral, BID, Anu Harrison MD, 20 mg at 08/26/20 0841 
  lactobac ac& pc-s.therm-b.anim (RONY Q/RISAQUAD), 1 Cap, Oral, DAILY, Deb Barr NP, 1 Cap at 08/26/20 6411   levothyroxine (SYNTHROID) tablet 125 mcg, 125 mcg, Oral, 6am, Hollie Zacarias MD, 125 mcg at 08/26/20 0530 
  alogliptin (NESINA) tablet 6.25 mg, 6.25 mg, Oral, DAILY, Anu Harrison MD, 6.25 mg at 08/26/20 5927   clopidogreL (PLAVIX) tablet 75 mg, 75 mg, Oral, DAILY, Anu Harrison MD, 75 mg at 08/26/20 9263   ferrous sulfate tablet 325 mg, 325 mg, Oral, BID WITH MEALS, Anu Harrison MD, 325 mg at 08/26/20 1603   folic acid (FOLVITE) tablet 1 mg, 1 mg, Oral, DAILY, Anu Harrison MD, 1 mg at 08/26/20 0840   hydrALAZINE (APRESOLINE) tablet 50 mg, 50 mg, Oral, TID, Anu Harrison MD, 50 mg at 08/26/20 0012   losartan (COZAAR) tablet 50 mg, 50 mg, Oral, DAILY, Anu Harrison MD, 50 mg at 08/26/20 0840 
  insulin lispro (HUMALOG) injection, , SubCUTAneous, AC&HS, Anu Harrison MD, Stopped at 08/22/20 0730   glucose chewable tablet 16 g, 4 Tab, Oral, PRN, Vicenta Kay MD 
  dextrose 10% infusion 0-250 mL, 0-250 mL, IntraVENous, PRN, Vicenta Kay MD 
  glucagon (GLUCAGEN) injection 1 mg, 1 mg, IntraMUSCular, PRN, Vicenta Kay MD 
  epoetin aquilino-epbx (RETACRIT) injection 20,000 Units, 20,000 Units, SubCUTAneous, Q7D, Vicenta Kay MD, 20,000 Units at 08/20/20 7752   polyethylene glycol (MIRALAX) packet 17 g, 17 g, Oral, DAILY PRN, Vicenta Kay MD 
  sodium bicarbonate tablet 1,950 mg, 1,950 mg, Oral, TID, Vicenta Kay MD, 1,950 mg at 08/26/20 0930 
  ondansetron (ZOFRAN ODT) tablet 4 mg, 4 mg, Oral, Q8H PRN, Vicenta Kay MD 
  heparin (porcine) injection 7,500 Units, 7,500 Units, SubCUTAneous, Q8H, Vicenta Kay MD, 7,500 Units at 08/26/20 0530 VITALS:  
Last 24hrs VS reviewed since prior progress note. Most recent are: 
Patient Vitals for the past 24 hrs: 
  Temp Pulse Resp BP SpO2  
08/26/20 1050  97  142/81   
08/26/20 0817 97.7 °F (36.5 °C) 87 20 (!) 169/97 99 % 08/26/20 0358 (!) 96.5 °F (35.8 °C) 82 18 162/84 100 % 08/25/20 2052 (!) 96 °F (35.6 °C) 86 18 146/81 97 % 08/25/20 1600 97.3 °F (36.3 °C) 87 18 132/72 100 % Intake/Output Summary (Last 24 hours) at 8/26/2020 1156 Last data filed at 8/26/2020 1050 Gross per 24 hour Intake  Output 175 ml Net -175 ml  
 
  
Physical exam: Not performed 
  
Labs/imaging: 
Recent Results (from the past 24 hour(s)) GLUCOSE, POC Collection Time: 08/25/20  9:03 PM  
Result Value Ref Range Glucose (POC) 145 (H) 65 - 100 mg/dL Performed by Lauren Elder (TRV RN) GLUCOSE, POC Collection Time: 08/26/20  7:55 AM  
Result Value Ref Range Glucose (POC) 108 (H) 65 - 100 mg/dL Performed by Sinai Chao GLUCOSE, POC Collection Time: 08/26/20 10:59 AM  
Result Value Ref Range Glucose (POC) 123 (H) 65 - 100 mg/dL Performed by [employer] METABOLIC PANEL, BASIC  
 Collection Time: 08/26/20 11:01 AM  
Result Value Ref Range Sodium 136 136 - 145 mmol/L Potassium 3.4 (L) 3.5 - 5.1 mmol/L Chloride 101 97 - 108 mmol/L  
 CO2 29 21 - 32 mmol/L Anion gap 6 5 - 15 mmol/L Glucose 131 (H) 65 - 100 mg/dL BUN 26 (H) 6 - 20 MG/DL Creatinine 2.54 (H) 0.70 - 1.30 MG/DL  
 BUN/Creatinine ratio 10 (L) 12 - 20 GFR est AA 31 (L) >60 ml/min/1.73m2 GFR est non-AA 25 (L) >60 ml/min/1.73m2 Calcium 9.6 8.5 - 10.1 MG/DL MAGNESIUM Collection Time: 08/26/20 11:01 AM  
Result Value Ref Range Magnesium 1.7 1.6 - 2.4 mg/dL PHOSPHORUS Collection Time: 08/26/20 11:01 AM  
Result Value Ref Range Phosphorus 3.9 2.6 - 4.7 MG/DL  
CBC WITH AUTOMATED DIFF Collection Time: 08/26/20 11:01 AM  
Result Value Ref Range WBC 7.3 4.1 - 11.1 K/uL  
 RBC 3.25 (L) 4.10 - 5.70 M/uL HGB 8.2 (L) 12.1 - 17.0 g/dL HCT 26.5 (L) 36.6 - 50.3 % MCV 81.5 80.0 - 99.0 FL  
 MCH 25.2 (L) 26.0 - 34.0 PG  
 MCHC 30.9 30.0 - 36.5 g/dL  
 RDW 15.1 (H) 11.5 - 14.5 % PLATELET 665 (H) 132 - 400 K/uL MPV 8.6 (L) 8.9 - 12.9 FL  
 NRBC 0.0 0  WBC ABSOLUTE NRBC 0.00 0.00 - 0.01 K/uL NEUTROPHILS 76 (H) 32 - 75 % LYMPHOCYTES 18 12 - 49 % MONOCYTES 5 5 - 13 % EOSINOPHILS 1 0 - 7 % BASOPHILS 0 0 - 1 % IMMATURE GRANULOCYTES 0 0.0 - 0.5 % ABS. NEUTROPHILS 5.6 1.8 - 8.0 K/UL  
 ABS. LYMPHOCYTES 1.3 0.8 - 3.5 K/UL  
 ABS. MONOCYTES 0.4 0.0 - 1.0 K/UL  
 ABS. EOSINOPHILS 0.1 0.0 - 0.4 K/UL  
 ABS. BASOPHILS 0.0 0.0 - 0.1 K/UL  
 ABS. IMM. GRANS. 0.0 0.00 - 0.04 K/UL  
 DF AUTOMATED    
 
A/P: 
  
1. KELIN stage 3 on CKD 3/4 A3: crt appears stable CKD likely 2/2 uncontrolled DM/HTN with ckd 3 in 2018 with likely progression  
-no acute indication for RRT 
-will need improved DM/HTN management  
-will need f/u with nephrology 1 month post discharge  
-check protein/crt ratio 
-renally dose medications  
  
2. NAGMA: 
-decreased bicarbonate to 1300mg bid -goal bicarb >22  
  
3.anemia: hgb improving. normocytic,  b12//iron stable, folate low 
-component of CKD likely present 
-continue on ASH 
-check stool blood if not already performed  
-on folic acid  
  
4. Htn:  
-variable bp readings 
-can titrate hydralazine to 75mg tid if remains hypertensive  
  
  
  
Thank you for this consult. I will continue to follow the patient with you.  Please feel free to call with questions. 
Simon Heller M.D. 
[facility] Nephrology

## 2020-08-26 NOTE — PROGRESS NOTES
18) Notify Dr. Timothy Kamara, pt walk to doorway with physical therapy, using walker 1920) Bedside shift change report given to Magda Lyn RN (oncoming nurse) by Chetna Ornelas RN and Piedad Selby RN (offgoing nurse). Report included the following information SBAR, Kardex and MAR.

## 2020-08-26 NOTE — PROGRESS NOTES
Interdisciplinary team rounds were held 8/26/2020 with the following team members:Care Management, Nursing, Pharmacy and Physician. Plan of care discussed. See clinical pathway and/or care plan for interventions and desired outcomes.

## 2020-08-26 NOTE — PROGRESS NOTES
Problem: Self Care Deficits Care Plan (Adult) Goal: *Acute Goals and Plan of Care (Insert Text) Description:  
FUNCTIONAL STATUS PRIOR TO ADMISSION: Patient with recent discharge from [facility] on 8/10/20, was recommended to go to rehab however went home at max assist of 2 level, family unable to assist and back to [facility], prior to initial admission patient likely ambulatory and lived with family. At [facility] on 2nd admission remained max assist of 2 and unable to transfer to chair. HOME SUPPORT: The patient lived with his sister and brother-in-law, per chart cousins assist also. Occupational Therapy Goals Initiated 8/20/2020 1. Patient will perform upper body bathing and dressing with modified independence  seated edge of bed within 7 day(s). 2.  Patient will perform lower body dressing with moderate assistance and best technique/adaptive equipment within 7 day(s). 3.  Patient will perform static standing with one UE support > or = 5 minutes with minimal assistance/contact guard assist within 7 day(s). 4.  Patient will perform toilet transfers with minimal assistance/contact guard assist x's 1 within 7 day(s). 5.  Patient will perform all aspects of toileting with moderate assistance  within 7 day(s). 6.  Patient will participate in upper extremity therapeutic exercise/activities with supervision/set-up for 10 minutes within 7 day(s). Outcome: Progressing Towards Goal 
  
OCCUPATIONAL THERAPY TREATMENT Patient: Rosy Aguirre (9 y.o. male) Date: 8/26/2020 Diagnosis: Physical debility [R53.81] Physical debility Precautions: Fall Chart, occupational therapy assessment, plan of care, and goals were reviewed. ASSESSMENT Patient continues with skilled OT services and is progressing towards goals. Patient received in bed, receptive to EOB activity with encouragement and education on importance of OOB activity.  Patient reporting inability to don socks - instructed on compensatory technique which patient was able to successfully utilize to don L sock with min assist to stabilize foot position. Due to decreased ROM, patient unable to reach R foot despite compensatory technique, requiring total assist. Patient with good balance during dynamic sitting while bathing, then requesting transfer to chair. Patient with good upper body strength, able to complete lateral transfer via squat-pivot with CGA. Patient very distractible, requiring frequent redirection to task and cues for initiation. Current Level of Function Impacting Discharge (ADLs): set-up for upper body ADLs Other factors to consider for discharge: home support PLAN : 
Patient continues to benefit from skilled intervention to address the above impairments. Continue treatment per established plan of care. to address goals. Recommend with staff: OOB to chair for all meals and encourage max participation in ADL completion Recommendation for discharge: (in order for the patient to meet his/her long term goals) Therapy up to 5 days/week in SNF setting or an intensive home health therapy program and 24/7 family assistance/supervision for all ADLs and mobility This discharge recommendation: 
Has been made in collaboration with the attending provider and/or case management IF patient discharges home will need the following DME: bedside commode and walker: rolling SUBJECTIVE:  
Patient stated I feel like a rosalba now after bathing. OBJECTIVE DATA SUMMARY:  
Cognitive/Behavioral Status: 
Neurologic State: Alert Orientation Level: Oriented X4 Cognition: Follows commands Perception: Appears intact; Verbal;Visual 
Perseveration: Perseverates during conversation;Perseverates during ADLS;Perseverates during mobility Safety/Judgement: Decreased awareness of environment;Decreased insight into deficits; Decreased awareness of need for safety; Fall prevention Functional Mobility and Transfers for ADLs: 
Bed Mobility: Supine to Sit: Supervision Sit to Supine: Supervision Scooting: Stand-by assistance Transfers: 
Bed to Chair: Minimum assistance;Contact guard assistance(squat-pivot to chair placed at bedside) Balance: 
Sitting: Intact Sitting - Static: Good (unsupported) Sitting - Dynamic: Good (unsupported) ADL Intervention: 
Upper Body Bathing Bathing Assistance: Minimum assistance Position Performed: Seated edge of bed Cues: Verbal cues provided;Physical assistance(physical A for back; redirection to task ) Lower Body Bathing Bathing Assistance: Stand-by assistance;Contact guard assistance Perineal  : Contact guard assistance Position Performed: Seated in chair Cues: Verbal cues provided;Visual cues provided Lower Body : Stand-by assistance Position Performed: Seated in chair Cues: Verbal cues provided;Visual cues provided Upper Body Dressing Assistance Dressing Assistance: Minimum assistance Hospital Gown: Minimum  assistance Cues: Verbal cues provided Cognitive Retraining Orientation Retraining: Awareness of environment Problem Solving: Awareness of environment Executive Functions: Executing cognitive plans;Regulating behavior Organizing/Sequencing: Breaking task down Attention to Task: Distractibility Safety/Judgement: Decreased awareness of environment;Decreased insight into deficits; Decreased awareness of need for safety; Fall prevention Cues: Verbal cues provided;Visual cues provided Pain: No pain reported. Activity Tolerance:  
Good Please refer to the flowsheet for vital signs taken during this treatment. After treatment patient left in no apparent distress:  
Sitting in chair and Call bell within reach COMMUNICATION/COLLABORATION:  
The patients plan of care was discussed with: Physical therapist and Registered nurse. Cirilo Espinoza OT Time Calculation: 23 mins

## 2020-08-27 NOTE — PROGRESS NOTES
Problem: Falls - Risk of 
Goal: *Absence of Falls Description: Document Vu Ghosh Fall Risk and appropriate interventions in the flowsheet. Outcome: Progressing Towards Goal 
Note: Fall Risk Interventions: 
Mobility Interventions: Communicate number of staff needed for ambulation/transfer Mentation Interventions: Adequate sleep, hydration, pain control Medication Interventions: Teach patient to arise slowly Elimination Interventions: Call light in reach History of Falls Interventions: Door open when patient unattended Problem: General Medical Care Plan Goal: *Vital signs within specified parameters Outcome: Progressing Towards Goal 
Goal: *Optimal pain control at patient's stated goal 
Outcome: Progressing Towards Goal

## 2020-08-27 NOTE — PROGRESS NOTES
..Bedside and Verbal shift change report given to Masood Huber (oncoming nurse) by Herbert Schulte RN/Mandi PIMENTEL (offgoing nurse). Report included the following information SBAR, Intake/Output, MAR, Recent Results and Med Rec Status.

## 2020-08-27 NOTE — PROGRESS NOTES
Bedside report received from [facility], Pt resting in dim lit room. He smiles and greets un coming staff with a enthusiastic hello. 1006-Pt  Compliant with topical for foot, and accepted crush meds without difficulties. Pt requests to speak to the physician about discharging to his sister house. 1300-Pt understands that he will discharge tomorrow, with a assistive device (walker). Pt continues to use the bedside commode without incident. No bowel movement today. Pt blood sugar stable no correction needed today. Pt consumes meals 40-60%.

## 2020-08-27 NOTE — PROGRESS NOTES
Transition of Care Plan: 
  
Patient is not a candidate for SNF rehab due to past criminal history and will be returning to family home at discharge. * Discharge Date:  Tomorrow 08/28 * Return to home (with sister & KUMAR; caregivers) * Follow up appointment with PCP completed and on AVS (telephonic) * Rolling Walker ordered from [employer] * LTSS successfully completed. Attended IDR this morning along with Attending and Care Team. Patient is walking with rolling walker with A x 1 last evening. Patient states he wants to go home. Plans are for discharge tomorrow where patient will return home with sister and brother in law, Tushar Melendez and Cory Vogt [phone]). Reviewed the following with patient for his disposition plan:  
 
> Follow up appointment made with Dr. Carline Vega at [facility] for Wednesday, September 2 at 10:30AM.  Arranged for phone appointment as patient does not have device to complete virtual visit. Phone number for KUMAR was provided to the PCP for this follow up call/appointment. 
 
> [facility] chosen by patient and referral sent to [address] with delivery to hospital room later today.   
 
> Discussed possible PCS through Medicaid, but patient stated he was not interested in this service. Feels he will be fine once he returns home with family. > Physician sending RXs to [facility] where patient can  his medications. Called KUMAR Valle, and reviewed outlined plan above. Mr. Lindsey Joseph will make arrangements for patient's transportation home. [facility], BSW, CCM, ACM-SW Complex CM Coordinator/[facility] [phone]

## 2020-08-27 NOTE — PROGRESS NOTES
Physical therapy:  
 
Patient declining physical therapy this AM secondary to just waking up. Continue to recommend home health therapy and 24/7 assistance from family. Will attempt as able.   
 
Kyler Liu, PT

## 2020-08-27 NOTE — ROUTINE PROCESS
Bedside shift change report given to [address] [place] [address] and Kp Potter LPN (oncoming nurse) by [employer] (offgoing nurse). Report included the following information SBAR, Intake/Output, MAR and Recent Results.

## 2020-08-27 NOTE — ROUTINE PROCESS
The documentation for this period is being entered following the guidelines as defined in the [facility] policy by Nereida Omer.

## 2020-08-27 NOTE — PROGRESS NOTES
Hospitalist Progress Note NAME: Amanda Durbin :  1950 MRN:  [id number] Room Number:  190/21  @ [facility] Summary: 78 y.o. male whom presented on 2020 with Assessment / Plan: 
Likely dc tomorrow. Meds ordered to patient's pharmacy of choice. Physical debility  
Leg edema, right POA  
Patient presented with inability to care for self at home. Likely due to long standing physical deconditioning due to chronic medical problems.  
  
- PT/OT following - Plan for discharge to home with family, [facility] tomorrow.  
  
Chronic bilateral knee pain Likely due to OA.  
  
- low dose Gabapentin 100 mg daily - Acetaminophen 1 g TID 
- Voltaren gel 4 x daily to knees PRN 
  
  
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  
A1c 8.9 % on 2020  
  
- FSG AC HS, consistent carb diet, hypoglycemia protocol - ELAINE, Alogliptin  
  
  
CKD stage 3/4 POA  
Due to long standing hypertension, T2DM. stable. Baseline creatinine 3.1-3.3.  
  
- Continue iron, erythropoeitin. - Continue losartan 100mg daily, Isosorbide mononitrate 30 mg BID, hydralazine  
- nephrology following - Sodium bicarbonate 1950 mg TID. continue current dose if bicarb > 25 can decrease sodium bicarb to bid dosing  
 - F/U nephrology within 1 month of dc.  
  
  
Coronary artery disease involving native coronary artery of native heart POA:  
Continue Imdur, Plavix  
  
  
HTN (hypertension), benign POA, uncontrolled:  
- Continue Hydralazine, Imdur and Cozaar  
  
  
Anemia due to chronic illness POA:  
deficient in folic acid. Folic acid 4.4.  
- Continue Epoetin, ferrous sulfate.  
- Outpatient follow up for screening colonoscopy.  
  
  
Severe obesity (BMI 35.0-39. 9) with comorbidity POA:  
Counseled on Lifestyle modifications, AHA Diet ,weight loss strategies.  
  
  
Hypothyroidism POA:  
         
Lab Results Component Value Date/Time   TSH 0.26 (L) 08/20/2020 03:56 AM  
  
-Continue Levothyroxine  
  
  
BMI 40.10 Morbid obesity Nutrition consult 
  
Code Status: full  
Surrogate Decision Maker: 
sister Dava Spatz ([phone]) 
  
DVT Prophylaxis: Hep SQ 
GI Prophylaxis: not indicated Lines :Latha Magdaleno Baseline: ambulatory prior to admission Subjective: Chief Complaint / Reason for Physician Visit \"I feel stronger, I have been walking with the walker. My knees dont hurt anymore\". Discussed with RN events overnight. Review of Systems: No fevers, chills, appetite change, cough, sputum production, shortness of breath, dyspnea on exertion, nausea, vomitting, diarrhea, constipation, chest pain, leg edema, abdominal pain, joint pain, rash, itching. Tolerating PT/OT. Tolerating diet. Objective: VITALS:  
Last 24hrs VS reviewed since prior progress note. Most recent are: 
Patient Vitals for the past 24 hrs: 
 Temp Pulse Resp BP SpO2  
08/27/20 0821 98.7 °F (37.1 °C)  17 158/87 98 % 08/27/20 0400 98.1 °F (36.7 °C) 88 20 156/73 100 % 08/26/20 2200  94  145/84   
08/26/20 2016 97.9 °F (36.6 °C) 94 18 145/84 100 % 08/26/20 1819  91  142/76 100 % 08/26/20 1510 97.8 °F (36.6 °C) 91 18 154/82 98 % Intake/Output Summary (Last 24 hours) at 8/27/2020 1145 Last data filed at 8/27/2020 0400 Gross per 24 hour Intake 600 ml Output 101 ml Net 499 ml PHYSICAL EXAM: 
General: WD, WN. Alert, cooperative, no acute distress EENT:  EOMI. Anicteric sclerae. MMM Resp:  CTA bilaterally, no wheezing or rales. No accessory muscle use CV:  Regular  rhythm,  normal S1/S2, no murmurs rubs gallops, No edema GI:  Soft, Non distended, Non tender. +Bowel sounds Neurologic:  Alert and oriented X 3, normal speech, Psych:   Fair insight. Not anxious nor agitated Skin:  No rashes. No jaundice Reviewed most current lab test results and cultures  YES Reviewed most current radiology test results   YES 
 Review and summation of old records today    NO Reviewed patient's current orders and MAR    YES 
PMH/SH reviewed - no change compared to H&P 
________________________________________________________________________ Care Plan discussed with: 
  Comments Patient x Family RN x Care Manager x Consultant Multidiciplinary team rounds were held today with , nursing, pharmacist and clinical coordinator. Patient's plan of care was discussed; medications were reviewed and discharge planning was addressed. ________________________________________________________________________ Total NON critical care TIME:  25  Minutes Total CRITICAL CARE TIME Spent:   Minutes non procedure based Comments >50% of visit spent in counseling and coordination of care    
________________________________________________________________________ Belinda Laguna MD  
 
Procedures: see electronic medical records for all procedures/Xrays and details which were not copied into this note but were reviewed prior to creation of Plan. LABS: 
I reviewed today's most current labs and imaging studies. Pertinent labs include: 
Recent Labs  
  08/26/20 
1101 WBC 7.3 HGB 8.2* HCT 26.5*  
* Recent Labs  
  08/26/20 
1101   
K 3.4*  
 CO2 29 * BUN 26* CREA 2.54* CA 9.6 MG 1.7 PHOS 3.9 Signed: Belinda Laguna MD

## 2020-08-28 NOTE — PROGRESS NOTES
IDR with MD and team this am.  
Plans for discharge home today. Discussed Medications/new prescriptions sent to his local pharmacy- confirmed on chart. Nursing confirm Criselda Rodriguez in patient's room to go home with him. Complex Care Manager arranged follow-up and on his AVS 
 
Family to provide transportation home. Nursing to call family once everything is ready. CM to do follow-up phone call. [address] ROZ RN  
[phone]

## 2020-08-28 NOTE — PROGRESS NOTES
Comprehensive Nutrition Assessment 
  
Type and Reason for Visit: (P) Follow Up 
  
Nutrition Recommendations/Plan: CC diet with oral supplements for wound healing and nutrition. Ocuvite twice per day for wound healing. Cont other supplements as ordered. Please do not re-consult for diet education. Thank you. 
  
Nutrition Assessment:  (P) Pt admitted with physical debility; PT, OT and diet instruction so pt can return home with [facility] and family. Medicaid reinstated last week. Podiatry following; pt reported that they are working on his feet and wanted to show me his feet. Other hx notable for IDDM A1c 8.9, nephopathy/CKD-IV, CAD of native coronary artery, HTN, hypothyroidism, FTT. I spent some time with him yesterday morning around 9:40 AM.  PER MD consult, I came in to conduct diabetes education. I had noted in the chart multiple attempts to educate pt and gross medical and nutritional non-compliace. His eyes were closed when I came in but he was easily arousable. Very poor dentition/missing teeth noted. Last time we met I introduced myself as the dietitian. Two minutes later he looked at me and said, \"Oh, you're the lady I saw at [facility]. \"  I attempted to review the pt's diet with him along with healthier alternatives. He just nodded while I was talking and later during our meeting he said, \"Oh, no; you're the lady who bought [employer] in [place], the one who makes all those delicious cakes that I eat. \"  When I asked if he could tell me anything I taught him about his diet he was unable to do so. I reviewed some things we discussed and made a second attempt to get him to tell me what we discussed. He was unable to tell me. Pt has limited understanding of his eating patterns and how they affect his blood sugar. I also noted some cognitive impairment. Teaching for this pt was unsuccessful and not helpful.   At this point, education consults are fruitless. Pt is unable to comprehend and communicate the relationship between his food choices and his poor health. Today he is still not able to understand who I am and why I want to talk about his diet. 
  
Malnutrition Assessment: FTT and protein/calorie malnutrition was charted. 
  
Estimated Daily Nutrient Needs: 
Energy (kcal):  (P) 2907(5708 x 1.3 -300) Protein (g):  (P) 82(.67 g/kg CBW) Fluid (ml/day):  (P) 2200 
  
Nutrition Related Findings:  (P) Pt is non-compliant and I believe is unable to comprehend the necessity of managing his diet to protect his health.   
  
Wounds:   
(P) Multiple    
  
Current Nutrition Therapies: DIET DIABETIC CONSISTENT CARB Regular; 70-70-70 (House) DIET NUTRITIONAL SUPPLEMENTS Lunch, Dinner; Glucerna Shake 
  
Anthropometric Measures: 
· Height:  (P) 5' 7\" (170.2 cm) · Current Body Wt:  (P) 124.2 kg (273 lb 12.8 oz) · Admission Body Wt:      
· Usual Body Wt:       
· Ideal Body Wt:  (P) 148 lbs:  (P) 185 % · Adjusted Body Weight:   ; Weight Adjustment for: (P) (ABW for MO is 96 kg) · Adjusted BMI:      
· BMI Category:  (P) Obese class 3 (BMI 40.0 or greater)    
  
Nutrition Diagnosis:  
· (P) Inadequate protein intake, Altered nutrition-related lab values, Overweight/obesity, Not ready for diet/lifestyle change, Limited adherence to nutrition-related recommendations related to (P) cognitive or neurological impairment, biting/chewing (masticatory) difficulty, food and nutrition related knowledge deficit as evidenced by (P) wounds, BMI, poor dentition 
  
Nutrition Interventions:  
Food and/or Nutrient Delivery: (P) Continue current diet, Continue oral nutrition supplement Nutrition Education and Counseling: (P) Education needed, Education completed, Counseling needed, Counseling completed Coordination of Nutrition Care: (P) Continued inpatient monitoring 
  
Goals: (P) PO intake 75% most meals and ONS, optimize glycemic control    
  
 Nutrition Monitoring and Evaluation:  
Behavioral-Environmental Outcomes: (P) Knowledge or skill, Readiness for change Food/Nutrient Intake Outcomes: (P) Diet advancement/tolerance, Food and nutrient intake Physical Signs/Symptoms Outcomes: (P) Biochemical data, Chewing or swallowing, Skin, Weight 
  
Discharge Planning: (P) Continue oral nutrition supplement, Continue current diet  
  
Electronically signed by Terell Pimentel, PhD, RD, [address]  on 8/28/2020 at [facility] 97: [phone]

## 2020-08-28 NOTE — PROGRESS NOTES
1940: Bedside and Verbal shift change report given to Jeff Calix Silvestre (oncoming nurse) by Prince wilberto AGUILAR (offgoing nurse). Report included the following information SBAR. Patient in the chair and agreeable to calling for assistance. 5: Messaged Telehospitalist \"The patient is supposed to go home today and does not have IV access. Current /99, pulse 86. \"

## 2020-08-28 NOTE — DISCHARGE INSTRUCTIONS
Patient Education        Weakness: Care Instructions  Your Care Instructions     Weakness is a lack of physical or muscle strength. You may feel that you need to make extra effort to move your arms, legs, or other muscles. Generalized weakness means that you feel weak in most areas of your body. Another type of weakness may affect just one muscle or group of muscles. You may feel weak and tired after you have done too much activity, such as taking an extra-long hike. This is not a serious problem. It often goes away on its own. Feeling weak can also be caused by medical conditions like thyroid problems, depression, or a virus. Sometimes the cause can be serious. Your doctor may want to do more tests to try to find the cause of the weakness. The doctor has checked you carefully, but problems can develop later. If you notice any problems or new symptoms, get medical treatment right away. Follow-up care is a key part of your treatment and safety. Be sure to make and go to all appointments, and call your doctor if you are having problems. It's also a good idea to know your test results and keep a list of the medicines you take. How can you care for yourself at home? · Rest when you feel tired. · Be safe with medicines. If your doctor prescribed medicine, take it exactly as prescribed. Call your doctor if you think you are having a problem with your medicine. You will get more details on the specific medicines your doctor prescribes. · Do not skip meals. Eating a balanced diet may increase your energy level. · Get some physical activity every day, but do not get too tired. When should you call for help? Call your doctor now or seek immediate medical care if:  · You have new or worse weakness. · You are dizzy or lightheaded, or you feel like you may faint. Watch closely for changes in your health, and be sure to contact your doctor if:  · You do not get better as expected. Where can you learn more?   Go to http://vandana-sherri.info/  Enter V492 in the search box to learn more about \"Weakness: Care Instructions. \"  Current as of: June 26, 2019               Content Version: 12.5  © 9584-0576 [employer]wise, Incorporated. Care instructions adapted under license by [employer] (which disclaims liability or warranty for this information). If you have questions about a medical condition or this instruction, always ask your healthcare professional. [address] any warranty or liability for your use of this information.

## 2020-08-28 NOTE — INTERDISCIPLINARY ROUNDS
IDR with Dr. Mario Funes (MD), Nhung Banegas (pharmacist), Xochilt Camarena and Adolm Peabody (), Sindy Magdaleno, (RN) and Buzz Parksch (RN) to discuss plan of care including BP medications, send new prescriptions to [facility], discharge today with walker

## 2020-08-28 NOTE — DISCHARGE SUMMARY
Hospitalist Discharge Summary Patient ID: Kelsy Rodriguez [id number] 
29 y.o. 
1950 8/19/2020 PCP on record: Bobby Sicard, MD 
 
Admit date: 8/19/2020 Discharge date and time: 8/28/2020 DISCHARGE DIAGNOSIS: 
Physical debility DM 
CKD CONSULTATIONS: 
IP CONSULT TO NEPHROLOGY Excerpted HPI from H&P of Manav Berg MD: 
HISTORY OF PRESENT ILLNESS:    
  
23-year-old male who has previous history significant for chronic kidney disease, diabetes, CAD, hypertension being transferred to [facility] for physical rehabilitation. He presented to [address] [place] [address] on 8/12 with failure to thrive. He had a prior hospital admission at [address] [place] [address] from 8/1-8/10 for acute kidney injury. He has chronic medical conditions and noted by home health nurse to be unable to take care of himself and sent to the ER. He was unable to be sent to SNF from ER due to registered sex offender status. During this hospital course, he was treated for metabolic acidosis due to CKD, blood pressure medications were titrated, PT/OT consulted. After 7 day hospital course, he was unable to return to baseline level of functionality and requires more PT/OT. Hence he was transferred to [facility] for continuation of medical care.  
  
Patient  is a poor historian. States that he forgets to take his medications, and was unable to ambulate for quite some time, unable to tell me exactly when decline began. He denies any complains at this time, reports his feet hurt from time to time.  
  
 
______________________________________________________________________ DISCHARGE SUMMARY/HOSPITAL COURSE:  for full details see H&P, daily progress notes, labs, consult notes. Physical debility  
Leg edema, right POA  
Patient presented with inability to care for self at home. Likely due to long standing physical deconditioning due to chronic medical problems.  
  
- PT/OT following - Plan for discharge to home with family, [facility] tomorrow.  
  
Chronic bilateral knee pain Likely due to OA.  
  
- low dose Gabapentin 100 mg daily - Acetaminophen 1 g TID 
- Voltaren gel 4 x daily to knees PRN 
  
  
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  
A1c 8.9 % on 8/2/2020  
  
- FSG AC HS, consistent carb diet, hypoglycemia protocol - ELAINE, Alogliptin  
  
  
CKD stage 3/4 POA  
Due to long standing hypertension, T2DM. stable. Baseline creatinine 3.1-3.3.  
  
- Continue iron, erythropoeitin. - Continue losartan 100mg daily, Isosorbide mononitrate 30 mg BID, hydralazine  
- nephrology following - Sodium bicarbonate 1950 mg TID. continue current dose if bicarb > 25 can decrease sodium bicarb to bid dosing  
 - F/U nephrology within 1 month of dc.  
  
  
Coronary artery disease involving native coronary artery of native heart POA:  
Continue Imdur, Plavix  
  
  
HTN (hypertension), benign POA, uncontrolled:  
- Continue Hydralazine, Imdur and Cozaar  
  
  
Anemia due to chronic illness POA:  
deficient in folic acid. Folic acid 4.4.  
- Continue Epoetin, ferrous sulfate.  
- Outpatient follow up for screening colonoscopy.  
  
  
Severe obesity (BMI 35.0-39. 9) with comorbidity POA:  
Counseled on Lifestyle modifications, AHA Diet ,weight loss strategies.  
  
  
Hypothyroidism POA:  
         
Lab Results Component Value Date/Time  
  TSH 0.26 (L) 08/20/2020 03:56 AM  
  
-Continue Levothyroxine  
  
  
BMI 40.10 Morbid obesity Nutrition consult 
  
Code Status: full  
Surrogate Decision Maker: 
sister Magdi Chew ([phone]) 
  
DVT Prophylaxis: Hep SQ 
GI Prophylaxis: not indicated Lines :Sienna Murray Baseline: ambulatory prior to admission 
 
 
 
 
 
_______________________________________________________________________ Patient seen and examined by me on discharge day. Pertinent Findings: 
Gen:    Not in distress Chest: Clear lungs CVS:   Regular rhythm. No edema Abd:  Soft, not distended, not tender Neuro: AXOx3 
_______________________________________________________________________ DISCHARGE MEDICATIONS:  
Current Discharge Medication List  
  
START taking these medications Details  
acetaminophen (TYLENOL) 500 mg tablet Take 2 Tabs by mouth three (3) times daily. Qty: 180 Tab, Refills: 0 CONTINUE these medications which have CHANGED Details  
clopidogreL (PLAVIX) 75 mg tab Take 1 Tab by mouth daily. Qty: 30 Tab, Refills: 0  
  
ferrous sulfate 325 mg (65 mg iron) tablet Take 1 Tab by mouth two (2) times daily (with meals). Qty: 60 Tab, Refills: 0  
  
folic acid (FOLVITE) 1 mg tablet Take 1 Tab by mouth daily. Qty: 30 Tab, Refills: 0  
  
alogliptin (NESINA) 6.25 mg tablet Take 1 Tab by mouth daily. Qty: 30 Tab, Refills: 0  
  
hydrALAZINE (APRESOLINE) 50 mg tablet Take 1 Tab by mouth three (3) times daily. Qty: 90 Tab, Refills: 0  
  
levothyroxine (SYNTHROID) 125 mcg tablet Take 1 Tab by mouth Daily (before breakfast). Qty: 30 Tab, Refills: 0 Comments: Please consider 90 day supplies to promote better adherence Associated Diagnoses: Subclinical iodine-deficiency hypothyroidism  
  
losartan (COZAAR) 50 mg tablet Take 1 Tab by mouth daily. Qty: 30 Tab, Refills: 0  
  
isosorbide mononitrate ER (IMDUR) 30 mg tablet Take 1 Tab by mouth two (2) times a day. Qty: 60 Tab, Refills: 0  
  
sodium bicarbonate 650 mg tablet Take 2 Tabs by mouth two (2) times a day. Qty: 120 Tab, Refills: 0 STOP taking these medications  
  
 glimepiride (AMARYL) 2 mg tablet Comments:  
Reason for Stopping:   
   
  
 
 
 
Patient Follow Up Instructions: Activity: PT/OT per Home Health Diet: Diabetic diet Wound Care: None needed Follow-up with PCP in 2 weeks Follow-up tests/labs No labs pending Follow-up Information Follow up With Specialties Details Why Contact Info Regi Perera MD Pediatric Medicine, Family Medicine On 9/2/2020 Doctor will call you at 10:30AM on phone number [phone] [facility] [address] [place], [place] [id number] 
  
  
 
________________________________________________________________ Risk of deterioration: Moderate Condition at Discharge:  Stable 
__________________________________________________________________ Disposition Home with family and home health services 
 
____________________________________________________________________ Code Status: Full Code 
___________________________________________________________________ Total time in minutes spent coordinating this discharge (includes going over instructions, follow-up, prescriptions, and preparing report for sign off to her PCP) :  35 minutes Signed: 
Aletha Warren MD

## 2020-08-28 NOTE — PROGRESS NOTES
..Reviewed discharge instructions with pt including follow-up appointments, new medications and side effects, medications to continue, weakness education, signs/symptoms of stroke and heart attack, and MyChart information. Pt expressed understanding, but may require reinforcement. Belongings sent home with pt.

## 2020-08-28 NOTE — PROGRESS NOTES
7625) pt request medications after breakfast 
1150) Spoke with brother-in-law Carlos Bridger 1200) Spoke with pt cousin Hilda. Her  is planning to  patient [phone]) pt wheeled downstairs for discharge.  Walker sent with pt

## 2020-08-31 NOTE — TELEPHONE ENCOUNTER
20 4:46 PM     Patient contacted regarding recent discharge and COVID-19 risk. Discussed COVID-19 related testing which was available at this time. Test results were negative. Patient informed of results, if available? yes    Care Transition Nurse/ Ambulatory Care Manager/ LPN Care Coordinator contacted the patient by telephone to perform post discharge assessment. Verified name and  with patient as identifiers. Patient has following risk factors of: diabetes, chronic kidney disease and this is all I was able derive in phone call because patient states he doesn't really know. These conditions are listed in his medical record. . CTN/ACM/LPN reviewed discharge instructions, medical action plan and red flags related to discharge diagnosis. Reviewed and educated them on any new and changed medications related to discharge diagnosis. Patient states he needs help with his medications. Spoke with his brother-in-law, Silke Sutherland, who tells me patient has been managing his medications on his own, but I asked if he can be available for the phone visit with Dr. Keanu Rodriguez on Wednesday and he agreed. Advised if Mr. Josse Nash could benefit from [facility] nursing to review medication management to ask Dr. Keanu Rodriguez about this in the visit. Mr. Madonna Jimenes states there is a cousin who lives close and may be able to assist also--he states he will ask her. Advance Care Planning:   Does patient have an Advance Directive: has one on file; unable to review at this time     Education not provided regarding infection prevention, and signs and symptoms of COVID-19 and when to seek medical attention. From CDC: Are you at higher risk for severe illness?  Wash your hands often.  Avoid close contact (6 feet, which is about two arm lengths) with people who are sick.  Put distance between yourself and other people if COVID-19 is spreading in your community.  Clean and disinfect frequently touched surfaces.    Avoid all cruise travel and non-essential air travel.  Call your healthcare professional if you have concerns about COVID-19 and your underlying condition or if you are sick. For more information on steps you can take to protect yourself, see CDC's How to Protect Yourself      Patient/family/caregiver given information for [employer] Loop and agrees to enroll no, doesn't have computer or smartphone  Patient's preferred e-mail:  N/A  Patient's preferred phone number: N/A  Based on Loop alert triggers, patient will be contacted by nurse care manager for worsening symptoms. Plan for follow-up call in 3-5 days based on severity of symptoms and risk factors.

## 2020-09-02 NOTE — PROGRESS NOTES
CC: 
Chief Complaint Patient presents with  
[facility] Follow Up  Chronic Kidney Disease  Diabetes Emil Abrams is a 78 y.o. male who was seen by synchronous (real-time) TELEPHONE CALL technology on 9/2/2020 for Hospital Follow Up; Chronic Kidney Disease; and Diabetes Assessment & Plan:  
Diagnoses and all orders for this visit: 1. Physical debility 
-     REFERRAL TO HOME HEALTH 2. CKD (chronic kidney disease) stage 3, GFR 30-59 ml/min (HCC) 
-     [address] Referral previously given to see nephrology out patient. Will evaluate. Recheck labs in 2-4 weeks. 3. Type 2 diabetes mellitus with stage 3 chronic kidney disease, without long-term current use of insulin ([facility] [address] 
-     [address] Will obtain HBA1C in 2-4 weeks for follow up. Lab Results Component Value Date/Time Hemoglobin A1c 8.9 (H) 08/02/2020 03:30 AM  
 Hemoglobin A1c (POC) 5.5 10/06/2014 09:45 AM  
 
 
4. HTN (hypertension), benign 
-     REFERRAL TO HOME HEALTH 5. Problem with medical care compliance 
-     [address] Pill box and reconciliation of in home medication that align with discharge paperwork. I spent at least 25 minutes on this visit with this established patient. Subjective:  
Mr. Anni Hdz is a 69M with h/o CKD, T2DM, CAD, HTN and complex medical problems due to non-compliance with medical regimen and also learning disability. He is a poor historian and recently admitted to [facility] and than transferred to [facility] due to debility. He was contacted within 48-hours of discharge by virtual NN. Medications were reconciled based on discharge order. Patient reports that he is going to  his [facility] he gets [facility]. He is living at home with his sister and brother-in-law, Candace Castillo and Monico Herminio. His KUMAR was available for the very initial part of the telephone interview. Mr. Alexandra Contreras has poor awareness. He will need [facility] (ordered) to assist with medications, OT/PT and also lab work. He was discharged home on Friday, August 28, 2020. It appears he has not had any of his medications since then? The patient will need help with medication management. Admit date: 8/19/2020 Discharge date and time: 8/28/2020 
  
DISCHARGE DIAGNOSIS: 
Physical debility DM 
CKD CONSULTATIONS: 
IP CONSULT TO NEPHROLOGY 
  
Hospital Course: Excerpted HPI from H&P of Rudolm Angelucci, MD: 
HISTORY OF PRESENT ILLNESS:    
  
78year-old male who has previous history significant for chronic kidney disease, diabetes, CAD, hypertension being transferred to [facility] for physical rehabilitation. He presented to [address] [place] [address] on 8/12 with failure to thrive. He had a prior hospital admission at [facility] from 8/1-8/10 for acute kidney injury. He has chronic medical conditions and noted by home health nurse to be unable to take care of himself and sent to the ER. He was unable to be sent to SNF from ER due to registered sex offender status. During this hospital course, he was treated for metabolic acidosis due to CKD, blood pressure medications were titrated, PT/OT consulted. After 7 day hospital course, he was unable to return to baseline level of functionality and requires more PT/OT. Hence he was transferred to [facility] for continuation of medical care.  
  
Patient  is a poor historian. States that he forgets to take his medications, and was unable to ambulate for quite some time, unable to tell me exactly when decline began. He denies any complains at this time, reports his feet hurt from time to time. Physical debility  
Leg edema, right POA  
Patient presented with inability to care for self at home. Likely due to long standing physical deconditioning due to chronic medical problems.  
  
- PT/OT following - Plan for discharge to home with family, HH tomorrow.  
  
 Chronic bilateral knee pain Likely due to OA.  
  
- low dose Gabapentin 100 mg daily - Acetaminophen 1 g TID 
- Voltaren gel 4 x daily to knees PRN 
  
  
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  
A1c 8.9 % on 8/2/2020  
  
- FSG AC HS, consistent carb diet, hypoglycemia protocol - ELAINE, Alogliptin  
  
  
CKD stage 3/4 POA  
Due to long standing hypertension, T2DM. stable. Baseline creatinine 3.1-3.3.  
  
- Continue iron, erythropoeitin. - Continue losartan 100mg daily, Isosorbide mononitrate 30 mg BID, hydralazine  
- nephrology following - Sodium bicarbonate 1950 mg TID. continue current dose if bicarb > 25 can decrease sodium bicarb to bid dosing  
 - F/U nephrology within 1 month of dc.  
  
  
Coronary artery disease involving native coronary artery of native heart POA:  
Continue Imdur, Plavix  
  
  
HTN (hypertension), benign POA, uncontrolled:  
- Continue Hydralazine, Imdur and Cozaar  
  
  
Anemia due to chronic illness POA:  
deficient in folic acid. Folic acid 4.4.  
- Continue Epoetin, ferrous sulfate.  
- Outpatient follow up for screening colonoscopy.  
  
  
Severe obesity (BMI 35.0-39. 9) with comorbidity POA:  
Counseled on Lifestyle modifications, AHA Diet ,weight loss strategies.  
  
  
Hypothyroidism POA:  
         
Lab Results Component Value Date/Time  
  TSH 0.26 (L) 08/20/2020 03:56 AM  
  
-Continue Levothyroxine  
  
  
BMI 40.10 Morbid obesity Nutrition consult 
  
Code Status: full  
Surrogate Decision Maker: 
sister Antonio Infante ([phone]) 
  
DVT Prophylaxis: Hep SQ 
GI Prophylaxis: not indicated Lines :Era Brain Baseline: ambulatory prior to admission 
  
 
Problem List: 
Patient Active Problem List  
 Diagnosis Date Noted  CKD (chronic kidney disease) stage 4, GFR 15-29 ml/min ([facility]) 08/14/2020  Coronary artery disease involving native coronary artery of native heart 08/14/2020  KELIN (acute kidney injury) ([facility] [address] 08/12/2020  ARF (acute renal failure) ([facility]) 08/02/2020  CKD (chronic kidney disease) stage 3, GFR 30-59 ml/min ([facility]) 08/02/2020  DM type 2 causing CKD stage 3 ([facility]) 08/02/2020  Leg edema, right 08/02/2020  Physical debility 08/02/2020  Severe obesity (BMI 35.0-39. 9) with comorbidity ([facility]) 03/19/2018  Status post insertion of drug eluting coronary artery stent 03/16/2018  Ground glass opacity present on imaging of lung 03/14/2018  Noncompliance with diabetes treatment 02/20/2018  Edema of right ankle 10/31/2017  Anemia due to chronic illness 07/17/2014  Protein calorie malnutrition ([facility]) 06/23/2014  
 HTN (hypertension), benign 06/23/2014 Medications: 
Current Outpatient Medications Medication Sig Dispense Refill  clopidogreL (PLAVIX) 75 mg tab Take 1 Tab by mouth daily. 30 Tab 0  
 ferrous sulfate 325 mg (65 mg iron) tablet Take 1 Tab by mouth two (2) times daily (with meals). 60 Tab 0  
 folic acid (FOLVITE) 1 mg tablet Take 1 Tab by mouth daily. 30 Tab 0  
 alogliptin (NESINA) 6.25 mg tablet Take 1 Tab by mouth daily. 30 Tab 0  
 hydrALAZINE (APRESOLINE) 50 mg tablet Take 1 Tab by mouth three (3) times daily. 90 Tab 0  
 levothyroxine (SYNTHROID) 125 mcg tablet Take 1 Tab by mouth Daily (before breakfast). 30 Tab 0  
 losartan (COZAAR) 50 mg tablet Take 1 Tab by mouth daily. 30 Tab 0  
 isosorbide mononitrate ER (IMDUR) 30 mg tablet Take 1 Tab by mouth two (2) times a day. 60 Tab 0  
 sodium bicarbonate 650 mg tablet Take 2 Tabs by mouth two (2) times a day. 120 Tab 0  
 acetaminophen (TYLENOL) 500 mg tablet Take 2 Tabs by mouth three (3) times daily. 180 Tab 0 Allergies: 
No Known Allergies Past Medical History: 
Past Medical History:  
Diagnosis Date  Anemia  Bronchitis  Chronic kidney disease UTI  Diabetes ([facility])  Gastrointestinal disorder   
 anemia  Hypertension  Kidney disease, chronic, stage II (GFR 60-89 ml/min)  Neurological disorder Metabolic Brain Disorder Past Surgical History: 
Past Surgical History:  
Procedure Laterality Date  HX OTHER SURGICAL    
 never Family History: 
Family History Problem Relation Age of Onset  Diabetes Mother  Cancer Sister Social History: 
Social History Tobacco Use  Smoking status: Former Smoker Packs/day: 0.50 Years: 30.00 Pack years: 15.00 Types: Cigarettes Last attempt to quit: 1994 Years since quittin.6  Smokeless tobacco: Never Used Substance Use Topics  Alcohol use: No  
 
 
Review of Systems A comprehensive review of systems was negative except for that written in the HPI. Objective: There were no vitals taken for this visit. General: alert, cooperative, no distress Mental  status: normal mood, behavior, speech. Thought process is poor, able to follow commands Psychiatric: normal affect, consistent with stated mood, no evidence of hallucinations We discussed the expected course, resolution and complications of the diagnosis(es) in detail. Medication risks, benefits, costs, interactions, and alternatives were discussed as indicated. I advised him to contact the office if his condition worsens, changes or fails to improve as anticipated. He expressed understanding with the diagnosis(es) and plan. Sara Chang, who was evaluated through a patient-initiated, synchronous (real-time) TELEPHONE CALL  encounter, and/or his healthcare decision maker, is aware that it is a billable service, with coverage as determined by his insurance carrier. He provided verbal consent to proceed: Yes, and patient identification was verified. It was conducted pursuant to the emergency declaration under the [facility], [address] [place] [address] authority and the [employer] General Act.  A caregiver was present when appropriate. Ability to conduct physical exam was limited. I was in the office. The patient was at home. Follow-up and Dispositions · Return if symptoms worsen or fail to improve.  
  
 
 
Alberto Leventhal, MD

## 2020-09-04 NOTE — TELEPHONE ENCOUNTER
[address] called to let us know they will be getting to hilda oHrner on wed 9/9/20 they are a bit delayed due to the availability and the holiday.

## 2020-09-08 NOTE — TELEPHONE ENCOUNTER
Colleen with [facility] is calling because they received orders for [facility] for the paitent for PT and OT they need to know if nursing should be included.      Best call back is [phone]

## 2020-09-10 NOTE — TELEPHONE ENCOUNTER
9/10/20 4:47 PM--called patient and spoke to his brother, Jacquelyn Berman, introducing myself again and advising this is just a quick F/U call to pt. Mr. Javier Saavedra is present and we spoke briefly. He tells me the nurse was just in and has set up his medications for the next week. He says he is trying to move around and not sit all day, which I agreed is best and encouraged him to keep up his activity. Thanked him for the information and advised I'll call for a quick final update next week.

## 2020-09-16 NOTE — PROGRESS NOTES
Please also call his skilled nurse - [address] [facility]. Labs for kidney are stable and slightly improved. Blood sugars are better controlled. Note that his TSH was elevated, meaning that his medication may be to low. He is currently on Levothyroxine 125 mcgs. I am going to bump that up to 137 mcgs. Would like to have repeat TSH in about 4-6 weeks.    Thanks,   Dr. Vince Validvia

## 2020-09-16 NOTE — TELEPHONE ENCOUNTER
----- Message from Anais Rosales MD sent at 9/16/2020  9:42 AM EDT -----  Please also call his skilled nurse - [address] [address]. Labs for kidney are stable and slightly improved. Blood sugars are better controlled. Note that his TSH was elevated, meaning that his medication may be to low. He is currently on Levothyroxine 125 mcgs. I am going to bump that up to 137 mcgs. Would like to have repeat TSH in about 4-6 weeks.    Thanks,   Dr. Lora Ceron

## 2020-09-16 NOTE — TELEPHONE ENCOUNTER
Spoke to [employer] and relayed message. She understood and said she would get the orders to do repeat TSH in 4-6wks.

## 2020-09-16 NOTE — TELEPHONE ENCOUNTER
Spoke to Jeniffer and relayed Dr Jakob Lundy message. He understood and said he would  new prescription today.

## 2020-09-17 NOTE — TELEPHONE ENCOUNTER
9/17/20 3:21 PM     Patient resolved from Transition of Care episode on 9/17/20. Discussed COVID-19 related testing which was available at this time. Test results were negative. Patient informed of results, if available? yes     Patient/family has been provided the following resources and education related to COVID-19:                         Signs, symptoms and red flags related to COVID-19            CDC exposure and quarantine guidelines            [employer] exposure contact - [phone]            Contact for their local Department of Health                 Patient currently reports that the following symptoms have improved:  pt states that his feet are not as swollen today. He confirms that his [address] is putting his medications in a pill box for him weekly. He also tells me he has been enrolled in Meals on Wheels with help from the [facility].  I told him how great this is to hear and thanked him and his relative, Mr. Ingrid Ruano, for their time today before we disconnected. No further outreach scheduled with this CTN/ACM/LPN/HC/ MA. Episode of Care resolved. Patient has this CTN/ACM/LPN/HC/MA contact information if future needs arise.

## 2020-10-15 NOTE — TELEPHONE ENCOUNTER
abhishek with [facility] pharmacy stated that pt is now using this pharmacy for all refills - located in [place] at

## 2020-10-21 NOTE — TELEPHONE ENCOUNTER
----- Message from Dimitri Leach sent at 10/21/2020 10:40 AM EDT -----  Regarding: : Dr. Kaley Conway first and last name: Edmond Dorsey  Reason for call: Rx confirmation   Callback required yes/no and why: Yes  Best contact number(s): [phone]  Details to clarify the request: Merlinda Pastel from [facility] is requesting confirmation that rx refill requests were received via fax.

## 2020-10-21 NOTE — TELEPHONE ENCOUNTER
James Sin, I do not see refill request pending in his chart. Please look through incoming faxes rec'd today and verify with the pharmacy if they were rec'd. Thanks.

## 2020-10-24 NOTE — PROGRESS NOTES
Ruthine Spurling,     Mr. Dallas's thyroid function was normal.   Please continue current medication. We will recheck the levels in 2-months.      Thanks,   Dr. Kenton Oviedo

## 2020-12-08 NOTE — TELEPHONE ENCOUNTER
Caller's first and last name: Roselyn Aviles / Friend   Reason for call: Pt in hospital   Callback required yes/no and why: no   Best contact number(s): [phone]   Details to clarify the request: Pt is on respirator in hospital

## 2020-12-08 NOTE — TELEPHONE ENCOUNTER
Spoke to Filiberto Haji and she informed me that Demarcus Gutierrez had been rushed to the ER yesterday. He is at [facility] 4th floor ICU. Pt has Covid, pneumonia, congested heart failure, and had a heart attack this morning. He has also been placed on dialysis as his kidney's have started shutting down. Pt is on a ventilator. I thanked Filiberto Haji for the information and asked for her to please keep us updated. She said she would.

## 2020-12-22 DIAGNOSIS — E02 SUBCLINICAL IODINE-DEFICIENCY HYPOTHYROIDISM: ICD-10-CM

## 2020-12-22 DIAGNOSIS — I10 ESSENTIAL HYPERTENSION: ICD-10-CM

## 2020-12-23 RX ORDER — ISOSORBIDE MONONITRATE 30 MG/1
TABLET, EXTENDED RELEASE ORAL
Qty: 60 TAB | Refills: 0 | OUTPATIENT
Start: 2020-12-23

## 2020-12-23 RX ORDER — LEVOTHYROXINE SODIUM 137 UG/1
TABLET ORAL
Qty: 30 TAB | Refills: 0 | OUTPATIENT
Start: 2020-12-23

## 2020-12-23 NOTE — TELEPHONE ENCOUNTER
Patient, I understand was hospitalized in very serious condition with COVID-19 and if he is at home now, he needs to have a hospital visit follow up to reconcile his medications before I refill them.    Thanks,   Dr. Milady Cool

## 2022-05-03 NOTE — CARDIO/PULMONARY
Cardiac Rehab: Received consult for cardiac education on cardiomyopathy. 76 yo male admitted with SOB (3/12). Per Dr Todd Noonan, dx includes: Acute resp failure - combination of airway disease and Acute decompensated HF. S/P stress test (3/14). LVEF 35-40% by echo (3/13/18). Cardiac Meds:  ACE/ARB - none (admission creat 1.99)  BB - started carvedilol   Statin - none (see lipid panel, 2/20/28)  ASA - none (hx anemia)  Prior to admission meds also included: amlodipine. New PO Cardiac Medications: carvedilol, Imdur and hydralazine. Smoking History: Former smoker (quit 1994). 3/14/2018 Met with Usha Nisa on [facility]. His sister Naty Sarmiento) and brother-in-law Odalis Yoder) were also present. [place] family [place]. He has worked in [employer] in past. Pt is on disability; has cognitive limitations (hx metabolic brain disorder). Discussed pt/family's understanding of pt's current condition and tx plan. Pt indicated he was admitted with a breathing problem. Explained reduced EF. Provided Heart Failure education folder. Reviewed importance of daily weight monitoring and low sodium diet (less than 1500 mg daily). Brother-in-law indicated he will obtain digital scale. Family reported pt eats high salt foods (corned beef, chips, pork skins, pork chops). Pt indicated he likes to eat many kinds of fruit. Pt aware he is a diabetic. Hgb A1c 7.8 (2/20/18); avg daily glucose 177. BMI 37.2. Discussed daily fluid intake. Family reported pt drinks one cup regular coffee; also drinks regular soda, in addition to water, milk & juice. Denied any energy or sports drinks. Medication compliance has been an issue for this patient. Reportedly, pt had run out of his Synthyroid and was waiting for \"a check\" before he obtained refill. Reinforced importance of med compliance. Usha Paula could benefit from ongoing reinforcement on all topics discussed.  He has limited understanding of Pt's son Alec called- wants to make us aware in case pt calls that; Nia has been having trouble understanding how to properly use the CPAP machine, The functions of the CPAP have been explained to Nia many times and she seems to forget. Nothing wrong with the CPAP, nothing wrong that we are doing on our end, or [employer].     Alec would like a call from María. Thanks   his health problems. Encouraged family to assist pt in improving his dietary compliance. All questions were answered. Recommend teaching be provided with family present, due to pt's cognitive limitations.